# Patient Record
Sex: FEMALE | Race: WHITE | Employment: UNEMPLOYED | URBAN - NONMETROPOLITAN AREA
[De-identification: names, ages, dates, MRNs, and addresses within clinical notes are randomized per-mention and may not be internally consistent; named-entity substitution may affect disease eponyms.]

---

## 2017-01-06 ENCOUNTER — OFFICE VISIT (OUTPATIENT)
Dept: FAMILY MEDICINE CLINIC | Age: 52
End: 2017-01-06

## 2017-01-06 VITALS — HEART RATE: 73 BPM | DIASTOLIC BLOOD PRESSURE: 68 MMHG | OXYGEN SATURATION: 98 % | SYSTOLIC BLOOD PRESSURE: 120 MMHG

## 2017-01-06 DIAGNOSIS — M79.7 FIBROMYALGIA: ICD-10-CM

## 2017-01-06 DIAGNOSIS — G44.40 DRUG INDUCED HEADACHE: ICD-10-CM

## 2017-01-06 DIAGNOSIS — G43.519 INTRACTABLE PERSISTENT MIGRAINE AURA WITHOUT CEREBRAL INFARCTION AND WITHOUT STATUS MIGRAINOSUS: Primary | ICD-10-CM

## 2017-01-06 DIAGNOSIS — F51.01 PRIMARY INSOMNIA: ICD-10-CM

## 2017-01-06 DIAGNOSIS — F32.89 DEPRESSIVE DISORDER, NOT ELSEWHERE CLASSIFIED: ICD-10-CM

## 2017-01-06 DIAGNOSIS — I10 ESSENTIAL HYPERTENSION, BENIGN: ICD-10-CM

## 2017-01-06 DIAGNOSIS — R94.31 PROLONGED QT INTERVAL: ICD-10-CM

## 2017-01-06 DIAGNOSIS — T43.011S: ICD-10-CM

## 2017-01-06 DIAGNOSIS — R40.0 SOMNOLENCE: ICD-10-CM

## 2017-01-06 PROCEDURE — 99215 OFFICE O/P EST HI 40 MIN: CPT | Performed by: FAMILY MEDICINE

## 2017-01-06 RX ORDER — PROMETHAZINE HYDROCHLORIDE 25 MG/1
TABLET ORAL
Qty: 30 TABLET | Refills: 0 | Status: CANCELLED | OUTPATIENT
Start: 2017-01-06

## 2017-01-10 ENCOUNTER — TELEPHONE (OUTPATIENT)
Dept: FAMILY MEDICINE CLINIC | Age: 52
End: 2017-01-10

## 2017-01-10 DIAGNOSIS — I10 ESSENTIAL HYPERTENSION, BENIGN: ICD-10-CM

## 2017-01-10 RX ORDER — METOPROLOL SUCCINATE 50 MG/1
TABLET, EXTENDED RELEASE ORAL
Qty: 30 TABLET | Refills: 3 | Status: SHIPPED | OUTPATIENT
Start: 2017-01-10 | End: 2017-07-26 | Stop reason: ALTCHOICE

## 2017-01-10 RX ORDER — POTASSIUM CHLORIDE 750 MG/1
10 CAPSULE, EXTENDED RELEASE ORAL DAILY PRN
Qty: 30 CAPSULE | Refills: 2 | Status: SHIPPED | OUTPATIENT
Start: 2017-01-10 | End: 2017-09-16

## 2017-01-10 RX ORDER — TRIAMTERENE AND HYDROCHLOROTHIAZIDE 37.5; 25 MG/1; MG/1
CAPSULE ORAL
Qty: 60 CAPSULE | Refills: 11 | Status: SHIPPED | OUTPATIENT
Start: 2017-01-10 | End: 2018-03-05

## 2017-01-11 ENCOUNTER — TELEPHONE (OUTPATIENT)
Dept: FAMILY MEDICINE CLINIC | Age: 52
End: 2017-01-11

## 2017-01-11 RX ORDER — METHYLPREDNISOLONE 4 MG/1
TABLET ORAL
Qty: 1 KIT | Refills: 0 | Status: SHIPPED | OUTPATIENT
Start: 2017-01-11 | End: 2017-01-15

## 2017-01-12 ENCOUNTER — OFFICE VISIT (OUTPATIENT)
Dept: NEUROLOGY | Age: 52
End: 2017-01-12

## 2017-01-12 VITALS
OXYGEN SATURATION: 97 % | HEART RATE: 100 BPM | HEIGHT: 63 IN | WEIGHT: 141 LBS | DIASTOLIC BLOOD PRESSURE: 86 MMHG | SYSTOLIC BLOOD PRESSURE: 114 MMHG | BODY MASS INDEX: 24.98 KG/M2

## 2017-01-12 DIAGNOSIS — T50.901D DRUG OVERDOSE, ACCIDENTAL OR UNINTENTIONAL, SUBSEQUENT ENCOUNTER: ICD-10-CM

## 2017-01-12 DIAGNOSIS — F51.05 INSOMNIA DUE TO MENTAL CONDITION: ICD-10-CM

## 2017-01-12 DIAGNOSIS — R56.9 NEW ONSET SEIZURE (HCC): ICD-10-CM

## 2017-01-12 DIAGNOSIS — G93.40 ACUTE ENCEPHALOPATHY: ICD-10-CM

## 2017-01-12 DIAGNOSIS — G43.719 INTRACTABLE CHRONIC MIGRAINE WITHOUT AURA AND WITHOUT STATUS MIGRAINOSUS: Primary | ICD-10-CM

## 2017-01-12 PROCEDURE — 99214 OFFICE O/P EST MOD 30 MIN: CPT | Performed by: PSYCHIATRY & NEUROLOGY

## 2017-01-16 PROBLEM — G93.40 ENCEPHALOPATHY: Status: ACTIVE | Noted: 2017-01-16

## 2017-01-17 ENCOUNTER — TELEPHONE (OUTPATIENT)
Dept: FAMILY MEDICINE CLINIC | Age: 52
End: 2017-01-17

## 2017-01-17 ENCOUNTER — CARE COORDINATION (OUTPATIENT)
Dept: CARE COORDINATION | Age: 52
End: 2017-01-17

## 2017-01-17 DIAGNOSIS — R56.9 NEW ONSET SEIZURE (HCC): Primary | ICD-10-CM

## 2017-01-17 RX ORDER — METOPROLOL SUCCINATE 25 MG/1
TABLET, EXTENDED RELEASE ORAL
Qty: 30 TABLET | Refills: 3 | Status: SHIPPED | OUTPATIENT
Start: 2017-01-17 | End: 2017-12-01 | Stop reason: DRUGHIGH

## 2017-01-25 ENCOUNTER — TELEPHONE (OUTPATIENT)
Dept: FAMILY MEDICINE CLINIC | Age: 52
End: 2017-01-25

## 2017-01-30 RX ORDER — IBUPROFEN 800 MG/1
TABLET ORAL
Qty: 90 TABLET | Refills: 0 | Status: SHIPPED | OUTPATIENT
Start: 2017-01-30 | End: 2017-06-12

## 2017-01-31 ENCOUNTER — TELEPHONE (OUTPATIENT)
Dept: FAMILY MEDICINE CLINIC | Age: 52
End: 2017-01-31

## 2017-02-15 ENCOUNTER — TELEPHONE (OUTPATIENT)
Dept: NEUROLOGY | Age: 52
End: 2017-02-15

## 2017-02-20 ENCOUNTER — CARE COORDINATION (OUTPATIENT)
Dept: CARE COORDINATION | Age: 52
End: 2017-02-20

## 2017-02-20 ENCOUNTER — TELEPHONE (OUTPATIENT)
Dept: FAMILY MEDICINE CLINIC | Age: 52
End: 2017-02-20

## 2017-02-27 ENCOUNTER — TELEPHONE (OUTPATIENT)
Dept: FAMILY MEDICINE CLINIC | Age: 52
End: 2017-02-27

## 2017-02-27 RX ORDER — BUSPIRONE HYDROCHLORIDE 15 MG/1
15 TABLET ORAL 3 TIMES DAILY PRN
Qty: 30 TABLET | Refills: 0 | Status: ON HOLD | OUTPATIENT
Start: 2017-02-27 | End: 2017-04-13 | Stop reason: ALTCHOICE

## 2017-03-29 ENCOUNTER — OFFICE VISIT (OUTPATIENT)
Dept: FAMILY MEDICINE CLINIC | Age: 52
End: 2017-03-29

## 2017-03-29 VITALS
WEIGHT: 147.2 LBS | SYSTOLIC BLOOD PRESSURE: 124 MMHG | OXYGEN SATURATION: 97 % | BODY MASS INDEX: 26.08 KG/M2 | DIASTOLIC BLOOD PRESSURE: 74 MMHG | HEART RATE: 77 BPM | HEIGHT: 63 IN

## 2017-03-29 DIAGNOSIS — R41.82 ALTERED MENTAL STATUS, UNSPECIFIED ALTERED MENTAL STATUS TYPE: Primary | ICD-10-CM

## 2017-03-29 DIAGNOSIS — F32.9 REACTIVE DEPRESSION: ICD-10-CM

## 2017-03-29 DIAGNOSIS — I10 ESSENTIAL HYPERTENSION, BENIGN: ICD-10-CM

## 2017-03-29 DIAGNOSIS — G43.719 INTRACTABLE CHRONIC MIGRAINE WITHOUT AURA AND WITHOUT STATUS MIGRAINOSUS: ICD-10-CM

## 2017-03-29 DIAGNOSIS — G93.40 ENCEPHALOPATHY: ICD-10-CM

## 2017-03-29 DIAGNOSIS — F51.01 PRIMARY INSOMNIA: ICD-10-CM

## 2017-03-29 PROCEDURE — G8484 FLU IMMUNIZE NO ADMIN: HCPCS | Performed by: FAMILY MEDICINE

## 2017-03-29 PROCEDURE — 3014F SCREEN MAMMO DOC REV: CPT | Performed by: FAMILY MEDICINE

## 2017-03-29 PROCEDURE — 4004F PT TOBACCO SCREEN RCVD TLK: CPT | Performed by: FAMILY MEDICINE

## 2017-03-29 PROCEDURE — G8419 CALC BMI OUT NRM PARAM NOF/U: HCPCS | Performed by: FAMILY MEDICINE

## 2017-03-29 PROCEDURE — 99214 OFFICE O/P EST MOD 30 MIN: CPT | Performed by: FAMILY MEDICINE

## 2017-03-29 PROCEDURE — G8598 ASA/ANTIPLAT THER USED: HCPCS | Performed by: FAMILY MEDICINE

## 2017-03-29 PROCEDURE — G8427 DOCREV CUR MEDS BY ELIG CLIN: HCPCS | Performed by: FAMILY MEDICINE

## 2017-04-11 ENCOUNTER — TELEPHONE (OUTPATIENT)
Dept: FAMILY MEDICINE CLINIC | Age: 52
End: 2017-04-11

## 2017-04-12 ENCOUNTER — TELEPHONE (OUTPATIENT)
Dept: FAMILY MEDICINE CLINIC | Age: 52
End: 2017-04-12

## 2017-04-14 ENCOUNTER — CARE COORDINATION (OUTPATIENT)
Dept: CARE COORDINATION | Age: 52
End: 2017-04-14

## 2017-05-03 ENCOUNTER — TELEPHONE (OUTPATIENT)
Dept: FAMILY MEDICINE CLINIC | Age: 52
End: 2017-05-03

## 2017-05-15 ENCOUNTER — TELEPHONE (OUTPATIENT)
Dept: FAMILY MEDICINE CLINIC | Age: 52
End: 2017-05-15

## 2017-06-12 ENCOUNTER — TELEPHONE (OUTPATIENT)
Dept: FAMILY MEDICINE CLINIC | Age: 52
End: 2017-06-12

## 2017-06-13 ENCOUNTER — TELEPHONE (OUTPATIENT)
Dept: FAMILY MEDICINE CLINIC | Age: 52
End: 2017-06-13

## 2017-06-13 ENCOUNTER — HOSPITAL ENCOUNTER (OUTPATIENT)
Dept: OTHER | Age: 52
Discharge: HOME | End: 2017-06-13
Attending: FAMILY MEDICINE | Admitting: FAMILY MEDICINE

## 2017-06-13 DIAGNOSIS — K59.00 CONSTIPATION, UNSPECIFIED CONSTIPATION TYPE: Primary | ICD-10-CM

## 2017-06-13 DIAGNOSIS — K59.00 CONSTIPATION, UNSPECIFIED CONSTIPATION TYPE: ICD-10-CM

## 2017-06-28 RX ORDER — POTASSIUM CHLORIDE 750 MG/1
TABLET, EXTENDED RELEASE ORAL
Qty: 30 TABLET | Refills: 11 | Status: SHIPPED | OUTPATIENT
Start: 2017-06-28 | End: 2018-08-28 | Stop reason: SDUPTHER

## 2017-07-10 RX ORDER — IBUPROFEN 800 MG/1
TABLET ORAL
Qty: 90 TABLET | Refills: 0 | Status: ON HOLD | OUTPATIENT
Start: 2017-07-10 | End: 2017-07-27 | Stop reason: HOSPADM

## 2017-07-13 RX ORDER — METOPROLOL SUCCINATE 50 MG/1
TABLET, EXTENDED RELEASE ORAL
Qty: 90 TABLET | Refills: 0 | Status: SHIPPED | OUTPATIENT
Start: 2017-07-13 | End: 2017-07-26 | Stop reason: ALTCHOICE

## 2017-07-14 ENCOUNTER — TELEPHONE (OUTPATIENT)
Dept: FAMILY MEDICINE CLINIC | Age: 52
End: 2017-07-14

## 2017-07-14 RX ORDER — FLUCONAZOLE 150 MG/1
150 TABLET ORAL ONCE
Qty: 1 TABLET | Refills: 0 | Status: SHIPPED | OUTPATIENT
Start: 2017-07-14 | End: 2017-07-14

## 2017-07-28 ENCOUNTER — CARE COORDINATION (OUTPATIENT)
Dept: CARE COORDINATION | Age: 52
End: 2017-07-28

## 2017-08-14 ENCOUNTER — TELEPHONE (OUTPATIENT)
Dept: FAMILY MEDICINE CLINIC | Age: 52
End: 2017-08-14

## 2017-08-23 ENCOUNTER — OFFICE VISIT (OUTPATIENT)
Dept: FAMILY MEDICINE CLINIC | Age: 52
End: 2017-08-23

## 2017-08-23 VITALS
OXYGEN SATURATION: 98 % | WEIGHT: 155.4 LBS | BODY MASS INDEX: 27.54 KG/M2 | DIASTOLIC BLOOD PRESSURE: 70 MMHG | HEART RATE: 90 BPM | SYSTOLIC BLOOD PRESSURE: 124 MMHG | HEIGHT: 63 IN

## 2017-08-23 DIAGNOSIS — Z23 NEED FOR PROPHYLACTIC VACCINATION AGAINST STREPTOCOCCUS PNEUMONIAE (PNEUMOCOCCUS): ICD-10-CM

## 2017-08-23 DIAGNOSIS — Z12.31 ENCOUNTER FOR SCREENING MAMMOGRAM FOR BREAST CANCER: ICD-10-CM

## 2017-08-23 DIAGNOSIS — I10 ESSENTIAL HYPERTENSION, BENIGN: Primary | ICD-10-CM

## 2017-08-23 DIAGNOSIS — F32.9 REACTIVE DEPRESSION: ICD-10-CM

## 2017-08-23 DIAGNOSIS — Z12.11 COLON CANCER SCREENING: ICD-10-CM

## 2017-08-23 DIAGNOSIS — T50.901S: ICD-10-CM

## 2017-08-23 DIAGNOSIS — Z23 NEED FOR INFLUENZA VACCINATION: ICD-10-CM

## 2017-08-23 DIAGNOSIS — Z13.220 LIPID SCREENING: ICD-10-CM

## 2017-08-23 DIAGNOSIS — G89.29 OTHER CHRONIC PAIN: ICD-10-CM

## 2017-08-23 DIAGNOSIS — F33.9 EPISODE OF RECURRENT MAJOR DEPRESSIVE DISORDER, UNSPECIFIED DEPRESSION EPISODE SEVERITY (HCC): ICD-10-CM

## 2017-08-23 PROBLEM — T50.901A POLYSUBSTANCE OVERDOSE: Status: ACTIVE | Noted: 2017-08-23

## 2017-08-23 PROCEDURE — G0444 DEPRESSION SCREEN ANNUAL: HCPCS | Performed by: FAMILY MEDICINE

## 2017-08-23 PROCEDURE — 90732 PPSV23 VACC 2 YRS+ SUBQ/IM: CPT | Performed by: FAMILY MEDICINE

## 2017-08-23 PROCEDURE — 90471 IMMUNIZATION ADMIN: CPT | Performed by: FAMILY MEDICINE

## 2017-08-23 PROCEDURE — 3014F SCREEN MAMMO DOC REV: CPT | Performed by: FAMILY MEDICINE

## 2017-08-23 PROCEDURE — 3017F COLORECTAL CA SCREEN DOC REV: CPT | Performed by: FAMILY MEDICINE

## 2017-08-23 PROCEDURE — G8427 DOCREV CUR MEDS BY ELIG CLIN: HCPCS | Performed by: FAMILY MEDICINE

## 2017-08-23 PROCEDURE — 1111F DSCHRG MED/CURRENT MED MERGE: CPT | Performed by: FAMILY MEDICINE

## 2017-08-23 PROCEDURE — 4004F PT TOBACCO SCREEN RCVD TLK: CPT | Performed by: FAMILY MEDICINE

## 2017-08-23 PROCEDURE — G8598 ASA/ANTIPLAT THER USED: HCPCS | Performed by: FAMILY MEDICINE

## 2017-08-23 PROCEDURE — 99214 OFFICE O/P EST MOD 30 MIN: CPT | Performed by: FAMILY MEDICINE

## 2017-08-23 PROCEDURE — 90688 IIV4 VACCINE SPLT 0.5 ML IM: CPT | Performed by: FAMILY MEDICINE

## 2017-08-23 PROCEDURE — 90472 IMMUNIZATION ADMIN EACH ADD: CPT | Performed by: FAMILY MEDICINE

## 2017-08-23 PROCEDURE — G8419 CALC BMI OUT NRM PARAM NOF/U: HCPCS | Performed by: FAMILY MEDICINE

## 2017-08-23 RX ORDER — TIZANIDINE 2 MG/1
2 TABLET ORAL EVERY 6 HOURS PRN
Qty: 120 TABLET | Refills: 0 | Status: SHIPPED | OUTPATIENT
Start: 2017-08-23 | End: 2017-09-21 | Stop reason: SDUPTHER

## 2017-08-23 ASSESSMENT — PATIENT HEALTH QUESTIONNAIRE - PHQ9
SUM OF ALL RESPONSES TO PHQ9 QUESTIONS 1 & 2: 6
5. POOR APPETITE OR OVEREATING: 1
8. MOVING OR SPEAKING SO SLOWLY THAT OTHER PEOPLE COULD HAVE NOTICED. OR THE OPPOSITE, BEING SO FIGETY OR RESTLESS THAT YOU HAVE BEEN MOVING AROUND A LOT MORE THAN USUAL: 0
3. TROUBLE FALLING OR STAYING ASLEEP: 3
7. TROUBLE CONCENTRATING ON THINGS, SUCH AS READING THE NEWSPAPER OR WATCHING TELEVISION: 3
6. FEELING BAD ABOUT YOURSELF - OR THAT YOU ARE A FAILURE OR HAVE LET YOURSELF OR YOUR FAMILY DOWN: 1
10. IF YOU CHECKED OFF ANY PROBLEMS, HOW DIFFICULT HAVE THESE PROBLEMS MADE IT FOR YOU TO DO YOUR WORK, TAKE CARE OF THINGS AT HOME, OR GET ALONG WITH OTHER PEOPLE: 3
SUM OF ALL RESPONSES TO PHQ QUESTIONS 1-9: 17
1. LITTLE INTEREST OR PLEASURE IN DOING THINGS: 3
2. FEELING DOWN, DEPRESSED OR HOPELESS: 3
4. FEELING TIRED OR HAVING LITTLE ENERGY: 3
9. THOUGHTS THAT YOU WOULD BE BETTER OFF DEAD, OR OF HURTING YOURSELF: 0

## 2017-09-15 ENCOUNTER — TELEPHONE (OUTPATIENT)
Dept: FAMILY MEDICINE CLINIC | Age: 52
End: 2017-09-15

## 2017-09-21 DIAGNOSIS — G89.29 OTHER CHRONIC PAIN: ICD-10-CM

## 2017-09-21 RX ORDER — TIZANIDINE 2 MG/1
TABLET ORAL
Qty: 120 TABLET | Refills: 0 | Status: SHIPPED | OUTPATIENT
Start: 2017-09-21 | End: 2017-09-25 | Stop reason: SDUPTHER

## 2017-09-25 ENCOUNTER — TELEPHONE (OUTPATIENT)
Dept: FAMILY MEDICINE CLINIC | Age: 52
End: 2017-09-25

## 2017-09-25 DIAGNOSIS — G89.29 OTHER CHRONIC PAIN: ICD-10-CM

## 2017-09-25 RX ORDER — TIZANIDINE 4 MG/1
4 TABLET ORAL EVERY 6 HOURS PRN
Qty: 120 TABLET | Refills: 0 | Status: SHIPPED | OUTPATIENT
Start: 2017-09-25 | End: 2017-10-23 | Stop reason: SDUPTHER

## 2017-10-03 ENCOUNTER — TELEPHONE (OUTPATIENT)
Dept: OTHER | Facility: CLINIC | Age: 52
End: 2017-10-03

## 2017-10-11 ENCOUNTER — TELEPHONE (OUTPATIENT)
Dept: FAMILY MEDICINE CLINIC | Age: 52
End: 2017-10-11

## 2017-10-11 RX ORDER — KETOROLAC TROMETHAMINE 10 MG/1
10 TABLET, FILM COATED ORAL EVERY 6 HOURS PRN
Qty: 20 TABLET | Refills: 0 | Status: SHIPPED | OUTPATIENT
Start: 2017-10-11 | End: 2017-11-14 | Stop reason: SDUPTHER

## 2017-10-12 NOTE — TELEPHONE ENCOUNTER
Would patient like to come in for a toradol injection for her headache? Has she made an appointment with pain management?

## 2017-10-12 NOTE — TELEPHONE ENCOUNTER
I spoke with the pt and she said that the Toradol injections or Phenergan does not work for her. Pt said that her head is congested, she is seeing double and she just feels like her head is ready to split apart. Pt said she has thrown up 5 times today due to the pain. Pt does not have anyone to bring her into the office today, due to her  being at work until and her mom is undergoing chemo treatments. Pt is refusing to go to the ER due to the cost.  Pt says that Dr. Raciel Carlos can not see her till Nov, she has gone to see Dr. Lucía Anguiano before and she does not trust him due to getting a shot from him and it causing her to stroke the next day so she does not want to go back to see him. Pt said that Dr. Kavya Sawyer office has her on a waiting list for if someone cancels their appt. She has gone to see Dr. Cecy Marsh before and she was dismissed from the practice due to them finding a partial crushed up pill in her purse.

## 2017-10-12 NOTE — TELEPHONE ENCOUNTER
Patient called after office hours (not sure how she go to us, [facility] on-call service) reporting migraine episode, requesting something for pain. Instructed try calling again to your office on call service or call in am to schedule an appointment. If headache is different and the worst she ever had in life, go to ER. Pt voiced understanding.

## 2017-10-16 ENCOUNTER — OFFICE VISIT (OUTPATIENT)
Dept: FAMILY MEDICINE CLINIC | Age: 52
End: 2017-10-16

## 2017-10-16 ENCOUNTER — HOSPITAL ENCOUNTER (OUTPATIENT)
Dept: CT IMAGING | Facility: MEDICAL CENTER | Age: 52
Discharge: HOME | End: 2017-10-16
Attending: NURSE PRACTITIONER | Admitting: NURSE PRACTITIONER

## 2017-10-16 VITALS
SYSTOLIC BLOOD PRESSURE: 120 MMHG | HEART RATE: 84 BPM | BODY MASS INDEX: 26.93 KG/M2 | DIASTOLIC BLOOD PRESSURE: 70 MMHG | HEIGHT: 63 IN | WEIGHT: 152 LBS | OXYGEN SATURATION: 97 %

## 2017-10-16 DIAGNOSIS — G43.811 OTHER MIGRAINE WITH STATUS MIGRAINOSUS, INTRACTABLE: ICD-10-CM

## 2017-10-16 DIAGNOSIS — R52 SEVERE PAIN: ICD-10-CM

## 2017-10-16 DIAGNOSIS — Z72.0 TOBACCO ABUSE: ICD-10-CM

## 2017-10-16 DIAGNOSIS — R06.02 SHORTNESS OF BREATH: ICD-10-CM

## 2017-10-16 DIAGNOSIS — R10.11 RIGHT UPPER QUADRANT PAIN: ICD-10-CM

## 2017-10-16 DIAGNOSIS — R11.0 NAUSEA: ICD-10-CM

## 2017-10-16 DIAGNOSIS — R10.11 RIGHT UPPER QUADRANT PAIN: Primary | ICD-10-CM

## 2017-10-16 LAB
A/G RATIO: 1.7 (ref 1.1–2.2)
ALBUMIN SERPL-MCNC: 4.7 G/DL (ref 3.4–5)
ALP BLD-CCNC: 59 U/L (ref 40–129)
ALT SERPL-CCNC: 13 U/L (ref 10–40)
AMYLASE: 38 U/L (ref 25–115)
ANION GAP SERPL CALCULATED.3IONS-SCNC: 14 MMOL/L (ref 3–16)
AST SERPL-CCNC: 27 U/L (ref 15–37)
BASOPHILS ABSOLUTE: 0 K/UL (ref 0–0.2)
BASOPHILS RELATIVE PERCENT: 0.7 %
BILIRUB SERPL-MCNC: <0.2 MG/DL (ref 0–1)
BUN BLDV-MCNC: 8 MG/DL (ref 7–20)
CALCIUM SERPL-MCNC: 9 MG/DL (ref 8.3–10.6)
CHLORIDE BLD-SCNC: 106 MMOL/L (ref 99–110)
CO2: 22 MMOL/L (ref 21–32)
CREAT SERPL-MCNC: 0.8 MG/DL (ref 0.6–1.1)
EOSINOPHILS ABSOLUTE: 0.2 K/UL (ref 0–0.6)
EOSINOPHILS RELATIVE PERCENT: 3.6 %
GFR AFRICAN AMERICAN: >60
GFR NON-AFRICAN AMERICAN: >60
GLOBULIN: 2.7 G/DL
GLUCOSE BLD-MCNC: 79 MG/DL (ref 70–99)
HCT VFR BLD CALC: 37.9 % (ref 36–48)
HEMOGLOBIN: 12.6 G/DL (ref 12–16)
LIPASE: 17 U/L (ref 13–60)
LYMPHOCYTES ABSOLUTE: 2 K/UL (ref 1–5.1)
LYMPHOCYTES RELATIVE PERCENT: 33.1 %
MCH RBC QN AUTO: 29.4 PG (ref 26–34)
MCHC RBC AUTO-ENTMCNC: 33.3 G/DL (ref 31–36)
MCV RBC AUTO: 88.3 FL (ref 80–100)
MONOCYTES ABSOLUTE: 0.4 K/UL (ref 0–1.3)
MONOCYTES RELATIVE PERCENT: 6.8 %
NEUTROPHILS ABSOLUTE: 3.3 K/UL (ref 1.7–7.7)
NEUTROPHILS RELATIVE PERCENT: 55.8 %
PDW BLD-RTO: 14.6 % (ref 12.4–15.4)
PLATELET # BLD: 246 K/UL (ref 135–450)
PMV BLD AUTO: 7.8 FL (ref 5–10.5)
POTASSIUM SERPL-SCNC: 5.2 MMOL/L (ref 3.5–5.1)
RBC # BLD: 4.3 M/UL (ref 4–5.2)
SODIUM BLD-SCNC: 142 MMOL/L (ref 136–145)
TOTAL PROTEIN: 7.4 G/DL (ref 6.4–8.2)
WBC # BLD: 5.9 K/UL (ref 4–11)

## 2017-10-16 PROCEDURE — 3014F SCREEN MAMMO DOC REV: CPT | Performed by: NURSE PRACTITIONER

## 2017-10-16 PROCEDURE — 99214 OFFICE O/P EST MOD 30 MIN: CPT | Performed by: NURSE PRACTITIONER

## 2017-10-16 PROCEDURE — G8419 CALC BMI OUT NRM PARAM NOF/U: HCPCS | Performed by: NURSE PRACTITIONER

## 2017-10-16 PROCEDURE — 3017F COLORECTAL CA SCREEN DOC REV: CPT | Performed by: NURSE PRACTITIONER

## 2017-10-16 PROCEDURE — 4004F PT TOBACCO SCREEN RCVD TLK: CPT | Performed by: NURSE PRACTITIONER

## 2017-10-16 PROCEDURE — G8598 ASA/ANTIPLAT THER USED: HCPCS | Performed by: NURSE PRACTITIONER

## 2017-10-16 PROCEDURE — G8427 DOCREV CUR MEDS BY ELIG CLIN: HCPCS | Performed by: NURSE PRACTITIONER

## 2017-10-16 PROCEDURE — G8484 FLU IMMUNIZE NO ADMIN: HCPCS | Performed by: NURSE PRACTITIONER

## 2017-10-16 RX ORDER — PROCHLORPERAZINE MALEATE 5 MG/1
5 TABLET ORAL EVERY 6 HOURS PRN
Qty: 30 TABLET | Refills: 0 | Status: SHIPPED | OUTPATIENT
Start: 2017-10-16 | End: 2017-10-16 | Stop reason: SDUPTHER

## 2017-10-16 RX ORDER — PROCHLORPERAZINE MALEATE 10 MG
10 TABLET ORAL EVERY 6 HOURS PRN
Qty: 30 TABLET | Refills: 0 | Status: ON HOLD | OUTPATIENT
Start: 2017-10-16 | End: 2017-12-07 | Stop reason: ALTCHOICE

## 2017-10-16 RX ORDER — PREDNISONE 10 MG/1
TABLET ORAL
Qty: 30 TABLET | Refills: 0 | Status: SHIPPED | OUTPATIENT
Start: 2017-10-16 | End: 2017-10-16 | Stop reason: ALTCHOICE

## 2017-10-16 RX ORDER — CLONAZEPAM 2 MG/1
TABLET ORAL
Refills: 1 | Status: ON HOLD | COMMUNITY
Start: 2017-10-10 | End: 2017-12-08 | Stop reason: HOSPADM

## 2017-10-16 RX ORDER — METOPROLOL SUCCINATE 50 MG/1
TABLET, EXTENDED RELEASE ORAL
Qty: 90 TABLET | Refills: 1 | Status: SHIPPED | OUTPATIENT
Start: 2017-10-16 | End: 2018-04-14 | Stop reason: SDUPTHER

## 2017-10-16 ASSESSMENT — ENCOUNTER SYMPTOMS
BLOOD IN STOOL: 0
ABDOMINAL PAIN: 1
CONSTIPATION: 0
VOMITING: 1
DIARRHEA: 0
ABDOMINAL DISTENTION: 0
NAUSEA: 1
SHORTNESS OF BREATH: 1
RECTAL PAIN: 0
ANAL BLEEDING: 0
WHEEZING: 0
APNEA: 0
STRIDOR: 0
CHEST TIGHTNESS: 0
COUGH: 0
CHOKING: 0
BACK PAIN: 1

## 2017-10-16 NOTE — PATIENT INSTRUCTIONS
spots, or sensitivity to bright light or loud noise. Note if the headache occurred near your period. List anything that might have triggered the headache. Triggers may include certain foods (chocolate, cheese, wine) or odors, smoke, bright light, stress, or lack of sleep. · If your doctor has prescribed medicine for your migraines, take it as directed. You may have medicine that you take only when you get a migraine and medicine that you take all the time to help prevent migraines. ¨ If your doctor has prescribed medicine for when you get a headache, take it at the first sign of a migraine, unless your doctor has given you other instructions. ¨ If your doctor has prescribed medicine to prevent migraines, take it exactly as prescribed. Call your doctor if you think you are having a problem with your medicine. · Find healthy ways to deal with stress. Migraines are most common during or right after stressful times. Take time to relax before and after you do something that has caused a migraine in the past.  · Try to keep your muscles relaxed by keeping good posture. Check your jaw, face, neck, and shoulder muscles for tension. Try to relax them. When you sit at a desk, change positions often. And make sure to stretch for 30 seconds each hour. · Get plenty of sleep and exercise. · Eat meals on a regular schedule. Avoid foods and drinks that often trigger migraines. These include chocolate, alcohol (especially red wine and port), aspartame, monosodium glutamate (MSG), and some additives found in foods (such as hot dogs, reddy, cold cuts, aged cheeses, and pickled foods). · Limit caffeine. Don't drink too much coffee, tea, or soda. But don't quit caffeine suddenly. That can also give you migraines. · Do not smoke or allow others to smoke around you. If you need help quitting, talk to your doctor about stop-smoking programs and medicines. These can increase your chances of quitting for good.   · If you are taking

## 2017-10-18 ENCOUNTER — TELEPHONE (OUTPATIENT)
Dept: FAMILY MEDICINE CLINIC | Age: 52
End: 2017-10-18

## 2017-10-18 NOTE — TELEPHONE ENCOUNTER
Pt was wanting to see if we could call her [facility] in to [address] after asking if we could do that she said that her mother threw her last ones away and they were filled on 10/13/17. Explained to her that her insurance would probably not pay for it since she had just gotten it filled so she was wanting to see if we could call her something else for her headaches and the pain in her ribs.

## 2017-10-23 DIAGNOSIS — G89.29 OTHER CHRONIC PAIN: ICD-10-CM

## 2017-10-23 RX ORDER — TIZANIDINE 4 MG/1
TABLET ORAL
Qty: 120 TABLET | Refills: 0 | Status: SHIPPED | OUTPATIENT
Start: 2017-10-23 | End: 2017-11-21 | Stop reason: SDUPTHER

## 2017-11-14 ENCOUNTER — TELEPHONE (OUTPATIENT)
Dept: FAMILY MEDICINE CLINIC | Age: 52
End: 2017-11-14

## 2017-11-14 RX ORDER — KETOROLAC TROMETHAMINE 10 MG/1
10 TABLET, FILM COATED ORAL EVERY 6 HOURS PRN
Qty: 20 TABLET | Refills: 0 | Status: SHIPPED | OUTPATIENT
Start: 2017-11-14 | End: 2017-12-01 | Stop reason: ALTCHOICE

## 2017-11-14 NOTE — TELEPHONE ENCOUNTER
Pt said that she has a migraine said that she does not have a pain doctor yet was wanting to see if she could get something called in to [facility]

## 2017-11-15 ENCOUNTER — TELEPHONE (OUTPATIENT)
Dept: ADMINISTRATIVE | Age: 52
End: 2017-11-15

## 2017-11-15 RX ORDER — PROMETHAZINE HYDROCHLORIDE 25 MG/1
25 TABLET ORAL EVERY 8 HOURS PRN
Qty: 20 TABLET | Refills: 0 | Status: SHIPPED | OUTPATIENT
Start: 2017-11-15 | End: 2017-12-11 | Stop reason: SDUPTHER

## 2017-11-15 NOTE — TELEPHONE ENCOUNTER
Patient called in saying she would like to be prescribed a new medication for her headaches. Her pain medication is not working.

## 2017-11-20 ENCOUNTER — TELEPHONE (OUTPATIENT)
Dept: FAMILY MEDICINE CLINIC | Age: 52
End: 2017-11-20

## 2017-11-20 RX ORDER — BUTALBITAL, ACETAMINOPHEN AND CAFFEINE 300; 40; 50 MG/1; MG/1; MG/1
1 CAPSULE ORAL EVERY 4 HOURS PRN
Qty: 10 CAPSULE | Refills: 0 | Status: SHIPPED | OUTPATIENT
Start: 2017-11-20 | End: 2017-12-01 | Stop reason: ALTCHOICE

## 2017-11-20 NOTE — TELEPHONE ENCOUNTER
Pt said that she is still having the migraine headaches and she said that she can't get in to Dr. Ashlyn Johnson till after piedad and he will not give her anything till he sees her was wanting to see if she could get some stadol called in to [address]

## 2017-11-21 DIAGNOSIS — G89.29 OTHER CHRONIC PAIN: ICD-10-CM

## 2017-11-21 RX ORDER — TIZANIDINE 4 MG/1
TABLET ORAL
Qty: 120 TABLET | Refills: 11 | Status: ON HOLD | OUTPATIENT
Start: 2017-11-21 | End: 2017-12-08 | Stop reason: HOSPADM

## 2017-12-01 ENCOUNTER — OFFICE VISIT (OUTPATIENT)
Dept: FAMILY MEDICINE CLINIC | Age: 52
End: 2017-12-01

## 2017-12-01 VITALS
SYSTOLIC BLOOD PRESSURE: 128 MMHG | BODY MASS INDEX: 28.21 KG/M2 | HEIGHT: 63 IN | HEART RATE: 89 BPM | WEIGHT: 159.2 LBS | OXYGEN SATURATION: 99 % | DIASTOLIC BLOOD PRESSURE: 80 MMHG

## 2017-12-01 DIAGNOSIS — I10 ESSENTIAL HYPERTENSION, BENIGN: Primary | ICD-10-CM

## 2017-12-01 DIAGNOSIS — F32.9 REACTIVE DEPRESSION: ICD-10-CM

## 2017-12-01 DIAGNOSIS — I10 UNCONTROLLED HYPERTENSION: ICD-10-CM

## 2017-12-01 DIAGNOSIS — M79.7 FIBROMYALGIA: ICD-10-CM

## 2017-12-01 DIAGNOSIS — K59.04 CHRONIC IDIOPATHIC CONSTIPATION: ICD-10-CM

## 2017-12-01 DIAGNOSIS — F41.9 CHRONIC ANXIETY: ICD-10-CM

## 2017-12-01 DIAGNOSIS — G43.509 PERSISTENT MIGRAINE AURA WITHOUT CEREBRAL INFARCTION AND WITHOUT STATUS MIGRAINOSUS, NOT INTRACTABLE: ICD-10-CM

## 2017-12-01 DIAGNOSIS — G44.209 TENSION HEADACHE: ICD-10-CM

## 2017-12-01 DIAGNOSIS — F51.01 PRIMARY INSOMNIA: ICD-10-CM

## 2017-12-01 PROCEDURE — 99214 OFFICE O/P EST MOD 30 MIN: CPT | Performed by: FAMILY MEDICINE

## 2017-12-01 PROCEDURE — G8484 FLU IMMUNIZE NO ADMIN: HCPCS | Performed by: FAMILY MEDICINE

## 2017-12-01 PROCEDURE — G8598 ASA/ANTIPLAT THER USED: HCPCS | Performed by: FAMILY MEDICINE

## 2017-12-01 PROCEDURE — G8419 CALC BMI OUT NRM PARAM NOF/U: HCPCS | Performed by: FAMILY MEDICINE

## 2017-12-01 PROCEDURE — 3017F COLORECTAL CA SCREEN DOC REV: CPT | Performed by: FAMILY MEDICINE

## 2017-12-01 PROCEDURE — G8427 DOCREV CUR MEDS BY ELIG CLIN: HCPCS | Performed by: FAMILY MEDICINE

## 2017-12-01 PROCEDURE — 4004F PT TOBACCO SCREEN RCVD TLK: CPT | Performed by: FAMILY MEDICINE

## 2017-12-01 PROCEDURE — 3014F SCREEN MAMMO DOC REV: CPT | Performed by: FAMILY MEDICINE

## 2017-12-01 RX ORDER — BUTALBITAL, ACETAMINOPHEN AND CAFFEINE 300; 40; 50 MG/1; MG/1; MG/1
1 CAPSULE ORAL EVERY 4 HOURS PRN
Qty: 10 CAPSULE | Refills: 0 | Status: ON HOLD | OUTPATIENT
Start: 2017-12-01 | End: 2017-12-08 | Stop reason: HOSPADM

## 2017-12-01 NOTE — PROGRESS NOTES
Rib pain. Pain under right rib area x a few months. She states pain is still present.  Migraine     She states symptoms come and go. The severity is the same, but they do not last as long.  Hypertension     Her bp has been high last week  One reading was 182/117, and another was 200/112. She was very stressed the past few weeks,and sometimes forgets to take her medications. Still has some rib pain since she fell down the steps. She states she thinks she needs Miralax again. Her bowels haven't moved since last Wednesday (over 1 week ago). She will check her formulary book to see what may be cheaper than the Linzess that she tried and it worked well. Her headaches have been lasting fewer days. She is still trying to get into a pain management doctor at Dr Franc [facility] old office. She complains about hot flashes, she may try the OTC Estrovan. HPI  Joy Lynn had a bad migraine about 3 weeks ago and had to sit in her closet for complete darkness. She did find the Fioricet capsules somewhat useful. She is found a new pain specialist.  Janet Infante if she can have some more Fioricet until she gets in with the pain specialist.  Her ribs still hurt, they have for several months. Her CAT scan in October however showed no rib fracture. Blood pressure was elevated 182/117 last week. She's had a good deal stress between her own health condition and trying to take care of her mother and father. Is here for high blood pressure. Watching salt intake. Blood pressure checked at home - yes. Numbers good if checked? No.  Denies chest pain. Denies shortness of breath. Taking medications as prescribed. HX: (> 3 status chronic/inact. Prob) or  Wt Readings from Last 3 Encounters:   12/01/17 159 lb 3.2 oz (72.2 kg)   10/16/17 152 lb (68.9 kg)   10/12/17 145 lb (65.8 kg)       Occupation Retired              HPI:  (>4 )  LOCATION:  QUALITY:SEVERITY:  DURATION:  TIMING:  CONTEXT:  MOD. FACTORS:  ASSOC. S/S:    Pertinent items are noted in HPI.  (+/- 2-9 systems)  ROS  All other systems reviewed and are negative except as noted above  Additional review of systems may be scanned into the media section of this medical record. Any responses requiring further intervention were pursued.     Past Medical History:   Diagnosis Date    Ankle fracture     Anxiety     Arthritis     Benzodiazepine abuse     Depression     Fibromyalgia     Hyperlipidemia     Hypertension     Kidney cyst     x2 rt. ?    Migraine     Pneumonia 2008    bronchitis frequently since    TIA (transient ischemic attack)     Ulcer, Stomach Peptic     frequent N/V    Unspecified cerebral artery occlusion with cerebral infarction      mini stroke cused by severe migraine    Weight loss     frequent N/V, S/P cholecystectomy       Family History   Problem Relation Age of Onset    High Blood Pressure Mother     Heart Disease Mother     Diabetes Maternal Grandfather     Heart Disease Father     Heart Disease Brother        Social History   Substance Use Topics    Smoking status: Light Tobacco Smoker     Packs/day: 0.50     Years: 27.00     Types: Cigarettes    Smokeless tobacco: Never Used    Alcohol use 0.0 oz/week      Comment: less than once a month         Past Surgical History:   Procedure Laterality Date    ANKLE SURGERY       SECTION      CHOLECYSTECTOMY      HAND SURGERY      HERNIA REPAIR      HYSTERECTOMY      TUMOR REMOVAL      lt femur       Immunization History   Administered Date(s) Administered    Influenza Virus Vaccine 09/10/2014    InfluenzaMichael, 3 Years and older, IM 2016, 2017    Pneumococcal 13-valent Conjugate ([id number]) 2013    Pneumococcal Polysaccharide ([id number]) 2017    Tdap (Boostrix, Adacel) 10/06/2012       Vitals:    17 1612   BP: 128/80   Site: Left Arm   Position: Sitting   Cuff Size: Large Adult   Pulse: 89   SpO2: 99%   Weight: 159 lb 3.2 oz (72.2 kg)   Height: 5' 3\" (1.6 m)       Estimated body mass index is 28.2 kg/m² as calculated from the following:    Height as of this encounter: 5' 3\" (1.6 m). Weight as of this encounter: 159 lb 3.2 oz (72.2 kg).   OBJECTIVE:    /80 (Site: Left Arm, Position: Sitting, Cuff Size: Large Adult)   Pulse 89   Ht 5' 3\" (1.6 m)   Wt 159 lb 3.2 oz (72.2 kg)   SpO2 99%   BMI 28.20 kg/m²   BP Readings from Last 2 Encounters:   12/01/17 128/80   11/12/17 (!) 119/98     Lab Review   Admission on 11/12/2017, Discharged on 11/12/2017   Component Date Value    WBC 11/12/2017 6.0     RBC 11/12/2017 3.51*    Hemoglobin 11/12/2017 10.5*    Hematocrit 11/12/2017 31.2*    MCV 11/12/2017 88.9     MCH 11/12/2017 29.9     MCHC 11/12/2017 33.6     RDW 11/12/2017 14.1     Platelets 52/96/2494 229     MPV 11/12/2017 7.2     Neutrophils % 11/12/2017 57.1     Lymphocytes % 11/12/2017 32.4     Monocytes % 11/12/2017 5.6     Eosinophils % 11/12/2017 4.0     Basophils % 11/12/2017 0.9     Neutrophils # 11/12/2017 3.4     Lymphocytes # 11/12/2017 2.0     Monocytes # 11/12/2017 0.3     Eosinophils # 11/12/2017 0.2     Basophils # 11/12/2017 0.1     Sodium 11/12/2017 142     Potassium 11/12/2017 4.4     Chloride 11/12/2017 109     CO2 11/12/2017 25     Anion Gap 11/12/2017 8     Glucose 11/12/2017 81     BUN 11/12/2017 17     CREATININE 11/12/2017 0.8     GFR Non- 11/12/2017 >60     GFR  11/12/2017 >60     Calcium 11/12/2017 8.5     Total Protein 11/12/2017 6.1*    Alb 11/12/2017 3.7     Albumin/Globulin Ratio 11/12/2017 1.5     Total Bilirubin 11/12/2017 <0.2     Alkaline Phosphatase 11/12/2017 53     ALT 11/12/2017 14     AST 11/12/2017 15     Globulin 11/12/2017 2.4     Amphetamine Screen, Urine 11/12/2017 Neg     Barbiturate Screen, Ur 11/12/2017 Neg     Benzodiazepine Screen, U* 11/12/2017 POSITIVE*    Cannabinoid Scrn, Ur 11/12/2017 Neg     below     Oxycodone Urine 10/14/2017 Neg     Ventricular Rate 10/16/2017 63     Atrial Rate 10/16/2017 63     P-R Interval 10/16/2017 164     QRS Duration 10/16/2017 88     Q-T Interval 10/16/2017 450     QTc Calculation (Bazett) 10/16/2017 460     P Axis 10/16/2017 63     R Axis 10/16/2017 28     T Axis 10/16/2017 31     Diagnosis 10/16/2017                      Value:Normal sinus rhythm  Normal ECG  When compared with ECG of 17-SEP-2017 20:43,  No significant change was found  Confirmed by HOOD SAMSON MD ([id number]) on 10/14/2017 1:55:41 PM     Admission on 10/12/2017, Discharged on 10/13/2017   Component Date Value    Color, UA 10/12/2017 Yellow     Clarity, UA 10/12/2017 Clear     Glucose, Ur 10/12/2017 Negative     Bilirubin Urine 10/12/2017 Negative     Ketones, Urine 10/12/2017 Negative     Specific Gravity, UA 10/12/2017 <=1.005     Blood, Urine 10/12/2017 TRACE-INTACT*    pH, UA 10/12/2017 6.0     Protein, UA 10/12/2017 Negative     Urobilinogen, Urine 10/12/2017 0.2     Nitrite, Urine 10/12/2017 Negative     Leukocyte Esterase, Urine 10/12/2017 Negative     Microscopic Examination 10/12/2017 YES     Urine Reflex to Culture 10/12/2017 Not Indicated     Urine Type 10/12/2017 Not Specified     Sodium 10/12/2017 142     Potassium 10/12/2017 3.6     Chloride 10/12/2017 105     CO2 10/12/2017 24     Anion Gap 10/12/2017 13     Glucose 10/12/2017 80     BUN 10/12/2017 13     CREATININE 10/12/2017 0.7     GFR Non- 10/12/2017 >60     GFR  10/12/2017 >60     Calcium 10/12/2017 9.1     Total Protein 10/12/2017 7.0     Alb 10/12/2017 4.2     Albumin/Globulin Ratio 10/12/2017 1.5     Total Bilirubin 10/12/2017 0.4     Alkaline Phosphatase 10/12/2017 50     ALT 10/12/2017 6*    AST 10/12/2017 14*    Globulin 10/12/2017 2.8     Total CK 10/12/2017 92     WBC, UA 10/12/2017 0-2     RBC, UA 10/12/2017 0-2     Epi Cells 10/12/2017 3-5     Bacteria, UA 10/12/2017 1+*   Admission on 09/17/2017, Discharged on 09/17/2017   Component Date Value    Ventricular Rate 09/18/2017 68     Atrial Rate 09/18/2017 68     P-R Interval 09/18/2017 196     QRS Duration 09/18/2017 96     Q-T Interval 09/18/2017 434     QTc Calculation (Bazett) 09/18/2017 461     P Axis 09/18/2017 65     R Axis 09/18/2017 17     T Axis 09/18/2017 55     Diagnosis 09/18/2017                      Value:Normal sinus rhythm  Normal ECG  No previous ECGs available  Confirmed by PHYLLIS LAURENT MD ([id number]) on 9/18/2017 8:02:35 AM     Admission on 07/26/2017, Discharged on 07/27/2017   Component Date Value    Color, UA 07/26/2017 Yellow     Clarity, UA 07/26/2017 Clear     Glucose, Ur 07/26/2017 Negative     Bilirubin Urine 07/26/2017 Negative     Ketones, Urine 07/26/2017 Negative     Specific Gravity, UA 07/26/2017 1.010     Blood, Urine 07/26/2017 Negative     pH, UA 07/26/2017 6.5     Protein, UA 07/26/2017 Negative     Urobilinogen, Urine 07/26/2017 0.2     Nitrite, Urine 07/26/2017 Negative     Leukocyte Esterase, Urine 07/26/2017 Negative     Microscopic Examination 07/26/2017 Not Indicated     Urine Type 07/26/2017 Voided     Ventricular Rate 07/28/2017 71     Atrial Rate 07/28/2017 71     P-R Interval 07/28/2017 186     QRS Duration 07/28/2017 100     Q-T Interval 07/28/2017 440     QTc Calculation (Bazett) 07/28/2017 478     P Axis 07/28/2017 24     R Axis 07/28/2017 -16     T Axis 07/28/2017 18     Diagnosis 07/28/2017                      Value: Poor data quality, interpretation may be adversely affected  Normal sinus rhythm  Moderate voltage criteria for LVH, may be normal variant  Borderline ECG  When compared with ECG of 13-APR-2017 22:28,  No significant change was found  Confirmed by Angela Chavis MD, [address] ([id number]) on 7/27/2017 7:25:59 AM      WBC 07/26/2017 5.6     RBC 07/26/2017 4.31     Hemoglobin 07/26/2017 13.0     Hematocrit 07/26/2017 38.6

## 2017-12-07 PROBLEM — T50.901A DRUG OVERDOSE: Status: ACTIVE | Noted: 2017-12-07

## 2017-12-11 ENCOUNTER — TELEPHONE (OUTPATIENT)
Dept: FAMILY MEDICINE CLINIC | Age: 52
End: 2017-12-11

## 2017-12-11 RX ORDER — FLUCONAZOLE 100 MG/1
100 TABLET ORAL DAILY
Qty: 1 TABLET | Refills: 0 | Status: SHIPPED | OUTPATIENT
Start: 2017-12-11 | End: 2017-12-12

## 2017-12-11 RX ORDER — PROMETHAZINE HYDROCHLORIDE 25 MG/1
25 TABLET ORAL EVERY 8 HOURS PRN
Qty: 20 TABLET | Refills: 0 | Status: SHIPPED | OUTPATIENT
Start: 2017-12-11 | End: 2017-12-13

## 2017-12-13 ENCOUNTER — TELEPHONE (OUTPATIENT)
Dept: FAMILY MEDICINE CLINIC | Age: 52
End: 2017-12-13

## 2017-12-20 ENCOUNTER — TELEPHONE (OUTPATIENT)
Dept: FAMILY MEDICINE CLINIC | Age: 52
End: 2017-12-20

## 2017-12-20 NOTE — TELEPHONE ENCOUNTER
Maura Luna stopped in this morning for a BP check. It was 140/98 on her left arm. She said she felt dizzy and had a headache. I told her if she starts to get any chest pains to go to the Er.

## 2018-01-08 ENCOUNTER — OFFICE VISIT (OUTPATIENT)
Dept: FAMILY MEDICINE CLINIC | Age: 53
End: 2018-01-08

## 2018-01-08 VITALS
DIASTOLIC BLOOD PRESSURE: 70 MMHG | HEART RATE: 98 BPM | SYSTOLIC BLOOD PRESSURE: 112 MMHG | WEIGHT: 160.4 LBS | BODY MASS INDEX: 28.41 KG/M2 | OXYGEN SATURATION: 96 % | TEMPERATURE: 99.3 F

## 2018-01-08 DIAGNOSIS — R11.0 NAUSEA: ICD-10-CM

## 2018-01-08 DIAGNOSIS — R68.89 FLU-LIKE SYMPTOMS: Primary | ICD-10-CM

## 2018-01-08 DIAGNOSIS — G43.701 CHRONIC MIGRAINE WITHOUT AURA WITH STATUS MIGRAINOSUS, NOT INTRACTABLE: ICD-10-CM

## 2018-01-08 DIAGNOSIS — R06.2 WHEEZING: ICD-10-CM

## 2018-01-08 DIAGNOSIS — Z72.0 TOBACCO ABUSE: ICD-10-CM

## 2018-01-08 DIAGNOSIS — J11.1 INFLUENZA: ICD-10-CM

## 2018-01-08 DIAGNOSIS — R05.9 COUGH: ICD-10-CM

## 2018-01-08 LAB
INFLUENZA A ANTIBODY: NORMAL
INFLUENZA B ANTIBODY: NORMAL

## 2018-01-08 PROCEDURE — 87804 INFLUENZA ASSAY W/OPTIC: CPT | Performed by: NURSE PRACTITIONER

## 2018-01-08 PROCEDURE — 94640 AIRWAY INHALATION TREATMENT: CPT | Performed by: NURSE PRACTITIONER

## 2018-01-08 PROCEDURE — 99214 OFFICE O/P EST MOD 30 MIN: CPT | Performed by: NURSE PRACTITIONER

## 2018-01-08 RX ORDER — ALBUTEROL SULFATE 2.5 MG/3ML
2.5 SOLUTION RESPIRATORY (INHALATION) ONCE
Status: COMPLETED | OUTPATIENT
Start: 2018-01-08 | End: 2018-01-08

## 2018-01-08 RX ORDER — BENZONATATE 100 MG/1
200 CAPSULE ORAL 3 TIMES DAILY PRN
Qty: 30 CAPSULE | Refills: 0 | Status: SHIPPED | OUTPATIENT
Start: 2018-01-08 | End: 2018-03-05 | Stop reason: ALTCHOICE

## 2018-01-08 RX ORDER — PREDNISONE 20 MG/1
40 TABLET ORAL DAILY
Qty: 7 TABLET | Refills: 0 | Status: SHIPPED | OUTPATIENT
Start: 2018-01-08 | End: 2018-03-17 | Stop reason: ALTCHOICE

## 2018-01-08 RX ORDER — ONDANSETRON 4 MG/1
4 TABLET, ORALLY DISINTEGRATING ORAL EVERY 8 HOURS PRN
Qty: 30 TABLET | Refills: 0 | Status: SHIPPED | OUTPATIENT
Start: 2018-01-08 | End: 2018-03-05

## 2018-01-08 RX ORDER — OSELTAMIVIR PHOSPHATE 75 MG/1
75 CAPSULE ORAL 2 TIMES DAILY
Qty: 10 CAPSULE | Refills: 0 | Status: SHIPPED | OUTPATIENT
Start: 2018-01-08 | End: 2018-01-13

## 2018-01-08 RX ADMIN — ALBUTEROL SULFATE 2.5 MG: 2.5 SOLUTION RESPIRATORY (INHALATION) at 16:26

## 2018-01-08 RX ADMIN — Medication 0.5 MG: at 16:27

## 2018-01-08 ASSESSMENT — ENCOUNTER SYMPTOMS
NAUSEA: 1
EYES NEGATIVE: 1
WHEEZING: 1
FACIAL SWELLING: 0
ABDOMINAL DISTENTION: 0
VOICE CHANGE: 0
COUGH: 1
CHOKING: 0
CONSTIPATION: 0
VOMITING: 1
RHINORRHEA: 1
SORE THROAT: 1
ANAL BLEEDING: 0
SINUS PRESSURE: 1
BLOOD IN STOOL: 0
CHEST TIGHTNESS: 1
TROUBLE SWALLOWING: 0
STRIDOR: 0
DIARRHEA: 0
SINUS PAIN: 1
ABDOMINAL PAIN: 0
SHORTNESS OF BREATH: 1
APNEA: 0
RECTAL PAIN: 0

## 2018-01-08 NOTE — PROGRESS NOTES
Kidney cyst     x2 rt. ?    Migraine     Pneumonia 2008    bronchitis frequently since    TIA (transient ischemic attack)     Ulcer, Stomach Peptic     frequent N/V    Unspecified cerebral artery occlusion with cerebral infarction 2007     mini stroke cused by severe migraine    Weight loss     frequent N/V, S/P cholecystectomy      Past Surgical History:   Procedure Laterality Date    ANKLE SURGERY       SECTION      CHOLECYSTECTOMY      HAND SURGERY  2005    HERNIA REPAIR      HYSTERECTOMY      TUMOR REMOVAL      lt femur       Family History   Problem Relation Age of Onset    High Blood Pressure Mother     Heart Disease Mother     Diabetes Maternal Grandfather     Heart Disease Father     Heart Disease Brother        Social History   Substance Use Topics    Smoking status: Light Tobacco Smoker     Packs/day: 0.50     Years: 27.00     Types: Cigarettes    Smokeless tobacco: Never Used    Alcohol use 0.0 oz/week      Comment: less than once a month      Current Outpatient Prescriptions   Medication Sig Dispense Refill    oseltamivir (TAMIFLU) 75 MG capsule Take 1 capsule by mouth 2 times daily for 5 days 10 capsule 0    predniSONE (DELTASONE) 20 MG tablet Take 2 tablets by mouth daily 7 tablet 0    benzonatate (TESSALON PERLES) 100 MG capsule Take 2 capsules by mouth 3 times daily as needed for Cough 30 capsule 0    ondansetron (ZOFRAN ODT) 4 MG disintegrating tablet Take 1 tablet by mouth every 8 hours as needed for Nausea or Vomiting 30 tablet 0    tiZANidine (ZANAFLEX) 2 MG tablet Take 2 mg by mouth every 6 hours as needed      LORazepam (ATIVAN) 1 MG tablet Take 1 mg by mouth every 6 hours as needed for Anxiety .  clonazePAM (KLONOPIN) 1 MG tablet Take 2 mg by mouth 2 times daily as needed .       Butalbital-APAP-Caffeine (FIORICET PO) Take by mouth      metoprolol succinate (TOPROL XL) 50 MG extended release tablet 1 tab daily 90 tablet 1    potassium chloride (KLOR-CON M) 10 MEQ extended release tablet TAKE 1 TABLET ONCE A DAY (Patient taking differently: TAKE 1 TABLET ONCE A DAY prn) 30 tablet 11    ibuprofen (ADVIL;MOTRIN) 600 MG tablet Take 1 tablet by mouth every 6 hours as needed for Pain 20 tablet 3    zolpidem (AMBIEN) 10 MG tablet Take by mouth nightly as needed for Sleep      amitriptyline (ELAVIL) 100 MG tablet Take 100 mg by mouth nightly      triamterene-hydrochlorothiazide (DYAZIDE) 37.5-25 MG per capsule Take 2 capsules daily (Patient taking differently: 1 capsule daily as needed Indications: take for HTN as needed Take 2 capsules daily) 60 capsule 11    DULoxetine (CYMBALTA) 60 MG extended release capsule TAKE 1 CAPSULE TWICE DAILY 60 capsule 11     No current facility-administered medications for this visit. Allergies   Allergen Reactions    Benadryl [Diphenhydramine] Anxiety     \"Made me jump off the wall\"    Topamax [Topiramate] Hives    Tramadol Shortness Of Breath    Vitamin C [Ascorbic Acid] Hives    Lyrica [Pregabalin] Palpitations       Subjective:      Review of Systems   Constitutional: Positive for activity change (R/t faigue), appetite change, chills, fatigue and fever (Up to 102 ). Negative for diaphoresis and unexpected weight change. HENT: Positive for congestion (Chest ), ear discharge (RIght ear fullness), postnasal drip, rhinorrhea, sinus pain, sinus pressure, sneezing and sore throat. Negative for dental problem, drooling, ear pain, facial swelling, hearing loss, mouth sores, nosebleeds, tinnitus, trouble swallowing and voice change. Eyes: Negative. Respiratory: Positive for cough (Dry, strong), chest tightness, shortness of breath and wheezing. Negative for apnea, choking and stridor. Cardiovascular: Negative. Gastrointestinal: Positive for nausea and vomiting (X 1 times ). Negative for abdominal distention, abdominal pain, anal bleeding, blood in stool, constipation, diarrhea and rectal pain.    Endocrine: mmol/L Final    Potassium 12/14/2017 3.7  3.5 - 5.1 mmol/L Final    Chloride 12/14/2017 105  99 - 110 mmol/L Final    CO2 12/14/2017 29  21 - 32 mmol/L Final    Anion Gap 12/14/2017 11  3 - 16 Final    Glucose 12/14/2017 97  70 - 99 mg/dL Final    BUN 12/14/2017 21* 7 - 20 mg/dL Final    CREATININE 12/14/2017 1.0  0.6 - 1.1 mg/dL Final    GFR Non- 12/14/2017 58* >60 Final    Comment: >60 mL/min/1.73m2 EGFR, calc. for ages 25 and older using the  MDRD formula (not corrected for weight), is valid for stable  renal function.  GFR  12/14/2017 >60  >60 Final    Comment: Chronic Kidney Disease: less than 60 ml/min/1.73 sq.m. Kidney Failure: less than 15 ml/min/1.73 sq.m. Results valid for patients 18 years and older.       Calcium 12/14/2017 10.4  8.3 - 10.6 mg/dL Final    Total Protein 12/14/2017 7.1  6.4 - 8.2 g/dL Final    Alb 12/14/2017 4.3  3.4 - 5.0 g/dL Final    Albumin/Globulin Ratio 12/14/2017 1.5  1.1 - 2.2 Final    Total Bilirubin 12/14/2017 <0.2  0.0 - 1.0 mg/dL Final    Alkaline Phosphatase 12/14/2017 63  40 - 129 U/L Final    ALT 12/14/2017 7* 10 - 40 U/L Final    AST 12/14/2017 12* 15 - 37 U/L Final    Globulin 12/14/2017 2.8  g/dL Final    Ventricular Rate 12/15/2017 71  BPM Final    Atrial Rate 12/15/2017 71  BPM Final    P-R Interval 12/15/2017 200  ms Final    QRS Duration 12/15/2017 96  ms Final    Q-T Interval 12/15/2017 424  ms Final    QTc Calculation (Bazett) 12/15/2017 460  ms Final    P Axis 12/15/2017 53  degrees Final    R Axis 12/15/2017 2  degrees Final    T Axis 12/15/2017 58  degrees Final    Diagnosis 12/15/2017    Final                    Value:Normal sinus rhythm  Normal ECG  When compared with ECG of 13-DEC-2017 02:41,  No significant change was found  Confirmed by Domenic Gómez MD, HOOD ([id number]) on 12/15/2017 5:31:28 AM      Lactic Acid 12/14/2017 0.8  0.4 - 2.0 mmol/L Final    Color, UA 12/14/2017 Yellow  Straw/Yellow Final    Clarity, UA 12/14/2017 Clear  Clear Final    Glucose, Ur 12/14/2017 Negative  Negative mg/dL Final    Bilirubin Urine 12/14/2017 Negative  Negative Final    Ketones, Urine 12/14/2017 TRACE* Negative mg/dL Final    Specific Gravity, UA 12/14/2017 >=1.030  1.005 - 1.030 Final    Blood, Urine 12/14/2017 Negative  Negative Final    pH, UA 12/14/2017 6.0  5.0 - 8.0 Final    Protein, UA 12/14/2017 Negative  Negative mg/dL Final    Urobilinogen, Urine 12/14/2017 0.2  <2.0 E.U./dL Final    Nitrite, Urine 12/14/2017 Negative  Negative Final    Leukocyte Esterase, Urine 12/14/2017 Negative  Negative Final    Microscopic Examination 12/14/2017 Not Indicated   Final    Urine Reflex to Culture 12/14/2017 Not Indicated   Final    Urine Type 12/14/2017 Not Specified   Final    Protime 12/14/2017 12.1  9.6 - 13.0 sec Final    Comment: Effective 02-15-17 9:00am EST  Please note reference ranges have  changed for PT and INR Testing.  INR 12/14/2017 1.04  0.85 - 1.15 Final    Comment: Effective 08/01/2017 at 3pm EST    Normal: 0.86 - 1.14  Therapeutic: 2.0 - 3.0  Pros. Valve: 2.5 - 3.5  AMI: 2.0 - 3.0      Total CK 12/14/2017 53  26 - 192 U/L Final           Assessment & Plan: The following diagnoses and conditions are stable with no further orders unless indicated:  1. Flu-like symptoms    2. Tobacco abuse    3. Cough    4. Chronic migraine without aura with status migrainosus, not intractable    5. Wheezing    6. Influenza    7. Nausea        Shanae Baugh was seen today for cough, fever and migraine. Will treat for influenza. Tamiflu, tessalon perles, rest, increased fluids, and small frequent meals. She may take zofran as needed for nausea. Recommend smoking cessation. Prednisone for her wheezing. She most likely has undiagnosed COPD. She is to follow up with neurology and pain management for her migraines and chronic pain syndrome.     Diagnoses and all orders for this visit:    Flu-like symptoms  -     POCT Influenza A/B    Tobacco abuse    Cough  -     benzonatate (TESSALON PERLES) 100 MG capsule; Take 2 capsules by mouth 3 times daily as needed for Cough    Chronic migraine without aura with status migrainosus, not intractable    Wheezing  -     albuterol (PROVENTIL) nebulizer solution 2.5 mg; Take 3 mLs by nebulization once  -     ipratropium (ATROVENT) 0.02 % nebulizer solution 0.5 mg; Take 2.5 mLs by nebulization once  -     predniSONE (DELTASONE) 20 MG tablet; Take 2 tablets by mouth daily    Influenza  -     oseltamivir (TAMIFLU) 75 MG capsule; Take 1 capsule by mouth 2 times daily for 5 days    Nausea  -     ondansetron (ZOFRAN ODT) 4 MG disintegrating tablet; Take 1 tablet by mouth every 8 hours as needed for Nausea or Vomiting        Return if symptoms worsen or fail to improve, for Follow up with PCP for further managment . Patient should call the office immediately with new or ongoing signs or symptoms or worsening, or proceed to the emergency room. If you are on medications which could impair your senses, you are at risk of weakness, falls, dizziness, or drowsiness. You should be careful during activities which could place you at risk of harm, such as climbing, using stairs, operating machinery, or driving vehicles. If you feel you cannot safely do these activities, you should request others to help you, or avoid the activities altogether. If you are drowsy for any other reason, you should use the same precautions as listed above. Call if pattern of symptoms change or persists for an extended time. Tobacco abuse: Patient was counseled about stopping tobacco use. Discussed harmful effects of continued tobacco use including COPD, cardiovascular disease, and/or death. Discussed the increased risk of pneumonia as well as the potential for substantial decline in lung function.  The following recommendations were given to improve chances of quittin)

## 2018-01-08 NOTE — PATIENT INSTRUCTIONS
Patient Education        Cough: Care Instructions  Your Care Instructions    A cough is your body's response to something that bothers your throat or airways. Many things can cause a cough. You might cough because of a cold or the flu, bronchitis, or asthma. Smoking, postnasal drip, allergies, and stomach acid that backs up into your throat also can cause coughs. A cough is a symptom, not a disease. Most coughs stop when the cause, such as a cold, goes away. You can take a few steps at home to cough less and feel better. Follow-up care is a key part of your treatment and safety. Be sure to make and go to all appointments, and call your doctor if you are having problems. It's also a good idea to know your test results and keep a list of the medicines you take. How can you care for yourself at home? · Drink lots of water and other fluids. This helps thin the mucus and soothes a dry or sore throat. Honey or lemon juice in hot water or tea may ease a dry cough. · Take cough medicine as directed by your doctor. · Prop up your head on pillows to help you breathe and ease a dry cough. · Try cough drops to soothe a dry or sore throat. Cough drops don't stop a cough. Medicine-flavored cough drops are no better than candy-flavored drops or hard candy. · Do not smoke. Avoid secondhand smoke. If you need help quitting, talk to your doctor about stop-smoking programs and medicines. These can increase your chances of quitting for good. When should you call for help? Call 911 anytime you think you may need emergency care. For example, call if:  ? · You have severe trouble breathing. ?Call your doctor now or seek immediate medical care if:  ? · You cough up blood. ? · You have new or worse trouble breathing. ? · You have a new or higher fever. ? · You have a new rash. ? Watch closely for changes in your health, and be sure to contact your doctor if:  ? · You cough more deeply or more often, especially if you notice that you have more energy than when you smoked. After 2 weeks  · Your lungs start to work better. · Your risk of heart attack starts to drop. After 1 month  · When your lungs are clear, you cough less and breathe deeper, so it's easier to be active. · Your sense of taste and smell return. That means you can enjoy food more than you have since you started smoking. Over the years  · After 1 year, your risk of heart disease is half what it would be if you kept smoking. · After 5 years, your risk of stroke starts to shrink. Within a few years after that, it's about the same as if you'd never smoked. · After 10 years, your risk of dying from lung cancer is cut by about half. And your risk for many other types of cancer is lower too. How would quitting help others in your life? When you quit smoking, you improve the health of everyone who now breathes in your smoke. · Their heart, lung, and cancer risks drop, much like yours. · They are sick less. For babies and small children, living smoke-free means they're less likely to have ear infections, pneumonia, and bronchitis. · If you're a woman who is or will be pregnant someday, quitting smoking means a healthier . · Children who are close to you are less likely to become adult smokers. Where can you learn more? Go to https://OutsmartpenelopeTapInko.healthNvigen. org and sign in to your [employer] account. Enter [phone] in the [facility] box to learn more about \"Learning About Benefits From Quitting Smoking. \"     If you do not have an account, please click on the \"Sign Up Now\" link. Current as of: 2017  Content Version: 11.5  © 7530-4217 Healthwise, Incorporated. Care instructions adapted under license by [facility] [place] ([facility]). If you have questions about a medical condition or this instruction, always ask your healthcare professional. [place] [address] any warranty or liability for your use of this information.

## 2018-01-15 RX ORDER — IBUPROFEN 800 MG/1
TABLET ORAL
Qty: 90 TABLET | Refills: 0 | Status: SHIPPED | OUTPATIENT
Start: 2018-01-15 | End: 2018-03-05 | Stop reason: SDUPTHER

## 2018-01-16 ENCOUNTER — TELEPHONE (OUTPATIENT)
Dept: FAMILY MEDICINE CLINIC | Age: 53
End: 2018-01-16

## 2018-01-16 NOTE — TELEPHONE ENCOUNTER
Pt said that she can't get into her pain management said that she was dropped from Dr. Kody Hall and she hasn't found another one yet was wanting to see if you would be willing to fill her Fioricet for her and she said that she uses [address] [place] [address]

## 2018-02-06 ENCOUNTER — TELEPHONE (OUTPATIENT)
Dept: FAMILY MEDICINE CLINIC | Age: 53
End: 2018-02-06

## 2018-02-06 RX ORDER — KETOROLAC TROMETHAMINE 10 MG/1
20 TABLET, FILM COATED ORAL EVERY 6 HOURS PRN
Qty: 30 TABLET | Refills: 0 | Status: SHIPPED | OUTPATIENT
Start: 2018-02-06 | End: 2018-02-06

## 2018-02-06 RX ORDER — PROMETHAZINE HYDROCHLORIDE 25 MG/1
25 TABLET ORAL EVERY 6 HOURS PRN
Qty: 30 TABLET | Refills: 0 | OUTPATIENT
Start: 2018-02-06 | End: 2018-05-18 | Stop reason: SDUPTHER

## 2018-02-06 RX ORDER — KETOROLAC TROMETHAMINE 10 MG/1
10 TABLET, FILM COATED ORAL EVERY 6 HOURS PRN
Qty: 15 TABLET | Refills: 0 | OUTPATIENT
Start: 2018-02-06 | End: 2018-03-17 | Stop reason: ALTCHOICE

## 2018-02-06 RX ORDER — KETOROLAC TROMETHAMINE 10 MG/1
20 TABLET, FILM COATED ORAL EVERY 6 HOURS PRN
Qty: 30 TABLET | Refills: 0 | Status: CANCELLED | OUTPATIENT
Start: 2018-02-06

## 2018-02-06 NOTE — TELEPHONE ENCOUNTER
Called the pharmacy and fixed the toradol to 10mg 1 by mouth 4 times a day when necessary and also callled in the phenergen for pt

## 2018-02-16 ENCOUNTER — TELEPHONE (OUTPATIENT)
Dept: FAMILY MEDICINE CLINIC | Age: 53
End: 2018-02-16

## 2018-02-16 RX ORDER — SULFAMETHOXAZOLE AND TRIMETHOPRIM 800; 160 MG/1; MG/1
1 TABLET ORAL 2 TIMES DAILY
Qty: 14 TABLET | Refills: 0 | Status: SHIPPED | OUTPATIENT
Start: 2018-02-16 | End: 2018-02-26

## 2018-03-05 ENCOUNTER — OFFICE VISIT (OUTPATIENT)
Dept: FAMILY MEDICINE CLINIC | Age: 53
End: 2018-03-05

## 2018-03-05 VITALS
BODY MASS INDEX: 29.59 KG/M2 | TEMPERATURE: 98.8 F | OXYGEN SATURATION: 97 % | WEIGHT: 167 LBS | DIASTOLIC BLOOD PRESSURE: 90 MMHG | HEART RATE: 97 BPM | HEIGHT: 63 IN | SYSTOLIC BLOOD PRESSURE: 138 MMHG

## 2018-03-05 DIAGNOSIS — J01.90 ACUTE BACTERIAL SINUSITIS: ICD-10-CM

## 2018-03-05 DIAGNOSIS — B96.89 ACUTE BACTERIAL SINUSITIS: ICD-10-CM

## 2018-03-05 DIAGNOSIS — J18.9 COMMUNITY ACQUIRED PNEUMONIA, UNSPECIFIED LATERALITY: Primary | ICD-10-CM

## 2018-03-05 PROCEDURE — 3017F COLORECTAL CA SCREEN DOC REV: CPT | Performed by: FAMILY MEDICINE

## 2018-03-05 PROCEDURE — 99213 OFFICE O/P EST LOW 20 MIN: CPT | Performed by: FAMILY MEDICINE

## 2018-03-05 PROCEDURE — G8427 DOCREV CUR MEDS BY ELIG CLIN: HCPCS | Performed by: FAMILY MEDICINE

## 2018-03-05 PROCEDURE — G8419 CALC BMI OUT NRM PARAM NOF/U: HCPCS | Performed by: FAMILY MEDICINE

## 2018-03-05 PROCEDURE — 3014F SCREEN MAMMO DOC REV: CPT | Performed by: FAMILY MEDICINE

## 2018-03-05 PROCEDURE — G8484 FLU IMMUNIZE NO ADMIN: HCPCS | Performed by: FAMILY MEDICINE

## 2018-03-05 PROCEDURE — G8599 NO ASA/ANTIPLAT THER USE RNG: HCPCS | Performed by: FAMILY MEDICINE

## 2018-03-05 PROCEDURE — 4004F PT TOBACCO SCREEN RCVD TLK: CPT | Performed by: FAMILY MEDICINE

## 2018-03-05 RX ORDER — LEVOFLOXACIN 500 MG/1
TABLET, FILM COATED ORAL
Qty: 7 TABLET | Refills: 0 | Status: SHIPPED | OUTPATIENT
Start: 2018-03-05 | End: 2018-03-17 | Stop reason: ALTCHOICE

## 2018-03-05 RX ORDER — IBUPROFEN 800 MG/1
TABLET ORAL
Qty: 90 TABLET | Refills: 11 | Status: ON HOLD | OUTPATIENT
Start: 2018-03-05 | End: 2018-11-30 | Stop reason: HOSPADM

## 2018-03-05 NOTE — PROGRESS NOTES
tender to touch. HPI  Gaby Espinal has been under a lot of stress, staying with her mother, who is in hospice care. Mother however is considering dropping hospice. Patient has been resting very little. Is here for high blood pressure. Watching salt intake. Numbers are good. Denies chest pain. Denies shortness of breath. Taking medications as prescribed. HX: (> 3 status chronic/inact. Prob) or  Wt Readings from Last 3 Encounters:   03/05/18 167 lb (75.8 kg)   01/08/18 160 lb 6.4 oz (72.8 kg)   12/14/17 154 lb (69.9 kg)       Occupation Retired              HPI:  (>4 )  LOCATION:  QUALITY:SEVERITY:  DURATION:  TIMING:  CONTEXT:  MOD. FACTORS:  ASSOC. S/S:    Pertinent items are noted in HPI.  (+/- 2-9 systems)  ROS  All other systems reviewed and are negative except as noted above  Additional review of systems may be scanned into the media section of this medical record. Any responses requiring further intervention were pursued.     Past Medical History:   Diagnosis Date    Ankle fracture     Anxiety     Arthritis     Benzodiazepine abuse     Depression     Fibromyalgia     Hyperlipidemia     Hypertension     Kidney cyst     x2 rt. ?    Migraine     Pneumonia 2008    bronchitis frequently since    TIA (transient ischemic attack) 2007    Ulcer, Stomach Peptic     frequent N/V    Unspecified cerebral artery occlusion with cerebral infarction 2007     mini stroke cused by severe migraine    Weight loss     frequent N/V, S/P cholecystectomy       Family History   Problem Relation Age of Onset    High Blood Pressure Mother     Heart Disease Mother     Diabetes Maternal Grandfather     Heart Disease Father     Heart Disease Brother        Social History   Substance Use Topics    Smoking status: Light Tobacco Smoker     Packs/day: 0.50     Years: 27.00     Types: Cigarettes    Smokeless tobacco: Never Used    Alcohol use 0.0 oz/week      Comment: less than once a month

## 2018-03-23 ENCOUNTER — OFFICE VISIT (OUTPATIENT)
Dept: FAMILY MEDICINE CLINIC | Age: 53
End: 2018-03-23

## 2018-03-23 VITALS
SYSTOLIC BLOOD PRESSURE: 116 MMHG | OXYGEN SATURATION: 98 % | HEART RATE: 92 BPM | DIASTOLIC BLOOD PRESSURE: 70 MMHG | BODY MASS INDEX: 28.6 KG/M2 | HEIGHT: 63 IN | WEIGHT: 161.4 LBS

## 2018-03-23 DIAGNOSIS — I10 ESSENTIAL HYPERTENSION, BENIGN: ICD-10-CM

## 2018-03-23 DIAGNOSIS — S06.0X1D CONCUSSION WITH LOSS OF CONSCIOUSNESS OF 30 MINUTES OR LESS, SUBSEQUENT ENCOUNTER: Primary | ICD-10-CM

## 2018-03-23 DIAGNOSIS — K59.04 CHRONIC IDIOPATHIC CONSTIPATION: ICD-10-CM

## 2018-03-23 DIAGNOSIS — M79.7 FIBROMYALGIA: ICD-10-CM

## 2018-03-23 DIAGNOSIS — F41.9 CHRONIC ANXIETY: ICD-10-CM

## 2018-03-23 PROCEDURE — G8427 DOCREV CUR MEDS BY ELIG CLIN: HCPCS | Performed by: FAMILY MEDICINE

## 2018-03-23 PROCEDURE — 3014F SCREEN MAMMO DOC REV: CPT | Performed by: FAMILY MEDICINE

## 2018-03-23 PROCEDURE — G8482 FLU IMMUNIZE ORDER/ADMIN: HCPCS | Performed by: FAMILY MEDICINE

## 2018-03-23 PROCEDURE — 3017F COLORECTAL CA SCREEN DOC REV: CPT | Performed by: FAMILY MEDICINE

## 2018-03-23 PROCEDURE — 4004F PT TOBACCO SCREEN RCVD TLK: CPT | Performed by: FAMILY MEDICINE

## 2018-03-23 PROCEDURE — 99214 OFFICE O/P EST MOD 30 MIN: CPT | Performed by: FAMILY MEDICINE

## 2018-03-23 PROCEDURE — G8419 CALC BMI OUT NRM PARAM NOF/U: HCPCS | Performed by: FAMILY MEDICINE

## 2018-03-23 PROCEDURE — G8599 NO ASA/ANTIPLAT THER USE RNG: HCPCS | Performed by: FAMILY MEDICINE

## 2018-03-23 RX ORDER — ARIPIPRAZOLE 2 MG/1
TABLET ORAL
COMMUNITY
Start: 2018-03-14 | End: 2018-07-09

## 2018-03-23 NOTE — PATIENT INSTRUCTIONS
MIRALAX INSTRUCTIONS    Get the Miralax over the counter. Start with one capful in 8 ounces of fluid daily. Double or halve dose to achieve a soft stool every day to every other day. You need to take some on a regular basis. Call if this is not working after several days, call us for instructions. Can use Biofreeze on your sore muscles to help the discomfort. You can try the [address] patches for pain.

## 2018-03-23 NOTE — PROGRESS NOTES
Chief Complaint   Patient presents with   [address] from [facility]     ER  3/17 and 3/20    Fall   Marii Hernandezer 5 times at her mothers since 3/17. She states she always had some neck problems but since she fell on it 2 days ago she can hardly move her neck. Mother states she has bowels problems and bloating. Discussed CtTof neck that showed some minor changes. She states she is having memory problems and states she can hardly see, has severe pain in the back of her head and neck, shoulders and back. Discussed that she should discuss her medications with Jignesh Salinas her psychiatrist re her falls. She states her bowels broke loose day before yesterday after she used 2 bottles of mag citrate and a whole box of ExLax. She states her bowels had not moved for 3 weeks before she did this. HPI:  Phuong Silva is a 47 y.o. (: 1965) here today for ER follow up on multiple falls, concussion. .      Patient's medications, allergies, past medical, surgical, social and family histories were reviewed and updated as appropriate on 3/23/2018 at 10:25 AM.    ROS:  Review of Systems    All other systems reviewed and are negative except as noted above on 3/23/2018 at 10:25 AM. Additional review of systems may be scanned into the media section of this medical record. Any responses requiring further intervention were pursued.     Microscopic Examination (no units)   Date Value   2018 YES     LDL Calculated (mg/dL)   Date Value   2016 104 (H)     Past Medical History:   Diagnosis Date    Ankle fracture     Anxiety     Arthritis     Benzodiazepine abuse     Depression     Fibromyalgia     Hyperlipidemia     Hypertension     Kidney cyst     x2 rt. ?    Migraine     Pneumonia 2008    bronchitis frequently since    TIA (transient ischemic attack) 2007    Ulcer, Stomach Peptic     frequent N/V    Unspecified cerebral artery occlusion with cerebral infarction 2007     mini stroke cused by severe off the wall\"    Topamax [Topiramate] Hives    Tramadol Shortness Of Breath    Vitamin C [Ascorbic Acid] Hives    Lyrica [Pregabalin] Palpitations       OBJECTIVE:  Estimated body mass index is 28.82 kg/m² as calculated from the following:    Height as of this encounter: 5' 2.75\" (1.594 m). Weight as of this encounter: 161 lb 6.4 oz (73.2 kg). Vitals:    03/23/18 1012   BP: 116/70   Site: Left Arm   Position: Sitting   Cuff Size: Medium Adult   Pulse: 92   SpO2: 98%   Weight: 161 lb 6.4 oz (73.2 kg)   Height: 5' 2.75\" (1.594 m)     BP Readings from Last 2 Encounters:   03/23/18 116/70   03/21/18 (!) 150/72     Wt Readings from Last 3 Encounters:   03/23/18 161 lb 6.4 oz (73.2 kg)   03/20/18 159 lb (72.1 kg)   03/17/18 170 lb (77.1 kg)       Physical Exam   Constitutional: She appears well-developed and well-nourished. No distress. HENT:   Head: Normocephalic and atraumatic. Right Ear: External ear normal.   Left Ear: External ear normal.   Nose: Nose normal.   Lips symmetric   Eyes: Lids are normal. Pupils are equal, round, and reactive to light. Right eye exhibits no discharge. Left eye exhibits no discharge. No scleral icterus. Neck: No JVD present. No thyromegaly present. Cardiovascular: Normal rate, regular rhythm and normal heart sounds. Pulmonary/Chest: Effort normal and breath sounds normal. No respiratory distress. Abdominal: Soft. She exhibits distension. There is no hepatosplenomegaly. There is tenderness. There is no guarding. Musculoskeletal: She exhibits tenderness (low back, mid back and worse in the trapezius muscle with spasm). She exhibits no edema. Right shoulder: She exhibits tenderness. Left shoulder: She exhibits tenderness. Cervical back: She exhibits tenderness. Thoracic back: She exhibits tenderness. Lumbar back: She exhibits tenderness. Skin: Skin is warm and dry. No rash noted. She is not diaphoretic. No erythema. No pallor. Turgor normal   Psychiatric: Judgment and thought content normal. Her affect is blunt. Her speech is delayed. Her speech is not slurred. She is slowed and withdrawn. Nursing note and vitals reviewed. ASSESSMENT PLAN     1. Concussion with loss of consciousness of 30 minutes or less, subsequent encounter     2. Chronic anxiety     3. Chronic idiopathic constipation     4. Essential hypertension, benign     5. Fibromyalgia     The patient is having repeated falls. She has been evaluated by neurology in the past without specific finding to explain the falls. He is had multiple emergency room visits and hospitalizations for change in mental status which has been attributed to polypharmacy/overmedicated state. I will share this note with her mental health specialist.  Also, I did not say anything in front of the patient or her mother. Her mother was a patient of hospice. I was notified by the hospice nurse that several Norco were missing from the mother's home, and patient states there frequently. Patient's drug screen on March 21 was negative for opiates and positive for benzodiazepines. Her drug screen from March 17 at the emergency room was not ran. I am not saying that this patient has taken some of her mother's medication, but has been suspicion of this patient misusing controlled substances in the past.  I think is important that the provider of her mental health meds be aware of these situations. Patient has read a follow-up with me on April 4. Symptomatic treatment for the diffuse muscular discomfort related to the fall and flare of fibromyalgia. Regular use of MiraLAX recommended. Blood pressures acceptable. Patient should call the office immediately with new or ongoing signs or symptoms or worsening, or proceed to the emergency room.   No changes in past medical history, past surgical history, social history, or family history were noted during the patient encounter unless

## 2018-03-24 ENCOUNTER — TELEPHONE (OUTPATIENT)
Dept: FAMILY MEDICINE CLINIC | Age: 53
End: 2018-03-24

## 2018-04-15 RX ORDER — METOPROLOL SUCCINATE 50 MG/1
TABLET, EXTENDED RELEASE ORAL
Qty: 90 TABLET | Refills: 1 | Status: SHIPPED | OUTPATIENT
Start: 2018-04-15 | End: 2018-10-12 | Stop reason: SDUPTHER

## 2018-05-15 ENCOUNTER — TELEPHONE (OUTPATIENT)
Dept: FAMILY MEDICINE CLINIC | Age: 53
End: 2018-05-15

## 2018-05-15 DIAGNOSIS — R41.82 ALTERED MENTAL STATUS, UNSPECIFIED ALTERED MENTAL STATUS TYPE: Primary | ICD-10-CM

## 2018-05-15 DIAGNOSIS — F32.9 REACTIVE DEPRESSION: ICD-10-CM

## 2018-05-15 DIAGNOSIS — F33.9 EPISODE OF RECURRENT MAJOR DEPRESSIVE DISORDER, UNSPECIFIED DEPRESSION EPISODE SEVERITY (HCC): ICD-10-CM

## 2018-05-18 ENCOUNTER — TELEPHONE (OUTPATIENT)
Dept: FAMILY MEDICINE CLINIC | Age: 53
End: 2018-05-18

## 2018-05-18 RX ORDER — PROMETHAZINE HYDROCHLORIDE 25 MG/1
25 TABLET ORAL EVERY 6 HOURS PRN
Qty: 30 TABLET | Refills: 0 | Status: SHIPPED | OUTPATIENT
Start: 2018-05-18 | End: 2018-07-20 | Stop reason: SDUPTHER

## 2018-05-21 ENCOUNTER — TELEPHONE (OUTPATIENT)
Dept: FAMILY MEDICINE CLINIC | Age: 53
End: 2018-05-21

## 2018-05-21 RX ORDER — KETOROLAC TROMETHAMINE 10 MG/1
10 TABLET, FILM COATED ORAL 4 TIMES DAILY
Qty: 20 TABLET | Refills: 0 | Status: SHIPPED | OUTPATIENT
Start: 2018-05-21 | End: 2018-07-09

## 2018-05-22 ENCOUNTER — TELEPHONE (OUTPATIENT)
Dept: FAMILY MEDICINE CLINIC | Age: 53
End: 2018-05-22

## 2018-05-22 RX ORDER — SENNA PLUS 8.6 MG/1
2 TABLET ORAL NIGHTLY PRN
Qty: 30 TABLET | Refills: 0 | Status: SHIPPED | OUTPATIENT
Start: 2018-05-22 | End: 2018-09-04 | Stop reason: ALTCHOICE

## 2018-05-22 RX ORDER — LACTULOSE 10 G/15ML
20 SOLUTION ORAL NIGHTLY PRN
Qty: 120 ML | Refills: 0 | Status: SHIPPED | OUTPATIENT
Start: 2018-05-22 | End: 2018-07-09

## 2018-07-06 ENCOUNTER — TELEPHONE (OUTPATIENT)
Dept: FAMILY MEDICINE CLINIC | Age: 53
End: 2018-07-06

## 2018-07-06 RX ORDER — POLYETHYLENE GLYCOL 3350 17 G/17G
POWDER, FOR SOLUTION ORAL
Qty: 510 G | Refills: 1 | Status: SHIPPED | OUTPATIENT
Start: 2018-07-06 | End: 2018-10-16 | Stop reason: ALTCHOICE

## 2018-07-06 NOTE — TELEPHONE ENCOUNTER
Patient is having trouble with her bowels, constipation and would like something called in for her. [facility] pharmacy.

## 2018-07-09 ENCOUNTER — HOSPITAL ENCOUNTER (OUTPATIENT)
Dept: OTHER | Age: 53
Discharge: HOME | End: 2018-07-09
Attending: FAMILY MEDICINE | Admitting: FAMILY MEDICINE

## 2018-07-09 ENCOUNTER — OFFICE VISIT (OUTPATIENT)
Dept: FAMILY MEDICINE CLINIC | Age: 53
End: 2018-07-09

## 2018-07-09 VITALS
WEIGHT: 191 LBS | DIASTOLIC BLOOD PRESSURE: 79 MMHG | SYSTOLIC BLOOD PRESSURE: 124 MMHG | HEART RATE: 71 BPM | BODY MASS INDEX: 33.84 KG/M2 | HEIGHT: 63 IN | OXYGEN SATURATION: 97 %

## 2018-07-09 DIAGNOSIS — F32.9 REACTIVE DEPRESSION: ICD-10-CM

## 2018-07-09 DIAGNOSIS — R12 HEARTBURN: ICD-10-CM

## 2018-07-09 DIAGNOSIS — M54.50 CHRONIC BILATERAL LOW BACK PAIN WITHOUT SCIATICA: ICD-10-CM

## 2018-07-09 DIAGNOSIS — K59.04 CHRONIC IDIOPATHIC CONSTIPATION: Primary | ICD-10-CM

## 2018-07-09 DIAGNOSIS — Z13.220 LIPID SCREENING: ICD-10-CM

## 2018-07-09 DIAGNOSIS — R07.89 CHEST DISCOMFORT: ICD-10-CM

## 2018-07-09 DIAGNOSIS — G89.29 CHRONIC BILATERAL LOW BACK PAIN WITHOUT SCIATICA: ICD-10-CM

## 2018-07-09 DIAGNOSIS — K30 INDIGESTION: ICD-10-CM

## 2018-07-09 DIAGNOSIS — Z13.21 ENCOUNTER FOR VITAMIN DEFICIENCY SCREENING: ICD-10-CM

## 2018-07-09 PROCEDURE — 4004F PT TOBACCO SCREEN RCVD TLK: CPT | Performed by: FAMILY MEDICINE

## 2018-07-09 PROCEDURE — G8417 CALC BMI ABV UP PARAM F/U: HCPCS | Performed by: FAMILY MEDICINE

## 2018-07-09 PROCEDURE — 3017F COLORECTAL CA SCREEN DOC REV: CPT | Performed by: FAMILY MEDICINE

## 2018-07-09 PROCEDURE — G8599 NO ASA/ANTIPLAT THER USE RNG: HCPCS | Performed by: FAMILY MEDICINE

## 2018-07-09 PROCEDURE — 93000 ELECTROCARDIOGRAM COMPLETE: CPT | Performed by: FAMILY MEDICINE

## 2018-07-09 PROCEDURE — 99214 OFFICE O/P EST MOD 30 MIN: CPT | Performed by: FAMILY MEDICINE

## 2018-07-09 PROCEDURE — G8427 DOCREV CUR MEDS BY ELIG CLIN: HCPCS | Performed by: FAMILY MEDICINE

## 2018-07-09 RX ORDER — TIZANIDINE 4 MG/1
TABLET ORAL
COMMUNITY
Start: 2018-04-10 | End: 2018-09-04 | Stop reason: SDUPTHER

## 2018-07-09 RX ORDER — RANITIDINE 150 MG/1
150 TABLET ORAL 2 TIMES DAILY
Qty: 60 TABLET | Refills: 5 | Status: SHIPPED | OUTPATIENT
Start: 2018-07-09 | End: 2019-02-04 | Stop reason: ALTCHOICE

## 2018-07-09 RX ORDER — QUETIAPINE FUMARATE 50 MG/1
50 TABLET, EXTENDED RELEASE ORAL NIGHTLY
COMMUNITY
End: 2019-02-04

## 2018-07-09 NOTE — PATIENT INSTRUCTIONS
Get 1 full jar of Miralax and mix with 64 ounces Gatorade. Mix it well and refrigerate until really cold. Drink 8 ounces every 15 minutes until you are fully cleaned out or bowels run clear. New RX for Macey Rose has been sent to pharmacy. Take every day. Also every day take 1 capful Miralax in 8 ounces liquid every day. Call our office on Wednesday to left Dr. Ralph Rose know how you are doing. Patient should call the office immediately with new or ongoing signs or symptoms or worsening, or proceed to the emergency room. If you are on medications which could impair your senses, you are at risk of weakness, falls, dizziness, or drowsiness. You should be careful during activities which could place you at risk of harm, such as climbing, using stairs, operating machinery, or driving vehicles. If you feel you cannot safely do these activities, you should request others to help you, or avoid the activities altogether. If you are drowsy for any other reason, you should use the same precautions as listed above. Patient Education        Constipation: Care Instructions  Your Care Instructions    Constipation means that you have a hard time passing stools (bowel movements). People pass stools from 3 times a day to once every 3 days. What is normal for you may be different. Constipation may occur with pain in the rectum and cramping. The pain may get worse when you try to pass stools. Sometimes there are small amounts of bright red blood on toilet paper or the surface of stools. This is because of enlarged veins near the rectum (hemorrhoids). A few changes in your diet and lifestyle may help you avoid ongoing constipation. Your doctor may also prescribe medicine to help loosen your stool. Some medicines can cause constipation. These include pain medicines and antidepressants. Tell your doctor about all the medicines you take. Your doctor may want to make a medicine change to ease your symptoms.   Follow-up Information box to learn more about \"Constipation: Care Instructions. \"     If you do not have an account, please click on the \"Sign Up Now\" link. Current as of: November 20, 2017  Content Version: 11.6  © 7916-8999 [email or web address]. Care instructions adapted under license by [facility] ([facility]). If you have questions about a medical condition or this instruction, always ask your healthcare professional. [address] any warranty or liability for your use of this information.

## 2018-07-09 NOTE — PROGRESS NOTES
Chief Complaint   Patient presents with    Constipation   She has uses Fleets enema, Magnesium, Dulcolax and if he goes, she kaveh not go enough. She is miserable. Back hurt and bad heart burn. She uses 1 capful MiraLax , then takes another capful about 3 hours later. Last BM was 18. Gets abdominal cramping and rumbles, but can't go. Has back pain. Hurts when she gets up to move and when she lays down. Also when she lays down she gets acid reflux. HPI:   Norma Gutiérrez is a 47 y.o. (: 1965) here today for constipation and weight gain. Constipation  She has had ongoing issues with constipation, but symptoms are worse. She has gained 20 pounds in 1 month. She states her stomach bloats and gets heartburn. She has been using Dulcolax , Citra magnesium and Miralax. Patient's medications, allergies, past medical, surgical, social and family histories were reviewed and updated as appropriate on 2018 at 5:22 PM.    Microscopic Examination (no units)   Date Value   2018 YES     LDL Calculated (mg/dL)   Date Value   2016 104 (H)     Review of Systems  Additional review of systems may be scanned into the media section of this medical record. Any responses requiring further intervention were pursued. OBJECTIVE:  Estimated body mass index is 34.1 kg/m² as calculated from the following:    Height as of this encounter: 5' 2.75\" (1.594 m). Weight as of this encounter: 191 lb (86.6 kg). Vitals:    18 1632   BP: 124/79   Site: Left Arm   Position: Sitting   Cuff Size: Large Adult   Pulse: 71   SpO2: 97%   Weight: 191 lb (86.6 kg)   Height: 5' 2.75\" (1.594 m)     Physical Exam   Constitutional: She appears well-developed and well-nourished. No distress. HENT:   Head: Normocephalic and atraumatic. Right Ear: External ear normal.   Left Ear: External ear normal.   Nose: Nose normal.   Lips symmetric   Eyes: Lids are normal. Pupils are equal, round, and reactive to light.  Right eye exhibits no discharge. Left eye exhibits no discharge. No scleral icterus. Neck: No JVD present. No thyromegaly present. Cardiovascular: Normal rate, regular rhythm and normal heart sounds. Pulmonary/Chest: Effort normal and breath sounds normal. No respiratory distress. Abdominal: Soft. Normal appearance and bowel sounds are normal. She exhibits distension. She exhibits no mass. There is no hepatosplenomegaly. There is generalized tenderness. There is no rigidity and no guarding. Musculoskeletal: She exhibits no edema. Skin: Skin is warm and dry. No rash noted. She is not diaphoretic. No erythema. No pallor. Turgor normal   Psychiatric: She has a normal mood and affect. Her behavior is normal. Judgment and thought content normal.   Nursing note and vitals reviewed. EKG-normal sinus rhythm     ASSESSMENT PLAN        Diagnosis Orders   1. Chronic idiopathic constipation  TSH without Reflex    T4, FREE    linaclotide (LINZESS) 290 MCG CAPS capsule   2. Chest discomfort  EKG 12 Lead    XR CHEST STANDARD (2 VW)   3. Heartburn  ranitidine (ZANTAC) 150 MG tablet   4. Indigestion  EKG 12 Lead    ranitidine (ZANTAC) 150 MG tablet   5. Encounter for vitamin deficiency screening  Vitamin D 25 Hydroxy   6. Lipid screening  Lipid Panel   7. Chronic bilateral low back pain without sciatica  tiZANidine (ZANAFLEX) 4 MG tablet   8. Reactive depression  QUEtiapine (SEROQUEL XR) 50 MG extended release tablet     We will do a KUB, then a MiraLAX cleanout. Increase MiraLAX to one capful twice a day. Add LINZESS 290 µg daily. Call Wednesday with follow-up. Add Zantac for reflux. Counselor has asked that we do some additional labs which are noted above. I do not believe her chest pain sounds cardiac in nature. Refer to GI as needed. Low back pain sounds musculoskeletal.    Patient should call the office immediately with new or ongoing signs or symptoms or worsening, or proceed to the emergency room.   No changes

## 2018-07-10 ENCOUNTER — NURSE ONLY (OUTPATIENT)
Dept: FAMILY MEDICINE CLINIC | Age: 53
End: 2018-07-10

## 2018-07-10 DIAGNOSIS — Z13.21 ENCOUNTER FOR VITAMIN DEFICIENCY SCREENING: ICD-10-CM

## 2018-07-10 DIAGNOSIS — Z13.220 LIPID SCREENING: ICD-10-CM

## 2018-07-10 DIAGNOSIS — K59.04 CHRONIC IDIOPATHIC CONSTIPATION: ICD-10-CM

## 2018-07-10 LAB
CHOLESTEROL, TOTAL: 188 MG/DL (ref 0–199)
HDLC SERPL-MCNC: 70 MG/DL (ref 40–60)
LDL CHOLESTEROL CALCULATED: 101 MG/DL
T4 FREE: 0.9 NG/DL (ref 0.9–1.8)
TRIGL SERPL-MCNC: 86 MG/DL (ref 0–150)
TSH SERPL DL<=0.05 MIU/L-ACNC: 1.98 UIU/ML (ref 0.27–4.2)
VITAMIN D 25-HYDROXY: 31.8 NG/ML
VLDLC SERPL CALC-MCNC: 17 MG/DL

## 2018-07-10 PROCEDURE — 36415 COLL VENOUS BLD VENIPUNCTURE: CPT | Performed by: FAMILY MEDICINE

## 2018-07-10 RX ORDER — LUBIPROSTONE 8 UG/1
CAPSULE, GELATIN COATED ORAL
Qty: 60 CAPSULE | Refills: 3 | Status: SHIPPED | OUTPATIENT
Start: 2018-07-10 | End: 2018-07-30

## 2018-07-10 NOTE — TELEPHONE ENCOUNTER
She states Ludy was going to cost $300.00 at pharmacy. Is there something else she can try? She did not use the Miralax and Gatorade yet, but will be doing it today.

## 2018-07-11 ENCOUNTER — TELEPHONE (OUTPATIENT)
Dept: FAMILY MEDICINE CLINIC | Age: 53
End: 2018-07-11

## 2018-07-11 DIAGNOSIS — R10.84 GENERALIZED ABDOMINAL PAIN: Primary | ICD-10-CM

## 2018-07-11 DIAGNOSIS — E55.9 VITAMIN D DEFICIENCY: ICD-10-CM

## 2018-07-11 DIAGNOSIS — K59.04 CHRONIC IDIOPATHIC CONSTIPATION: ICD-10-CM

## 2018-07-11 DIAGNOSIS — Z86.73 H/O: CVA (CEREBROVASCULAR ACCIDENT): Primary | ICD-10-CM

## 2018-07-11 RX ORDER — ROSUVASTATIN CALCIUM 10 MG/1
10 TABLET, COATED ORAL DAILY
Qty: 30 TABLET | Refills: 11 | Status: SHIPPED | OUTPATIENT
Start: 2018-07-11 | End: 2019-10-27

## 2018-07-11 NOTE — TELEPHONE ENCOUNTER
Pt said that she did the cleansing and she said that she went off and on up till 2:00 am and she said that it had a green color said that she hasn't went today

## 2018-07-13 ENCOUNTER — HOSPITAL ENCOUNTER (OUTPATIENT)
Dept: OTHER | Age: 53
Discharge: HOME | End: 2018-07-14
Attending: FAMILY MEDICINE | Admitting: FAMILY MEDICINE

## 2018-07-13 DIAGNOSIS — K59.04 CHRONIC IDIOPATHIC CONSTIPATION: ICD-10-CM

## 2018-07-13 DIAGNOSIS — R10.84 GENERALIZED ABDOMINAL PAIN: ICD-10-CM

## 2018-07-16 ENCOUNTER — TELEPHONE (OUTPATIENT)
Dept: FAMILY MEDICINE CLINIC | Age: 53
End: 2018-07-16

## 2018-07-16 NOTE — TELEPHONE ENCOUNTER
Pt states that the Amitiza is going to cost her $260.00 and the other script that was called in was $350.00 was wanting to see if there was anything else that she could get said that she uses [address]

## 2018-07-16 NOTE — TELEPHONE ENCOUNTER
I do not know what her formulary is. She either needs to bring it in, or work with [facility] [address] [address] to find a cheaper alternative.   Otherwise she can also call her insurance and ask what more affordable alternatives are beyond lactulose MiraLAX and fiber senna docusate etc.

## 2018-07-20 ENCOUNTER — TELEPHONE (OUTPATIENT)
Dept: FAMILY MEDICINE CLINIC | Age: 53
End: 2018-07-20

## 2018-07-20 DIAGNOSIS — Z86.73 H/O: CVA (CEREBROVASCULAR ACCIDENT): ICD-10-CM

## 2018-07-20 RX ORDER — PROMETHAZINE HYDROCHLORIDE 25 MG/1
25 TABLET ORAL EVERY 6 HOURS PRN
Qty: 30 TABLET | Refills: 0 | Status: SHIPPED | OUTPATIENT
Start: 2018-07-20 | End: 2018-07-27

## 2018-07-20 RX ORDER — KETOROLAC TROMETHAMINE 10 MG/1
10 TABLET, FILM COATED ORAL 4 TIMES DAILY
Qty: 20 TABLET | Refills: 0 | Status: SHIPPED | OUTPATIENT
Start: 2018-07-20 | End: 2018-10-16 | Stop reason: ALTCHOICE

## 2018-07-30 ENCOUNTER — TELEPHONE (OUTPATIENT)
Dept: FAMILY MEDICINE CLINIC | Age: 53
End: 2018-07-30

## 2018-07-30 ENCOUNTER — HOSPITAL ENCOUNTER (OUTPATIENT)
Dept: MAMMOGRAPHY | Age: 53
Discharge: HOME | End: 2018-08-04
Payer: COMMERCIAL

## 2018-07-30 DIAGNOSIS — Z12.31 VISIT FOR SCREENING MAMMOGRAM: ICD-10-CM

## 2018-07-30 PROCEDURE — 77067 SCR MAMMO BI INCL CAD: CPT

## 2018-07-30 RX ORDER — LACTULOSE 10 G/15ML
30 SOLUTION ORAL 2 TIMES DAILY
Qty: 1800 ML | Refills: 0 | Status: SHIPPED | OUTPATIENT
Start: 2018-07-30 | End: 2019-06-11 | Stop reason: SDUPTHER

## 2018-07-30 NOTE — TELEPHONE ENCOUNTER
Pt called and stated that the original medication Linzess was changed due to cost. And the next medication was too expensive as well. Pt called [facility] and was told these two medications are the closest covered meds and wanted to know if we could change to one of these. Lactulose any mg are covered  [id number] any mg are covered  To use instead if Linzess  Pt stated she uses [address] Please Advise.  Thank You

## 2018-08-02 ENCOUNTER — TELEPHONE (OUTPATIENT)
Dept: FAMILY MEDICINE CLINIC | Age: 53
End: 2018-08-02

## 2018-08-08 ENCOUNTER — TELEPHONE (OUTPATIENT)
Dept: FAMILY MEDICINE CLINIC | Age: 53
End: 2018-08-08

## 2018-08-08 NOTE — LETTER
[facility]  [address] [facility]  Phone: [phone]  Fax: [phone]    Royal Charly MD        August 9, 2018    Lucie Abraham  [address] [facility]  [place] [id number]      To whom it may concern,     Maria Elena Jj Suffers from migraines and may benefit from darker window tint as long as it is in the legal range. If you have any questions or concerns, please don't hesitate to call.     Sincerely,          Royal Charly MD

## 2018-08-08 NOTE — TELEPHONE ENCOUNTER
Pt called. She said that the sun really aggravates her migraines even with her sunglasses. She went to get a strip of tint on the front of her car window that was still in the legal range. She really needs to go darker to keep the glare out. The  mentioned that his dgt got a statement from her physician stating that she suffers from migraines and the darker tint would help prevent them and the insurance covered the cost.  Would you be willing to do this for her?

## 2018-08-27 ENCOUNTER — TELEPHONE (OUTPATIENT)
Dept: FAMILY MEDICINE CLINIC | Age: 53
End: 2018-08-27

## 2018-08-27 NOTE — TELEPHONE ENCOUNTER
Lasix 20 mg quantity #20 one by mouth daily as needed for fluid retention. If she is taking one potassium every day routinely, then take 2 on the days she takes Lasix. BMP in 2 weeks. Magnesium 2 weeks.

## 2018-08-28 DIAGNOSIS — R60.9 EDEMA, UNSPECIFIED TYPE: Primary | ICD-10-CM

## 2018-08-28 RX ORDER — POTASSIUM CHLORIDE 750 MG/1
TABLET, EXTENDED RELEASE ORAL
Qty: 30 TABLET | Refills: 11 | Status: SHIPPED | OUTPATIENT
Start: 2018-08-28 | End: 2020-03-18 | Stop reason: ALTCHOICE

## 2018-08-28 RX ORDER — FUROSEMIDE 20 MG/1
20 TABLET ORAL DAILY
Qty: 60 TABLET | Refills: 3 | Status: SHIPPED | OUTPATIENT
Start: 2018-08-28 | End: 2019-02-04 | Stop reason: ALTCHOICE

## 2018-09-04 ENCOUNTER — OFFICE VISIT (OUTPATIENT)
Dept: FAMILY MEDICINE CLINIC | Age: 53
End: 2018-09-04

## 2018-09-04 VITALS
OXYGEN SATURATION: 98 % | DIASTOLIC BLOOD PRESSURE: 80 MMHG | SYSTOLIC BLOOD PRESSURE: 132 MMHG | WEIGHT: 198.4 LBS | HEART RATE: 82 BPM | HEIGHT: 63 IN | BODY MASS INDEX: 35.15 KG/M2

## 2018-09-04 DIAGNOSIS — S39.012A STRAIN OF LUMBAR REGION, INITIAL ENCOUNTER: ICD-10-CM

## 2018-09-04 DIAGNOSIS — M54.50 CHRONIC BILATERAL LOW BACK PAIN WITHOUT SCIATICA: ICD-10-CM

## 2018-09-04 DIAGNOSIS — K59.01 SLOW TRANSIT CONSTIPATION: ICD-10-CM

## 2018-09-04 DIAGNOSIS — I10 ESSENTIAL HYPERTENSION, BENIGN: Primary | ICD-10-CM

## 2018-09-04 DIAGNOSIS — G43.719 INTRACTABLE CHRONIC MIGRAINE WITHOUT AURA AND WITHOUT STATUS MIGRAINOSUS: ICD-10-CM

## 2018-09-04 DIAGNOSIS — F51.01 PRIMARY INSOMNIA: ICD-10-CM

## 2018-09-04 DIAGNOSIS — G89.29 CHRONIC BILATERAL LOW BACK PAIN WITHOUT SCIATICA: ICD-10-CM

## 2018-09-04 PROCEDURE — G8417 CALC BMI ABV UP PARAM F/U: HCPCS | Performed by: FAMILY MEDICINE

## 2018-09-04 PROCEDURE — G8599 NO ASA/ANTIPLAT THER USE RNG: HCPCS | Performed by: FAMILY MEDICINE

## 2018-09-04 PROCEDURE — 4004F PT TOBACCO SCREEN RCVD TLK: CPT | Performed by: FAMILY MEDICINE

## 2018-09-04 PROCEDURE — 3017F COLORECTAL CA SCREEN DOC REV: CPT | Performed by: FAMILY MEDICINE

## 2018-09-04 PROCEDURE — 99214 OFFICE O/P EST MOD 30 MIN: CPT | Performed by: FAMILY MEDICINE

## 2018-09-04 PROCEDURE — G8427 DOCREV CUR MEDS BY ELIG CLIN: HCPCS | Performed by: FAMILY MEDICINE

## 2018-09-04 RX ORDER — PREDNISONE 10 MG/1
TABLET ORAL
Qty: 30 TABLET | Refills: 0 | Status: SHIPPED | OUTPATIENT
Start: 2018-09-04 | End: 2018-10-16 | Stop reason: ALTCHOICE

## 2018-09-04 RX ORDER — TIZANIDINE 4 MG/1
8 TABLET ORAL EVERY 6 HOURS PRN
Qty: 60 TABLET | Refills: 2 | Status: ON HOLD | OUTPATIENT
Start: 2018-09-04 | End: 2018-11-29

## 2018-09-04 NOTE — PROGRESS NOTES
responses requiring further intervention were pursued. Microscopic Examination (no units)   Date Value   03/21/2018 YES     LDL Calculated (mg/dL)   Date Value   07/10/2018 101 (H)     Past Medical History:   Diagnosis Date    Ankle fracture     Anxiety     Arthritis     Benzodiazepine abuse     Depression     Fibromyalgia     Hyperlipidemia     Hypertension     Kidney cyst     x2 rt. ?    Migraine     Pneumonia 2008    bronchitis frequently since    TIA (transient ischemic attack) 2007    Ulcer, Stomach Peptic     frequent N/V    Unspecified cerebral artery occlusion with cerebral infarction 2007     mini stroke cused by severe migraine    Weight loss     frequent N/V, S/P cholecystectomy     Family History   Problem Relation Age of Onset    High Blood Pressure Mother     Heart Disease Mother     Diabetes Maternal Grandfather     Heart Disease Father     Heart Disease Brother      Social History     Social History    Marital status:      Spouse name: N/A    Number of children: N/A    Years of education: N/A     Occupational History    Not on file. Social History Main Topics    Smoking status: Light Tobacco Smoker     Packs/day: 0.50     Years: 27.00     Types: Cigarettes    Smokeless tobacco: Never Used    Alcohol use No    Drug use: No    Sexual activity: Yes     Partners: Male     Other Topics Concern    Not on file     Social History Narrative    No narrative on file       Prior to Visit Medications    Medication Sig Taking?  Authorizing Provider   furosemide (LASIX) 20 MG tablet Take 1 tablet by mouth daily As needed for fluid retention Yes Emily Cates MD   potassium chloride (KLOR-CON M) 10 MEQ extended release tablet TAKE 1 TABLET ONCE A DAY Yes DARÍO Haro - CNP   ketorolac (TORADOL) 10 MG tablet Take 1 tablet by mouth 4 times daily for up to 5 days maximum Yes Emily Cates MD   rosuvastatin (CRESTOR) 10 MG tablet Take 1 tablet by mouth daily Yes Lani Frederick MD   tiZANidine (ZANAFLEX) 4 MG tablet  Yes Historical Provider, MD   QUEtiapine (SEROQUEL XR) 50 MG extended release tablet Take 50 mg by mouth nightly Yes Historical Provider, MD   ranitidine (ZANTAC) 150 MG tablet Take 1 tablet by mouth 2 times daily Yes Lani Frederick MD   polyethylene glycol [facility]) powder Take 1 capful mixed with 8 ounces fluid twice a day. Yes Lani Frederick MD   metoprolol succinate (TOPROL XL) 50 MG extended release tablet TAKE 1 TABLET DAILY Yes Lani Frederick MD   ibuprofen (ADVIL;MOTRIN) 800 MG tablet TAKE 1 TABLET THREE TIMES DAILY Yes Lani Frederick MD   DULoxetine (CYMBALTA) 60 MG extended release capsule TAKE 1 CAPSULE TWICE DAILY Yes Lani Frederick MD     Allergies   Allergen Reactions    Benadryl [Diphenhydramine] Anxiety     \"Made me jump off the wall\"    Topamax [Topiramate] Hives    Tramadol Shortness Of Breath    Vitamin C [Ascorbic Acid] Hives    Lyrica [Pregabalin] Palpitations       OBJECTIVE:  Estimated body mass index is 35.43 kg/m² as calculated from the following:    Height as of this encounter: 5' 2.75\" (1.594 m). Weight as of this encounter: 198 lb 6.4 oz (90 kg). Vitals:    09/04/18 0814   BP: 132/80   Pulse: 82   SpO2: 98%   Weight: 198 lb 6.4 oz (90 kg)   Height: 5' 2.75\" (1.594 m)     BP Readings from Last 2 Encounters:   09/04/18 132/80   07/09/18 124/79     Wt Readings from Last 3 Encounters:   09/04/18 198 lb 6.4 oz (90 kg)   07/09/18 191 lb (86.6 kg)   03/23/18 161 lb 6.4 oz (73.2 kg)       Physical Exam   Constitutional: She appears well-developed and well-nourished. No distress. HENT:   Head: Normocephalic and atraumatic. Right Ear: External ear normal.   Left Ear: External ear normal.   Nose: Nose normal.   Lips symmetric   Eyes: Pupils are equal, round, and reactive to light. Lids are normal. Right eye exhibits no discharge.  Left eye exhibits no discharge. No scleral icterus. Neck: No JVD present. No thyromegaly present. Cardiovascular: Normal rate, regular rhythm and normal heart sounds. Pulmonary/Chest: Effort normal and breath sounds normal. No respiratory distress. Abdominal: Soft. Normal appearance and bowel sounds are normal. She exhibits distension. There is no hepatosplenomegaly. There is no tenderness. Musculoskeletal: She exhibits no edema. Lumbar back: She exhibits tenderness and spasm. Back:    Skin: Skin is warm and dry. No rash noted. She is not diaphoretic. No erythema. No pallor. Turgor normal   Psychiatric: She has a normal mood and affect. Her behavior is normal. Judgment and thought content normal.   Nursing note and vitals reviewed. ASSESSMENT PLAN      Diagnosis Orders   1. Essential hypertension, benign     2. Chronic bilateral low back pain without sciatica     3. Strain of lumbar region, initial encounter  predniSONE (DELTASONE) 10 MG tablet    tiZANidine (ZANAFLEX) 4 MG tablet   4. Intractable chronic migraine without aura and without status migrainosus     5. Primary insomnia     6. Slow transit constipation      blood pressure is acceptable. She has no red flag symptoms with her back, therefore, I did no imaging. Will increase her Zanaflex up to 8 mg 4 times a day, she is already been doing 4 mg about every 4 hours at times. Also give a course of prednisone. Her migraine is at baseline. Still has trouble sleeping. She states that lactulose and MiraLAX work well when she uses it regularly. She did get some LINZESS samples from Dr. Madeleine Leal. They helped immensely. When we tried to prescribe this earlier in the year her insurance did not cover well and it was about $300 a month. Follow-up again in 4 months. Call Friday for back is not at least somewhat better. I am not sure if she is going to pain management elsewhere, likely not since her OARRS report is negative.   She is a

## 2018-09-07 ENCOUNTER — TELEPHONE (OUTPATIENT)
Dept: FAMILY MEDICINE CLINIC | Age: 53
End: 2018-09-07

## 2018-09-07 RX ORDER — METHYLPREDNISOLONE 4 MG/1
TABLET ORAL
Qty: 1 KIT | Refills: 0 | Status: SHIPPED | OUTPATIENT
Start: 2018-09-07 | End: 2018-10-19 | Stop reason: SDUPTHER

## 2018-09-14 RX ORDER — DULOXETIN HYDROCHLORIDE 60 MG/1
CAPSULE, DELAYED RELEASE ORAL
Qty: 60 CAPSULE | Refills: 11 | Status: SHIPPED | OUTPATIENT
Start: 2018-09-14 | End: 2019-10-25 | Stop reason: SDUPTHER

## 2018-10-01 ENCOUNTER — TELEPHONE (OUTPATIENT)
Dept: FAMILY MEDICINE CLINIC | Age: 53
End: 2018-10-01

## 2018-10-01 DIAGNOSIS — Z86.69 HISTORY OF MIGRAINE: Primary | ICD-10-CM

## 2018-10-01 RX ORDER — PROMETHAZINE HYDROCHLORIDE 25 MG/1
TABLET ORAL
Qty: 30 TABLET | Refills: 0 | Status: SHIPPED | OUTPATIENT
Start: 2018-10-01 | End: 2018-10-16 | Stop reason: ALTCHOICE

## 2018-10-01 NOTE — TELEPHONE ENCOUNTER
Pt called, uses [facility].  Pt doesn't have a pain management dr anymore, and open to suggestions regarding neurologist.

## 2018-10-01 NOTE — TELEPHONE ENCOUNTER
Phenergan 25 mg #30 one or 2 up to 4 times a day when necessary migraine pain and nausea. Patient has had oral Toradol several times and I do not think we should be prescribing it that closely.   Suggest she call her pain management or neurology for further suggestions for the migraine pain

## 2018-10-12 RX ORDER — METOPROLOL SUCCINATE 50 MG/1
TABLET, EXTENDED RELEASE ORAL
Qty: 90 TABLET | Refills: 1 | Status: SHIPPED | OUTPATIENT
Start: 2018-10-12 | End: 2019-07-01 | Stop reason: SDUPTHER

## 2018-10-16 ENCOUNTER — OFFICE VISIT (OUTPATIENT)
Dept: FAMILY MEDICINE CLINIC | Age: 53
End: 2018-10-16
Payer: COMMERCIAL

## 2018-10-16 VITALS
TEMPERATURE: 98.8 F | HEART RATE: 91 BPM | BODY MASS INDEX: 35.97 KG/M2 | OXYGEN SATURATION: 97 % | HEIGHT: 63 IN | WEIGHT: 203 LBS | SYSTOLIC BLOOD PRESSURE: 138 MMHG | DIASTOLIC BLOOD PRESSURE: 88 MMHG

## 2018-10-16 DIAGNOSIS — J06.9 ACUTE URI: ICD-10-CM

## 2018-10-16 DIAGNOSIS — J01.00 ACUTE NON-RECURRENT MAXILLARY SINUSITIS: ICD-10-CM

## 2018-10-16 DIAGNOSIS — R05.9 COUGH: Primary | ICD-10-CM

## 2018-10-16 PROCEDURE — G8427 DOCREV CUR MEDS BY ELIG CLIN: HCPCS | Performed by: NURSE PRACTITIONER

## 2018-10-16 PROCEDURE — 4004F PT TOBACCO SCREEN RCVD TLK: CPT | Performed by: NURSE PRACTITIONER

## 2018-10-16 PROCEDURE — G8599 NO ASA/ANTIPLAT THER USE RNG: HCPCS | Performed by: NURSE PRACTITIONER

## 2018-10-16 PROCEDURE — G8484 FLU IMMUNIZE NO ADMIN: HCPCS | Performed by: NURSE PRACTITIONER

## 2018-10-16 PROCEDURE — 99214 OFFICE O/P EST MOD 30 MIN: CPT | Performed by: NURSE PRACTITIONER

## 2018-10-16 PROCEDURE — 3017F COLORECTAL CA SCREEN DOC REV: CPT | Performed by: NURSE PRACTITIONER

## 2018-10-16 PROCEDURE — G8417 CALC BMI ABV UP PARAM F/U: HCPCS | Performed by: NURSE PRACTITIONER

## 2018-10-16 RX ORDER — DEXTROMETHORPHAN HYDROBROMIDE AND PROMETHAZINE HYDROCHLORIDE 15; 6.25 MG/5ML; MG/5ML
5 SYRUP ORAL 4 TIMES DAILY PRN
Qty: 180 ML | Refills: 0 | Status: ON HOLD | OUTPATIENT
Start: 2018-10-16 | End: 2018-11-29

## 2018-10-16 RX ORDER — DOXYCYCLINE HYCLATE 100 MG
100 TABLET ORAL EVERY 12 HOURS SCHEDULED
Qty: 14 TABLET | Refills: 0 | Status: SHIPPED | OUTPATIENT
Start: 2018-10-16 | End: 2018-10-23

## 2018-10-16 RX ORDER — FLUTICASONE PROPIONATE 50 MCG
1 SPRAY, SUSPENSION (ML) NASAL DAILY
Qty: 1 BOTTLE | Refills: 3 | Status: ON HOLD | OUTPATIENT
Start: 2018-10-16 | End: 2018-11-29 | Stop reason: SDDI

## 2018-10-16 ASSESSMENT — ENCOUNTER SYMPTOMS
SHORTNESS OF BREATH: 1
FACIAL SWELLING: 0
SINUS PAIN: 1
STRIDOR: 0
BACK PAIN: 1
RECTAL PAIN: 0
ANAL BLEEDING: 0
COUGH: 1
TROUBLE SWALLOWING: 0
SORE THROAT: 1
DIARRHEA: 0
BLOOD IN STOOL: 0
SINUS PRESSURE: 1
ABDOMINAL PAIN: 0
CHOKING: 0
CONSTIPATION: 0
WHEEZING: 1
CHEST TIGHTNESS: 0
RHINORRHEA: 1
VOMITING: 0
ABDOMINAL DISTENTION: 0
EYES NEGATIVE: 1
NAUSEA: 1
VOICE CHANGE: 0
APNEA: 0

## 2018-10-16 NOTE — PROGRESS NOTES
facial asymmetry, speech difficulty, light-headedness and numbness. Psychiatric/Behavioral: Negative. Objective:     Vitals:    10/16/18 1602 10/16/18 1611   BP: (!) 140/88 138/88   Site: Left Upper Arm Right Upper Arm   Position: Sitting Sitting   Cuff Size: Large Adult Medium Adult   Pulse: 91    Temp: 98.8 °F (37.1 °C)    SpO2: 97%    Weight: 203 lb (92.1 kg)    Height: 5' 3\" (1.6 m)      Wt Readings from Last 3 Encounters:   10/16/18 203 lb (92.1 kg)   09/04/18 198 lb 6.4 oz (90 kg)   07/09/18 191 lb (86.6 kg)     Temp Readings from Last 3 Encounters:   10/16/18 98.8 °F (37.1 °C)   03/20/18 98.1 °F (36.7 °C)   03/17/18 98.1 °F (36.7 °C) (Oral)     BP Readings from Last 3 Encounters:   10/16/18 138/88   09/04/18 132/80   07/09/18 124/79     Pulse Readings from Last 3 Encounters:   10/16/18 91   09/04/18 82   07/09/18 71     Physical Exam   Constitutional: She is oriented to person, place, and time. She appears well-developed and well-nourished. No distress. HENT:   Head: Normocephalic and atraumatic. Right Ear: Tympanic membrane and external ear normal.   Left Ear: Tympanic membrane and external ear normal.   Nose: Mucosal edema and rhinorrhea present. Right sinus exhibits maxillary sinus tenderness and frontal sinus tenderness. Left sinus exhibits maxillary sinus tenderness and frontal sinus tenderness. Mouth/Throat: Posterior oropharyngeal erythema (Slight) present. No oropharyngeal exudate. Eyes: Pupils are equal, round, and reactive to light. Conjunctivae and EOM are normal. Right eye exhibits no discharge. Left eye exhibits no discharge. No scleral icterus. Neck: Normal range of motion. Neck supple. No JVD present. No tracheal deviation present. No thyromegaly present. Cardiovascular: Normal rate, regular rhythm, normal heart sounds and intact distal pulses. Exam reveals no gallop and no friction rub. No murmur heard.   Pulmonary/Chest: Effort normal and breath sounds normal. No CNP   rosuvastatin (CRESTOR) 10 MG tablet Take 1 tablet by mouth daily Yes Valente Baugh MD   QUEtiapine (SEROQUEL XR) 50 MG extended release tablet Take 50 mg by mouth nightly Yes Historical Provider, MD   ranitidine (ZANTAC) 150 MG tablet Take 1 tablet by mouth 2 times daily Yes Valente Baugh MD   ibuprofen (ADVIL;MOTRIN) 800 MG tablet TAKE 1 TABLET THREE TIMES DAILY Yes Valente Baugh MD     Orders Placed This Encounter   Medications    doxycycline hyclate (VIBRA-TABS) 100 MG tablet     Sig: Take 1 tablet by mouth every 12 hours for 7 days     Dispense:  14 tablet     Refill:  0    fluticasone (FLONASE) 50 MCG/ACT nasal spray     Si spray by Nasal route daily     Dispense:  1 Bottle     Refill:  3    promethazine-dextromethorphan (PROMETHAZINE-DM) 6.25-15 MG/5ML syrup     Sig: Take 5 mLs by mouth 4 times daily as needed for Cough     Dispense:  180 mL     Refill:  0         Return if symptoms worsen or fail to improve. Patient should call the office immediately with new or ongoing signs or symptoms or worsening, or proceedto the emergency room. No changes in past medical history, past surgical history, social history, or family history were noted during the patient encounter unless specifically listed above. All updates of past medicalhistory, past surgical history, social history, or family history were reviewed personally by me during the office visit. All problems listed in the assessment are stable unless noted otherwise. Medication profilereviewed personally by me during the office visit. Medication side effects and possible impairments from medications were discussed as applicable.     Call if pattern of symptoms change or persists for an extended time. This document was prepared by a combination of typing and transcription through a voice recognition software.

## 2018-10-19 ENCOUNTER — TELEPHONE (OUTPATIENT)
Dept: FAMILY MEDICINE CLINIC | Age: 53
End: 2018-10-19

## 2018-10-19 ENCOUNTER — HOSPITAL ENCOUNTER (OUTPATIENT)
Dept: GENERAL RADIOLOGY | Age: 53
Discharge: HOME | End: 2018-10-19
Payer: COMMERCIAL

## 2018-10-19 ENCOUNTER — HOSPITAL ENCOUNTER (OUTPATIENT)
Age: 53
Discharge: HOME | End: 2018-10-19
Payer: COMMERCIAL

## 2018-10-19 DIAGNOSIS — R05.9 COUGH: Primary | ICD-10-CM

## 2018-10-19 DIAGNOSIS — R05.9 COUGH: ICD-10-CM

## 2018-10-19 PROCEDURE — 71046 X-RAY EXAM CHEST 2 VIEWS: CPT

## 2018-10-19 RX ORDER — METHYLPREDNISOLONE 4 MG/1
TABLET ORAL
Qty: 1 KIT | Refills: 0 | Status: SHIPPED | OUTPATIENT
Start: 2018-10-19 | End: 2018-10-25

## 2018-11-29 ENCOUNTER — APPOINTMENT (OUTPATIENT)
Dept: CT IMAGING | Age: 53
End: 2018-11-29
Payer: COMMERCIAL

## 2018-11-29 ENCOUNTER — HOSPITAL ENCOUNTER (OUTPATIENT)
Age: 53
Setting detail: OBSERVATION
Discharge: HOME | End: 2018-11-30
Attending: EMERGENCY MEDICINE | Admitting: INTERNAL MEDICINE
Payer: COMMERCIAL

## 2018-11-29 ENCOUNTER — APPOINTMENT (OUTPATIENT)
Dept: GENERAL RADIOLOGY | Age: 53
End: 2018-11-29
Payer: COMMERCIAL

## 2018-11-29 DIAGNOSIS — R07.9 CHEST PAIN, UNSPECIFIED TYPE: Primary | ICD-10-CM

## 2018-11-29 DIAGNOSIS — R10.13 EPIGASTRIC PAIN: ICD-10-CM

## 2018-11-29 LAB
A/G RATIO: 1.3 (ref 1.1–2.2)
ALBUMIN SERPL-MCNC: 4 G/DL (ref 3.4–5)
ALP BLD-CCNC: 60 U/L (ref 40–129)
ALT SERPL-CCNC: 7 U/L (ref 10–40)
ANION GAP SERPL CALCULATED.3IONS-SCNC: 12 MMOL/L (ref 3–16)
AST SERPL-CCNC: 10 U/L (ref 15–37)
BASOPHILS ABSOLUTE: 0.1 K/UL (ref 0–0.2)
BASOPHILS RELATIVE PERCENT: 1.2 %
BILIRUB SERPL-MCNC: 0.3 MG/DL (ref 0–1)
BUN BLDV-MCNC: 11 MG/DL (ref 7–20)
CALCIUM SERPL-MCNC: 9.2 MG/DL (ref 8.3–10.6)
CHLORIDE BLD-SCNC: 105 MMOL/L (ref 99–110)
CO2: 24 MMOL/L (ref 21–32)
CREAT SERPL-MCNC: 0.8 MG/DL (ref 0.6–1.1)
D DIMER: 276 NG/ML DDU (ref 0–229)
EKG ATRIAL RATE: 63 BPM
EKG ATRIAL RATE: 72 BPM
EKG DIAGNOSIS: NORMAL
EKG DIAGNOSIS: NORMAL
EKG P AXIS: 60 DEGREES
EKG P AXIS: 79 DEGREES
EKG P-R INTERVAL: 164 MS
EKG P-R INTERVAL: 170 MS
EKG Q-T INTERVAL: 424 MS
EKG Q-T INTERVAL: 458 MS
EKG QRS DURATION: 76 MS
EKG QRS DURATION: 84 MS
EKG QTC CALCULATION (BAZETT): 464 MS
EKG QTC CALCULATION (BAZETT): 468 MS
EKG R AXIS: -13 DEGREES
EKG R AXIS: -5 DEGREES
EKG T AXIS: -6 DEGREES
EKG T AXIS: 3 DEGREES
EKG VENTRICULAR RATE: 63 BPM
EKG VENTRICULAR RATE: 72 BPM
EOSINOPHILS ABSOLUTE: 0.3 K/UL (ref 0–0.6)
EOSINOPHILS RELATIVE PERCENT: 2.3 %
GFR AFRICAN AMERICAN: >60
GFR NON-AFRICAN AMERICAN: >60
GLOBULIN: 3.1 G/DL
GLUCOSE BLD-MCNC: 125 MG/DL (ref 70–99)
HCT VFR BLD CALC: 36.4 % (ref 36–48)
HEMOGLOBIN: 12.1 G/DL (ref 12–16)
LIPASE: 19 U/L (ref 13–60)
LYMPHOCYTES ABSOLUTE: 2.2 K/UL (ref 1–5.1)
LYMPHOCYTES RELATIVE PERCENT: 19.8 %
MCH RBC QN AUTO: 28.5 PG (ref 26–34)
MCHC RBC AUTO-ENTMCNC: 33.1 G/DL (ref 31–36)
MCV RBC AUTO: 85.9 FL (ref 80–100)
MONOCYTES ABSOLUTE: 0.4 K/UL (ref 0–1.3)
MONOCYTES RELATIVE PERCENT: 3.6 %
NEUTROPHILS ABSOLUTE: 8.3 K/UL (ref 1.7–7.7)
NEUTROPHILS RELATIVE PERCENT: 73.1 %
PDW BLD-RTO: 13.8 % (ref 12.4–15.4)
PLATELET # BLD: 385 K/UL (ref 135–450)
PMV BLD AUTO: 7.1 FL (ref 5–10.5)
POTASSIUM SERPL-SCNC: 3.3 MMOL/L (ref 3.5–5.1)
PRO-BNP: 1279 PG/ML (ref 0–124)
RBC # BLD: 4.24 M/UL (ref 4–5.2)
SODIUM BLD-SCNC: 141 MMOL/L (ref 136–145)
TOTAL PROTEIN: 7.1 G/DL (ref 6.4–8.2)
TROPONIN: <0.01 NG/ML
WBC # BLD: 11.3 K/UL (ref 4–11)

## 2018-11-29 PROCEDURE — 93010 ELECTROCARDIOGRAM REPORT: CPT | Performed by: INTERNAL MEDICINE

## 2018-11-29 PROCEDURE — 96376 TX/PRO/DX INJ SAME DRUG ADON: CPT

## 2018-11-29 PROCEDURE — 94761 N-INVAS EAR/PLS OXIMETRY MLT: CPT

## 2018-11-29 PROCEDURE — 96374 THER/PROPH/DIAG INJ IV PUSH: CPT

## 2018-11-29 PROCEDURE — 84484 ASSAY OF TROPONIN QUANT: CPT

## 2018-11-29 PROCEDURE — 2580000003 HC RX 258: Performed by: EMERGENCY MEDICINE

## 2018-11-29 PROCEDURE — G0378 HOSPITAL OBSERVATION PER HR: HCPCS

## 2018-11-29 PROCEDURE — 6360000004 HC RX CONTRAST MEDICATION: Performed by: EMERGENCY MEDICINE

## 2018-11-29 PROCEDURE — 85379 FIBRIN DEGRADATION QUANT: CPT

## 2018-11-29 PROCEDURE — C9113 INJ PANTOPRAZOLE SODIUM, VIA: HCPCS | Performed by: EMERGENCY MEDICINE

## 2018-11-29 PROCEDURE — 80053 COMPREHEN METABOLIC PANEL: CPT

## 2018-11-29 PROCEDURE — 85025 COMPLETE CBC W/AUTO DIFF WBC: CPT

## 2018-11-29 PROCEDURE — 6370000000 HC RX 637 (ALT 250 FOR IP): Performed by: EMERGENCY MEDICINE

## 2018-11-29 PROCEDURE — 2580000003 HC RX 258: Performed by: PHYSICIAN ASSISTANT

## 2018-11-29 PROCEDURE — 6360000002 HC RX W HCPCS: Performed by: EMERGENCY MEDICINE

## 2018-11-29 PROCEDURE — 83880 ASSAY OF NATRIURETIC PEPTIDE: CPT

## 2018-11-29 PROCEDURE — 99285 EMERGENCY DEPT VISIT HI MDM: CPT

## 2018-11-29 PROCEDURE — 96372 THER/PROPH/DIAG INJ SC/IM: CPT

## 2018-11-29 PROCEDURE — 71260 CT THORAX DX C+: CPT

## 2018-11-29 PROCEDURE — 96375 TX/PRO/DX INJ NEW DRUG ADDON: CPT

## 2018-11-29 PROCEDURE — 6360000002 HC RX W HCPCS: Performed by: PHYSICIAN ASSISTANT

## 2018-11-29 PROCEDURE — 83690 ASSAY OF LIPASE: CPT

## 2018-11-29 PROCEDURE — 71045 X-RAY EXAM CHEST 1 VIEW: CPT

## 2018-11-29 PROCEDURE — 6370000000 HC RX 637 (ALT 250 FOR IP): Performed by: PHYSICIAN ASSISTANT

## 2018-11-29 PROCEDURE — 93005 ELECTROCARDIOGRAM TRACING: CPT | Performed by: EMERGENCY MEDICINE

## 2018-11-29 PROCEDURE — 36415 COLL VENOUS BLD VENIPUNCTURE: CPT

## 2018-11-29 RX ORDER — ROSUVASTATIN CALCIUM 10 MG/1
10 TABLET, COATED ORAL DAILY
Status: DISCONTINUED | OUTPATIENT
Start: 2018-11-29 | End: 2018-11-29

## 2018-11-29 RX ORDER — ONDANSETRON 2 MG/ML
4 INJECTION INTRAMUSCULAR; INTRAVENOUS EVERY 6 HOURS PRN
Status: DISCONTINUED | OUTPATIENT
Start: 2018-11-29 | End: 2018-11-30 | Stop reason: HOSPADM

## 2018-11-29 RX ORDER — ASPIRIN 81 MG/1
324 TABLET, CHEWABLE ORAL ONCE
Status: COMPLETED | OUTPATIENT
Start: 2018-11-29 | End: 2018-11-29

## 2018-11-29 RX ORDER — PANTOPRAZOLE SODIUM 40 MG/10ML
40 INJECTION, POWDER, LYOPHILIZED, FOR SOLUTION INTRAVENOUS ONCE
Status: COMPLETED | OUTPATIENT
Start: 2018-11-29 | End: 2018-11-29

## 2018-11-29 RX ORDER — NITROGLYCERIN 0.4 MG/1
0.4 TABLET SUBLINGUAL EVERY 5 MIN PRN
Status: DISCONTINUED | OUTPATIENT
Start: 2018-11-29 | End: 2018-11-30 | Stop reason: HOSPADM

## 2018-11-29 RX ORDER — SODIUM CHLORIDE 0.9 % (FLUSH) 0.9 %
10 SYRINGE (ML) INJECTION PRN
Status: DISCONTINUED | OUTPATIENT
Start: 2018-11-29 | End: 2018-11-30 | Stop reason: HOSPADM

## 2018-11-29 RX ORDER — ROSUVASTATIN CALCIUM 10 MG/1
10 TABLET, COATED ORAL NIGHTLY
Status: DISCONTINUED | OUTPATIENT
Start: 2018-11-29 | End: 2018-11-30 | Stop reason: HOSPADM

## 2018-11-29 RX ORDER — SODIUM CHLORIDE 9 MG/ML
INJECTION, SOLUTION INTRAVENOUS CONTINUOUS
Status: DISCONTINUED | OUTPATIENT
Start: 2018-11-29 | End: 2018-11-29

## 2018-11-29 RX ORDER — QUETIAPINE FUMARATE 50 MG/1
50 TABLET, EXTENDED RELEASE ORAL NIGHTLY
Status: DISCONTINUED | OUTPATIENT
Start: 2018-11-29 | End: 2018-11-30 | Stop reason: HOSPADM

## 2018-11-29 RX ORDER — METOPROLOL SUCCINATE 50 MG/1
50 TABLET, EXTENDED RELEASE ORAL DAILY
Status: DISCONTINUED | OUTPATIENT
Start: 2018-11-29 | End: 2018-11-30 | Stop reason: HOSPADM

## 2018-11-29 RX ORDER — FLUTICASONE PROPIONATE 50 MCG
1 SPRAY, SUSPENSION (ML) NASAL DAILY
Status: DISCONTINUED | OUTPATIENT
Start: 2018-11-29 | End: 2018-11-30 | Stop reason: HOSPADM

## 2018-11-29 RX ORDER — ONDANSETRON 2 MG/ML
4 INJECTION INTRAMUSCULAR; INTRAVENOUS ONCE
Status: COMPLETED | OUTPATIENT
Start: 2018-11-29 | End: 2018-11-29

## 2018-11-29 RX ORDER — SODIUM CHLORIDE 0.9 % (FLUSH) 0.9 %
10 SYRINGE (ML) INJECTION EVERY 12 HOURS SCHEDULED
Status: DISCONTINUED | OUTPATIENT
Start: 2018-11-29 | End: 2018-11-30 | Stop reason: HOSPADM

## 2018-11-29 RX ORDER — ASPIRIN 81 MG/1
81 TABLET, CHEWABLE ORAL DAILY
Status: DISCONTINUED | OUTPATIENT
Start: 2018-11-30 | End: 2018-11-30 | Stop reason: HOSPADM

## 2018-11-29 RX ORDER — DULOXETIN HYDROCHLORIDE 60 MG/1
60 CAPSULE, DELAYED RELEASE ORAL 2 TIMES DAILY
Status: DISCONTINUED | OUTPATIENT
Start: 2018-11-29 | End: 2018-11-30 | Stop reason: HOSPADM

## 2018-11-29 RX ORDER — MORPHINE SULFATE 2 MG/ML
2 INJECTION, SOLUTION INTRAMUSCULAR; INTRAVENOUS EVERY 4 HOURS PRN
Status: DISCONTINUED | OUTPATIENT
Start: 2018-11-29 | End: 2018-11-30 | Stop reason: HOSPADM

## 2018-11-29 RX ORDER — NITROGLYCERIN 0.4 MG/1
0.4 TABLET SUBLINGUAL EVERY 5 MIN PRN
Status: DISCONTINUED | OUTPATIENT
Start: 2018-11-29 | End: 2018-11-29

## 2018-11-29 RX ORDER — FAMOTIDINE 20 MG/1
20 TABLET, FILM COATED ORAL 2 TIMES DAILY
Status: DISCONTINUED | OUTPATIENT
Start: 2018-11-29 | End: 2018-11-30 | Stop reason: HOSPADM

## 2018-11-29 RX ORDER — TRAZODONE HYDROCHLORIDE 50 MG/1
50 TABLET ORAL NIGHTLY
COMMUNITY
End: 2019-02-04

## 2018-11-29 RX ORDER — ACETAMINOPHEN 325 MG/1
650 TABLET ORAL ONCE
Status: COMPLETED | OUTPATIENT
Start: 2018-11-29 | End: 2018-11-29

## 2018-11-29 RX ORDER — TIZANIDINE 4 MG/1
8 TABLET ORAL EVERY 6 HOURS PRN
Status: DISCONTINUED | OUTPATIENT
Start: 2018-11-29 | End: 2018-11-30 | Stop reason: HOSPADM

## 2018-11-29 RX ADMIN — NITROGLYCERIN 0.4 MG: 0.4 TABLET SUBLINGUAL at 10:21

## 2018-11-29 RX ADMIN — FAMOTIDINE 20 MG: 20 TABLET, FILM COATED ORAL at 20:26

## 2018-11-29 RX ADMIN — NITROGLYCERIN 0.4 MG: 0.4 TABLET SUBLINGUAL at 10:02

## 2018-11-29 RX ADMIN — NITROGLYCERIN 1 INCH: 20 OINTMENT TOPICAL at 12:06

## 2018-11-29 RX ADMIN — Medication 10 ML: at 20:27

## 2018-11-29 RX ADMIN — DULOXETINE HYDROCHLORIDE 60 MG: 60 CAPSULE, DELAYED RELEASE ORAL at 20:26

## 2018-11-29 RX ADMIN — IOPAMIDOL 75 ML: 755 INJECTION, SOLUTION INTRAVENOUS at 11:01

## 2018-11-29 RX ADMIN — QUETIAPINE FUMARATE 50 MG: 50 TABLET, EXTENDED RELEASE ORAL at 20:30

## 2018-11-29 RX ADMIN — FLUTICASONE PROPIONATE 1 SPRAY: 50 SPRAY, METERED NASAL at 20:25

## 2018-11-29 RX ADMIN — HYDROMORPHONE HYDROCHLORIDE 1 MG: 1 INJECTION, SOLUTION INTRAMUSCULAR; INTRAVENOUS; SUBCUTANEOUS at 10:37

## 2018-11-29 RX ADMIN — LIDOCAINE HYDROCHLORIDE: 20 SOLUTION ORAL; TOPICAL at 12:51

## 2018-11-29 RX ADMIN — ONDANSETRON 4 MG: 2 INJECTION INTRAMUSCULAR; INTRAVENOUS at 10:37

## 2018-11-29 RX ADMIN — ACETAMINOPHEN 650 MG: 325 TABLET ORAL at 16:19

## 2018-11-29 RX ADMIN — HYDROMORPHONE HYDROCHLORIDE 1 MG: 1 INJECTION, SOLUTION INTRAMUSCULAR; INTRAVENOUS; SUBCUTANEOUS at 12:20

## 2018-11-29 RX ADMIN — ENOXAPARIN SODIUM 40 MG: 40 INJECTION SUBCUTANEOUS at 20:26

## 2018-11-29 RX ADMIN — ROSUVASTATIN CALCIUM 10 MG: 10 TABLET, FILM COATED ORAL at 20:26

## 2018-11-29 RX ADMIN — SODIUM CHLORIDE: 9 INJECTION, SOLUTION INTRAVENOUS at 10:01

## 2018-11-29 RX ADMIN — PANTOPRAZOLE SODIUM 40 MG: 40 INJECTION, POWDER, FOR SOLUTION INTRAVENOUS at 12:06

## 2018-11-29 RX ADMIN — ASPIRIN 81 MG 324 MG: 81 TABLET ORAL at 10:01

## 2018-11-29 ASSESSMENT — ENCOUNTER SYMPTOMS
VOMITING: 0
COUGH: 0
SORE THROAT: 0
SHORTNESS OF BREATH: 0
ABDOMINAL PAIN: 0
NAUSEA: 1
DIARRHEA: 0

## 2018-11-29 ASSESSMENT — PAIN DESCRIPTION - LOCATION
LOCATION: CHEST
LOCATION: CHEST

## 2018-11-29 ASSESSMENT — PAIN SCALES - GENERAL
PAINLEVEL_OUTOF10: 8
PAINLEVEL_OUTOF10: 6
PAINLEVEL_OUTOF10: 4
PAINLEVEL_OUTOF10: 6
PAINLEVEL_OUTOF10: 7

## 2018-11-29 ASSESSMENT — PAIN DESCRIPTION - PAIN TYPE
TYPE: ACUTE PAIN
TYPE: ACUTE PAIN

## 2018-11-29 ASSESSMENT — PAIN DESCRIPTION - ORIENTATION: ORIENTATION: MID

## 2018-11-29 ASSESSMENT — PAIN DESCRIPTION - FREQUENCY: FREQUENCY: INTERMITTENT

## 2018-11-29 ASSESSMENT — PAIN DESCRIPTION - DESCRIPTORS: DESCRIPTORS: BURNING;SHARP

## 2018-11-29 NOTE — ED NOTES
Patient called out states she has increased pain similar to what it was when she came into the ED. VSS. Dr. Danyelle Luis updated. Patient sitting up in bed using cell phone.      Natalia Ferreira RN  11/29/18 [id number]

## 2018-11-29 NOTE — ED NOTES
Report given to [facility] for transport to [facility].      Virginie Zamarripa RN  11/29/18 1640

## 2018-11-29 NOTE — ED PROVIDER NOTES
Asuncion Quezada is a 47 y.o. female presents with chest pain onset 5 days with associated nausea. She states for the past two days it has been constant and worsening around 0500 this morning where all she could do is pace. She has had heart burn for the past few weeks, she has bene taking her Zantac with no relief. Denies fevers, chills, v/d, ear aches, sore throat, cough, abdominal pain. She does smoke. She states that she had a   Her mother had CHF in her [de-identified], her father has had 6 stents placed, her brother at age 62 had 3 stents placed. HPI    Review of Systems   Constitutional: Negative for chills and fever. HENT: Negative for congestion and sore throat. Eyes: Negative for visual disturbance. Respiratory: Negative for cough and shortness of breath. Cardiovascular: Positive for chest pain. Gastrointestinal: Positive for nausea. Negative for abdominal pain, diarrhea and vomiting. Genitourinary: Negative for difficulty urinating. Musculoskeletal: Negative for neck pain. Skin: Negative for wound. Neurological: Negative for headaches. PAST MEDICAL HISTORY   has a past medical history of Ankle fracture; Anxiety; Arthritis; Benzodiazepine abuse ([facility] [address] Depression; Fibromyalgia; Hyperlipidemia; Hypertension; Kidney cyst; Migraine; Pneumonia (); TIA (transient ischemic attack) (); Ulcer, Stomach Peptic; Unspecified cerebral artery occlusion with cerebral infarction ( ); and Weight loss. PAST SURGICAL HISTORY   has a past surgical history that includes Cholecystectomy; tumor removal ();  section; Hysterectomy; hernia repair; Hand surgery (); and Ankle surgery. FAMILY HISTORY  family history includes Diabetes in her maternal grandfather; Heart Disease in her brother, father, and mother; High Blood Pressure in her mother. SOCIAL HISTORY   reports that she has been smoking Cigarettes. She has a 13.50 pack-year smoking history.  She has never used to the hospital and she would like to be admitted to [facility]. 2:50 PM  Spoke with Dr. Olesya Pan, cardiology. Patient presentation examination and disposition were discussed and agreed upon. He has no problem with the patient being admitted to [facility]. 3:02 PM   Spoke with DOUG Howard for Dr. Katalina Taylor and discussed symptoms, exam, objective data and clinical course. Disposition and plan agreed upon. He will admit the patient and give/enter admitting orders. I spoke with patient and she states her period of time the pain had significantly improved but is now returning and she would rank is a 7 out of 10. We had discussed admission to [facility] and she is still agreeable to that. As she still has chest pain I will speak with cardiology first with a plan then is agreeable to admit her to the hospitalist at [facility]. This document serves as a record of the services and decisions personally performed by Annie Garcia MD. It was created on the provider's behalf by Yoon Pereyra, a trained medical scribe. The creation of this document is based on the provider's statements to the medical scribe. The document has been reviewed and approved by the provider. Lucio Sykes, 9:37 AM 11/29/18 scribing for and in the presence of Annie Garcia MD.        This dictation was generated by voice recognition computer software. Although all attempts are made to edit the dictation for accuracy, there may be errors in the transcription that are not intended.      The Clinical Impression is chest pain        Annie Garcia MD  11/29/18 3718

## 2018-11-29 NOTE — ED NOTES
Bed: 02  Expected date:   Expected time:   Means of arrival:   Comments:  Jose Barbour RN  11/29/18 7450

## 2018-11-30 ENCOUNTER — APPOINTMENT (OUTPATIENT)
Dept: NUCLEAR MEDICINE | Age: 53
End: 2018-11-30
Payer: COMMERCIAL

## 2018-11-30 VITALS
DIASTOLIC BLOOD PRESSURE: 89 MMHG | HEART RATE: 70 BPM | WEIGHT: 193.8 LBS | BODY MASS INDEX: 34.34 KG/M2 | OXYGEN SATURATION: 97 % | SYSTOLIC BLOOD PRESSURE: 172 MMHG | HEIGHT: 63 IN | TEMPERATURE: 97.8 F | RESPIRATION RATE: 18 BRPM

## 2018-11-30 PROBLEM — R07.9 CHEST PAIN: Status: RESOLVED | Noted: 2018-11-29 | Resolved: 2018-11-30

## 2018-11-30 LAB
ANION GAP SERPL CALCULATED.3IONS-SCNC: 10 MMOL/L (ref 3–16)
BUN BLDV-MCNC: 13 MG/DL (ref 7–20)
CALCIUM SERPL-MCNC: 8.6 MG/DL (ref 8.3–10.6)
CHLORIDE BLD-SCNC: 107 MMOL/L (ref 99–110)
CO2: 25 MMOL/L (ref 21–32)
CREAT SERPL-MCNC: 0.7 MG/DL (ref 0.6–1.1)
GFR AFRICAN AMERICAN: >60
GFR NON-AFRICAN AMERICAN: >60
GLUCOSE BLD-MCNC: 115 MG/DL (ref 70–99)
HCT VFR BLD CALC: 32.4 % (ref 36–48)
HEMOGLOBIN: 11.2 G/DL (ref 12–16)
LV EF: 60 %
LVEF MODALITY: NORMAL
MCH RBC QN AUTO: 29.5 PG (ref 26–34)
MCHC RBC AUTO-ENTMCNC: 34.4 G/DL (ref 31–36)
MCV RBC AUTO: 85.7 FL (ref 80–100)
PDW BLD-RTO: 14.2 % (ref 12.4–15.4)
PLATELET # BLD: 296 K/UL (ref 135–450)
PMV BLD AUTO: 7.2 FL (ref 5–10.5)
POTASSIUM REFLEX MAGNESIUM: 3.7 MMOL/L (ref 3.5–5.1)
RBC # BLD: 3.79 M/UL (ref 4–5.2)
SODIUM BLD-SCNC: 142 MMOL/L (ref 136–145)
WBC # BLD: 7.1 K/UL (ref 4–11)

## 2018-11-30 PROCEDURE — 36415 COLL VENOUS BLD VENIPUNCTURE: CPT

## 2018-11-30 PROCEDURE — 6360000002 HC RX W HCPCS: Performed by: PHYSICIAN ASSISTANT

## 2018-11-30 PROCEDURE — 96375 TX/PRO/DX INJ NEW DRUG ADDON: CPT

## 2018-11-30 PROCEDURE — G0378 HOSPITAL OBSERVATION PER HR: HCPCS

## 2018-11-30 PROCEDURE — 90686 IIV4 VACC NO PRSV 0.5 ML IM: CPT | Performed by: PHYSICIAN ASSISTANT

## 2018-11-30 PROCEDURE — G0008 ADMIN INFLUENZA VIRUS VAC: HCPCS | Performed by: PHYSICIAN ASSISTANT

## 2018-11-30 PROCEDURE — 6370000000 HC RX 637 (ALT 250 FOR IP): Performed by: INTERNAL MEDICINE

## 2018-11-30 PROCEDURE — 6370000000 HC RX 637 (ALT 250 FOR IP): Performed by: PHYSICIAN ASSISTANT

## 2018-11-30 PROCEDURE — 93017 CV STRESS TEST TRACING ONLY: CPT

## 2018-11-30 PROCEDURE — 3430000000 HC RX DIAGNOSTIC RADIOPHARMACEUTICAL: Performed by: INTERNAL MEDICINE

## 2018-11-30 PROCEDURE — 96376 TX/PRO/DX INJ SAME DRUG ADON: CPT

## 2018-11-30 PROCEDURE — A9502 TC99M TETROFOSMIN: HCPCS | Performed by: INTERNAL MEDICINE

## 2018-11-30 PROCEDURE — 78452 HT MUSCLE IMAGE SPECT MULT: CPT

## 2018-11-30 PROCEDURE — 85027 COMPLETE CBC AUTOMATED: CPT

## 2018-11-30 PROCEDURE — 2580000003 HC RX 258: Performed by: PHYSICIAN ASSISTANT

## 2018-11-30 PROCEDURE — 99220 PR INITIAL OBSERVATION CARE/DAY 70 MINUTES: CPT | Performed by: PHYSICIAN ASSISTANT

## 2018-11-30 PROCEDURE — 6360000002 HC RX W HCPCS: Performed by: INTERNAL MEDICINE

## 2018-11-30 PROCEDURE — 80048 BASIC METABOLIC PNL TOTAL CA: CPT

## 2018-11-30 RX ORDER — PANTOPRAZOLE SODIUM 40 MG/1
40 TABLET, DELAYED RELEASE ORAL
Status: DISCONTINUED | OUTPATIENT
Start: 2018-11-30 | End: 2018-11-30 | Stop reason: HOSPADM

## 2018-11-30 RX ORDER — PANTOPRAZOLE SODIUM 40 MG/1
40 TABLET, DELAYED RELEASE ORAL
Qty: 30 TABLET | Refills: 0 | Status: SHIPPED | OUTPATIENT
Start: 2018-11-30 | End: 2019-02-04 | Stop reason: SDUPTHER

## 2018-11-30 RX ORDER — ACETAMINOPHEN 325 MG/1
650 TABLET ORAL EVERY 6 HOURS PRN
Status: DISCONTINUED | OUTPATIENT
Start: 2018-11-30 | End: 2018-11-30 | Stop reason: HOSPADM

## 2018-11-30 RX ORDER — ACETAMINOPHEN 160 MG/5ML
650 SOLUTION ORAL EVERY 6 HOURS PRN
Status: DISCONTINUED | OUTPATIENT
Start: 2018-11-30 | End: 2018-11-30

## 2018-11-30 RX ADMIN — MORPHINE SULFATE 2 MG: 2 INJECTION, SOLUTION INTRAMUSCULAR; INTRAVENOUS at 08:29

## 2018-11-30 RX ADMIN — Medication 10 ML: at 08:30

## 2018-11-30 RX ADMIN — TETROFOSMIN 30.7 MILLICURIE: 0.23 INJECTION, POWDER, LYOPHILIZED, FOR SOLUTION INTRAVENOUS at 13:18

## 2018-11-30 RX ADMIN — REGADENOSON 0.4 MG: 0.08 INJECTION, SOLUTION INTRAVENOUS at 13:18

## 2018-11-30 RX ADMIN — ACETAMINOPHEN 650 MG: 325 TABLET ORAL at 13:44

## 2018-11-30 RX ADMIN — ASPIRIN 81 MG 81 MG: 81 TABLET ORAL at 08:29

## 2018-11-30 RX ADMIN — FAMOTIDINE 20 MG: 20 TABLET, FILM COATED ORAL at 08:29

## 2018-11-30 RX ADMIN — METOPROLOL SUCCINATE 50 MG: 50 TABLET, EXTENDED RELEASE ORAL at 15:34

## 2018-11-30 RX ADMIN — DULOXETINE HYDROCHLORIDE 60 MG: 60 CAPSULE, DELAYED RELEASE ORAL at 08:29

## 2018-11-30 RX ADMIN — INFLUENZA A VIRUS A/MICHIGAN/45/2015 X-275 (H1N1) ANTIGEN (FORMALDEHYDE INACTIVATED), INFLUENZA A VIRUS A/SINGAPORE/INFIMH-16-0019/2016 IVR-186 (H3N2) ANTIGEN (FORMALDEHYDE INACTIVATED), INFLUENZA B VIRUS B/PHUKET/3073/2013 ANTIGEN (FORMALDEHYDE INACTIVATED), AND INFLUENZA B VIRUS B/MARYLAND/15/2016 BX-69A ANTIGEN (FORMALDEHYDE INACTIVATED) 0.5 ML: 15; 15; 15; 15 INJECTION, SUSPENSION INTRAMUSCULAR at 15:13

## 2018-11-30 RX ADMIN — ONDANSETRON 4 MG: 2 INJECTION INTRAMUSCULAR; INTRAVENOUS at 08:37

## 2018-11-30 RX ADMIN — TETROFOSMIN 10.3 MILLICURIE: 0.23 INJECTION, POWDER, LYOPHILIZED, FOR SOLUTION INTRAVENOUS at 13:17

## 2018-11-30 RX ADMIN — PANTOPRAZOLE SODIUM 40 MG: 40 TABLET, DELAYED RELEASE ORAL at 09:53

## 2018-11-30 ASSESSMENT — PAIN SCALES - GENERAL
PAINLEVEL_OUTOF10: 5
PAINLEVEL_OUTOF10: 6
PAINLEVEL_OUTOF10: 6
PAINLEVEL_OUTOF10: 8

## 2018-11-30 NOTE — PROGRESS NOTES
Pt called out stating that she was having severe pain. She described her pain as upper GI, stabbing, non-radiating, 8 out of 10. She states that she would compare this pain to having \"gallstones\"    See MAR for pain management.     Vitals:    11/30/18 0815   BP: (!) 149/95   Pulse: 78   Resp: 18   Temp: 97.8 °F (36.6 °C)   SpO2: 97%

## 2018-11-30 NOTE — PROGRESS NOTES
Spoke with Maude Eason from stress and Elizabeth Soria from xray re stress test for tomorrow 1902 11-29-18 Stephanie Osborne

## 2018-11-30 NOTE — PLAN OF CARE
Problem: PAIN  Goal: Patient's pain/discomfort is manageable  Outcome: Ongoing  Denies chest pain at this time.

## 2018-11-30 NOTE — PROGRESS NOTES
Pt completed Lexiscan stress ( due to Pt's recent dose on pain medication & c/o weakness in legs  Changed protocol to non walking stress )waiting for post stress scan.  Pt tolerated stress well no c/o's chest discomfort update calaled to PCU staff CHASIDY Vega

## 2018-12-06 ENCOUNTER — INITIAL CONSULT (OUTPATIENT)
Dept: GASTROENTEROLOGY | Age: 53
End: 2018-12-06
Payer: COMMERCIAL

## 2018-12-06 VITALS
HEIGHT: 63 IN | SYSTOLIC BLOOD PRESSURE: 140 MMHG | BODY MASS INDEX: 34.2 KG/M2 | DIASTOLIC BLOOD PRESSURE: 86 MMHG | WEIGHT: 193 LBS

## 2018-12-06 DIAGNOSIS — Z12.11 SCREENING FOR COLON CANCER: ICD-10-CM

## 2018-12-06 DIAGNOSIS — R12 HEARTBURN: ICD-10-CM

## 2018-12-06 DIAGNOSIS — K59.04 CHRONIC IDIOPATHIC CONSTIPATION: Primary | ICD-10-CM

## 2018-12-06 DIAGNOSIS — R07.9 CHEST PAIN, UNSPECIFIED TYPE: ICD-10-CM

## 2018-12-06 PROCEDURE — 3017F COLORECTAL CA SCREEN DOC REV: CPT | Performed by: INTERNAL MEDICINE

## 2018-12-06 PROCEDURE — G8427 DOCREV CUR MEDS BY ELIG CLIN: HCPCS | Performed by: INTERNAL MEDICINE

## 2018-12-06 PROCEDURE — G8417 CALC BMI ABV UP PARAM F/U: HCPCS | Performed by: INTERNAL MEDICINE

## 2018-12-06 PROCEDURE — 4004F PT TOBACCO SCREEN RCVD TLK: CPT | Performed by: INTERNAL MEDICINE

## 2018-12-06 PROCEDURE — G8598 ASA/ANTIPLAT THER USED: HCPCS | Performed by: INTERNAL MEDICINE

## 2018-12-06 PROCEDURE — G8482 FLU IMMUNIZE ORDER/ADMIN: HCPCS | Performed by: INTERNAL MEDICINE

## 2018-12-06 PROCEDURE — 99204 OFFICE O/P NEW MOD 45 MIN: CPT | Performed by: INTERNAL MEDICINE

## 2018-12-06 RX ORDER — POLYETHYLENE GLYCOL 3350 17 G/17G
255 POWDER ORAL DAILY
Qty: 255 G | Refills: 0 | Status: ON HOLD | OUTPATIENT
Start: 2018-12-06 | End: 2018-12-20 | Stop reason: ALTCHOICE

## 2018-12-06 NOTE — PROGRESS NOTES
03/29/17 147 lb 3.2 oz (66.8 kg)   02/17/17 142 lb 10.2 oz (64.7 kg)   01/16/17 141 lb 12.8 oz (64.3 kg)   01/12/17 141 lb (64 kg)   12/15/16 137 lb 3.2 oz (62.2 kg)   12/09/16 143 lb 12.8 oz (65.2 kg)   12/07/16 140 lb (63.5 kg)   11/02/16 142 lb (64.4 kg)   10/15/16 141 lb (64 kg)   10/11/16 146 lb 9.6 oz (66.5 kg)   09/13/16 148 lb (67.1 kg)   08/31/16 145 lb (65.8 kg)   08/17/16 141 lb (64 kg)   07/11/16 145 lb (65.8 kg)   06/20/16 150 lb (68 kg)   04/05/16 149 lb (67.6 kg)   01/05/16 157 lb (71.2 kg)   12/02/15 147 lb 11.3 oz (67 kg)   11/17/15 147 lb 12.8 oz (67 kg)   10/05/15 154 lb (69.9 kg)   08/26/15 155 lb 9.6 oz (70.6 kg)   08/06/15 160 lb (72.6 kg)   07/11/15 161 lb (73 kg)   06/26/15 156 lb 12.8 oz (71.1 kg)       No components found for: HGBA1C  BP Readings from Last 3 Encounters:   12/06/18 (!) 140/86   11/30/18 (!) 172/89   10/16/18 138/88     Health Maintenance   Topic Date Due    HIV screen  07/31/1981    Diabetes screen  07/31/2006    Shingles Vaccine (1 of 2 - 2 Dose Series) 07/31/2016    Colon cancer screen colonoscopy  07/31/2016    Cervical cancer screen  04/05/2019    Potassium monitoring  11/30/2019    Creatinine monitoring  11/30/2019    Breast cancer screen  07/30/2020    DTaP/Tdap/Td vaccine (2 - Td) 10/06/2022    Lipid screen  07/10/2023    Flu vaccine  Completed    Pneumococcal med risk  Completed       No components found for: [facility]     PAST MEDICAL HISTORY     Past Medical History:   Diagnosis Date    Ankle fracture     Anxiety     Arthritis     Benzodiazepine abuse ([facility] [address]     Depression     Fibromyalgia     Hyperlipidemia     Hypertension     Kidney cyst     x2 rt. ?    Migraine     Pneumonia 2008    bronchitis frequently since    TIA (transient ischemic attack) 2007    Ulcer, Stomach Peptic     frequent N/V    Unspecified cerebral artery occlusion with cerebral infarction 2007     mini stroke cused by severe migraine    Weight loss     frequent COLONOSCOPY W/ OR W/O BIOPSY  -     bisacodyl (DULCOLAX) 5 MG EC tablet; Take 1 tablet by mouth daily as needed for Constipation  -     polyethylene glycol (MIRALAX) POWD powder; Take 255 g by mouth daily Take as directed for colonoscopy    History suspicious for slow transit constipation/gastroparesis. Need to rule out recurrent pud/gastric outlet obstruction given her nsaid use which I instructed her to stop. Should use daily miralax. ORDERED FUTURE/PENDING TESTS     Lab Frequency Next Occurrence   LIPID PANEL Once 11/30/2018   Hepatic Function Panel Once 11/30/2018   Vitamin D 25 Hydroxy Once 70/03/9792   BASIC METABOLIC PANEL Once 79/73/1324   MAGNESIUM Once 11/30/2018       FOLLOWUP   Return for EGD & Colonoscopy.           Ewa GARCÍA 12/6/18 2:19 PM    CC:  Kaylene Novak MD

## 2018-12-06 NOTE — LETTER
[address]  [place], [facility]  Jeremias Meehan [phone]   [phone]    12/06/18  Patient: Ruchi Euceda   MR Number: [id number]   YOB: 1965   Date of Visit: 12/6/18     Dear Dr. Aisha Coto MD    Thank you for the request for consultation for Ruchi Euceda to me for the evaluation of   Chief Complaint   Patient presents with    Abdominal Pain     Epigastric pain X 1 month. Belching (taste like egg)   . Below are the relevant portions of my assessment and plan of care. FINAL DIAGNOSIS/Assessment   Diagnosis Orders   1. Chronic idiopathic constipation  bisacodyl (DULCOLAX) 5 MG EC tablet    polyethylene glycol (MIRALAX) POWD powder   2. Chest pain, unspecified type  EGD   3. Heartburn  EGD   4. Screening for colon cancer  COLONOSCOPY W/ OR W/O BIOPSY    bisacodyl (DULCOLAX) 5 MG EC tablet    polyethylene glycol (MIRALAX) POWD powder       VISIT ORDERS/Plan  Orders Placed This Encounter   Procedures    COLONOSCOPY W/ OR W/O BIOPSY     Scheduling Instructions: With diprivan     Order Specific Question:   Screening or Diagnostic? Answer:   Diagnostic    EGD     Standing Status:   Future     Standing Expiration Date:   1/6/2019     Order Specific Question:   Screening or Diagnostic? Answer:   Diagnostic       If you have questions, please do not hesitate to call me. I look forward to following Ruchi Euceda along with you.     Sincerely,        Ezequiel [facility] 12/6/18 2:32 PM

## 2018-12-06 NOTE — PATIENT INSTRUCTIONS
to detect lumps (tumors), polyps, inflammation, and areas of bleeding. Your caregiver may also take a small piece of tissue (biopsy) that will be examined under a microscope. LET YOUR CAREGIVER KNOW ABOUT:   Allergies to food or medicine. Medicines taken, including vitamins, herbs, eyedrops, over-the-counter medicines, and creams. Use of steroids (by mouth or creams). Previous problems with anesthetics or numbing medicines. History of bleeding problems or blood clots. Previous surgery. Other health problems, including diabetes and kidney problems. Possibility of pregnancy, if this applies. BEFORE THE PROCEDURE   A clear liquid diet may be required for 2 days before the exam.   Ask your caregiver about changing or stopping your regular medications. Liquid injections (enemas) or laxatives may be required. A large amount of electrolyte solution may be given to you to drink over a short period of time. This solution is used to clean out your colon. You should be present 60 minutes prior to your procedure or as directed by your caregiver. AFTER THE PROCEDURE   If you received a sedative or pain relieving medication, you will need to arrange for someone to drive you home. Occasionally, there is a little blood passed with the first bowel movement. Do not be concerned. FINDING OUT THE RESULTS OF YOUR TEST   Not all test results are available during your visit. If your test results are not back during the visit, make an appointment with your caregiver to find out the results. Do not assume everything is normal if you have not heard from your caregiver or the medical facility. It is important for you to follow up on all of your test results. HOME CARE INSTRUCTIONS   It is not unusual to pass moderate amounts of gas and experience mild abdominal cramping following the procedure.  This is due to air being used to inflate your colon during the exam. Walking or a warm pack on your belly (abdomen) may

## 2018-12-19 ENCOUNTER — TELEPHONE (OUTPATIENT)
Dept: GASTROENTEROLOGY | Age: 53
End: 2018-12-19

## 2018-12-20 ENCOUNTER — HOSPITAL ENCOUNTER (OUTPATIENT)
Age: 53
Setting detail: OUTPATIENT SURGERY
Discharge: HOME | End: 2018-12-20
Attending: INTERNAL MEDICINE | Admitting: INTERNAL MEDICINE
Payer: COMMERCIAL

## 2018-12-20 ENCOUNTER — ANESTHESIA EVENT (OUTPATIENT)
Dept: ENDOSCOPY | Age: 53
End: 2018-12-20
Payer: COMMERCIAL

## 2018-12-20 ENCOUNTER — ANESTHESIA (OUTPATIENT)
Dept: ENDOSCOPY | Age: 53
End: 2018-12-20
Payer: COMMERCIAL

## 2018-12-20 VITALS
RESPIRATION RATE: 18 BRPM | BODY MASS INDEX: 33.66 KG/M2 | OXYGEN SATURATION: 97 % | HEART RATE: 82 BPM | SYSTOLIC BLOOD PRESSURE: 157 MMHG | TEMPERATURE: 98.8 F | HEIGHT: 63 IN | WEIGHT: 190 LBS | DIASTOLIC BLOOD PRESSURE: 96 MMHG

## 2018-12-20 VITALS
RESPIRATION RATE: 19 BRPM | DIASTOLIC BLOOD PRESSURE: 93 MMHG | OXYGEN SATURATION: 98 % | SYSTOLIC BLOOD PRESSURE: 179 MMHG

## 2018-12-20 PROBLEM — R07.89 NON-CARDIAC CHEST PAIN: Status: ACTIVE | Noted: 2018-11-29

## 2018-12-20 PROCEDURE — 43239 EGD BIOPSY SINGLE/MULTIPLE: CPT | Performed by: INTERNAL MEDICINE

## 2018-12-20 PROCEDURE — 45378 DIAGNOSTIC COLONOSCOPY: CPT | Performed by: INTERNAL MEDICINE

## 2018-12-20 PROCEDURE — 3700000000 HC ANESTHESIA ATTENDED CARE: Performed by: INTERNAL MEDICINE

## 2018-12-20 PROCEDURE — 3609012400 HC EGD TRANSORAL BIOPSY SINGLE/MULTIPLE: Performed by: INTERNAL MEDICINE

## 2018-12-20 PROCEDURE — 6360000002 HC RX W HCPCS: Performed by: NURSE ANESTHETIST, CERTIFIED REGISTERED

## 2018-12-20 PROCEDURE — 7100000010 HC PHASE II RECOVERY - FIRST 15 MIN: Performed by: INTERNAL MEDICINE

## 2018-12-20 PROCEDURE — 7100000011 HC PHASE II RECOVERY - ADDTL 15 MIN: Performed by: INTERNAL MEDICINE

## 2018-12-20 PROCEDURE — 3609027000 HC COLONOSCOPY: Performed by: INTERNAL MEDICINE

## 2018-12-20 PROCEDURE — 88305 TISSUE EXAM BY PATHOLOGIST: CPT

## 2018-12-20 PROCEDURE — 2580000003 HC RX 258: Performed by: INTERNAL MEDICINE

## 2018-12-20 PROCEDURE — 2709999900 HC NON-CHARGEABLE SUPPLY: Performed by: INTERNAL MEDICINE

## 2018-12-20 PROCEDURE — 3700000001 HC ADD 15 MINUTES (ANESTHESIA): Performed by: INTERNAL MEDICINE

## 2018-12-20 PROCEDURE — 2500000003 HC RX 250 WO HCPCS: Performed by: NURSE ANESTHETIST, CERTIFIED REGISTERED

## 2018-12-20 RX ORDER — LIDOCAINE HYDROCHLORIDE 20 MG/ML
INJECTION, SOLUTION INFILTRATION; PERINEURAL PRN
Status: DISCONTINUED | OUTPATIENT
Start: 2018-12-20 | End: 2018-12-20 | Stop reason: SDUPTHER

## 2018-12-20 RX ORDER — PROPOFOL 10 MG/ML
INJECTION, EMULSION INTRAVENOUS PRN
Status: DISCONTINUED | OUTPATIENT
Start: 2018-12-20 | End: 2018-12-20 | Stop reason: SDUPTHER

## 2018-12-20 RX ORDER — SODIUM CHLORIDE, SODIUM LACTATE, POTASSIUM CHLORIDE, CALCIUM CHLORIDE 600; 310; 30; 20 MG/100ML; MG/100ML; MG/100ML; MG/100ML
INJECTION, SOLUTION INTRAVENOUS CONTINUOUS
Status: DISCONTINUED | OUTPATIENT
Start: 2018-12-20 | End: 2018-12-20 | Stop reason: HOSPADM

## 2018-12-20 RX ADMIN — LIDOCAINE HYDROCHLORIDE 60 MG: 20 INJECTION, SOLUTION INFILTRATION; PERINEURAL at 11:44

## 2018-12-20 RX ADMIN — PROPOFOL 30 MG: 10 INJECTION, EMULSION INTRAVENOUS at 11:45

## 2018-12-20 RX ADMIN — PROPOFOL 30 MG: 10 INJECTION, EMULSION INTRAVENOUS at 12:01

## 2018-12-20 RX ADMIN — PROPOFOL 40 MG: 10 INJECTION, EMULSION INTRAVENOUS at 11:51

## 2018-12-20 RX ADMIN — PROPOFOL 40 MG: 10 INJECTION, EMULSION INTRAVENOUS at 11:54

## 2018-12-20 RX ADMIN — PROPOFOL 20 MG: 10 INJECTION, EMULSION INTRAVENOUS at 12:02

## 2018-12-20 RX ADMIN — PROPOFOL 30 MG: 10 INJECTION, EMULSION INTRAVENOUS at 11:47

## 2018-12-20 RX ADMIN — PROPOFOL 30 MG: 10 INJECTION, EMULSION INTRAVENOUS at 11:56

## 2018-12-20 RX ADMIN — PROPOFOL 20 MG: 10 INJECTION, EMULSION INTRAVENOUS at 12:00

## 2018-12-20 RX ADMIN — SODIUM CHLORIDE, POTASSIUM CHLORIDE, SODIUM LACTATE AND CALCIUM CHLORIDE: 600; 310; 30; 20 INJECTION, SOLUTION INTRAVENOUS at 11:43

## 2018-12-20 RX ADMIN — PROPOFOL 30 MG: 10 INJECTION, EMULSION INTRAVENOUS at 11:58

## 2018-12-20 RX ADMIN — PROPOFOL 30 MG: 10 INJECTION, EMULSION INTRAVENOUS at 11:52

## 2018-12-20 RX ADMIN — PROPOFOL 30 MG: 10 INJECTION, EMULSION INTRAVENOUS at 11:59

## 2018-12-20 RX ADMIN — PROPOFOL 70 MG: 10 INJECTION, EMULSION INTRAVENOUS at 11:44

## 2018-12-20 ASSESSMENT — PAIN DESCRIPTION - DESCRIPTORS
DESCRIPTORS: ACHING

## 2018-12-20 ASSESSMENT — PAIN DESCRIPTION - PAIN TYPE
TYPE: ACUTE PAIN

## 2018-12-20 ASSESSMENT — PAIN DESCRIPTION - LOCATION
LOCATION: ABDOMEN

## 2018-12-20 ASSESSMENT — PAIN DESCRIPTION - ORIENTATION
ORIENTATION: MID;UPPER

## 2018-12-20 ASSESSMENT — PAIN SCALES - GENERAL
PAINLEVEL_OUTOF10: 8
PAINLEVEL_OUTOF10: 1
PAINLEVEL_OUTOF10: 8

## 2018-12-20 ASSESSMENT — PAIN - FUNCTIONAL ASSESSMENT: PAIN_FUNCTIONAL_ASSESSMENT: 0-10

## 2018-12-20 NOTE — PROGRESS NOTES
Patient resting in bed with no acute signs of distress. Vital signs stable. Side rails up x2. Family at beside. Will continue to monitor.

## 2018-12-20 NOTE — H&P
mini stroke cused by severe migraine    Weight loss       frequent N/V, S/P cholecystectomy         FAMILY HISTORY      Family History         Family History   Problem Relation Age of Onset    High Blood Pressure Mother      Heart Disease Mother      Diabetes Maternal Grandfather      Heart Disease Father      Heart Disease Brother           SOCIAL HISTORY      Social History   Social History            Social History    Marital status:        Spouse name: N/A    Number of children: N/A    Years of education: N/A          Occupational History    Not on file.             Social History Main Topics    Smoking status: Light Tobacco Smoker       Packs/day: 0.50       Years: 27.00       Types: Cigarettes    Smokeless tobacco: Never Used    Alcohol use No         Comment: rare    Drug use: No    Sexual activity: Yes       Partners: Male           Other Topics Concern    Not on file          Social History Narrative    No narrative on file         SURGICAL HISTORY      Past Surgical History         Past Surgical History:   Procedure Laterality Date    ANKLE SURGERY         SECTION        CHOLECYSTECTOMY        HAND SURGERY   2005    HERNIA REPAIR        HYSTERECTOMY        TUMOR REMOVAL        lt femur         CURRENT MEDICATIONS   (This list may include medications prescribed during this encounter as epic can not insert only the list prior to this encounter.)  Current Outpatient Rx          Current Outpatient Rx   Medication Sig Dispense Refill    bisacodyl (DULCOLAX) 5 MG EC tablet Take 1 tablet by mouth daily as needed for Constipation 4 tablet 0    polyethylene glycol (MIRALAX) POWD powder Take 255 g by mouth daily Take as directed for colonoscopy 255 g 0    pantoprazole (PROTONIX) 40 MG tablet Take 1 tablet by mouth every morning (before breakfast) 30 tablet 0    metoprolol succinate (TOPROL XL) 50 MG extended release tablet TAKE 1 TABLET DAILY 90 tablet 1    adenopathy. Does not bruise/bleed easily. Psychiatric/Behavioral: Negative for suicidal ideas. PHYSICAL EXAM   VITAL SIGNS: BP (!) 140/86   Ht 5' 2.99\" (1.6 m)   Wt 193 lb (87.5 kg)   BMI 34.20 kg/m²       Wt Readings from Last 3 Encounters:   12/06/18 193 lb (87.5 kg)   11/30/18 193 lb 12.8 oz (87.9 kg)   10/16/18 203 lb (92.1 kg)      Constitutional: Well developed, Well nourished, No acute distress, Non-toxic appearance. HENT: Normocephalic, Atraumatic, Bilateral external ears normal, Oropharynx moist, No oral exudates, Nose normal.   Eyes: Conjunctiva normal, No discharge. Neck: Normal range of motion, No tenderness, Supple, No stridor. Lymphatic: No cervical, subclavian, or axillary lymphadenopathy. Cardiovascular: Normal heart rate, Normal rhythm, No murmurs, No rubs, No gallops. Thorax & Lungs: Normal breath sounds, No respiratory distress, No wheezing, No chest tenderness. No gynecomastia. Abdomen: scars consistent with stated surgeries, no hernias, no HSM, soft NTND   Rectal:  Deferred. Skin: Warm, Dry, No erythema, No rash. No bruising. No spider hemangiomas. Back: No tenderness, No CVA tenderness. Lower Extremities: Intact distal pulses, No edema, No tenderness, No cyanosis, No clubbing. Neurologic: Alert & oriented x 3, Normal motor function, Normal sensory function, No focal deficits noted. No asterixis.   RADIOLOGY/PROCEDURES         FINAL IMPRESSION             Orders Placed This Encounter   Procedures    COLONOSCOPY W/ OR W/O BIOPSY       Scheduling Instructions:         With diprivan       Order Specific Question:   Screening or Diagnostic?       Answer:   Diagnostic    EGD       Standing Status:   Future       Standing Expiration Date:   1/6/2019       Order Specific Question:   Screening or Diagnostic?       Answer:   Diagnostic      Jamar Means was seen today for abdominal pain.     Diagnoses and all orders for this visit:     Chronic idiopathic constipation  -     bisacodyl

## 2018-12-21 ENCOUNTER — TELEPHONE (OUTPATIENT)
Dept: GASTROENTEROLOGY | Age: 53
End: 2018-12-21

## 2019-02-04 ENCOUNTER — OFFICE VISIT (OUTPATIENT)
Dept: FAMILY MEDICINE CLINIC | Age: 54
End: 2019-02-04
Payer: COMMERCIAL

## 2019-02-04 VITALS
HEART RATE: 93 BPM | BODY MASS INDEX: 34.73 KG/M2 | OXYGEN SATURATION: 98 % | HEIGHT: 63 IN | WEIGHT: 196 LBS | SYSTOLIC BLOOD PRESSURE: 136 MMHG | DIASTOLIC BLOOD PRESSURE: 78 MMHG

## 2019-02-04 DIAGNOSIS — K59.04 CHRONIC IDIOPATHIC CONSTIPATION: ICD-10-CM

## 2019-02-04 DIAGNOSIS — Z13.220 LIPID SCREENING: ICD-10-CM

## 2019-02-04 DIAGNOSIS — F51.01 PRIMARY INSOMNIA: ICD-10-CM

## 2019-02-04 DIAGNOSIS — K20.80 ESOPHAGITIS, LOS ANGELES GRADE A: ICD-10-CM

## 2019-02-04 DIAGNOSIS — B96.89 ACUTE BACTERIAL SINUSITIS: ICD-10-CM

## 2019-02-04 DIAGNOSIS — G43.719 INTRACTABLE CHRONIC MIGRAINE WITHOUT AURA AND WITHOUT STATUS MIGRAINOSUS: ICD-10-CM

## 2019-02-04 DIAGNOSIS — L82.1 SEBORRHEIC KERATOSIS: ICD-10-CM

## 2019-02-04 DIAGNOSIS — I10 ESSENTIAL HYPERTENSION, BENIGN: Primary | ICD-10-CM

## 2019-02-04 DIAGNOSIS — K26.9 DUODENAL ULCER: ICD-10-CM

## 2019-02-04 DIAGNOSIS — R73.9 HYPERGLYCEMIA: ICD-10-CM

## 2019-02-04 DIAGNOSIS — M79.7 FIBROMYALGIA: ICD-10-CM

## 2019-02-04 DIAGNOSIS — J01.90 ACUTE BACTERIAL SINUSITIS: ICD-10-CM

## 2019-02-04 DIAGNOSIS — J18.9 COMMUNITY ACQUIRED PNEUMONIA, UNSPECIFIED LATERALITY: ICD-10-CM

## 2019-02-04 DIAGNOSIS — S99.921A FOOT TRAUMA, RIGHT, INITIAL ENCOUNTER: ICD-10-CM

## 2019-02-04 PROCEDURE — G8599 NO ASA/ANTIPLAT THER USE RNG: HCPCS | Performed by: FAMILY MEDICINE

## 2019-02-04 PROCEDURE — G8417 CALC BMI ABV UP PARAM F/U: HCPCS | Performed by: FAMILY MEDICINE

## 2019-02-04 PROCEDURE — 99214 OFFICE O/P EST MOD 30 MIN: CPT | Performed by: FAMILY MEDICINE

## 2019-02-04 PROCEDURE — G8427 DOCREV CUR MEDS BY ELIG CLIN: HCPCS | Performed by: FAMILY MEDICINE

## 2019-02-04 PROCEDURE — 4004F PT TOBACCO SCREEN RCVD TLK: CPT | Performed by: FAMILY MEDICINE

## 2019-02-04 PROCEDURE — G8482 FLU IMMUNIZE ORDER/ADMIN: HCPCS | Performed by: FAMILY MEDICINE

## 2019-02-04 PROCEDURE — 3017F COLORECTAL CA SCREEN DOC REV: CPT | Performed by: FAMILY MEDICINE

## 2019-02-04 RX ORDER — PANTOPRAZOLE SODIUM 40 MG/1
40 TABLET, DELAYED RELEASE ORAL
Qty: 30 TABLET | Refills: 11 | Status: SHIPPED | OUTPATIENT
Start: 2019-02-04 | End: 2020-03-11

## 2019-02-04 RX ORDER — TRAZODONE HYDROCHLORIDE 50 MG/1
50 TABLET ORAL NIGHTLY
Qty: 100 TABLET | Refills: 0 | Status: SHIPPED | OUTPATIENT
Start: 2019-02-04 | End: 2019-03-05

## 2019-02-04 RX ORDER — IBUPROFEN 800 MG/1
TABLET ORAL
Qty: 90 TABLET | Refills: 11 | Status: SHIPPED | OUTPATIENT
Start: 2019-02-04 | End: 2020-02-13

## 2019-02-12 ENCOUNTER — HOSPITAL ENCOUNTER (OUTPATIENT)
Dept: GENERAL RADIOLOGY | Age: 54
Discharge: HOME | End: 2019-02-12
Payer: COMMERCIAL

## 2019-02-12 ENCOUNTER — HOSPITAL ENCOUNTER (OUTPATIENT)
Age: 54
Discharge: HOME | End: 2019-02-12
Payer: COMMERCIAL

## 2019-02-12 DIAGNOSIS — S99.921A FOOT TRAUMA, RIGHT, INITIAL ENCOUNTER: ICD-10-CM

## 2019-02-12 PROCEDURE — 73630 X-RAY EXAM OF FOOT: CPT

## 2019-02-12 PROCEDURE — 73610 X-RAY EXAM OF ANKLE: CPT

## 2019-02-21 ENCOUNTER — TELEPHONE (OUTPATIENT)
Dept: FAMILY MEDICINE CLINIC | Age: 54
End: 2019-02-21

## 2019-03-04 RX ORDER — TRAZODONE HYDROCHLORIDE 50 MG/1
TABLET ORAL
Qty: 30 TABLET | Refills: 0 | Status: SHIPPED | OUTPATIENT
Start: 2019-03-04 | End: 2019-05-10 | Stop reason: SDUPTHER

## 2019-03-05 ENCOUNTER — PROCEDURE VISIT (OUTPATIENT)
Dept: FAMILY MEDICINE CLINIC | Age: 54
End: 2019-03-05
Payer: COMMERCIAL

## 2019-03-05 VITALS
BODY MASS INDEX: 35.08 KG/M2 | DIASTOLIC BLOOD PRESSURE: 82 MMHG | HEIGHT: 63 IN | HEART RATE: 90 BPM | OXYGEN SATURATION: 98 % | WEIGHT: 198 LBS | SYSTOLIC BLOOD PRESSURE: 136 MMHG

## 2019-03-05 DIAGNOSIS — R20.9 SKIN SENSATION DISTURBANCE: ICD-10-CM

## 2019-03-05 DIAGNOSIS — L82.0 INFLAMED SEBORRHEIC KERATOSIS: Primary | ICD-10-CM

## 2019-03-05 DIAGNOSIS — L91.8 SKIN TAG: ICD-10-CM

## 2019-03-05 DIAGNOSIS — L73.2 HIDRADENITIS AXILLARIS: ICD-10-CM

## 2019-03-05 PROCEDURE — 4004F PT TOBACCO SCREEN RCVD TLK: CPT | Performed by: FAMILY MEDICINE

## 2019-03-05 PROCEDURE — 3017F COLORECTAL CA SCREEN DOC REV: CPT | Performed by: FAMILY MEDICINE

## 2019-03-05 PROCEDURE — G8482 FLU IMMUNIZE ORDER/ADMIN: HCPCS | Performed by: FAMILY MEDICINE

## 2019-03-05 PROCEDURE — G8599 NO ASA/ANTIPLAT THER USE RNG: HCPCS | Performed by: FAMILY MEDICINE

## 2019-03-05 PROCEDURE — G8417 CALC BMI ABV UP PARAM F/U: HCPCS | Performed by: FAMILY MEDICINE

## 2019-03-05 PROCEDURE — G8427 DOCREV CUR MEDS BY ELIG CLIN: HCPCS | Performed by: FAMILY MEDICINE

## 2019-03-05 PROCEDURE — 11301 SHAVE SKIN LESION 0.6-1.0 CM: CPT | Performed by: FAMILY MEDICINE

## 2019-03-05 PROCEDURE — 99212 OFFICE O/P EST SF 10 MIN: CPT | Performed by: FAMILY MEDICINE

## 2019-03-05 RX ORDER — DOXYCYCLINE HYCLATE 100 MG
100 TABLET ORAL 2 TIMES DAILY
Qty: 20 TABLET | Refills: 0 | Status: SHIPPED | OUTPATIENT
Start: 2019-03-05 | End: 2019-03-15

## 2019-04-02 RX ORDER — TRAZODONE HYDROCHLORIDE 50 MG/1
TABLET ORAL
Qty: 30 TABLET | Refills: 11 | Status: SHIPPED | OUTPATIENT
Start: 2019-04-02 | End: 2019-05-10 | Stop reason: SDUPTHER

## 2019-04-08 ENCOUNTER — NURSE ONLY (OUTPATIENT)
Dept: FAMILY MEDICINE CLINIC | Age: 54
End: 2019-04-08
Payer: COMMERCIAL

## 2019-04-08 DIAGNOSIS — R73.9 HYPERGLYCEMIA: ICD-10-CM

## 2019-04-08 DIAGNOSIS — Z13.220 LIPID SCREENING: ICD-10-CM

## 2019-04-08 LAB
ALBUMIN SERPL-MCNC: 3.9 G/DL (ref 3.4–5)
ALP BLD-CCNC: 60 U/L (ref 40–129)
ALT SERPL-CCNC: 9 U/L (ref 10–40)
AST SERPL-CCNC: 13 U/L (ref 15–37)
BILIRUB SERPL-MCNC: <0.2 MG/DL (ref 0–1)
BILIRUBIN DIRECT: <0.2 MG/DL (ref 0–0.3)
BILIRUBIN, INDIRECT: ABNORMAL MG/DL (ref 0–1)
CHOLESTEROL, TOTAL: 171 MG/DL (ref 0–199)
HDLC SERPL-MCNC: 62 MG/DL (ref 40–60)
LDL CHOLESTEROL CALCULATED: 94 MG/DL
TOTAL PROTEIN: 6.7 G/DL (ref 6.4–8.2)
TRIGL SERPL-MCNC: 75 MG/DL (ref 0–150)
VLDLC SERPL CALC-MCNC: 15 MG/DL

## 2019-04-08 PROCEDURE — 36415 COLL VENOUS BLD VENIPUNCTURE: CPT | Performed by: FAMILY MEDICINE

## 2019-04-09 LAB
ESTIMATED AVERAGE GLUCOSE: 122.6 MG/DL
HBA1C MFR BLD: 5.9 %

## 2019-04-22 ENCOUNTER — TELEPHONE (OUTPATIENT)
Dept: FAMILY MEDICINE CLINIC | Age: 54
End: 2019-04-22

## 2019-04-22 DIAGNOSIS — T36.95XA ANTIBIOTIC-INDUCED YEAST INFECTION: Primary | ICD-10-CM

## 2019-04-22 DIAGNOSIS — B37.9 ANTIBIOTIC-INDUCED YEAST INFECTION: Primary | ICD-10-CM

## 2019-04-22 RX ORDER — FLUCONAZOLE 150 MG/1
150 TABLET ORAL ONCE
Qty: 1 TABLET | Refills: 0 | Status: SHIPPED | OUTPATIENT
Start: 2019-04-22 | End: 2019-04-22

## 2019-04-22 NOTE — TELEPHONE ENCOUNTER
Pt states that she has been on an antibiotic and has a yeast infection from it was wanting to see if she could get the 1 pill called in to [address]  States that the dentist put her on the antibiotic

## 2019-05-09 ENCOUNTER — TELEPHONE (OUTPATIENT)
Dept: FAMILY MEDICINE CLINIC | Age: 54
End: 2019-05-09

## 2019-05-09 NOTE — TELEPHONE ENCOUNTER
Pt called stating that Sunday she accidentally took 150MG of her Trazodone instead of the 100MG. Pt stated that she slept really well after doing this(3.5 hrs straight) and was wanting to see what PCP thought about increasing her Rx.  Call back [phone]

## 2019-05-10 RX ORDER — TRAZODONE HYDROCHLORIDE 150 MG/1
TABLET ORAL
Qty: 30 TABLET | Refills: 11 | Status: SHIPPED | OUTPATIENT
Start: 2019-05-10 | End: 2020-04-07

## 2019-05-30 ENCOUNTER — TELEPHONE (OUTPATIENT)
Dept: FAMILY MEDICINE CLINIC | Age: 54
End: 2019-05-30

## 2019-05-30 NOTE — TELEPHONE ENCOUNTER
Attempted to contact patient on 5/30/2019. Result: left message on the patient's voicemail asking patient to return my call. Pre-Visit planning not completed.

## 2019-06-06 NOTE — TELEPHONE ENCOUNTER
Attempted to contact patient on 6/6/2019. Result: left message on the patient's voicemail asking patient to return my call. Pre-Visit planning not completed.

## 2019-06-11 ENCOUNTER — OFFICE VISIT (OUTPATIENT)
Dept: FAMILY MEDICINE CLINIC | Age: 54
End: 2019-06-11
Payer: COMMERCIAL

## 2019-06-11 VITALS
BODY MASS INDEX: 34.55 KG/M2 | DIASTOLIC BLOOD PRESSURE: 100 MMHG | HEART RATE: 79 BPM | SYSTOLIC BLOOD PRESSURE: 162 MMHG | OXYGEN SATURATION: 97 % | HEIGHT: 63 IN | WEIGHT: 195 LBS

## 2019-06-11 DIAGNOSIS — R60.9 PERIPHERAL EDEMA: ICD-10-CM

## 2019-06-11 DIAGNOSIS — F51.01 PRIMARY INSOMNIA: ICD-10-CM

## 2019-06-11 DIAGNOSIS — K21.00 GASTROESOPHAGEAL REFLUX DISEASE WITH ESOPHAGITIS: ICD-10-CM

## 2019-06-11 DIAGNOSIS — R10.13 DYSPEPSIA: ICD-10-CM

## 2019-06-11 DIAGNOSIS — M25.571 RIGHT ANKLE PAIN, UNSPECIFIED CHRONICITY: ICD-10-CM

## 2019-06-11 DIAGNOSIS — G43.509 PERSISTENT MIGRAINE AURA WITHOUT CEREBRAL INFARCTION AND WITHOUT STATUS MIGRAINOSUS, NOT INTRACTABLE: ICD-10-CM

## 2019-06-11 DIAGNOSIS — K59.04 CHRONIC IDIOPATHIC CONSTIPATION: ICD-10-CM

## 2019-06-11 DIAGNOSIS — K20.80 ESOPHAGITIS, LOS ANGELES GRADE A: ICD-10-CM

## 2019-06-11 DIAGNOSIS — I10 ESSENTIAL HYPERTENSION, BENIGN: Primary | ICD-10-CM

## 2019-06-11 PROCEDURE — 99214 OFFICE O/P EST MOD 30 MIN: CPT | Performed by: FAMILY MEDICINE

## 2019-06-11 PROCEDURE — G8427 DOCREV CUR MEDS BY ELIG CLIN: HCPCS | Performed by: FAMILY MEDICINE

## 2019-06-11 PROCEDURE — G8417 CALC BMI ABV UP PARAM F/U: HCPCS | Performed by: FAMILY MEDICINE

## 2019-06-11 PROCEDURE — 3017F COLORECTAL CA SCREEN DOC REV: CPT | Performed by: FAMILY MEDICINE

## 2019-06-11 PROCEDURE — G8599 NO ASA/ANTIPLAT THER USE RNG: HCPCS | Performed by: FAMILY MEDICINE

## 2019-06-11 PROCEDURE — 4004F PT TOBACCO SCREEN RCVD TLK: CPT | Performed by: FAMILY MEDICINE

## 2019-06-11 RX ORDER — POLYETHYLENE GLYCOL 3350 17 G/17G
POWDER ORAL
Qty: 255 G | Refills: 11 | Status: SHIPPED | OUTPATIENT
Start: 2019-06-11 | End: 2022-03-23 | Stop reason: SDUPTHER

## 2019-06-11 RX ORDER — LACTULOSE 10 G/15ML
30 SOLUTION ORAL 2 TIMES DAILY
Qty: 1800 ML | Refills: 0 | Status: SHIPPED | OUTPATIENT
Start: 2019-06-11 | End: 2019-07-11

## 2019-06-11 RX ORDER — TRIAMTERENE AND HYDROCHLOROTHIAZIDE 37.5; 25 MG/1; MG/1
1 CAPSULE ORAL DAILY
Qty: 30 CAPSULE | Refills: 5 | Status: SHIPPED | OUTPATIENT
Start: 2019-06-11 | End: 2021-02-16

## 2019-06-11 RX ORDER — FUROSEMIDE 20 MG/1
20 TABLET ORAL DAILY PRN
COMMUNITY
End: 2021-03-27

## 2019-06-11 NOTE — PATIENT INSTRUCTIONS
Start taking dyazide every morning and if by 12 or 2:00 you're still feeling swollen, then take one 20 mg tab of Lasix with one potassium supplement. Continue taking metoprolol. Call the office in 1 week to let us know how your BP is doing. Patient should call the office immediately with new or ongoing signs or symptoms or worsening, or proceed to the emergency room. If you are on medications which could impair your senses, you are at risk of weakness, falls, dizziness, or drowsiness. You should be careful during activities which could place you at risk of harm, such as climbing, using stairs, operating machinery, or driving vehicles. If you feel you cannot safely do these activities, you should request others to help you, or avoid the activities altogether. If you are drowsy for any other reason, you should use the same precautions as listed above.

## 2019-06-11 NOTE — PROGRESS NOTES
Chief Complaint   Patient presents with    Hypertension    Gastroesophageal Reflux    Constipation    Migraine    Other     pt has multiple medical conditions     HPI:  Nancy Olmedo is a 47 y.o. (: 1965) here today for 4 month follow up. GI Disorder:  Patient presents for follow-up of GERD- chronic and constipation- chronic. Pt has not been taking her Miralax or using Lactulose over the last 2 weeks because she ran out both prescriptions. So she has not had a full BM in 2 weeks. She's bloated and her abdomen is distended and with her current migraine, she can't strain to move her bowels because it makes her head hurt. Current symptoms include heartburn. Symptoms are better since last visit. Patient denies vomiting, diarrhea, hematochezia and rectal bleeding. Current treatment includes: proton pump inhibitor- pantoprazole 40 mg daily. Medication side effects:  none. Recent diagnostic testing: none. Chronic Migraine Headaches: Last week she got a migraine that began on Wednesday and has not yet totally gone away. She takes ibuprofen for this but says it doesn't help. Hypertension:  Home blood pressure monitoring: yes, has been running high lately. She notes that one day last week she checked her BP and it was 200 something over 100 something and 190/80 on another day. A long time ago she was on a dyazide prescription. She is adherent to a low sodium diet. Patient denies chest pain, shortness of breath, blurred vision, palpitations, dry cough and fatigue. She does have peripheral edema and has been taking 20 mg of Lasix daily without much relief. Antihypertensive medication side effects: no medication side effects noted. Use of agents associated with hypertension: none. Cervical Neck Pain: Radiates down to her shoulders and mid back, pain is constant, started approx 1 month ago.   Pt is pretty sure she saw a physician at [facility] a year or so ago and recalls having an x-ray of her neck completed and thinks it showed some kind of abnormality. She has limited ROM, hard to look to her left side for very long. Hip Pain:  Pt has left hip pain and swelling. Hx of surgery in 1990 for tumor removal and they put a joseluis, screws and a plate in her left hip. She's been more physically active lately watching her grand daughter and wonders if this is what's aggravating her joints. Sodium (mmol/L)   Date Value   11/30/2018 142    BUN (mg/dL)   Date Value   11/30/2018 13    Glucose (mg/dL)   Date Value   11/30/2018 115 (H)      Potassium reflex Magnesium (mmol/L)   Date Value   11/30/2018 3.7    CREATININE (mg/dL)   Date Value   11/30/2018 0.7         Patient's medications, allergies, past medical, surgical, social and family histories were reviewed and updated asappropriate on 6/11/2019 at 3:28 PM.    ROS:  Review of Systems    All other systems reviewed and are negative except as noted above on 6/11/2019 at 3:28 PM. Additional review of systems may be scanned into the media section ofthis medical record. Any responses requiring further intervention were pursued.     Hemoglobin A1C (%)   Date Value   04/08/2019 5.9     Microscopic Examination (no units)   Date Value   03/21/2018 YES     LDL Calculated (mg/dL)   Date Value   04/08/2019 94     Past Medical History:   Diagnosis Date    Ankle fracture     Anxiety     Arthritis     Benzodiazepine abuse ([facility] [address]     Depression     Fibromyalgia     Hyperlipidemia     Hypertension     Kidney cyst     x2 rt. ?    Migraine     Pneumonia 2008    bronchitis frequently since    TIA (transient ischemic attack) 2007    Ulcer, Stomach Peptic     frequent N/V    Unspecified cerebral artery occlusion with cerebral infarction 2007     mini stroke cused by severe migraine    Weight loss     frequent N/V, S/P cholecystectomy        Family History   Problem Relation Age of Onset    High Blood Pressure Mother  Heart Disease Mother     Diabetes Maternal Grandfather     Heart Disease Father     Heart Disease Brother      Social History     Socioeconomic History    Marital status:      Spouse name: Not on file    Number of children: Not on file    Years of education: Not on file    Highest education level: Not on file   Occupational History    Not on file   Social Needs    Financial resource strain: Not on file    Food insecurity:     Worry: Not on file     Inability: Not on file    Transportation needs:     Medical: Not on file     Non-medical: Not on file   Tobacco Use    Smoking status: Current Every Day Smoker     Packs/day: 0.50     Years: 27.00     Pack years: 13.50     Types: Cigarettes    Smokeless tobacco: Never Used   Substance and Sexual Activity    Alcohol use: No     Alcohol/week: 0.0 oz     Comment: rare    Drug use: No    Sexual activity: Yes     Partners: Male   Lifestyle    Physical activity:     Days per week: Not on file     Minutes per session: Not on file    Stress: Not on file   Relationships    Social connections:     Talks on phone: Not on file     Gets together: Not on file     Attends Confucianism service: Not on file     Active member of club or organization: Not on file     Attends meetings of clubs or organizations: Not on file     Relationship status: Not on file    Intimate partner violence:     Fear of current or ex partner: Not on file     Emotionally abused: Not on file     Physically abused: Not on file     Forced sexual activity: Not on file   Other Topics Concern    Not on file   Social History Narrative    Not on file       Prior to Visit Medications    Medication Sig Taking?  Authorizing Provider   traZODone (DESYREL) 150 MG tablet TAKE 1 TABLET AT BEDTIME Yes Kinjal Fernandes MD   pantoprazole (PROTONIX) 40 MG tablet Take 1 tablet by mouth every morning (before breakfast) Yes Kinjal Fernandes MD   ibuprofen (ADVIL;MOTRIN) 800 MG tablet TAKE 1 TABLET THREE TIMES DAILY Yes Rosanne Tompkins MD   metoprolol succinate (TOPROL XL) 50 MG extended release tablet TAKE 1 TABLET DAILY Yes Rosanne Tompkins MD   DULoxetine (CYMBALTA) 60 MG extended release capsule TAKE 1 CAPSULE TWICE DAILY Yes Rosanne Tompkins MD   potassium chloride (KLOR-CON M) 10 MEQ extended release tablet TAKE 1 TABLET ONCE A DAY Yes Enma Tabares, APRN - CNP   rosuvastatin (CRESTOR) 10 MG tablet Take 1 tablet by mouth daily Yes Rosanne Tompkins MD     Allergies   Allergen Reactions    Benadryl [Diphenhydramine] Anxiety     \"Made me jump off the wall\"    Topamax [Topiramate] Hives    Tramadol Shortness Of Breath    Vitamin C [Ascorbic Acid] Hives    Lyrica [Pregabalin] Palpitations       OBJECTIVE:  Estimated body mass index is 35.08 kg/m² as calculated from the following:    Height as of this encounter: 5' 2.52\" (1.588 m). Weight as of this encounter: 195 lb (88.5 kg). Vitals:    06/11/19 1457   BP: (!) 170/100   Site: Right Upper Arm   Position: Sitting   Cuff Size: Large Adult   Pulse: 79   SpO2: 97%   Weight: 195 lb (88.5 kg)   Height: 5' 2.52\" (1.588 m)     BP Readings from Last 2 Encounters:   06/11/19 (!) 170/100   03/05/19 136/82     Wt Readings from Last 3 Encounters:   06/11/19 195 lb (88.5 kg)   03/05/19 198 lb (89.8 kg)   02/04/19 196 lb (88.9 kg)       Physical Exam   Constitutional: She appears well-developed and well-nourished. No distress. HENT:   Head: Normocephalic and atraumatic. Right Ear: External ear normal.   Left Ear: External ear normal.   Nose: Nose normal.   Lips symmetric   Eyes: Pupils are equal, round, and reactive to light. Lids are normal. Right eye exhibits no discharge. Left eye exhibits no discharge. No scleral icterus. Neck: No JVD present. No thyromegaly present. Cardiovascular: Normal rate, regular rhythm and normal heart sounds.    Pulmonary/Chest: Effort normal and breath sounds normal. No respiratory distress. Abdominal: Soft. There is no hepatosplenomegaly. There is no tenderness. Musculoskeletal: She exhibits edema (3 + BLE). Cervical back: She exhibits decreased range of motion. Skin: Skin is warm and dry. No rash noted. She is not diaphoretic. No erythema. No pallor. Turgor normal   Psychiatric: She has a normal mood and affect. Her behavior is normal. Judgment and thought content normal.   Nursing note and vitals reviewed. ASSESSMENT PLAN      Diagnosis Orders   1. Essential hypertension, benign  triamterene-hydrochlorothiazide (DYAZIDE) 37.5-25 MG per capsule   2. 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 Esophagitis, [place] grade A     4. Dyspepsia     5. Right ankle pain, unspecified chronicity     6. Gastroesophageal reflux disease with esophagitis     7. Chronic idiopathic constipation  polyethylene glycol (MIRALAX) POWD powder    lactulose (CHRONULAC) 10 GM/15ML solution   8. Peripheral edema     9. Primary insomnia     Blood pressure is uncontrolled. Add Dyazide. Patient will change her use of Lasix and potassium to PRN for peripheral edema. Call blood pressure readings in 1 week for titration. Considered prednisone for status migraine, but she states it makes her swelling much worse so will observe for now. Under significant stress with her mother who is terminally ill. Still has some difficulty with swallowing and acid, treated elsewhere. Since significant neck discomfort related to degenerative disc changes previously evaluated. Bowels were some rash she went off of MiraLAX and lactulose actually running out of these products. She will  MiraLAX over-the-counter and we sent lactulose. Still with difficulty sleeping. Follow-up 4 months. Patient should call the office immediately with new or ongoing signs or symptoms or worsening, or proceed to the emergency room.   No changes

## 2019-07-01 ENCOUNTER — TELEPHONE (OUTPATIENT)
Dept: FAMILY MEDICINE CLINIC | Age: 54
End: 2019-07-01

## 2019-07-01 RX ORDER — METOPROLOL SUCCINATE 50 MG/1
TABLET, EXTENDED RELEASE ORAL
Qty: 30 TABLET | Refills: 11 | Status: SHIPPED | OUTPATIENT
Start: 2019-07-01 | End: 2019-10-11

## 2019-08-28 ENCOUNTER — TELEPHONE (OUTPATIENT)
Dept: FAMILY MEDICINE CLINIC | Age: 54
End: 2019-08-28

## 2019-09-10 ENCOUNTER — TELEPHONE (OUTPATIENT)
Dept: FAMILY MEDICINE CLINIC | Age: 54
End: 2019-09-10

## 2019-09-18 ENCOUNTER — OFFICE VISIT (OUTPATIENT)
Dept: ORTHOPEDIC SURGERY | Age: 54
End: 2019-09-18
Payer: COMMERCIAL

## 2019-09-18 VITALS
HEART RATE: 76 BPM | HEIGHT: 63 IN | DIASTOLIC BLOOD PRESSURE: 82 MMHG | WEIGHT: 195.11 LBS | SYSTOLIC BLOOD PRESSURE: 128 MMHG | BODY MASS INDEX: 34.57 KG/M2

## 2019-09-18 DIAGNOSIS — M50.30 DDD (DEGENERATIVE DISC DISEASE), CERVICAL: Primary | ICD-10-CM

## 2019-09-18 PROCEDURE — G8427 DOCREV CUR MEDS BY ELIG CLIN: HCPCS | Performed by: PHYSICIAN ASSISTANT

## 2019-09-18 PROCEDURE — 99243 OFF/OP CNSLTJ NEW/EST LOW 30: CPT | Performed by: PHYSICIAN ASSISTANT

## 2019-09-18 PROCEDURE — G8417 CALC BMI ABV UP PARAM F/U: HCPCS | Performed by: PHYSICIAN ASSISTANT

## 2019-09-27 ENCOUNTER — TELEPHONE (OUTPATIENT)
Dept: FAMILY MEDICINE CLINIC | Age: 54
End: 2019-09-27

## 2019-10-11 ENCOUNTER — OFFICE VISIT (OUTPATIENT)
Dept: FAMILY MEDICINE CLINIC | Age: 54
End: 2019-10-11
Payer: COMMERCIAL

## 2019-10-11 VITALS
OXYGEN SATURATION: 96 % | BODY MASS INDEX: 35.22 KG/M2 | SYSTOLIC BLOOD PRESSURE: 156 MMHG | DIASTOLIC BLOOD PRESSURE: 108 MMHG | HEART RATE: 90 BPM | WEIGHT: 195.8 LBS

## 2019-10-11 DIAGNOSIS — Z23 FLU VACCINE NEED: ICD-10-CM

## 2019-10-11 DIAGNOSIS — I10 UNCONTROLLED HYPERTENSION: ICD-10-CM

## 2019-10-11 DIAGNOSIS — K21.00 GASTROESOPHAGEAL REFLUX DISEASE WITH ESOPHAGITIS: ICD-10-CM

## 2019-10-11 DIAGNOSIS — K59.04 CHRONIC IDIOPATHIC CONSTIPATION: ICD-10-CM

## 2019-10-11 DIAGNOSIS — R07.9 CHEST PAIN AT REST: Primary | ICD-10-CM

## 2019-10-11 DIAGNOSIS — G43.509 PERSISTENT MIGRAINE AURA WITHOUT CEREBRAL INFARCTION AND WITHOUT STATUS MIGRAINOSUS, NOT INTRACTABLE: ICD-10-CM

## 2019-10-11 DIAGNOSIS — L82.1 SEBORRHEIC KERATOSIS: ICD-10-CM

## 2019-10-11 DIAGNOSIS — Z23 NEED FOR SHINGLES VACCINE: ICD-10-CM

## 2019-10-11 DIAGNOSIS — L81.9 ABNORMAL PIGMENTATION OF SKIN: ICD-10-CM

## 2019-10-11 DIAGNOSIS — I10 ESSENTIAL HYPERTENSION, BENIGN: ICD-10-CM

## 2019-10-11 PROCEDURE — 4004F PT TOBACCO SCREEN RCVD TLK: CPT | Performed by: FAMILY MEDICINE

## 2019-10-11 PROCEDURE — G8599 NO ASA/ANTIPLAT THER USE RNG: HCPCS | Performed by: FAMILY MEDICINE

## 2019-10-11 PROCEDURE — 90686 IIV4 VACC NO PRSV 0.5 ML IM: CPT | Performed by: FAMILY MEDICINE

## 2019-10-11 PROCEDURE — 93000 ELECTROCARDIOGRAM COMPLETE: CPT | Performed by: FAMILY MEDICINE

## 2019-10-11 PROCEDURE — 3017F COLORECTAL CA SCREEN DOC REV: CPT | Performed by: FAMILY MEDICINE

## 2019-10-11 PROCEDURE — 90471 IMMUNIZATION ADMIN: CPT | Performed by: FAMILY MEDICINE

## 2019-10-11 PROCEDURE — G8427 DOCREV CUR MEDS BY ELIG CLIN: HCPCS | Performed by: FAMILY MEDICINE

## 2019-10-11 PROCEDURE — 99214 OFFICE O/P EST MOD 30 MIN: CPT | Performed by: FAMILY MEDICINE

## 2019-10-11 PROCEDURE — G8482 FLU IMMUNIZE ORDER/ADMIN: HCPCS | Performed by: FAMILY MEDICINE

## 2019-10-11 PROCEDURE — G8417 CALC BMI ABV UP PARAM F/U: HCPCS | Performed by: FAMILY MEDICINE

## 2019-10-11 RX ORDER — METOPROLOL SUCCINATE 100 MG/1
TABLET, EXTENDED RELEASE ORAL
Qty: 30 TABLET | Refills: 11
Start: 2019-10-11 | End: 2021-01-05

## 2019-10-15 ENCOUNTER — APPOINTMENT (OUTPATIENT)
Dept: GENERAL RADIOLOGY | Age: 54
End: 2019-10-15
Payer: COMMERCIAL

## 2019-10-15 ENCOUNTER — HOSPITAL ENCOUNTER (EMERGENCY)
Age: 54
Discharge: HOME | End: 2019-10-15
Attending: EMERGENCY MEDICINE
Payer: COMMERCIAL

## 2019-10-15 VITALS
TEMPERATURE: 98.3 F | WEIGHT: 190 LBS | HEART RATE: 68 BPM | SYSTOLIC BLOOD PRESSURE: 150 MMHG | RESPIRATION RATE: 19 BRPM | OXYGEN SATURATION: 98 % | DIASTOLIC BLOOD PRESSURE: 69 MMHG | BODY MASS INDEX: 33.66 KG/M2 | HEIGHT: 63 IN

## 2019-10-15 DIAGNOSIS — G43.909 MIGRAINE WITHOUT STATUS MIGRAINOSUS, NOT INTRACTABLE, UNSPECIFIED MIGRAINE TYPE: ICD-10-CM

## 2019-10-15 DIAGNOSIS — R07.9 CHEST PAIN, UNSPECIFIED TYPE: Primary | ICD-10-CM

## 2019-10-15 LAB
A/G RATIO: 1.4 (ref 1.1–2.2)
ALBUMIN SERPL-MCNC: 4.1 G/DL (ref 3.4–5)
ALP BLD-CCNC: 58 U/L (ref 40–129)
ALT SERPL-CCNC: 7 U/L (ref 10–40)
ANION GAP SERPL CALCULATED.3IONS-SCNC: 12 MMOL/L (ref 3–16)
AST SERPL-CCNC: 11 U/L (ref 15–37)
BASOPHILS ABSOLUTE: 0.1 K/UL (ref 0–0.2)
BASOPHILS RELATIVE PERCENT: 1.1 %
BILIRUB SERPL-MCNC: 0.3 MG/DL (ref 0–1)
BUN BLDV-MCNC: 13 MG/DL (ref 7–20)
CALCIUM SERPL-MCNC: 9.3 MG/DL (ref 8.3–10.6)
CHLORIDE BLD-SCNC: 106 MMOL/L (ref 99–110)
CO2: 21 MMOL/L (ref 21–32)
CREAT SERPL-MCNC: 0.7 MG/DL (ref 0.6–1.1)
EKG ATRIAL RATE: 67 BPM
EKG DIAGNOSIS: NORMAL
EKG P AXIS: 26 DEGREES
EKG P-R INTERVAL: 176 MS
EKG Q-T INTERVAL: 452 MS
EKG QRS DURATION: 88 MS
EKG QTC CALCULATION (BAZETT): 477 MS
EKG R AXIS: -6 DEGREES
EKG T AXIS: 8 DEGREES
EKG VENTRICULAR RATE: 67 BPM
EOSINOPHILS ABSOLUTE: 0.3 K/UL (ref 0–0.6)
EOSINOPHILS RELATIVE PERCENT: 3.3 %
GFR AFRICAN AMERICAN: >60
GFR NON-AFRICAN AMERICAN: >60
GLOBULIN: 2.9 G/DL
GLUCOSE BLD-MCNC: 99 MG/DL (ref 70–99)
HCT VFR BLD CALC: 36.4 % (ref 36–48)
HEMOGLOBIN: 12 G/DL (ref 12–16)
LYMPHOCYTES ABSOLUTE: 2 K/UL (ref 1–5.1)
LYMPHOCYTES RELATIVE PERCENT: 25.7 %
MAGNESIUM: 2 MG/DL (ref 1.8–2.4)
MCH RBC QN AUTO: 28.8 PG (ref 26–34)
MCHC RBC AUTO-ENTMCNC: 33.1 G/DL (ref 31–36)
MCV RBC AUTO: 87.2 FL (ref 80–100)
MONOCYTES ABSOLUTE: 0.3 K/UL (ref 0–1.3)
MONOCYTES RELATIVE PERCENT: 4.3 %
NEUTROPHILS ABSOLUTE: 5 K/UL (ref 1.7–7.7)
NEUTROPHILS RELATIVE PERCENT: 65.6 %
PDW BLD-RTO: 14.3 % (ref 12.4–15.4)
PLATELET # BLD: 274 K/UL (ref 135–450)
PMV BLD AUTO: 7.2 FL (ref 5–10.5)
POTASSIUM REFLEX MAGNESIUM: 3.5 MMOL/L (ref 3.5–5.1)
RBC # BLD: 4.17 M/UL (ref 4–5.2)
SODIUM BLD-SCNC: 139 MMOL/L (ref 136–145)
TOTAL PROTEIN: 7 G/DL (ref 6.4–8.2)
TROPONIN: <0.01 NG/ML
TROPONIN: <0.01 NG/ML
WBC # BLD: 7.6 K/UL (ref 4–11)

## 2019-10-15 PROCEDURE — 93010 ELECTROCARDIOGRAM REPORT: CPT | Performed by: INTERNAL MEDICINE

## 2019-10-15 PROCEDURE — 84484 ASSAY OF TROPONIN QUANT: CPT

## 2019-10-15 PROCEDURE — 2580000003 HC RX 258: Performed by: PHYSICIAN ASSISTANT

## 2019-10-15 PROCEDURE — 99285 EMERGENCY DEPT VISIT HI MDM: CPT

## 2019-10-15 PROCEDURE — 96375 TX/PRO/DX INJ NEW DRUG ADDON: CPT

## 2019-10-15 PROCEDURE — 6360000002 HC RX W HCPCS: Performed by: PHYSICIAN ASSISTANT

## 2019-10-15 PROCEDURE — 93005 ELECTROCARDIOGRAM TRACING: CPT | Performed by: PHYSICIAN ASSISTANT

## 2019-10-15 PROCEDURE — 80053 COMPREHEN METABOLIC PANEL: CPT

## 2019-10-15 PROCEDURE — 96361 HYDRATE IV INFUSION ADD-ON: CPT

## 2019-10-15 PROCEDURE — 71046 X-RAY EXAM CHEST 2 VIEWS: CPT

## 2019-10-15 PROCEDURE — 96374 THER/PROPH/DIAG INJ IV PUSH: CPT

## 2019-10-15 PROCEDURE — 83735 ASSAY OF MAGNESIUM: CPT

## 2019-10-15 PROCEDURE — 85025 COMPLETE CBC W/AUTO DIFF WBC: CPT

## 2019-10-15 RX ORDER — ONDANSETRON 2 MG/ML
4 INJECTION INTRAMUSCULAR; INTRAVENOUS EVERY 6 HOURS PRN
Status: DISCONTINUED | OUTPATIENT
Start: 2019-10-15 | End: 2019-10-15 | Stop reason: HOSPADM

## 2019-10-15 RX ORDER — DEXAMETHASONE SODIUM PHOSPHATE 10 MG/ML
10 INJECTION INTRAMUSCULAR; INTRAVENOUS ONCE
Status: COMPLETED | OUTPATIENT
Start: 2019-10-15 | End: 2019-10-15

## 2019-10-15 RX ORDER — KETOROLAC TROMETHAMINE 30 MG/ML
30 INJECTION, SOLUTION INTRAMUSCULAR; INTRAVENOUS ONCE
Status: COMPLETED | OUTPATIENT
Start: 2019-10-15 | End: 2019-10-15

## 2019-10-15 RX ORDER — 0.9 % SODIUM CHLORIDE 0.9 %
1000 INTRAVENOUS SOLUTION INTRAVENOUS ONCE
Status: COMPLETED | OUTPATIENT
Start: 2019-10-15 | End: 2019-10-15

## 2019-10-15 RX ADMIN — KETOROLAC TROMETHAMINE 30 MG: 30 INJECTION, SOLUTION INTRAMUSCULAR at 13:47

## 2019-10-15 RX ADMIN — ONDANSETRON HYDROCHLORIDE 4 MG: 2 INJECTION, SOLUTION INTRAMUSCULAR; INTRAVENOUS at 13:47

## 2019-10-15 RX ADMIN — DEXAMETHASONE SODIUM PHOSPHATE 10 MG: 10 INJECTION, SOLUTION INTRAMUSCULAR; INTRAVENOUS at 13:47

## 2019-10-15 RX ADMIN — SODIUM CHLORIDE 1000 ML: 9 INJECTION, SOLUTION INTRAVENOUS at 13:46

## 2019-10-15 ASSESSMENT — PAIN SCALES - GENERAL
PAINLEVEL_OUTOF10: 9
PAINLEVEL_OUTOF10: 9
PAINLEVEL_OUTOF10: 7

## 2019-10-15 ASSESSMENT — HEART SCORE
ECG: 0
ECG: 0

## 2019-10-15 ASSESSMENT — PAIN DESCRIPTION - LOCATION
LOCATION: CHEST;HEAD;SHOULDER
LOCATION: CHEST;HEAD

## 2019-10-15 ASSESSMENT — ENCOUNTER SYMPTOMS
RESPIRATORY NEGATIVE: 1
NAUSEA: 1
PHOTOPHOBIA: 1

## 2019-10-15 ASSESSMENT — PAIN DESCRIPTION - PAIN TYPE
TYPE: ACUTE PAIN
TYPE: ACUTE PAIN

## 2019-10-15 ASSESSMENT — PAIN DESCRIPTION - ORIENTATION: ORIENTATION: RIGHT;LEFT

## 2019-10-25 ENCOUNTER — OFFICE VISIT (OUTPATIENT)
Dept: FAMILY MEDICINE CLINIC | Age: 54
End: 2019-10-25
Payer: COMMERCIAL

## 2019-10-25 VITALS
WEIGHT: 206.2 LBS | HEART RATE: 75 BPM | OXYGEN SATURATION: 96 % | DIASTOLIC BLOOD PRESSURE: 64 MMHG | SYSTOLIC BLOOD PRESSURE: 128 MMHG | BODY MASS INDEX: 36.53 KG/M2

## 2019-10-25 DIAGNOSIS — L98.9 SKIN LESION OF RIGHT ARM: Primary | ICD-10-CM

## 2019-10-25 DIAGNOSIS — L81.9 ABNORMAL PIGMENTATION OF SKIN: ICD-10-CM

## 2019-10-25 DIAGNOSIS — R07.9 CHEST PAIN, UNSPECIFIED TYPE: ICD-10-CM

## 2019-10-25 DIAGNOSIS — R20.9 SKIN SENSATION DISTURBANCE: ICD-10-CM

## 2019-10-25 PROCEDURE — G8427 DOCREV CUR MEDS BY ELIG CLIN: HCPCS | Performed by: FAMILY MEDICINE

## 2019-10-25 PROCEDURE — G8482 FLU IMMUNIZE ORDER/ADMIN: HCPCS | Performed by: FAMILY MEDICINE

## 2019-10-25 PROCEDURE — G8417 CALC BMI ABV UP PARAM F/U: HCPCS | Performed by: FAMILY MEDICINE

## 2019-10-25 PROCEDURE — G8599 NO ASA/ANTIPLAT THER USE RNG: HCPCS | Performed by: FAMILY MEDICINE

## 2019-10-25 PROCEDURE — 3017F COLORECTAL CA SCREEN DOC REV: CPT | Performed by: FAMILY MEDICINE

## 2019-10-25 PROCEDURE — 99212 OFFICE O/P EST SF 10 MIN: CPT | Performed by: FAMILY MEDICINE

## 2019-10-25 PROCEDURE — 4004F PT TOBACCO SCREEN RCVD TLK: CPT | Performed by: FAMILY MEDICINE

## 2019-10-25 PROCEDURE — 11300 SHAVE SKIN LESION 0.5 CM/<: CPT | Performed by: FAMILY MEDICINE

## 2019-10-25 RX ORDER — DULOXETIN HYDROCHLORIDE 60 MG/1
CAPSULE, DELAYED RELEASE ORAL
Qty: 60 CAPSULE | Refills: 0 | Status: SHIPPED | OUTPATIENT
Start: 2019-10-25 | End: 2020-02-13

## 2019-10-27 ENCOUNTER — APPOINTMENT (OUTPATIENT)
Dept: CT IMAGING | Age: 54
End: 2019-10-27
Payer: COMMERCIAL

## 2019-10-27 ENCOUNTER — HOSPITAL ENCOUNTER (EMERGENCY)
Age: 54
Discharge: HOME | End: 2019-10-28
Payer: COMMERCIAL

## 2019-10-27 DIAGNOSIS — R19.7 DIARRHEA, UNSPECIFIED TYPE: ICD-10-CM

## 2019-10-27 DIAGNOSIS — R11.0 NAUSEA: ICD-10-CM

## 2019-10-27 DIAGNOSIS — R10.13 EPIGASTRIC PAIN: Primary | ICD-10-CM

## 2019-10-27 LAB
A/G RATIO: 1.3 (ref 1.1–2.2)
ALBUMIN SERPL-MCNC: 3.9 G/DL (ref 3.4–5)
ALP BLD-CCNC: 54 U/L (ref 40–129)
ALT SERPL-CCNC: 10 U/L (ref 10–40)
ANION GAP SERPL CALCULATED.3IONS-SCNC: 11 MMOL/L (ref 3–16)
AST SERPL-CCNC: 14 U/L (ref 15–37)
BASOPHILS ABSOLUTE: 0.1 K/UL (ref 0–0.2)
BASOPHILS RELATIVE PERCENT: 1 %
BILIRUB SERPL-MCNC: <0.2 MG/DL (ref 0–1)
BILIRUBIN URINE: NEGATIVE
BLOOD, URINE: NEGATIVE
BUN BLDV-MCNC: 14 MG/DL (ref 7–20)
CALCIUM SERPL-MCNC: 9.3 MG/DL (ref 8.3–10.6)
CHLORIDE BLD-SCNC: 104 MMOL/L (ref 99–110)
CLARITY: CLEAR
CO2: 24 MMOL/L (ref 21–32)
COLOR: YELLOW
CREAT SERPL-MCNC: 1 MG/DL (ref 0.6–1.1)
EOSINOPHILS ABSOLUTE: 0.3 K/UL (ref 0–0.6)
EOSINOPHILS RELATIVE PERCENT: 3 %
GFR AFRICAN AMERICAN: >60
GFR NON-AFRICAN AMERICAN: 58
GLOBULIN: 2.9 G/DL
GLUCOSE BLD-MCNC: 106 MG/DL (ref 70–99)
GLUCOSE URINE: NEGATIVE MG/DL
HCT VFR BLD CALC: 33.4 % (ref 36–48)
HEMOGLOBIN: 11.2 G/DL (ref 12–16)
KETONES, URINE: NEGATIVE MG/DL
LEUKOCYTE ESTERASE, URINE: NEGATIVE
LIPASE: 21 U/L (ref 13–60)
LYMPHOCYTES ABSOLUTE: 2.5 K/UL (ref 1–5.1)
LYMPHOCYTES RELATIVE PERCENT: 26.5 %
MCH RBC QN AUTO: 28.8 PG (ref 26–34)
MCHC RBC AUTO-ENTMCNC: 33.4 G/DL (ref 31–36)
MCV RBC AUTO: 86.2 FL (ref 80–100)
MICROSCOPIC EXAMINATION: NORMAL
MONOCYTES ABSOLUTE: 0.5 K/UL (ref 0–1.3)
MONOCYTES RELATIVE PERCENT: 5.5 %
NEUTROPHILS ABSOLUTE: 6.1 K/UL (ref 1.7–7.7)
NEUTROPHILS RELATIVE PERCENT: 64 %
NITRITE, URINE: NEGATIVE
PDW BLD-RTO: 14.4 % (ref 12.4–15.4)
PH UA: 7 (ref 5–8)
PLATELET # BLD: 274 K/UL (ref 135–450)
PMV BLD AUTO: 7.5 FL (ref 5–10.5)
POTASSIUM SERPL-SCNC: 4.2 MMOL/L (ref 3.5–5.1)
PROTEIN UA: NEGATIVE MG/DL
RBC # BLD: 3.87 M/UL (ref 4–5.2)
SODIUM BLD-SCNC: 139 MMOL/L (ref 136–145)
SPECIFIC GRAVITY UA: 1.01 (ref 1–1.03)
TOTAL PROTEIN: 6.8 G/DL (ref 6.4–8.2)
URINE REFLEX TO CULTURE: NORMAL
URINE TYPE: NORMAL
UROBILINOGEN, URINE: 0.2 E.U./DL
WBC # BLD: 9.5 K/UL (ref 4–11)

## 2019-10-27 PROCEDURE — 85025 COMPLETE CBC W/AUTO DIFF WBC: CPT

## 2019-10-27 PROCEDURE — 74177 CT ABD & PELVIS W/CONTRAST: CPT

## 2019-10-27 PROCEDURE — 99284 EMERGENCY DEPT VISIT MOD MDM: CPT

## 2019-10-27 PROCEDURE — 96361 HYDRATE IV INFUSION ADD-ON: CPT

## 2019-10-27 PROCEDURE — 83690 ASSAY OF LIPASE: CPT

## 2019-10-27 PROCEDURE — 6360000002 HC RX W HCPCS: Performed by: NURSE PRACTITIONER

## 2019-10-27 PROCEDURE — 6370000000 HC RX 637 (ALT 250 FOR IP): Performed by: NURSE PRACTITIONER

## 2019-10-27 PROCEDURE — 2580000003 HC RX 258: Performed by: NURSE PRACTITIONER

## 2019-10-27 PROCEDURE — 80053 COMPREHEN METABOLIC PANEL: CPT

## 2019-10-27 PROCEDURE — 6360000004 HC RX CONTRAST MEDICATION: Performed by: NURSE PRACTITIONER

## 2019-10-27 PROCEDURE — 96374 THER/PROPH/DIAG INJ IV PUSH: CPT

## 2019-10-27 PROCEDURE — 2500000003 HC RX 250 WO HCPCS: Performed by: NURSE PRACTITIONER

## 2019-10-27 PROCEDURE — 81003 URINALYSIS AUTO W/O SCOPE: CPT

## 2019-10-27 PROCEDURE — 96375 TX/PRO/DX INJ NEW DRUG ADDON: CPT

## 2019-10-27 RX ORDER — 0.9 % SODIUM CHLORIDE 0.9 %
1000 INTRAVENOUS SOLUTION INTRAVENOUS ONCE
Status: COMPLETED | OUTPATIENT
Start: 2019-10-27 | End: 2019-10-28

## 2019-10-27 RX ORDER — ONDANSETRON 2 MG/ML
4 INJECTION INTRAMUSCULAR; INTRAVENOUS ONCE
Status: COMPLETED | OUTPATIENT
Start: 2019-10-27 | End: 2019-10-27

## 2019-10-27 RX ADMIN — LIDOCAINE HYDROCHLORIDE: 20 SOLUTION ORAL; TOPICAL at 22:32

## 2019-10-27 RX ADMIN — FAMOTIDINE 20 MG: 10 INJECTION, SOLUTION INTRAVENOUS at 22:45

## 2019-10-27 RX ADMIN — SODIUM CHLORIDE 1000 ML: 9 INJECTION, SOLUTION INTRAVENOUS at 22:45

## 2019-10-27 RX ADMIN — ONDANSETRON 4 MG: 2 INJECTION INTRAMUSCULAR; INTRAVENOUS at 22:45

## 2019-10-27 RX ADMIN — IOPAMIDOL 75 ML: 755 INJECTION, SOLUTION INTRAVENOUS at 23:36

## 2019-10-27 ASSESSMENT — PAIN DESCRIPTION - ORIENTATION: ORIENTATION: RIGHT;UPPER

## 2019-10-27 ASSESSMENT — PAIN DESCRIPTION - DESCRIPTORS: DESCRIPTORS: DULL;SHARP

## 2019-10-27 ASSESSMENT — PAIN SCALES - GENERAL: PAINLEVEL_OUTOF10: 10

## 2019-10-27 ASSESSMENT — PAIN DESCRIPTION - PAIN TYPE: TYPE: ACUTE PAIN

## 2019-10-27 ASSESSMENT — PAIN DESCRIPTION - LOCATION: LOCATION: ABDOMEN

## 2019-10-27 ASSESSMENT — PAIN DESCRIPTION - FREQUENCY: FREQUENCY: CONTINUOUS

## 2019-10-28 ENCOUNTER — TELEPHONE (OUTPATIENT)
Dept: FAMILY MEDICINE CLINIC | Age: 54
End: 2019-10-28

## 2019-10-28 VITALS
HEIGHT: 63 IN | TEMPERATURE: 98.3 F | SYSTOLIC BLOOD PRESSURE: 150 MMHG | BODY MASS INDEX: 33.66 KG/M2 | HEART RATE: 90 BPM | WEIGHT: 190 LBS | OXYGEN SATURATION: 99 % | RESPIRATION RATE: 20 BRPM | DIASTOLIC BLOOD PRESSURE: 78 MMHG

## 2019-10-28 PROCEDURE — 96361 HYDRATE IV INFUSION ADD-ON: CPT

## 2019-10-28 PROCEDURE — 96372 THER/PROPH/DIAG INJ SC/IM: CPT

## 2019-10-28 PROCEDURE — 6360000002 HC RX W HCPCS: Performed by: NURSE PRACTITIONER

## 2019-10-28 RX ORDER — DICYCLOMINE HYDROCHLORIDE 10 MG/1
10 CAPSULE ORAL
Qty: 60 CAPSULE | Refills: 0 | Status: SHIPPED | OUTPATIENT
Start: 2019-10-28 | End: 2020-01-30 | Stop reason: ALTCHOICE

## 2019-10-28 RX ORDER — ONDANSETRON 4 MG/1
4 TABLET, ORALLY DISINTEGRATING ORAL EVERY 8 HOURS PRN
Qty: 12 TABLET | Refills: 0 | Status: SHIPPED | OUTPATIENT
Start: 2019-10-28 | End: 2020-01-30 | Stop reason: SDUPTHER

## 2019-10-28 RX ORDER — DICYCLOMINE HYDROCHLORIDE 10 MG/ML
20 INJECTION INTRAMUSCULAR ONCE
Status: COMPLETED | OUTPATIENT
Start: 2019-10-28 | End: 2019-10-28

## 2019-10-28 RX ADMIN — DICYCLOMINE HYDROCHLORIDE 20 MG: 20 INJECTION INTRAMUSCULAR at 00:17

## 2019-10-28 ASSESSMENT — PAIN SCALES - GENERAL: PAINLEVEL_OUTOF10: 6

## 2019-10-29 ENCOUNTER — OFFICE VISIT (OUTPATIENT)
Dept: FAMILY MEDICINE CLINIC | Age: 54
End: 2019-10-29
Payer: COMMERCIAL

## 2019-10-29 VITALS
BODY MASS INDEX: 35.43 KG/M2 | HEART RATE: 68 BPM | OXYGEN SATURATION: 96 % | DIASTOLIC BLOOD PRESSURE: 89 MMHG | WEIGHT: 200 LBS | SYSTOLIC BLOOD PRESSURE: 149 MMHG

## 2019-10-29 DIAGNOSIS — K59.01 SLOW TRANSIT CONSTIPATION: ICD-10-CM

## 2019-10-29 DIAGNOSIS — R10.13 EPIGASTRIC PAIN: ICD-10-CM

## 2019-10-29 DIAGNOSIS — R14.0 ABDOMINAL DISTENSION: Primary | ICD-10-CM

## 2019-10-29 PROBLEM — R10.9 ABDOMINAL PAIN: Status: ACTIVE | Noted: 2019-10-29

## 2019-10-29 PROCEDURE — 99213 OFFICE O/P EST LOW 20 MIN: CPT | Performed by: FAMILY MEDICINE

## 2019-10-29 PROCEDURE — G8417 CALC BMI ABV UP PARAM F/U: HCPCS | Performed by: FAMILY MEDICINE

## 2019-10-29 PROCEDURE — G8427 DOCREV CUR MEDS BY ELIG CLIN: HCPCS | Performed by: FAMILY MEDICINE

## 2019-10-29 PROCEDURE — G8599 NO ASA/ANTIPLAT THER USE RNG: HCPCS | Performed by: FAMILY MEDICINE

## 2019-10-29 PROCEDURE — G8482 FLU IMMUNIZE ORDER/ADMIN: HCPCS | Performed by: FAMILY MEDICINE

## 2019-10-29 PROCEDURE — 4004F PT TOBACCO SCREEN RCVD TLK: CPT | Performed by: FAMILY MEDICINE

## 2019-10-29 PROCEDURE — 3017F COLORECTAL CA SCREEN DOC REV: CPT | Performed by: FAMILY MEDICINE

## 2019-11-19 ENCOUNTER — TELEPHONE (OUTPATIENT)
Dept: FAMILY MEDICINE CLINIC | Age: 54
End: 2019-11-19

## 2019-11-19 RX ORDER — BUSPIRONE HYDROCHLORIDE 15 MG/1
15 TABLET ORAL 3 TIMES DAILY
Qty: 30 TABLET | Refills: 0 | Status: SHIPPED | OUTPATIENT
Start: 2019-11-19 | End: 2019-12-19

## 2019-12-04 ENCOUNTER — TELEPHONE (OUTPATIENT)
Dept: FAMILY MEDICINE CLINIC | Age: 54
End: 2019-12-04

## 2019-12-19 RX ORDER — BUSPIRONE HYDROCHLORIDE 15 MG/1
TABLET ORAL
Qty: 30 TABLET | Refills: 0 | Status: SHIPPED | OUTPATIENT
Start: 2019-12-19 | End: 2020-03-18 | Stop reason: ALTCHOICE

## 2020-01-20 ENCOUNTER — TELEPHONE (OUTPATIENT)
Dept: FAMILY MEDICINE CLINIC | Age: 55
End: 2020-01-20

## 2020-01-29 ENCOUNTER — TELEPHONE (OUTPATIENT)
Dept: FAMILY MEDICINE CLINIC | Age: 55
End: 2020-01-29

## 2020-01-29 NOTE — TELEPHONE ENCOUNTER
Pt said that she is having a lot of pain in her right hip just like what she had in her left hip years ago and they found a tumor. Was wanting to see if she could get an order for an x-ray.

## 2020-01-30 ENCOUNTER — OFFICE VISIT (OUTPATIENT)
Dept: FAMILY MEDICINE CLINIC | Age: 55
End: 2020-01-30
Payer: COMMERCIAL

## 2020-01-30 ENCOUNTER — HOSPITAL ENCOUNTER (OUTPATIENT)
Dept: GENERAL RADIOLOGY | Age: 55
Discharge: HOME | End: 2020-01-30
Payer: COMMERCIAL

## 2020-01-30 ENCOUNTER — HOSPITAL ENCOUNTER (OUTPATIENT)
Age: 55
Discharge: HOME | End: 2020-01-30
Payer: COMMERCIAL

## 2020-01-30 VITALS
WEIGHT: 197 LBS | HEIGHT: 63 IN | TEMPERATURE: 99.1 F | SYSTOLIC BLOOD PRESSURE: 138 MMHG | OXYGEN SATURATION: 98 % | BODY MASS INDEX: 34.91 KG/M2 | DIASTOLIC BLOOD PRESSURE: 88 MMHG | HEART RATE: 75 BPM

## 2020-01-30 LAB
ANION GAP SERPL CALCULATED.3IONS-SCNC: 13 MMOL/L (ref 3–16)
BASOPHILS ABSOLUTE: 0.1 K/UL (ref 0–0.2)
BASOPHILS RELATIVE PERCENT: 0.7 %
BUN BLDV-MCNC: 8 MG/DL (ref 7–20)
CALCIUM SERPL-MCNC: 9.3 MG/DL (ref 8.3–10.6)
CHLORIDE BLD-SCNC: 105 MMOL/L (ref 99–110)
CO2: 22 MMOL/L (ref 21–32)
CREAT SERPL-MCNC: 0.8 MG/DL (ref 0.6–1.1)
EOSINOPHILS ABSOLUTE: 0.2 K/UL (ref 0–0.6)
EOSINOPHILS RELATIVE PERCENT: 2.5 %
GFR AFRICAN AMERICAN: >60
GFR NON-AFRICAN AMERICAN: >60
GLUCOSE BLD-MCNC: 130 MG/DL (ref 70–99)
HCT VFR BLD CALC: 39 % (ref 36–48)
HEMOGLOBIN: 12.9 G/DL (ref 12–16)
INFLUENZA A ANTIBODY: NEGATIVE
INFLUENZA B ANTIBODY: NEGATIVE
LYMPHOCYTES ABSOLUTE: 2 K/UL (ref 1–5.1)
LYMPHOCYTES RELATIVE PERCENT: 24.2 %
MCH RBC QN AUTO: 29 PG (ref 26–34)
MCHC RBC AUTO-ENTMCNC: 33.1 G/DL (ref 31–36)
MCV RBC AUTO: 87.6 FL (ref 80–100)
MONOCYTES ABSOLUTE: 0.3 K/UL (ref 0–1.3)
MONOCYTES RELATIVE PERCENT: 3.6 %
NEUTROPHILS ABSOLUTE: 5.8 K/UL (ref 1.7–7.7)
NEUTROPHILS RELATIVE PERCENT: 69 %
PDW BLD-RTO: 14.6 % (ref 12.4–15.4)
PLATELET # BLD: 325 K/UL (ref 135–450)
PMV BLD AUTO: 7.4 FL (ref 5–10.5)
POTASSIUM SERPL-SCNC: 3.2 MMOL/L (ref 3.5–5.1)
RBC # BLD: 4.45 M/UL (ref 4–5.2)
S PYO AG THROAT QL: NORMAL
SODIUM BLD-SCNC: 140 MMOL/L (ref 136–145)
WBC # BLD: 8.4 K/UL (ref 4–11)

## 2020-01-30 PROCEDURE — 99215 OFFICE O/P EST HI 40 MIN: CPT | Performed by: NURSE PRACTITIONER

## 2020-01-30 PROCEDURE — G8417 CALC BMI ABV UP PARAM F/U: HCPCS | Performed by: NURSE PRACTITIONER

## 2020-01-30 PROCEDURE — 85025 COMPLETE CBC W/AUTO DIFF WBC: CPT

## 2020-01-30 PROCEDURE — 71046 X-RAY EXAM CHEST 2 VIEWS: CPT

## 2020-01-30 PROCEDURE — 87880 STREP A ASSAY W/OPTIC: CPT | Performed by: NURSE PRACTITIONER

## 2020-01-30 PROCEDURE — 73502 X-RAY EXAM HIP UNI 2-3 VIEWS: CPT

## 2020-01-30 PROCEDURE — 3017F COLORECTAL CA SCREEN DOC REV: CPT | Performed by: NURSE PRACTITIONER

## 2020-01-30 PROCEDURE — 94640 AIRWAY INHALATION TREATMENT: CPT | Performed by: NURSE PRACTITIONER

## 2020-01-30 PROCEDURE — G8427 DOCREV CUR MEDS BY ELIG CLIN: HCPCS | Performed by: NURSE PRACTITIONER

## 2020-01-30 PROCEDURE — 4004F PT TOBACCO SCREEN RCVD TLK: CPT | Performed by: NURSE PRACTITIONER

## 2020-01-30 PROCEDURE — 70210 X-RAY EXAM OF SINUSES: CPT

## 2020-01-30 PROCEDURE — G8482 FLU IMMUNIZE ORDER/ADMIN: HCPCS | Performed by: NURSE PRACTITIONER

## 2020-01-30 PROCEDURE — 87804 INFLUENZA ASSAY W/OPTIC: CPT | Performed by: NURSE PRACTITIONER

## 2020-01-30 PROCEDURE — 36415 COLL VENOUS BLD VENIPUNCTURE: CPT

## 2020-01-30 PROCEDURE — 80048 BASIC METABOLIC PNL TOTAL CA: CPT

## 2020-01-30 RX ORDER — BENZONATATE 100 MG/1
100-200 CAPSULE ORAL 3 TIMES DAILY PRN
Qty: 30 CAPSULE | Refills: 0 | Status: SHIPPED | OUTPATIENT
Start: 2020-01-30 | End: 2020-02-06

## 2020-01-30 RX ORDER — METHYLPREDNISOLONE 4 MG/1
TABLET ORAL
Qty: 1 KIT | Refills: 0 | Status: SHIPPED | OUTPATIENT
Start: 2020-01-30 | End: 2020-02-05

## 2020-01-30 RX ORDER — ONDANSETRON 4 MG/1
4 TABLET, ORALLY DISINTEGRATING ORAL EVERY 8 HOURS PRN
Qty: 12 TABLET | Refills: 0 | Status: SHIPPED | OUTPATIENT
Start: 2020-01-30 | End: 2020-03-18 | Stop reason: ALTCHOICE

## 2020-01-30 RX ORDER — FLUTICASONE PROPIONATE 50 MCG
1 SPRAY, SUSPENSION (ML) NASAL DAILY
Qty: 1 BOTTLE | Refills: 3 | Status: SHIPPED | OUTPATIENT
Start: 2020-01-30 | End: 2020-03-18 | Stop reason: ALTCHOICE

## 2020-01-30 RX ORDER — ALBUTEROL SULFATE 2.5 MG/3ML
2.5 SOLUTION RESPIRATORY (INHALATION) ONCE
Status: COMPLETED | OUTPATIENT
Start: 2020-01-30 | End: 2020-01-30

## 2020-01-30 RX ORDER — ESTRADIOL 0.5 MG/1
TABLET ORAL
COMMUNITY
Start: 2019-12-19 | End: 2020-05-29

## 2020-01-30 RX ADMIN — ALBUTEROL SULFATE 2.5 MG: 2.5 SOLUTION RESPIRATORY (INHALATION) at 16:04

## 2020-01-30 ASSESSMENT — ENCOUNTER SYMPTOMS
ABDOMINAL DISTENTION: 0
APNEA: 0
CHEST TIGHTNESS: 0
WHEEZING: 0
BLOOD IN STOOL: 0
RHINORRHEA: 1
RECTAL PAIN: 0
HOARSE VOICE: 0
CONSTIPATION: 0
ANAL BLEEDING: 0
VOICE CHANGE: 0
SINUS PRESSURE: 1
CHOKING: 0
FACIAL SWELLING: 0
SHORTNESS OF BREATH: 1
TROUBLE SWALLOWING: 0
SINUS PAIN: 1
STRIDOR: 0
DIARRHEA: 1

## 2020-01-30 NOTE — PROGRESS NOTES
since    TIA (transient ischemic attack)     Ulcer, Stomach Peptic     frequent N/V    Unspecified cerebral artery occlusion with cerebral infarction      mini stroke cused by severe migraine    Weight loss     frequent N/V, S/P cholecystectomy      Past Surgical History:   Procedure Laterality Date    ANKLE SURGERY       SECTION      CHOLECYSTECTOMY      COLONOSCOPY N/A 2018    COLON W/ANES. performed by Patricia Bustos MD at [address]      HERNIA REPAIR      HYSTERECTOMY      TUMOR REMOVAL      lt femur    UPPER GASTROINTESTINAL ENDOSCOPY N/A 2018    EGD BIOPSY performed by Patricia Bustos MD at [facility]       Family History   Problem Relation Age of Onset    High Blood Pressure Mother     Heart Disease Mother     Diabetes Maternal Grandfather     Heart Disease Father     Heart Disease Brother        Social History     Tobacco Use    Smoking status: Current Some Day Smoker     Packs/day: 0.50     Years: 27.00     Pack years: 13.50     Types: Cigarettes    Smokeless tobacco: Never Used    Tobacco comment: she smokes sometimes   Substance Use Topics    Alcohol use: No     Alcohol/week: 0.0 standard drinks     Comment: rare         Current Outpatient Medications   Medication Sig Dispense Refill    estradiol (ESTRACE) 0.5 MG tablet       methylPREDNISolone (MEDROL DOSEPACK) 4 MG tablet Take by mouth.  1 kit 0    ondansetron (ZOFRAN ODT) 4 MG disintegrating tablet Take 1 tablet by mouth every 8 hours as needed for Nausea or Vomiting 12 tablet 0    fluticasone (FLONASE) 50 MCG/ACT nasal spray 1 spray by Nasal route daily 1 Bottle 3    benzonatate (TESSALON) 100 MG capsule Take 1-2 capsules by mouth 3 times daily as needed for Cough 30 capsule 0    busPIRone (BUSPAR) 15 MG tablet TAKE 1 TABLET 3 TIMES DAILY 30 tablet 0    DULoxetine (CYMBALTA) 60 MG extended release capsule TAKE (1) CAPSULE TWICE DAILY 60 capsule 0    zoster recombinant adjuvanted vaccine (SHINGRIX) 50 MCG/0.5ML SUSR injection Inject 0.5 mLs into the muscle See Admin Instructions 1 dose now and repeat in 2-6 months 0.5 mL 0    metoprolol succinate (TOPROL XL) 100 MG extended release tablet TAKE (1) TABLET DAILY 30 tablet 11    furosemide (LASIX) 20 MG tablet Take 20 mg by mouth daily as needed      triamterene-hydrochlorothiazide (DYAZIDE) 37.5-25 MG per capsule Take 1 capsule by mouth daily 30 capsule 5    traZODone (DESYREL) 150 MG tablet TAKE 1 TABLET AT BEDTIME 30 tablet 11    pantoprazole (PROTONIX) 40 MG tablet Take 1 tablet by mouth every morning (before breakfast) 30 tablet 11    ibuprofen (ADVIL;MOTRIN) 800 MG tablet TAKE 1 TABLET THREE TIMES DAILY 90 tablet 11    potassium chloride (KLOR-CON M) 10 MEQ extended release tablet TAKE 1 TABLET ONCE A DAY 30 tablet 11    polyethylene glycol (MIRALAX) POWD powder Take 1-2 capfuls of Miralax daily as needed (Patient not taking: Reported on 1/30/2020) 255 g 11     No current facility-administered medications for this visit. Allergies   Allergen Reactions    Benadryl [Diphenhydramine] Anxiety     \"Made me jump off the wall\"    Topamax [Topiramate] Hives    Tramadol Shortness Of Breath    Vitamin C [Ascorbic Acid] Hives    Lyrica [Pregabalin] Palpitations    Penicillins Hives and Itching       Subjective:      Review of Systems   Constitutional: Positive for activity change and appetite change. Negative for unexpected weight change. HENT: Positive for ear pain, postnasal drip, rhinorrhea, sinus pressure and sinus pain. Negative for dental problem, drooling, ear discharge, facial swelling, hearing loss, hoarse voice, mouth sores, nosebleeds, sneezing, tinnitus, trouble swallowing and voice change. Respiratory: Positive for shortness of breath (With extensive exertion). Negative for apnea, choking, chest tightness, wheezing and stridor. Cardiovascular: Negative. Gastrointestinal: Positive for diarrhea (Started 4 days ago). Negative for abdominal distention, anal bleeding, blood in stool, constipation and rectal pain. Allergic/Immunologic: Positive for environmental allergies. Negative for food allergies and immunocompromised state. Neurological: Negative for dizziness, facial asymmetry and light-headedness. Hematological: Positive for adenopathy. Objective:     Vitals:    01/30/20 1155   BP: 138/88   Site: Right Upper Arm   Position: Sitting   Cuff Size: Large Adult   Pulse: 75   Temp: 99.1 °F (37.3 °C)   SpO2: 98%   Weight: 197 lb (89.4 kg)   Height: 5' 3\" (1.6 m)     Wt Readings from Last 3 Encounters:   01/30/20 197 lb (89.4 kg)   10/29/19 200 lb (90.7 kg)   10/27/19 190 lb (86.2 kg)     Temp Readings from Last 3 Encounters:   01/30/20 99.1 °F (37.3 °C)   10/27/19 98.3 °F (36.8 °C) (Oral)   10/15/19 98.3 °F (36.8 °C) (Oral)     BP Readings from Last 3 Encounters:   01/30/20 138/88   10/29/19 (!) 149/89   10/28/19 (!) 150/78     Pulse Readings from Last 3 Encounters:   01/30/20 75   10/29/19 68   10/28/19 90     Physical Exam  Vitals signs and nursing note reviewed. Constitutional:       General: She is not in acute distress. Appearance: Normal appearance. She is well-developed. She is not ill-appearing, toxic-appearing or diaphoretic. HENT:      Head: Normocephalic and atraumatic. Right Ear: Tympanic membrane, ear canal and external ear normal. There is no impacted cerumen. Left Ear: Tympanic membrane, ear canal and external ear normal. There is no impacted cerumen. Nose: Mucosal edema present. No congestion or rhinorrhea. Right Turbinates: Swollen. Left Turbinates: Swollen. Right Sinus: Maxillary sinus tenderness and frontal sinus tenderness present. Left Sinus: Maxillary sinus tenderness and frontal sinus tenderness present.       Mouth/Throat:      Mouth: Mucous membranes are moist.      Pharynx: Oropharynx is clear. No oropharyngeal exudate or posterior oropharyngeal erythema. Eyes:      General: No scleral icterus. Right eye: No discharge. Left eye: No discharge. Extraocular Movements: Extraocular movements intact. Conjunctiva/sclera: Conjunctivae normal.      Pupils: Pupils are equal, round, and reactive to light. Neck:      Musculoskeletal: Normal range of motion and neck supple. No neck rigidity or muscular tenderness. Vascular: No carotid bruit. Trachea: No tracheal deviation. Cardiovascular:      Rate and Rhythm: Normal rate and regular rhythm. Pulses: Normal pulses. Heart sounds: Normal heart sounds. No murmur. No friction rub. No gallop. Pulmonary:      Effort: Pulmonary effort is normal. No respiratory distress. Breath sounds: No stridor. Examination of the right-lower field reveals wheezing. Examination of the left-lower field reveals wheezing. Wheezing (Lungs clear after breathing treatment ) present. No rhonchi or rales. Chest:      Chest wall: No tenderness. Abdominal:      General: Bowel sounds are normal. There is no distension. Palpations: Abdomen is soft. There is no mass. Tenderness: There is no abdominal tenderness. There is no guarding or rebound. Hernia: No hernia is present. Musculoskeletal:         General: Tenderness present. No swelling, deformity or signs of injury. Thoracic back: She exhibits tenderness and pain. Lumbar back: She exhibits tenderness and pain. She exhibits normal range of motion. Right lower leg: No edema. Left lower leg: No edema. Lymphadenopathy:      Cervical: No cervical adenopathy. Skin:     General: Skin is warm and dry. Capillary Refill: Capillary refill takes less than 2 seconds. Coloration: Skin is not jaundiced or pale. Findings: No bruising, erythema, lesion or rash. Neurological:      General: No focal deficit present.       Mental Status: She is alert and oriented to person, place, and time. Mental status is at baseline. Cranial Nerves: No cranial nerve deficit. Sensory: No sensory deficit. Motor: No weakness or abnormal muscle tone. Coordination: Coordination normal.      Gait: Gait normal.      Deep Tendon Reflexes: Reflexes are normal and symmetric. Reflexes normal.   Psychiatric:         Mood and Affect: Mood normal.         Behavior: Behavior normal.         Thought Content:  Thought content normal.         Judgment: Judgment normal.         Admission on 10/27/2019, Discharged on 10/28/2019   Component Date Value Ref Range Status    WBC 10/27/2019 9.5  4.0 - 11.0 K/uL Final    RBC 10/27/2019 3.87* 4.00 - 5.20 M/uL Final    Hemoglobin 10/27/2019 11.2* 12.0 - 16.0 g/dL Final    Hematocrit 10/27/2019 33.4* 36.0 - 48.0 % Final    MCV 10/27/2019 86.2  80.0 - 100.0 fL Final    MCH 10/27/2019 28.8  26.0 - 34.0 pg Final    MCHC 10/27/2019 33.4  31.0 - 36.0 g/dL Final    RDW 10/27/2019 14.4  12.4 - 15.4 % Final    Platelets 12/63/1183 274  135 - 450 K/uL Final    MPV 10/27/2019 7.5  5.0 - 10.5 fL Final    Neutrophils % 10/27/2019 64.0  % Final    Lymphocytes % 10/27/2019 26.5  % Final    Monocytes % 10/27/2019 5.5  % Final    Eosinophils % 10/27/2019 3.0  % Final    Basophils % 10/27/2019 1.0  % Final    Neutrophils Absolute 10/27/2019 6.1  1.7 - 7.7 K/uL Final    Lymphocytes Absolute 10/27/2019 2.5  1.0 - 5.1 K/uL Final    Monocytes Absolute 10/27/2019 0.5  0.0 - 1.3 K/uL Final    Eosinophils Absolute 10/27/2019 0.3  0.0 - 0.6 K/uL Final    Basophils Absolute 10/27/2019 0.1  0.0 - 0.2 K/uL Final    Sodium 10/27/2019 139  136 - 145 mmol/L Final    Potassium 10/27/2019 4.2  3.5 - 5.1 mmol/L Final    Chloride 10/27/2019 104  99 - 110 mmol/L Final    CO2 10/27/2019 24  21 - 32 mmol/L Final    Anion Gap 10/27/2019 11  3 - 16 Final    Glucose 10/27/2019 106* 70 - 99 mg/dL Final    BUN 10/27/2019 14  7 - 20 mg/dL Final  CREATININE 10/27/2019 1.0  0.6 - 1.1 mg/dL Final    GFR Non- 10/27/2019 58* >60 Final    Comment: >60 mL/min/1.73m2 EGFR, calc. for ages 25 and older using the  MDRD formula (not corrected for weight), is valid for stable  renal function.  GFR  10/27/2019 >60  >60 Final    Comment: Chronic Kidney Disease: less than 60 ml/min/1.73 sq.m. Kidney Failure: less than 15 ml/min/1.73 sq.m. Results valid for patients 18 years and older.  Calcium 10/27/2019 9.3  8.3 - 10.6 mg/dL Final    Total Protein 10/27/2019 6.8  6.4 - 8.2 g/dL Final    Alb 10/27/2019 3.9  3.4 - 5.0 g/dL Final    Albumin/Globulin Ratio 10/27/2019 1.3  1.1 - 2.2 Final    Total Bilirubin 10/27/2019 <0.2  0.0 - 1.0 mg/dL Final    Alkaline Phosphatase 10/27/2019 54  40 - 129 U/L Final    ALT 10/27/2019 10  10 - 40 U/L Final    AST 10/27/2019 14* 15 - 37 U/L Final    Comment: Specimen hemolysis has exceeded the interference as defined by Roche. Value may be falsely increased. Suggest recollection if clinically  indicated.       Globulin 10/27/2019 2.9  g/dL Final    Lipase 10/27/2019 21.0  13.0 - 60.0 U/L Final    Color, UA 10/27/2019 Yellow  Straw/Yellow Final    Clarity, UA 10/27/2019 Clear  Clear Final    Glucose, Ur 10/27/2019 Negative  Negative mg/dL Final    Bilirubin Urine 10/27/2019 Negative  Negative Final    Ketones, Urine 10/27/2019 Negative  Negative mg/dL Final    Specific [place], UA 10/27/2019 1.015  1.005 - 1.030 Final    Blood, Urine 10/27/2019 Negative  Negative Final    pH, UA 10/27/2019 7.0  5.0 - 8.0 Final    Protein, UA 10/27/2019 Negative  Negative mg/dL Final    Urobilinogen, Urine 10/27/2019 0.2  <2.0 E.U./dL Final    Nitrite, Urine 10/27/2019 Negative  Negative Final    Leukocyte Esterase, Urine 10/27/2019 Negative  Negative Final    Microscopic Examination 10/27/2019 Not Indicated   Final    Urine Type 10/27/2019 NotGiven   Final    Urine received in a benzonatate (TESSALON) 100 MG capsule; Take 1-2 capsules by mouth 3 times daily as needed for Cough    Nausea  -     ondansetron (ZOFRAN ODT) 4 MG disintegrating tablet; Take 1 tablet by mouth every 8 hours as needed for Nausea or Vomiting    Sinus pain  -     XR SINUSES (<3 VIEWS); Future  -     fluticasone (FLONASE) 50 MCG/ACT nasal spray; 1 spray by Nasal route daily    Fever, unspecified fever cause  -     Basic Metabolic Panel; Future  -     CBC Auto Differential; Future  -     POCT rapid strep A  -     Throat culture  -     POCT Influenza A/B    Diarrhea of presumed infectious origin  -     Basic Metabolic Panel; Future  -     CBC Auto Differential; Future    Wheezing  -     albuterol (PROVENTIL) nebulizer solution 2.5 mg  -     methylPREDNISolone (MEDROL DOSEPACK) 4 MG tablet; Take by mouth. Prior to Visit Medications    Medication Sig Taking? Authorizing Provider   estradiol (ESTRACE) 0.5 MG tablet  Yes Historical Provider, MD   methylPREDNISolone (MEDROL DOSEPACK) 4 MG tablet Take by mouth.  Yes DARÍO Jacobsen CNP   ondansetron (ZOFRAN ODT) 4 MG disintegrating tablet Take 1 tablet by mouth every 8 hours as needed for Nausea or Vomiting Yes DARÍO Jacobsen CNP   fluticasone (FLONASE) 50 MCG/ACT nasal spray 1 spray by Nasal route daily Yes DARÍO Jacobsen CNP   benzonatate (TESSALON) 100 MG capsule Take 1-2 capsules by mouth 3 times daily as needed for Cough Yes DARÍO Jacobsen CNP   busPIRone (BUSPAR) 15 MG tablet TAKE 1 TABLET 3 TIMES DAILY Yes DARÍO Jacobsen CNP   DULoxetine (CYMBALTA) 60 MG extended release capsule TAKE (1) CAPSULE TWICE DAILY Yes Vanessa Gaona MD   zoster recombinant adjuvanted vaccine [facility]) 50 MCG/0.5ML SUSR injection Inject 0.5 mLs into the muscle See Admin Instructions 1 dose now and repeat in 2-6 months Yes Vanessa Gaona MD   metoprolol succinate (TOPROL XL) 100 MG extended release medicalhistory, past surgical history, social history, or family history were reviewed personally by me during the office visit. All problems listed in the assessment are stable unless noted otherwise. Medication profilereviewed personally by me during the office visit. Medication side effects and possible impairments from medications were discussed as applicable.     Call if pattern of symptoms change or persists for an extended time. This document was prepared by a combination of typing and transcription through a voice recognition software. This provider spent 40 minutes in the room with the patient with 50% or greater being utilized on patient education. Patient educated on role of a humidifier, causes of diarrhea, influenza.

## 2020-02-01 LAB — THROAT CULTURE: NORMAL

## 2020-02-04 ENCOUNTER — HOSPITAL ENCOUNTER (OUTPATIENT)
Age: 55
Discharge: HOME | End: 2020-02-04
Payer: COMMERCIAL

## 2020-02-04 LAB — POTASSIUM SERPL-SCNC: 3.8 MMOL/L (ref 3.5–5.1)

## 2020-02-04 PROCEDURE — 84132 ASSAY OF SERUM POTASSIUM: CPT

## 2020-02-04 PROCEDURE — 36415 COLL VENOUS BLD VENIPUNCTURE: CPT

## 2020-02-07 ENCOUNTER — HOSPITAL ENCOUNTER (OUTPATIENT)
Dept: NON INVASIVE DIAGNOSTICS | Age: 55
Discharge: HOME | End: 2020-02-07
Payer: COMMERCIAL

## 2020-02-07 ENCOUNTER — HOSPITAL ENCOUNTER (OUTPATIENT)
Dept: NUCLEAR MEDICINE | Age: 55
Discharge: HOME | End: 2020-02-07
Payer: COMMERCIAL

## 2020-02-07 LAB
LV EF: 60 %
LVEF MODALITY: NORMAL

## 2020-02-07 PROCEDURE — 93017 CV STRESS TEST TRACING ONLY: CPT

## 2020-02-07 PROCEDURE — 78452 HT MUSCLE IMAGE SPECT MULT: CPT

## 2020-02-07 PROCEDURE — 3430000000 HC RX DIAGNOSTIC RADIOPHARMACEUTICAL: Performed by: FAMILY MEDICINE

## 2020-02-07 PROCEDURE — A9502 TC99M TETROFOSMIN: HCPCS | Performed by: FAMILY MEDICINE

## 2020-02-07 RX ADMIN — TETROFOSMIN 10 MILLICURIE: 1.38 INJECTION, POWDER, LYOPHILIZED, FOR SOLUTION INTRAVENOUS at 08:25

## 2020-02-07 RX ADMIN — TETROFOSMIN 31.7 MILLICURIE: 1.38 INJECTION, POWDER, LYOPHILIZED, FOR SOLUTION INTRAVENOUS at 09:40

## 2020-02-07 NOTE — PROGRESS NOTES
Pt educated on cardiac stress testing. Pt verbalizes understanding to cardiac stress testing. Questions and concerns addressed. Pt is agreeable to proceed with stress testing. Pt denies CP at present. Pt states gets intermittent CP.

## 2020-02-07 NOTE — PROGRESS NOTES
A GXT stress test was completed on this patient as ordered. The patient tolerated the procedure well. Awaiting stress imaging at this time. Nuc med to remove IV prior to discharge.

## 2020-02-13 RX ORDER — DULOXETIN HYDROCHLORIDE 60 MG/1
CAPSULE, DELAYED RELEASE ORAL
Qty: 60 CAPSULE | Refills: 0 | Status: SHIPPED | OUTPATIENT
Start: 2020-02-13 | End: 2020-12-15 | Stop reason: SDUPTHER

## 2020-02-13 RX ORDER — IBUPROFEN 800 MG/1
TABLET ORAL
Qty: 90 TABLET | Refills: 0 | Status: SHIPPED | OUTPATIENT
Start: 2020-02-13 | End: 2020-04-07

## 2020-03-11 RX ORDER — PANTOPRAZOLE SODIUM 40 MG/1
TABLET, DELAYED RELEASE ORAL
Qty: 30 TABLET | Refills: 5 | Status: SHIPPED | OUTPATIENT
Start: 2020-03-11 | End: 2021-03-22 | Stop reason: SDUPTHER

## 2020-03-18 ENCOUNTER — OFFICE VISIT (OUTPATIENT)
Dept: FAMILY MEDICINE CLINIC | Age: 55
End: 2020-03-18
Payer: COMMERCIAL

## 2020-03-18 VITALS
TEMPERATURE: 98.1 F | OXYGEN SATURATION: 98 % | SYSTOLIC BLOOD PRESSURE: 130 MMHG | BODY MASS INDEX: 34.37 KG/M2 | DIASTOLIC BLOOD PRESSURE: 80 MMHG | WEIGHT: 194 LBS | HEART RATE: 105 BPM

## 2020-03-18 PROBLEM — G44.229 CHRONIC TENSION HEADACHE: Status: ACTIVE | Noted: 2020-03-18

## 2020-03-18 PROBLEM — L82.0 INFLAMED SEBORRHEIC KERATOSIS: Status: ACTIVE | Noted: 2020-03-18

## 2020-03-18 PROBLEM — M25.551 RIGHT HIP PAIN: Status: ACTIVE | Noted: 2020-03-18

## 2020-03-18 LAB
A/G RATIO: 1.7 (ref 1.1–2.2)
ALBUMIN SERPL-MCNC: 4.3 G/DL (ref 3.4–5)
ALP BLD-CCNC: 63 U/L (ref 40–129)
ALT SERPL-CCNC: 7 U/L (ref 10–40)
ANION GAP SERPL CALCULATED.3IONS-SCNC: 14 MMOL/L (ref 3–16)
AST SERPL-CCNC: 9 U/L (ref 15–37)
BASOPHILS ABSOLUTE: 0.1 K/UL (ref 0–0.2)
BASOPHILS RELATIVE PERCENT: 0.8 %
BILIRUB SERPL-MCNC: 0.4 MG/DL (ref 0–1)
BUN BLDV-MCNC: 11 MG/DL (ref 7–20)
CALCIUM SERPL-MCNC: 9.6 MG/DL (ref 8.3–10.6)
CHLORIDE BLD-SCNC: 109 MMOL/L (ref 99–110)
CHOLESTEROL, TOTAL: 174 MG/DL (ref 0–199)
CO2: 19 MMOL/L (ref 21–32)
CREAT SERPL-MCNC: 0.8 MG/DL (ref 0.6–1.1)
EOSINOPHILS ABSOLUTE: 0.3 K/UL (ref 0–0.6)
EOSINOPHILS RELATIVE PERCENT: 3.4 %
GFR AFRICAN AMERICAN: >60
GFR NON-AFRICAN AMERICAN: >60
GLOBULIN: 2.5 G/DL
GLUCOSE BLD-MCNC: 98 MG/DL (ref 70–99)
HCT VFR BLD CALC: 41.8 % (ref 36–48)
HDLC SERPL-MCNC: 61 MG/DL (ref 40–60)
HEMOGLOBIN: 13.7 G/DL (ref 12–16)
LDL CHOLESTEROL CALCULATED: 94 MG/DL
LYMPHOCYTES ABSOLUTE: 2.1 K/UL (ref 1–5.1)
LYMPHOCYTES RELATIVE PERCENT: 22.4 %
MCH RBC QN AUTO: 28.8 PG (ref 26–34)
MCHC RBC AUTO-ENTMCNC: 32.8 G/DL (ref 31–36)
MCV RBC AUTO: 87.8 FL (ref 80–100)
MONOCYTES ABSOLUTE: 0.5 K/UL (ref 0–1.3)
MONOCYTES RELATIVE PERCENT: 5.5 %
NEUTROPHILS ABSOLUTE: 6.3 K/UL (ref 1.7–7.7)
NEUTROPHILS RELATIVE PERCENT: 67.9 %
PDW BLD-RTO: 14.2 % (ref 12.4–15.4)
PLATELET # BLD: 325 K/UL (ref 135–450)
PMV BLD AUTO: 8 FL (ref 5–10.5)
POTASSIUM SERPL-SCNC: 3.8 MMOL/L (ref 3.5–5.1)
RBC # BLD: 4.76 M/UL (ref 4–5.2)
SODIUM BLD-SCNC: 142 MMOL/L (ref 136–145)
TOTAL PROTEIN: 6.8 G/DL (ref 6.4–8.2)
TRIGL SERPL-MCNC: 97 MG/DL (ref 0–150)
VLDLC SERPL CALC-MCNC: 19 MG/DL
WBC # BLD: 9.3 K/UL (ref 4–11)

## 2020-03-18 PROCEDURE — G8417 CALC BMI ABV UP PARAM F/U: HCPCS | Performed by: FAMILY MEDICINE

## 2020-03-18 PROCEDURE — 3017F COLORECTAL CA SCREEN DOC REV: CPT | Performed by: FAMILY MEDICINE

## 2020-03-18 PROCEDURE — G8482 FLU IMMUNIZE ORDER/ADMIN: HCPCS | Performed by: FAMILY MEDICINE

## 2020-03-18 PROCEDURE — G8427 DOCREV CUR MEDS BY ELIG CLIN: HCPCS | Performed by: FAMILY MEDICINE

## 2020-03-18 PROCEDURE — 99214 OFFICE O/P EST MOD 30 MIN: CPT | Performed by: FAMILY MEDICINE

## 2020-03-18 PROCEDURE — 4004F PT TOBACCO SCREEN RCVD TLK: CPT | Performed by: FAMILY MEDICINE

## 2020-03-18 PROCEDURE — 36415 COLL VENOUS BLD VENIPUNCTURE: CPT | Performed by: FAMILY MEDICINE

## 2020-03-18 NOTE — PROGRESS NOTES
Chief Complaint   Patient presents with    Hypertension    Depression    Anxiety    Gastroesophageal Reflux    Other     hip issues   She always has the left hip problem but now her right hip is really hurting her. She has seen Dr Meredith Tyler in the past for bursitis in her right hip, she will call his office to see about getting in. She takes Motrin regularly and is worried about her liver, we will check her labs today. She is worried about going around her Dad with the corona virus scare, he has a lot of health problems. She can't see her Mom at the NH due to no visitors being allowed currently. She asked about a spot near her nose that is a SK, she also has a SK on right upper back that gets irritated by her bra. Offered removal in the future if desired. She states she still has migraine headaches but they do not last as long. HPI:  Jennifer Lipoma is a 49 y.o. (: 1965) here today for    GI Disorder:  Patient presents for follow-up of GERD- chronic. Current symptoms include none. Symptoms are better since last visit. Patient denies any other symptoms. Current treatment includes: proton pump inhibitor- protonix. Medication side effects:  none. Recent diagnostic testing: none. Hypertension:  Home blood pressure monitoring: Yes - runs high at times. She is adherent to a low sodium diet. Patient denies shortness of breath. Antihypertensive medication side effects: no medication side effects noted. Use of agents associated with hypertension: none. Depression:  Takes the Cymbalta as directed and does help.                                   Sodium (mmol/L)   Date Value   2020 140    BUN (mg/dL)   Date Value   2020 8    Glucose (mg/dL)   Date Value   2020 130 (H)      Potassium (mmol/L)   Date Value   2020 3.8     Potassium reflex Magnesium (mmol/L)   Date Value   10/15/2019 3.5    CREATININE (mg/dL)   Date Value   2020 0.8           Patient's medications, allergies, past medical, surgical, social and family histories were reviewed and updated asappropriate on 3/18/2020 at 9:04 AM.    ROS:  Review of Systems    All other systems reviewed and are negative except as noted above on 3/18/2020 at 9:04 AM. Additional review of systems may be scanned into the media section ofthis medical record. Any responses requiring further intervention were pursued.     Hemoglobin A1C (%)   Date Value   04/08/2019 5.9     Microscopic Examination (no units)   Date Value   10/27/2019 Not Indicated     LDL Calculated (mg/dL)   Date Value   04/08/2019 94     Past Medical History:   Diagnosis Date    Ankle fracture     Anxiety     Arthritis     Benzodiazepine abuse ([facility] [address]     Depression     Fibromyalgia     Hyperlipidemia     Hypertension     Kidney cyst     x2 rt. ?    Migraine     Pneumonia 2008    bronchitis frequently since    TIA (transient ischemic attack) 2007    Ulcer, Stomach Peptic     frequent N/V    Unspecified cerebral artery occlusion with cerebral infarction 2007     mini stroke cused by severe migraine    Weight loss     frequent N/V, S/P cholecystectomy        Family History   Problem Relation Age of Onset    High Blood Pressure Mother     Heart Disease Mother     Diabetes Maternal Grandfather     Heart Disease Father     Heart Disease Brother      Social History     Socioeconomic History    Marital status:      Spouse name: Not on file    Number of children: Not on file    Years of education: Not on file    Highest education level: Not on file   Occupational History    Not on file   Social Needs    Financial resource strain: Not on file    Food insecurity     Worry: Not on file     Inability: Not on file    Transportation needs     Medical: Not on file     Non-medical: Not on file   Tobacco Use    Smoking status: Current Some Day Smoker     Packs/day: 0.50     Years: 27.00     Pack years: 13.50     Types: Cigarettes    1/30/2020  Haskel Hatchet, MD   triamterene-hydrochlorothiazide (DYAZIDE) 37.5-25 MG per capsule Take 1 capsule by mouth daily  Haskel Hatchet, MD   traZODone (DESYREL) 150 MG tablet TAKE 1 TABLET AT BEDTIME  Haskel Hatchet, MD     Allergies   Allergen Reactions    Benadryl [Diphenhydramine] Anxiety     \"Made me jump off the wall\"    Topamax [Topiramate] Hives    Tramadol Shortness Of Breath    Vitamin C [Ascorbic Acid] Hives    Lyrica [Pregabalin] Palpitations    Penicillins Hives and Itching       OBJECTIVE:  Estimated body mass index is 34.37 kg/m² as calculated from the following:    Height as of 1/30/20: 5' 3\" (1.6 m). Weight as of this encounter: 194 lb (88 kg). Vitals:    03/18/20 0901   BP: 130/80   Site: Left Upper Arm   Position: Sitting   Cuff Size: Medium Adult   Pulse: 105   Temp: 98.1 °F (36.7 °C)   SpO2: 98%   Weight: 194 lb (88 kg)     BP Readings from Last 2 Encounters:   03/18/20 130/80   01/30/20 138/88     Wt Readings from Last 3 Encounters:   03/18/20 194 lb (88 kg)   01/30/20 197 lb (89.4 kg)   10/29/19 200 lb (90.7 kg)       Physical Exam  Vitals signs and nursing note reviewed. Constitutional:       General: She is not in acute distress. Appearance: She is well-developed. She is not diaphoretic. HENT:      Head: Normocephalic and atraumatic. Right Ear: External ear normal.      Left Ear: External ear normal.      Nose: Nose normal.   Eyes:      General: Lids are normal. No scleral icterus. Right eye: No discharge. Left eye: No discharge. Pupils: Pupils are equal, round, and reactive to light. Neck:      Thyroid: No thyromegaly. Vascular: No JVD. Cardiovascular:      Rate and Rhythm: Normal rate and regular rhythm. Heart sounds: Normal heart sounds. Pulmonary:      Effort: Pulmonary effort is normal. No respiratory distress. Breath sounds: Normal breath sounds.    Abdominal:      Palpations: Abdomen is soft. There is no hepatomegaly or splenomegaly. Tenderness: There is no abdominal tenderness. Skin:     General: Skin is warm and dry. Coloration: Skin is not pale. Findings: No erythema or rash. Comments: SK on right side of nose and right upper back   Psychiatric:         Behavior: Behavior normal.         Thought Content: Thought content normal.         Judgment: Judgment normal.              ASSESSMENT PLAN      Diagnosis Orders   1. Essential hypertension, benign  COMPREHENSIVE METABOLIC PANEL   2. Hypokalemia  COMPREHENSIVE METABOLIC PANEL   3. Lipid screening  Lipid Panel   4. Hyperglycemia  Hemoglobin A1C   5. Abnormal CBC  CBC Auto Differential   6. Persistent migraine aura without cerebral infarction and without status migrainosus, not intractable     7. Chronic anxiety     8. Slow transit constipation     9. Gastroesophageal reflux disease with esophagitis     10. Right hip pain     11. Inflamed seborrheic keratosis     12. Chronic tension-type headache, not intractable     Blood pressure acceptable. Following up the low potassium. Update sugar control but no symptoms of elevated sugar. Follow-up her CBC. She has been having a new form of headache almost daily not necessarily migraine. Concerned about her mother who is in the nursing home that she cannot see right now. Asked about seeing her father who is quite ill. Reflux controlled on current regimen. She may be seeing Dr. Marylene Esters in the future regarding her right hip pain. She had a 6 mm seborrheic keratosis on her right shoulder where the bra strap is at. In this area it is being irritated and causing pain. I told her that we can remove that but will consider doing that in the future. She will follow back up in the office in 6 months. Patient should call the office immediately with new or ongoing signs or symptoms or worsening, or proceed to the emergency room.   No changes in past medical history, past surgical history, social history, or family history were noted during the patient encounter unless specifically listed above. All updates of past medical history, past surgical history, social history, or family history were reviewed personally by me during the office visit. All problems listed in the assessment are stable unless noted otherwise. Medication profile reviewed personally by me during the visit. Medication side effects and possible impairments from medications were discussed as applicable. This document was prepared by a combination of typing and transcription through a voice recognition software. Scribe attestation: Pati Anne RN, am scribing for and in the presence of Tamara Fuentes MD. Electronically signed by Miles Doran RN on 3/18/2020 at 9:04 AM      Provider attestation:     I, Dr. Klever Woods, personally performed the services described in this documentation, as scribed by the above signed scribe in my presence, and it is both accurate and complete. I agree with the ROS and Past Histories independently gathered by the clinical support staff and the remaining scribed note accurately describes my personal service to the patient.       3/18/2020    9:58 AM

## 2020-03-19 LAB
ESTIMATED AVERAGE GLUCOSE: 108.3 MG/DL
HBA1C MFR BLD: 5.4 %

## 2020-03-20 ENCOUNTER — TELEPHONE (OUTPATIENT)
Dept: FAMILY MEDICINE CLINIC | Age: 55
End: 2020-03-20

## 2020-03-20 NOTE — TELEPHONE ENCOUNTER
Labs are resulted but have not yet been reviewed by MD.  Will route this message to Gena Vanessa to have her review the results so we can inform pt.

## 2020-03-24 RX ORDER — ATORVASTATIN CALCIUM 10 MG/1
10 TABLET, FILM COATED ORAL DAILY
Qty: 30 TABLET | Refills: 3 | Status: SHIPPED | OUTPATIENT
Start: 2020-03-24 | End: 2020-05-29

## 2020-04-07 RX ORDER — IBUPROFEN 800 MG/1
TABLET ORAL
Qty: 90 TABLET | Refills: 1 | Status: SHIPPED | OUTPATIENT
Start: 2020-04-07 | End: 2020-06-30 | Stop reason: SDUPTHER

## 2020-04-07 RX ORDER — TRAZODONE HYDROCHLORIDE 150 MG/1
TABLET ORAL
Qty: 30 TABLET | Refills: 1 | Status: SHIPPED | OUTPATIENT
Start: 2020-04-07 | End: 2020-06-30 | Stop reason: SDUPTHER

## 2020-05-28 ENCOUNTER — TELEPHONE (OUTPATIENT)
Dept: FAMILY MEDICINE CLINIC | Age: 55
End: 2020-05-28

## 2020-05-28 NOTE — TELEPHONE ENCOUNTER
The pt called today with [facility] VISIT + [phone] knot on chest inbetween breast the two breast mainly over top right breast , has been present for 3wks , painful in begining , but now just annoying . just concerned . Was offered an appointment for [facility] but   cant a soon enough appt . Seen some provider filll , was hoping you could find the pt  A sooner appt . Patient would like Matt Clayton MD to  Give the pt a call back in ref to the concern . N/a  pharmacy confirmed in [id number].     Patient was made aware of e-visits via [place] -Yes

## 2020-05-29 ENCOUNTER — VIRTUAL VISIT (OUTPATIENT)
Dept: FAMILY MEDICINE CLINIC | Age: 55
End: 2020-05-29
Payer: COMMERCIAL

## 2020-05-29 PROCEDURE — 99213 OFFICE O/P EST LOW 20 MIN: CPT | Performed by: NURSE PRACTITIONER

## 2020-05-29 ASSESSMENT — ENCOUNTER SYMPTOMS
EYES NEGATIVE: 1
COLOR CHANGE: 0
SHORTNESS OF BREATH: 0
GASTROINTESTINAL NEGATIVE: 1
ANAL BLEEDING: 0
RESPIRATORY NEGATIVE: 1
ABDOMINAL PAIN: 0
NAUSEA: 0
ALLERGIC/IMMUNOLOGIC NEGATIVE: 1
BLOOD IN STOOL: 0

## 2020-05-29 NOTE — PROGRESS NOTES
vaccine  Completed    Pneumococcal 0-64 years Vaccine  Completed    Hepatitis A vaccine  Aged Out    Hepatitis B vaccine  Aged Out    Hib vaccine  Aged Out    Meningococcal (ACWY) vaccine  Aged Out       PHYSICAL EXAMINATION:  [ INSTRUCTIONS:  \"[x]\" Indicates a positive item  \"[]\" Indicates a negative item  -- DELETE ALL ITEMS NOT EXAMINED]  Vital Signs: (As obtained by patient/caregiver or practitioner observation)    Blood pressure-  Heart rate-    Respiratory rate-    Temperature-  Pulse oximetry-     Constitutional: [x] Appears well-developed and well-nourished [x] No apparent distress      [] Abnormal-   Mental status  [x] Alert and awake  [x] Oriented to person/place/time [x]Able to follow commands      Eyes:  EOM    [x]  Normal  [] Abnormal-  Sclera  [x]  Normal  [] Abnormal -         Discharge [x]  None visible  [] Abnormal -    HENT:   [x] Normocephalic, atraumatic.   [] Abnormal   [x] Mouth/Throat: Mucous membranes are moist.     External Ears [x] Normal  [] Abnormal-     Neck: [x] No visualized mass     Pulmonary/Chest: [x] Respiratory effort normal.  [x] No visualized signs of difficulty breathing or respiratory distress        [] Abnormal-      Musculoskeletal:   [x] Normal gait with no signs of ataxia         [x] Normal range of motion of neck        [] Abnormal-       Neurological:        [x] No Facial Asymmetry (Cranial nerve 7 motor function) (limited exam to video visit)          [x] No gaze palsy        [] Abnormal-         Skin:        [x] No significant exanthematous lesions or discoloration noted on facial skin         [x] Abnormal- raised area that is irregular in shape to left of sternal region and slightly on left inner breast.  no surrounding erythema, no open area, no drainage, slight tenderness when patient pressed on area,  Appears soft when patient pushes on area           Psychiatric:       [x] Normal Affect [x] No Hallucinations        [] Abnormal-     Other pertinent observable physical exam findings-     ASSESSMENT/PLAN:  1. Skin lesion of chest wall  Skin lesion is to left of sternum and on part of left breast but appears to be superfical skin lesion and not a breast issue. Area is similar to cyst like area but is irregular in shape. Patient given option to try atb therapy since she has history of MRSA or refer to general surgery for evaluation and possible removal of area. Patient has opted for referral  - Orly Norwood MD, General Surgery, [facility] if symptoms worsen or fail to improve. Jenny Coronel is a 49 y.o. female being evaluated by a Virtual Visit (video visit) encounter to address concerns as mentioned above. A caregiver was present when appropriate. Due to this being a TeleHealth encounter (During [place] [address] public health emergency), evaluation of the following organ systems was limited: Vitals/Constitutional/EENT/Resp/CV/GI//MS/Neuro/Skin/Heme-Lymph-Imm. Pursuant to the emergency declaration under the [address] [place] [address], [address] [place] [address] authority and the [employer] and [employer] Act, this Virtual Visit was conducted with patient's (and/or legal guardian's) consent, to reduce the patient's risk of exposure to COVID-19 and provide necessary medical care. The patient (and/or legal guardian) has also been advised to contact this office for worsening conditions or problems, and seek emergency medical treatment and/or call 911 if deemed necessary. Patient identification was verified at the start of the visit: Yes    Total time spent on this encounter: 20 minutes    Services were provided through a video synchronous discussion virtually to substitute for in-person clinic visit. Patient and provider were located at their individual homes. --Rosanna Cast, DARÍO - CNP on 5/29/2020 at 2:52 PM    An electronic signature was used to authenticate this note.

## 2020-06-01 ENCOUNTER — INITIAL CONSULT (OUTPATIENT)
Dept: SURGERY | Age: 55
End: 2020-06-01
Payer: COMMERCIAL

## 2020-06-01 VITALS
SYSTOLIC BLOOD PRESSURE: 138 MMHG | DIASTOLIC BLOOD PRESSURE: 86 MMHG | HEIGHT: 63 IN | BODY MASS INDEX: 34.41 KG/M2 | TEMPERATURE: 98.6 F | WEIGHT: 194.2 LBS

## 2020-06-01 PROCEDURE — G8427 DOCREV CUR MEDS BY ELIG CLIN: HCPCS | Performed by: SURGERY

## 2020-06-01 PROCEDURE — G8417 CALC BMI ABV UP PARAM F/U: HCPCS | Performed by: SURGERY

## 2020-06-01 PROCEDURE — 99241 PR OFFICE CONSULTATION NEW/ESTAB PATIENT 15 MIN: CPT | Performed by: SURGERY

## 2020-06-30 ENCOUNTER — TELEPHONE (OUTPATIENT)
Dept: FAMILY MEDICINE CLINIC | Age: 55
End: 2020-06-30

## 2020-06-30 RX ORDER — TRAZODONE HYDROCHLORIDE 150 MG/1
TABLET ORAL
Qty: 30 TABLET | Refills: 11 | Status: SHIPPED | OUTPATIENT
Start: 2020-06-30 | End: 2021-07-12

## 2020-06-30 RX ORDER — IBUPROFEN 800 MG/1
TABLET ORAL
Qty: 90 TABLET | Refills: 11 | Status: ON HOLD | OUTPATIENT
Start: 2020-06-30 | End: 2020-12-02 | Stop reason: HOSPADM

## 2020-06-30 RX ORDER — BLOOD PRESSURE TEST KIT
KIT MISCELLANEOUS
Qty: 1 KIT | Refills: 0 | Status: SHIPPED | OUTPATIENT
Start: 2020-06-30 | End: 2021-01-19

## 2020-06-30 NOTE — PROGRESS NOTES
[facility] SURGERY    HPI:  Patient is 49y.o. year old female seen at request of Shon Marks MD.  She presents because of lump located on chest and lesion located on the R shoulder. There has been pain at or near the lesion and a recent increase in size. There is not  previous history of similar. There has not been any biopsy. Past Medical History:   Diagnosis Date    Ankle fracture     Anxiety     Arthritis     Benzodiazepine abuse ([facility] [address]     Depression     Fibromyalgia     Hyperlipidemia     Hypertension     Kidney cyst     x2 rt. ?    Migraine     Pneumonia 2008    bronchitis frequently since    TIA (transient ischemic attack)     Ulcer, Stomach Peptic     frequent N/V    Unspecified cerebral artery occlusion with cerebral infarction      mini stroke cused by severe migraine    Weight loss     frequent N/V, S/P cholecystectomy       Past Surgical History:   Procedure Laterality Date    ANKLE SURGERY       SECTION      CHOLECYSTECTOMY      COLONOSCOPY N/A 2018    COLON W/ANES.  performed by Andrea Jones MD at [address] [facility] SURGERY  2005   [address]      TUMOR REMOVAL      lt femur    UPPER GASTROINTESTINAL ENDOSCOPY N/A 2018    EGD BIOPSY performed by Andrea Jones MD at [address] [facility]       Current Outpatient Medications on File Prior to Visit   Medication Sig Dispense Refill    traZODone (DESYREL) 150 MG tablet TAKE 1 TABLET AT BEDTIME 30 tablet 1    ibuprofen (ADVIL;MOTRIN) 800 MG tablet TAKE 1 TABLET THREE TIMES DAILY 90 tablet 1    pantoprazole (PROTONIX) 40 MG tablet TAKE 1 TABLET EACH MORNING PRIOR TO BREAKFAST 30 tablet 5    DULoxetine (CYMBALTA) 60 MG extended release capsule TAKE (1) CAPSULE TWICE DAILY 60 capsule 0    metoprolol succinate (TOPROL XL) 100 MG extended release tablet TAKE (1) TABLET DAILY 30 tablet 11    furosemide (LASIX) 20 MG tablet Take 20 mg by mouth daily as needed      polyethylene glycol (MIRALAX) POWD powder Take 1-2 capfuls of Miralax daily as needed 255 g 11    triamterene-hydrochlorothiazide (DYAZIDE) 37.5-25 MG per capsule Take 1 capsule by mouth daily 30 capsule 5     No current facility-administered medications on file prior to visit.         Allergies   Allergen Reactions    Benadryl [Diphenhydramine] Anxiety     \"Made me jump off the wall\"    Topamax [Topiramate] Hives    Tramadol Shortness Of Breath    Vitamin C [Ascorbic Acid] Hives    Lyrica [Pregabalin] Palpitations    Penicillins Hives and Itching       Social History     Socioeconomic History    Marital status:      Spouse name: Not on file    Number of children: Not on file    Years of education: Not on file    Highest education level: Not on file   Occupational History    Not on file   Social Needs    Financial resource strain: Not on file    Food insecurity     Worry: Not on file     Inability: Not on file    Transportation needs     Medical: Not on file     Non-medical: Not on file   Tobacco Use    Smoking status: Current Some Day Smoker     Packs/day: 0.50     Years: 27.00     Pack years: 13.50     Types: Cigarettes    Smokeless tobacco: Never Used    Tobacco comment: she smokes sometimes   Substance and Sexual Activity    Alcohol use: No     Alcohol/week: 0.0 standard drinks     Comment: rare    Drug use: No    Sexual activity: Yes     Partners: Male   Lifestyle    Physical activity     Days per week: Not on file     Minutes per session: Not on file    Stress: Not on file   Relationships    Social connections     Talks on phone: Not on file     Gets together: Not on file     Attends Orthodox service: Not on file     Active member of club or organization: Not on file     Attends meetings of clubs or organizations: Not on file     Relationship status: Not on file    Intimate partner violence     Fear of current or ex partner: Not on file material risks, benefits, and reasonable alternatives including postponing the procedure were discussed. The patient does wish to proceed with the procedure at this time. Greater than 50% visit spent counseling about diagnosis, treatment plan and expected post operative course.

## 2020-07-06 ENCOUNTER — OFFICE VISIT (OUTPATIENT)
Dept: ORTHOPEDIC SURGERY | Age: 55
End: 2020-07-06
Payer: COMMERCIAL

## 2020-07-06 VITALS — WEIGHT: 190 LBS | BODY MASS INDEX: 33.66 KG/M2 | HEIGHT: 63 IN

## 2020-07-06 PROCEDURE — G8427 DOCREV CUR MEDS BY ELIG CLIN: HCPCS | Performed by: ORTHOPAEDIC SURGERY

## 2020-07-06 PROCEDURE — 99213 OFFICE O/P EST LOW 20 MIN: CPT | Performed by: ORTHOPAEDIC SURGERY

## 2020-07-06 PROCEDURE — 3017F COLORECTAL CA SCREEN DOC REV: CPT | Performed by: ORTHOPAEDIC SURGERY

## 2020-07-06 PROCEDURE — G8417 CALC BMI ABV UP PARAM F/U: HCPCS | Performed by: ORTHOPAEDIC SURGERY

## 2020-07-06 PROCEDURE — 4004F PT TOBACCO SCREEN RCVD TLK: CPT | Performed by: ORTHOPAEDIC SURGERY

## 2020-07-06 RX ORDER — MELOXICAM 15 MG/1
15 TABLET ORAL DAILY PRN
Qty: 90 TABLET | Refills: 0 | Status: SHIPPED | OUTPATIENT
Start: 2020-07-06 | End: 2020-09-23

## 2020-07-06 NOTE — PROGRESS NOTES
Chief Complaint    Hip Pain (rt hip lateral pain down into leg, aches all the down of the front of her leg, feels like needles)      History of Present Illness:  Sayda Chery is a 49 y.o. female presents with a chief complaint of right lateral hip pain, thigh pain and pain, numbness and tingling all the way down to her knee and calf and foot. She has been noticing pain for the last several months, progressing getting worse. 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 There is no history of trauma. Does have history of low back problems in the past and she seems to think it may be coming from her back. There is some groin pain. She has been trying some ibuprofen with some mild relief in her pain. He saw her primary care physician, Dr. Jono Pryor who sends her in today for orthopedic consultation. Pain Assessment  Location of Pain: Pelvis  Location Modifiers: Right  Severity of Pain: 10  Quality of Pain: Aching  Frequency of Pain: Intermittent  Aggravating Factors: Stairs, Walking  Limiting Behavior: Yes  Result of Injury: No  Work-Related Injury: No  Are there other pain locations you wish to document?: No    Medical History:  Patient's medications, allergies, past medical, surgical, social and family histories were reviewed and updated as appropriate. Review of Systems:  Pertinent items are noted in HPI  Review of systems reviewed from Patient History Form dated on 7/6/2020 and available in the patient's chart under the Media tab. Vital Signs:  Ht 5' 3.25\" (1.607 m)   Wt 190 lb (86.2 kg)   BMI 33.39 kg/m²     General Exam:   Constitutional: Patient is adequately groomed with no evidence of malnutrition  Mental Status: The patient is oriented to time, place and person. The patient's mood and affect are appropriate.   Lymphatic: The lymphatic examination bilaterally reveals all areas to be without enlargement or induration. Vascular: Examination reveals no swelling or calf tenderness. Peripheral pulses are palpable and 2+. Neurological: The patient has good coordination. There is no weakness or sensory deficit. Right hip Examination:    Inspection:  No skin abnormalities noted, no evidence of lymphangitis or lymphedema, ecchymosis or bruising. Palpation: Mild pain to palpation of the greater trochanter    Range of Motion: Full pain-free range of motion. Strength: 4+/5 hip strength in abduction, hip flexion and extension. Special Tests: Mildly positive Nicolas test, negative log maneuver, mildly positive straight leg raise    Skin: There are no rashes, ulcerations or lesions. Gait: Antalgic gait on the right    Additional Comments:       Additional Examinations:         Left Lower Extremity: Examination of the left lower extremity does not show any tenderness, deformity or injury. Range of motion is unremarkable. There is no gross instability. There are no rashes, ulcerations or lesions. Strength and tone are normal.    Radiology:     X-rays obtained and reviewed in office:  Views 2  Location right hip including AP pelvis, AP lateral right hip reveals well-maintained articular cartilage space with no evidence of acute fracture, dislocation, high-grade AVN. A dynamic compression hip screw is seen on the left. There is no evidence of AVN or osteoarthritis on the left as well. Assessment : Right lower extremity pain, probable lumbar radiculopathy,    Impression:  Encounter Diagnoses   Name Primary?     Pain of right hip joint Yes    Lumbar radiculopathy        Office Procedures:  Orders Placed This Encounter   Procedures    XR HIP RIGHT (2-3 VIEWS)     Standing Status:   Future     Number of Occurrences:   1     Standing Expiration Date:   7/6/2021   [facility] Fenton [facility] Physical Therapy     Referral Priority:   Routine     Referral Type:   Eval and Treat Referral Reason:   Specialty Services Required     Requested Specialty:   Physical Therapy     Number of Visits Requested:   1       Treatment Plan: Options for treatment were clear discussed. I have discussed with the patient, possible MRI but she wants to try some conservative care including physical therapy both for her knee back and hip. We will try her on meloxicam to see if this will help her with pain. See her back in 3 to 4 weeks for repeat clinical exam to try distinguish  between hip versus back.

## 2020-08-07 ENCOUNTER — TELEPHONE (OUTPATIENT)
Dept: ORTHOPEDIC SURGERY | Age: 55
End: 2020-08-07

## 2020-08-07 NOTE — TELEPHONE ENCOUNTER
Patient cancelled appointment. She hasnt started PT.  She will call to reschedule once she starts PT.

## 2020-08-26 ENCOUNTER — TELEPHONE (OUTPATIENT)
Dept: FAMILY MEDICINE CLINIC | Age: 55
End: 2020-08-26

## 2020-08-26 NOTE — TELEPHONE ENCOUNTER
Pt was wanting to see if she could get the spot on her neck taken off when she was here for her appointment  On 8/23.

## 2020-08-26 NOTE — TELEPHONE ENCOUNTER
Please clarify the date, but will also depend upon what the lesion looks like, and that is really the only thing we could do in a 20-minute slot would be to remove the lesion and not deal with any other problems

## 2020-08-26 NOTE — TELEPHONE ENCOUNTER
ADRIANE for pt with Dr. Harrison Rene response. Asked her to call the office to clarify which she'd like to do - procedure to remove spot on her neck of follow up for management of chronic conditions. There isn't enough time to do both that day.

## 2020-09-23 ENCOUNTER — OFFICE VISIT (OUTPATIENT)
Dept: FAMILY MEDICINE CLINIC | Age: 55
End: 2020-09-23
Payer: COMMERCIAL

## 2020-09-23 VITALS
WEIGHT: 195 LBS | SYSTOLIC BLOOD PRESSURE: 136 MMHG | HEART RATE: 78 BPM | BODY MASS INDEX: 34.55 KG/M2 | OXYGEN SATURATION: 97 % | HEIGHT: 63 IN | DIASTOLIC BLOOD PRESSURE: 74 MMHG | TEMPERATURE: 98.1 F

## 2020-09-23 LAB
ALBUMIN SERPL-MCNC: 4.2 G/DL (ref 3.4–5)
ALP BLD-CCNC: 62 U/L (ref 40–129)
ALT SERPL-CCNC: 10 U/L (ref 10–40)
AST SERPL-CCNC: 10 U/L (ref 15–37)
BILIRUB SERPL-MCNC: 0.3 MG/DL (ref 0–1)
BILIRUBIN DIRECT: <0.2 MG/DL (ref 0–0.3)
BILIRUBIN, INDIRECT: ABNORMAL MG/DL (ref 0–1)
CHOLESTEROL, TOTAL: 178 MG/DL (ref 0–199)
HDLC SERPL-MCNC: 70 MG/DL (ref 40–60)
LDL CHOLESTEROL CALCULATED: 97 MG/DL
TOTAL CK: 62 U/L (ref 26–192)
TOTAL PROTEIN: 6.6 G/DL (ref 6.4–8.2)
TRIGL SERPL-MCNC: 57 MG/DL (ref 0–150)
VLDLC SERPL CALC-MCNC: 11 MG/DL

## 2020-09-23 PROCEDURE — 4004F PT TOBACCO SCREEN RCVD TLK: CPT | Performed by: FAMILY MEDICINE

## 2020-09-23 PROCEDURE — 99212 OFFICE O/P EST SF 10 MIN: CPT | Performed by: FAMILY MEDICINE

## 2020-09-23 PROCEDURE — G8427 DOCREV CUR MEDS BY ELIG CLIN: HCPCS | Performed by: FAMILY MEDICINE

## 2020-09-23 PROCEDURE — G8417 CALC BMI ABV UP PARAM F/U: HCPCS | Performed by: FAMILY MEDICINE

## 2020-09-23 PROCEDURE — 11300 SHAVE SKIN LESION 0.5 CM/<: CPT | Performed by: FAMILY MEDICINE

## 2020-09-23 PROCEDURE — 90686 IIV4 VACC NO PRSV 0.5 ML IM: CPT | Performed by: FAMILY MEDICINE

## 2020-09-23 PROCEDURE — 36415 COLL VENOUS BLD VENIPUNCTURE: CPT | Performed by: FAMILY MEDICINE

## 2020-09-23 PROCEDURE — 3017F COLORECTAL CA SCREEN DOC REV: CPT | Performed by: FAMILY MEDICINE

## 2020-09-23 PROCEDURE — 90471 IMMUNIZATION ADMIN: CPT | Performed by: FAMILY MEDICINE

## 2020-09-23 RX ORDER — PREDNISONE 10 MG/1
TABLET ORAL
Qty: 30 TABLET | Refills: 0 | Status: SHIPPED | OUTPATIENT
Start: 2020-09-23 | End: 2020-11-03 | Stop reason: ALTCHOICE

## 2020-09-25 RX ORDER — ROSUVASTATIN CALCIUM 10 MG/1
10 TABLET, COATED ORAL NIGHTLY
Qty: 30 TABLET | Refills: 5 | Status: SHIPPED | OUTPATIENT
Start: 2020-09-25 | End: 2021-12-17 | Stop reason: ALTCHOICE

## 2020-09-25 NOTE — PROGRESS NOTES
Chief Complaint   Patient presents with    Procedure     Right shoulder lesion        HPI:   Maude Buckner is a 48 y.o. (: 1965) here today for evaluation of a bump on her right shoulder. This area is in the way of her bra strap. She catches the lesion frequently causing it to bleed. Patient's medications, allergies, past medical, surgical, social and family histories were reviewed and updated as appropriateon 2020 at 2:45 PM.    Hemoglobin A1C (%)   Date Value   2020 5.4     Microscopic Examination (no units)   Date Value   10/27/2019 Not Indicated     LDL Calculated (mg/dL)   Date Value   2020 97     Review of Systems  Additional review of systems may be scanned into the mediasection of this medical record. Any responses requiring further intervention were pursued. OBJECTIVE:  Estimated body mass index is 34.27 kg/m² as calculated from the following:    Height as of this encounter: 5' 3.25\" (1.607 m). Weight as of this encounter: 195 lb (88.5 kg). Vitals:    20 0827   BP: 136/74   Site: Left Upper Arm   Position: Sitting   Cuff Size: Medium Adult   Pulse: 78   Temp: 98.1 °F (36.7 °C)   TempSrc: Temporal   SpO2: 97%   Weight: 195 lb (88.5 kg)   Height: 5' 3.25\" (1.607 m)     Physical Exam  Vitals signs and nursing note reviewed. Constitutional:       General: She is not in acute distress. Appearance: She is well-developed. She is not diaphoretic. HENT:      Head: Normocephalic and atraumatic. Cardiovascular:      Rate and Rhythm: Normal rate. Pulmonary:      Effort: Pulmonary effort is normal. No respiratory distress. Musculoskeletal:        Hands:    Skin:     Comments: A 5 mm hyperpigmented papule on the right shoulder. Neurological:      Mental Status: She is alert and oriented to person, place, and time. Psychiatric:         Behavior: Behavior normal.         Thought Content:  Thought content normal.         Judgment: Judgment normal. PROCEDURE NOTE    Written consent obtained. Patient and site confirmed. A 5 mm lesion indicated for removal by shave excision. Indication for removal irritation and bleeding of the lesion, being caught by her clothing. . 1 milliliters of Xylocaine without epinephrine administered. Shave excision performed. Hemostasis by electrocautery. Wound closure necessary no. Pathology sent yes. Wound care instructions. Signs and symptoms of infection discussed. Return to office as needed. ASSESSMENT PLAN        Diagnosis Orders   1. Inflamed seborrheic keratosis  SURGICAL PATHOLOGY   2. Cardiovascular risk factor  Hepatic Function Panel    Lipid Panel    CK   3. Skin sensation disturbance  SURGICAL PATHOLOGY   4. Synovitis of finger  predniSONE (DELTASONE) 10 MG tablet   Due to this lesion bleeding and in the way of her clothing it was removed. Patient also showed us an area on her finger that was red and inflamed. She does not recall trauma to the finger, we gave her a prednisone course. Follow-up as regularly scheduled    Patient should call the office immediately with new or ongoing signs or symptoms or worsening, or proceed to the emergency room. No changes in past medical history, past surgical history, social history, or family history were noted during the patient encounter unless specifically listed above. All updates of past medical history, past surgical history, social history, or family history were reviewed personally by me during the office visit. All problems listed in the assessment are stable unless noted otherwise. Medication profile reviewed personally by me during the visit. Medication side effects and possible impairments from medications were discussed as applicable. This document was prepared by a combination of typing and transcription through a voice recognition software.       Scribe attestation: Travon Leone MD, am scribing for and in the presence of Martin Eason MD. Electronically signed by Martin Eason MD on 9/25/2020 at 2:45 PM    Physician attestation:     I, Dr. Lobo Tena, personally performed the services described in this documentation, as scribed by the above signed scribe in my presence, and it is both accurate and complete. I agree with the ROS and Past Histories independently gathered by the clinical support staff and the remaining scribed note accurately describes my personal service to the patient.       9/25/2020    3:11 PM

## 2020-10-15 ENCOUNTER — TELEPHONE (OUTPATIENT)
Dept: FAMILY MEDICINE CLINIC | Age: 55
End: 2020-10-15

## 2020-10-15 NOTE — TELEPHONE ENCOUNTER
Pt said that she was taking the prednisone for her finger and it was doing a little better. But since she started to taper down the finger is right back to where it was. Was wondering what she needs to do now, she uses [address]

## 2020-11-02 ENCOUNTER — TELEPHONE (OUTPATIENT)
Dept: FAMILY MEDICINE CLINIC | Age: 55
End: 2020-11-02

## 2020-11-02 NOTE — TELEPHONE ENCOUNTER
Pt states that she has a cough, left earache and breaking out under her breast and in between them. Said that the breaking out spots comes to a head and when she pops them they have a really bad odor to it. Said that she thimks it is the MRSA back that she has had before. Been using an antibiotic cream on it but not helping. And she uses [employer].

## 2020-11-03 ENCOUNTER — HOSPITAL ENCOUNTER (EMERGENCY)
Age: 55
Discharge: HOME | End: 2020-11-03
Attending: EMERGENCY MEDICINE
Payer: COMMERCIAL

## 2020-11-03 ENCOUNTER — APPOINTMENT (OUTPATIENT)
Dept: GENERAL RADIOLOGY | Age: 55
End: 2020-11-03
Payer: COMMERCIAL

## 2020-11-03 VITALS
BODY MASS INDEX: 33.84 KG/M2 | OXYGEN SATURATION: 95 % | HEART RATE: 78 BPM | RESPIRATION RATE: 18 BRPM | WEIGHT: 191 LBS | SYSTOLIC BLOOD PRESSURE: 138 MMHG | HEIGHT: 63 IN | DIASTOLIC BLOOD PRESSURE: 78 MMHG | TEMPERATURE: 98.3 F

## 2020-11-03 PROBLEM — I10 ESSENTIAL HYPERTENSION, BENIGN: Status: RESOLVED | Noted: 2020-11-03 | Resolved: 2020-11-03

## 2020-11-03 PROBLEM — R91.1 PULMONARY NODULE: Status: ACTIVE | Noted: 2020-11-03

## 2020-11-03 PROCEDURE — 99283 EMERGENCY DEPT VISIT LOW MDM: CPT

## 2020-11-03 PROCEDURE — 71046 X-RAY EXAM CHEST 2 VIEWS: CPT

## 2020-11-03 RX ORDER — NYSTATIN 100000 [USP'U]/G
POWDER TOPICAL
Qty: 30 G | Refills: 0 | Status: SHIPPED | OUTPATIENT
Start: 2020-11-03 | End: 2022-03-23 | Stop reason: SDUPTHER

## 2020-11-03 RX ORDER — SULFAMETHOXAZOLE AND TRIMETHOPRIM 800; 160 MG/1; MG/1
1 TABLET ORAL 2 TIMES DAILY
Qty: 20 TABLET | Refills: 0 | Status: SHIPPED | OUTPATIENT
Start: 2020-11-03 | End: 2020-11-13

## 2020-11-03 RX ORDER — ALBUTEROL SULFATE 90 UG/1
2 AEROSOL, METERED RESPIRATORY (INHALATION) EVERY 6 HOURS PRN
Qty: 1 INHALER | Refills: 0 | Status: SHIPPED | OUTPATIENT
Start: 2020-11-03 | End: 2021-01-19

## 2020-11-03 ASSESSMENT — ENCOUNTER SYMPTOMS
BACK PAIN: 0
SHORTNESS OF BREATH: 0
DIARRHEA: 0
NAUSEA: 0
SORE THROAT: 0
COUGH: 1
VOMITING: 0
EYE DISCHARGE: 0
ABDOMINAL PAIN: 0
RHINORRHEA: 0

## 2020-11-03 NOTE — ED PROVIDER NOTES
Skin: Positive for rash. Neurological: Negative for weakness and headaches. Hematological: Does not bruise/bleed easily. Psychiatric/Behavioral: Negative for confusion. Positives and Pertinent negatives as per HPI. Except as noted above in the ROS, all other systems were reviewed and negative. PAST MEDICAL HISTORY     Past Medical History:   Diagnosis Date    Ankle fracture     Anxiety     Arthritis     Benzodiazepine abuse ([facility] [address]     Depression     Fibromyalgia     Hyperlipidemia     Hypertension     Kidney cyst     x2 rt. ?    Migraine     Pneumonia 2008    bronchitis frequently since    TIA (transient ischemic attack)     Ulcer, Stomach Peptic     frequent N/V    Unspecified cerebral artery occlusion with cerebral infarction      mini stroke cused by severe migraine    Weight loss     frequent N/V, S/P cholecystectomy         SURGICAL HISTORY     Past Surgical History:   Procedure Laterality Date    ANKLE SURGERY       SECTION      CHOLECYSTECTOMY      COLONOSCOPY N/A 2018    COLON W/ANES.  performed by Robb Vyas MD at [address] [facility] SURGERY  2005   [address]      TUMOR REMOVAL      lt femur    UPPER GASTROINTESTINAL ENDOSCOPY N/A 2018    EGD BIOPSY performed by Robb Vyas MD at [address]       Discharge Medication List as of 11/3/2020 11:16 AM      CONTINUE these medications which have NOT CHANGED    Details   rosuvastatin (CRESTOR) 10 MG tablet Take 1 tablet by mouth nightly, Disp-30 tablet,R-5Normal      traZODone (DESYREL) 150 MG tablet TAKE 1 TABLET AT BEDTIME, Disp-30 tablet, R-11Normal      ibuprofen (ADVIL;MOTRIN) 800 MG tablet TAKE 1 TABLET THREE TIMES DAILY, Disp-90 tablet, R-11Normal      Blood Pressure Monitor KIT Disp-1 kit, R-0, NormalUse daily      pantoprazole (PROTONIX) 40 MG tablet TAKE 1 TABLET EACH MORNING PRIOR TO BREAKFAST, Disp-30 tablet, R-5Normal      DULoxetine (CYMBALTA) 60 MG extended release capsule TAKE (1) CAPSULE TWICE DAILY, Disp-60 capsule, R-0Normal      metoprolol succinate (TOPROL XL) 100 MG extended release tablet TAKE (1) TABLET DAILY, Disp-30 tablet, R-11Adjust Sig      furosemide (LASIX) 20 MG tablet Take 20 mg by mouth daily as neededHistorical Med      polyethylene glycol (MIRALAX) POWD powder Take 1-2 capfuls of Miralax daily as needed, Disp-255 g, R-11Normal      triamterene-hydrochlorothiazide (DYAZIDE) 37.5-25 MG per capsule Take 1 capsule by mouth daily, Disp-30 capsule, R-5Normal               ALLERGIES     Benadryl [diphenhydramine]; Topamax [topiramate]; Tramadol; Vitamin c [ascorbic acid];  Lyrica [pregabalin]; and Penicillins    FAMILYHISTORY       Family History   Problem Relation Age of Onset    High Blood Pressure Mother     Heart Disease Mother     Diabetes Maternal Grandfather     Heart Disease Father     Heart Disease Brother           SOCIAL HISTORY       Social History     Socioeconomic History    Marital status:      Spouse name: None    Number of children: None    Years of education: None    Highest education level: None   Occupational History    None   Social Needs    Financial resource strain: None    Food insecurity     Worry: None     Inability: None    Transportation needs     Medical: None     Non-medical: None   Tobacco Use    Smoking status: Current Some Day Smoker     Packs/day: 0.50     Years: 27.00     Pack years: 13.50     Types: Cigarettes    Smokeless tobacco: Never Used    Tobacco comment: she smokes sometimes   Substance and Sexual Activity    Alcohol use: No     Alcohol/week: 0.0 standard drinks     Comment: rare    Drug use: No    Sexual activity: Yes     Partners: Male   Lifestyle    Physical activity     Days per week: None     Minutes per session: None    Stress: None   Relationships    Social connections     Talks on phone: None Gets together: None     Attends [employer] service: None     Active member of club or organization: None     Attends meetings of clubs or organizations: None     Relationship status: None    Intimate partner violence     Fear of current or ex partner: None     Emotionally abused: None     Physically abused: None     Forced sexual activity: None   Other Topics Concern    None   Social History Narrative    None       SCREENINGS    Abigail Coma Scale  Eye Opening: Spontaneous  Best Verbal Response: Oriented  Best Motor Response: Obeys commands  Abigail Coma Scale Score: 15        PHYSICAL EXAM    (up to 7 for level 4, 8 or more for level 5)     ED Triage Vitals [11/03/20 1030]   BP Temp Temp Source Pulse Resp SpO2 Height Weight   (!) 180/84 98.4 °F (36.9 °C) Oral 96 18 95 % 5' 3\" (1.6 m) 191 lb (86.6 kg)       Physical Exam  Vitals signs and nursing note reviewed. Exam conducted with a chaperone present. Constitutional:       General: She is not in acute distress. Appearance: Normal appearance. She is well-developed. HENT:      Head: Normocephalic and atraumatic. Right Ear: Tympanic membrane, ear canal and external ear normal.      Left Ear: Tympanic membrane, ear canal and external ear normal.      Nose: Nose normal.      Mouth/Throat:      Mouth: Mucous membranes are moist.      Pharynx: No oropharyngeal exudate or posterior oropharyngeal erythema. Eyes:      Conjunctiva/sclera: Conjunctivae normal.   Neck:      Musculoskeletal: Normal range of motion and neck supple. Cardiovascular:      Rate and Rhythm: Normal rate and regular rhythm. Heart sounds: Normal heart sounds. No murmur. No friction rub. No gallop. Pulmonary:      Effort: Pulmonary effort is normal. No accessory muscle usage, respiratory distress or retractions. Breath sounds: Decreased breath sounds present. No wheezing, rhonchi or rales. Chest:          Comments: Daniele Arroyo RN, present in the exam room.   Red outline the area has several areas of open wounds with serosanguineous weeping surrounded by erythema. No induration or warmth. No fluctuance. The lesions are somewhat confluent on the right. Abdominal:      General: Bowel sounds are normal. There is no distension. Palpations: Abdomen is soft. Tenderness: There is no abdominal tenderness. There is no guarding or rebound. Musculoskeletal: Normal range of motion. General: No tenderness. Lymphadenopathy:      Cervical: No cervical adenopathy. Skin:     General: Skin is warm and dry. Capillary Refill: Capillary refill takes less than 2 seconds. Findings: Rash present. Neurological:      General: No focal deficit present. Mental Status: She is alert and oriented to person, place, and time. Cranial Nerves: No cranial nerve deficit. Gait: Gait normal.         DIAGNOSTIC RESULTS   LABS:    No results found for this visit on 11/03/20. All other labs were within normal range ornot returned as of this dictation. EKG: All EKG's are interpreted by the Emergency Department Physician who either signs or Co-signs this chart in the absence of a cardiologist.  Please see their note for interpretation of EKG. RADIOLOGY:   Non-plain film images such as CT, Ultrasound and MRI are read by the radiologist.  Plain radiographic images are visualized and preliminarily interpreted by the ED Provider with the belowfindings:    Interpretation per the Radiologist below, if available at the time of this note:    XR CHEST (2 VW)   Final Result   Evidence of prior granulomatous disease as above. No consolidation.                PROCEDURES   Unless otherwise noted below, none     Procedures    CRITICAL CARE TIME   N/A    CONSULTS:  None      EMERGENCY DEPARTMENT COURSE and DIFFERENTIAL DIAGNOSIS/MDM:   Vitals:    Vitals:    11/03/20 1030 11/03/20 1119   BP: (!) 180/84 138/78   Pulse: 96 78   Resp: 18 18   Temp: 98.4 °F (36.9 °C) 98.3 °F (36.8 °C) TempSrc: Oral Oral   SpO2: 95%    Weight: 191 lb (86.6 kg)    Height: 5' 3\" (1.6 m)        Patient was given the following medications:  Medications - No data to display    Clinically I think the patient is having bronchitis plus or minus possible undiagnosed COPD flare. She also appears to have intertrigo that may be bacterial versus candidal.  I am going to place her on antibiotics for this rash along with nystatin powder. Chest x-ray was obtained that shows a pulmonary nodule but otherwise no acute disease. I am placing the patient on an inhaler to help with her respiratory complaints. I have asked that she follow-up with her primary care physician regarding the pulmonary nodule. Patient is agreeable with the plan for continued outpatient management. I estimate there is LOW risk for ACUTE CORONARY SYNDROME, CHRONIC OBSTRUCTIVE PULMONARY DISEASE, CONGESTIVE HEART FAILURE, PERICARDIAL TAMPONADE, PNEUMONIA, PNEUMOTHORAX, PULMONARY EMBOLISM, SEPSIS, and THORACIC DISSECTION,  thus I consider the discharge disposition reasonable. Luc Gaffney and I have discussed the diagnosis and risks, and we agree with discharging home to follow-up with their primary doctor. We also discussed returning to the Emergency Department immediately if new or worsening symptoms occur. We have discussed the symptoms which are most concerning (e.g., bloody sputum, fever, worsening pain or shortness of breath, vomiting) that necessitate immediate return. /78   Pulse 78   Temp 98.3 °F (36.8 °C) (Oral)   Resp 18 Comment: 97% RA  Ht 5' 3\" (1.6 m)   Wt 191 lb (86.6 kg)   SpO2 95%   BMI 33.83 kg/m²     The patient understands the importance of follow up and reasons to return. FINAL IMPRESSION      1. Acute bronchitis, unspecified organism    2. Intertrigo    3.  Pulmonary nodule          DISPOSITION/PLAN   DISPOSITION Decision To Discharge 11/03/2020 11:13:40 AM      PATIENT REFERRED TO:  Regino Dillard MD  [address] [facility]. Miky Mortensen  [phone]    Schedule an appointment as soon as possible for a visit in 1 week      [facility]  Madan Cochran [address] [address]  [phone]  Go to   If symptoms worsen      DISCHARGE MEDICATIONS:  Discharge Medication List as of 11/3/2020 11:16 AM      START taking these medications    Details   sulfamethoxazole-trimethoprim (BACTRIM DS) 800-160 MG per tablet Take 1 tablet by mouth 2 times daily for 10 days, Disp-20 tablet,R-0Normal      nystatin (MYCOSTATIN) 613383 UNIT/GM powder Apply 3 times daily. , Disp-30 g,R-0, Normal      albuterol sulfate HFA (VENTOLIN HFA) 108 (90 Base) MCG/ACT inhaler Inhale 2 puffs into the lungs every 6 hours as needed for Wheezing, Disp-1 Inhaler,R-0Normal             DISCONTINUED MEDICATIONS:  Discharge Medication List as of 11/3/2020 11:16 AM                 (Please note that portions of this note were completed with a voice recognition program.  Efforts were made to edit the dictations but occasionally words are mis-transcribed.)    Jeanna Tarango MD(electronically signed)              Jeanna Tarango MD  11/03/20 5711

## 2020-11-03 NOTE — ED NOTES
Pt denies fever, N/V, SOB or loss of taste or smell. Resps even and unlab. Noted with scattered erythemic rash under bilat breast area. No open areas or drng noted.  Pt alert and without s/s distress     Victoriano Russell RN  11/03/20 5318

## 2020-11-05 ENCOUNTER — TELEPHONE (OUTPATIENT)
Dept: FAMILY MEDICINE CLINIC | Age: 55
End: 2020-11-05

## 2020-11-05 RX ORDER — FLUCONAZOLE 150 MG/1
150 TABLET ORAL ONCE
Qty: 2 TABLET | Refills: 0 | Status: SHIPPED | OUTPATIENT
Start: 2020-11-05 | End: 2020-11-05

## 2020-11-05 NOTE — TELEPHONE ENCOUNTER
Pt called and states that she was given bactrim yesterday and has to take for about 10-12 days. Pt states she now has a yeast infection since starting the antibiotic. Pt requesting Rx be sent for the yeast infection to [facility].  Call back [phone]  Routing to Yoni De Oliveira due to PCP out of office

## 2020-11-28 ENCOUNTER — APPOINTMENT (OUTPATIENT)
Dept: GENERAL RADIOLOGY | Age: 55
End: 2020-11-28
Payer: COMMERCIAL

## 2020-11-28 ENCOUNTER — HOSPITAL ENCOUNTER (EMERGENCY)
Age: 55
Discharge: LEFT BEFORE DISCHARGE | End: 2020-11-29
Attending: EMERGENCY MEDICINE
Payer: COMMERCIAL

## 2020-11-28 ENCOUNTER — APPOINTMENT (OUTPATIENT)
Dept: CT IMAGING | Age: 55
End: 2020-11-28
Payer: COMMERCIAL

## 2020-11-28 VITALS
HEART RATE: 71 BPM | DIASTOLIC BLOOD PRESSURE: 87 MMHG | SYSTOLIC BLOOD PRESSURE: 134 MMHG | RESPIRATION RATE: 16 BRPM | BODY MASS INDEX: 31 KG/M2 | TEMPERATURE: 98.8 F | OXYGEN SATURATION: 96 % | WEIGHT: 175 LBS

## 2020-11-28 LAB
A/G RATIO: 1.3 (ref 1.1–2.2)
ALBUMIN SERPL-MCNC: 4 G/DL (ref 3.4–5)
ALP BLD-CCNC: 64 U/L (ref 40–129)
ALT SERPL-CCNC: 8 U/L (ref 10–40)
AMPHETAMINE SCREEN, URINE: ABNORMAL
ANION GAP SERPL CALCULATED.3IONS-SCNC: 12 MMOL/L (ref 3–16)
APTT: 34.6 SEC (ref 24.2–36.2)
AST SERPL-CCNC: 14 U/L (ref 15–37)
BARBITURATE SCREEN URINE: POSITIVE
BASOPHILS ABSOLUTE: 0.1 K/UL (ref 0–0.2)
BASOPHILS RELATIVE PERCENT: 0.6 %
BENZODIAZEPINE SCREEN, URINE: POSITIVE
BILIRUB SERPL-MCNC: 0.3 MG/DL (ref 0–1)
BILIRUBIN URINE: NEGATIVE
BLOOD, URINE: ABNORMAL
BUN BLDV-MCNC: 13 MG/DL (ref 7–20)
CALCIUM SERPL-MCNC: 9.7 MG/DL (ref 8.3–10.6)
CANNABINOID SCREEN URINE: ABNORMAL
CHLORIDE BLD-SCNC: 109 MMOL/L (ref 99–110)
CLARITY: CLEAR
CO2: 21 MMOL/L (ref 21–32)
COCAINE METABOLITE SCREEN URINE: ABNORMAL
COLOR: YELLOW
CREAT SERPL-MCNC: 0.8 MG/DL (ref 0.6–1.1)
EOSINOPHILS ABSOLUTE: 0.3 K/UL (ref 0–0.6)
EOSINOPHILS RELATIVE PERCENT: 3.8 %
EPITHELIAL CELLS, UA: NORMAL /HPF (ref 0–5)
ETHANOL: NORMAL MG/DL (ref 0–0.08)
GFR AFRICAN AMERICAN: >60
GFR NON-AFRICAN AMERICAN: >60
GLOBULIN: 3 G/DL
GLUCOSE BLD-MCNC: 86 MG/DL (ref 70–99)
GLUCOSE BLD-MCNC: 88 MG/DL (ref 70–99)
GLUCOSE URINE: NEGATIVE MG/DL
HCT VFR BLD CALC: 37.8 % (ref 36–48)
HEMOGLOBIN: 12.4 G/DL (ref 12–16)
INR BLD: 1.06 (ref 0.86–1.14)
KETONES, URINE: NEGATIVE MG/DL
LEUKOCYTE ESTERASE, URINE: NEGATIVE
LYMPHOCYTES ABSOLUTE: 2.5 K/UL (ref 1–5.1)
LYMPHOCYTES RELATIVE PERCENT: 28.1 %
Lab: ABNORMAL
MAGNESIUM: 1.8 MG/DL (ref 1.8–2.4)
MCH RBC QN AUTO: 29.3 PG (ref 26–34)
MCHC RBC AUTO-ENTMCNC: 32.7 G/DL (ref 31–36)
MCV RBC AUTO: 89.7 FL (ref 80–100)
METHADONE SCREEN, URINE: ABNORMAL
MICROSCOPIC EXAMINATION: YES
MONOCYTES ABSOLUTE: 0.4 K/UL (ref 0–1.3)
MONOCYTES RELATIVE PERCENT: 4.4 %
NEUTROPHILS ABSOLUTE: 5.6 K/UL (ref 1.7–7.7)
NEUTROPHILS RELATIVE PERCENT: 63.1 %
NITRITE, URINE: NEGATIVE
OPIATE SCREEN URINE: ABNORMAL
OXYCODONE URINE: ABNORMAL
PDW BLD-RTO: 14.6 % (ref 12.4–15.4)
PERFORMED ON: NORMAL
PH UA: 6.5
PH UA: 6.5 (ref 5–8)
PHENCYCLIDINE SCREEN URINE: ABNORMAL
PLATELET # BLD: 265 K/UL (ref 135–450)
PMV BLD AUTO: 7.6 FL (ref 5–10.5)
POTASSIUM REFLEX MAGNESIUM: 3.5 MMOL/L (ref 3.5–5.1)
PROPOXYPHENE SCREEN: ABNORMAL
PROTEIN UA: NEGATIVE MG/DL
PROTHROMBIN TIME: 12.3 SEC (ref 10–13.2)
RBC # BLD: 4.22 M/UL (ref 4–5.2)
RBC UA: NORMAL /HPF (ref 0–4)
SODIUM BLD-SCNC: 142 MMOL/L (ref 136–145)
SPECIFIC GRAVITY UA: 1.02 (ref 1–1.03)
TOTAL PROTEIN: 7 G/DL (ref 6.4–8.2)
TROPONIN: <0.01 NG/ML
URINE REFLEX TO CULTURE: ABNORMAL
URINE TYPE: ABNORMAL
UROBILINOGEN, URINE: 0.2 E.U./DL
WBC # BLD: 8.9 K/UL (ref 4–11)
WBC UA: NORMAL /HPF (ref 0–5)

## 2020-11-28 PROCEDURE — 6360000004 HC RX CONTRAST MEDICATION: Performed by: EMERGENCY MEDICINE

## 2020-11-28 PROCEDURE — 85025 COMPLETE CBC W/AUTO DIFF WBC: CPT

## 2020-11-28 PROCEDURE — 70450 CT HEAD/BRAIN W/O DYE: CPT

## 2020-11-28 PROCEDURE — 84484 ASSAY OF TROPONIN QUANT: CPT

## 2020-11-28 PROCEDURE — 71045 X-RAY EXAM CHEST 1 VIEW: CPT

## 2020-11-28 PROCEDURE — 85730 THROMBOPLASTIN TIME PARTIAL: CPT

## 2020-11-28 PROCEDURE — 93005 ELECTROCARDIOGRAM TRACING: CPT | Performed by: EMERGENCY MEDICINE

## 2020-11-28 PROCEDURE — 83735 ASSAY OF MAGNESIUM: CPT

## 2020-11-28 PROCEDURE — 80307 DRUG TEST PRSMV CHEM ANLYZR: CPT

## 2020-11-28 PROCEDURE — 85610 PROTHROMBIN TIME: CPT

## 2020-11-28 PROCEDURE — 81001 URINALYSIS AUTO W/SCOPE: CPT

## 2020-11-28 PROCEDURE — 80053 COMPREHEN METABOLIC PANEL: CPT

## 2020-11-28 PROCEDURE — 99284 EMERGENCY DEPT VISIT MOD MDM: CPT

## 2020-11-28 PROCEDURE — G0480 DRUG TEST DEF 1-7 CLASSES: HCPCS

## 2020-11-28 PROCEDURE — 72125 CT NECK SPINE W/O DYE: CPT

## 2020-11-28 PROCEDURE — 36415 COLL VENOUS BLD VENIPUNCTURE: CPT

## 2020-11-28 RX ORDER — ACETAMINOPHEN 325 MG/1
650 TABLET ORAL ONCE
Status: DISCONTINUED | OUTPATIENT
Start: 2020-11-28 | End: 2020-11-29 | Stop reason: HOSPADM

## 2020-11-28 RX ADMIN — IOPAMIDOL 75 ML: 755 INJECTION, SOLUTION INTRAVENOUS at 20:57

## 2020-11-28 ASSESSMENT — PAIN DESCRIPTION - FREQUENCY: FREQUENCY: INTERMITTENT

## 2020-11-28 ASSESSMENT — PAIN DESCRIPTION - LOCATION: LOCATION: HEAD

## 2020-11-28 ASSESSMENT — PAIN SCALES - GENERAL: PAINLEVEL_OUTOF10: 9

## 2020-11-29 ENCOUNTER — CLINICAL DOCUMENTATION (OUTPATIENT)
Dept: FAMILY MEDICINE CLINIC | Age: 55
End: 2020-11-29

## 2020-11-29 LAB
EKG ATRIAL RATE: 81 BPM
EKG DIAGNOSIS: NORMAL
EKG P AXIS: 39 DEGREES
EKG P-R INTERVAL: 162 MS
EKG Q-T INTERVAL: 402 MS
EKG QRS DURATION: 90 MS
EKG QTC CALCULATION (BAZETT): 466 MS
EKG R AXIS: -13 DEGREES
EKG T AXIS: 0 DEGREES
EKG VENTRICULAR RATE: 81 BPM

## 2020-11-29 PROCEDURE — 93010 ELECTROCARDIOGRAM REPORT: CPT | Performed by: INTERNAL MEDICINE

## 2020-11-29 NOTE — ED NOTES
Pt remains drowsy but answered questions appropriately. C/o headache. CT attempted cta without success. States iv infiltration. Dr. Ap Amado made aware.       Sebastián Suggs RN  11/28/20 2121

## 2020-11-29 NOTE — ED TRIAGE NOTES
States she fell at home at 1630. Pt seems altered with assessment. Slower to respond. Drowsy. Pt has swelling to right scalp.

## 2020-11-29 NOTE — ED PROVIDER NOTES
Past Medical History:   Diagnosis Date    Ankle fracture     Anxiety     Arthritis     Benzodiazepine abuse ([facility] [address]     Depression     Fibromyalgia     Hyperlipidemia     Hypertension     Kidney cyst     x2 rt. ?    Migraine     Pneumonia 2008    bronchitis frequently since    TIA (transient ischemic attack)     Ulcer, Stomach Peptic     frequent N/V    Unspecified cerebral artery occlusion with cerebral infarction      mini stroke cused by severe migraine    Weight loss     frequent N/V, S/P cholecystectomy         SURGICAL HISTORY       Past Surgical History:   Procedure Laterality Date    ANKLE SURGERY       SECTION      CHOLECYSTECTOMY      COLONOSCOPY N/A 2018    COLON W/ANES. performed by Amy Garcia MD at [address] [facility] SURGERY  2005   [address]      TUMOR REMOVAL  1990    lt femur    UPPER GASTROINTESTINAL ENDOSCOPY N/A 2018    EGD BIOPSY performed by Amy Garcia MD at [facility]       Discharge Medication List as of 2020 12:01 AM      CONTINUE these medications which have NOT CHANGED    Details   nystatin (MYCOSTATIN) 975145 UNIT/GM powder Apply 3 times daily. , Disp-30 g,R-0, Normal      albuterol sulfate HFA (VENTOLIN HFA) 108 (90 Base) MCG/ACT inhaler Inhale 2 puffs into the lungs every 6 hours as needed for Wheezing, Disp-1 Inhaler,R-0Normal      rosuvastatin (CRESTOR) 10 MG tablet Take 1 tablet by mouth nightly, Disp-30 tablet,R-5Normal      traZODone (DESYREL) 150 MG tablet TAKE 1 TABLET AT BEDTIME, Disp-30 tablet, R-11Normal      ibuprofen (ADVIL;MOTRIN) 800 MG tablet TAKE 1 TABLET THREE TIMES DAILY, Disp-90 tablet, R-11Normal      Blood Pressure Monitor KIT Disp-1 kit, R-0, NormalUse daily      pantoprazole (PROTONIX) 40 MG tablet TAKE 1 TABLET EACH MORNING PRIOR TO BREAKFAST, Disp-30 tablet, R-5Normal      DULoxetine (CYMBALTA) 60 MG extended release capsule TAKE (1) CAPSULE TWICE DAILY, Disp-60 capsule, R-0Normal      metoprolol succinate (TOPROL XL) 100 MG extended release tablet TAKE (1) TABLET DAILY, Disp-30 tablet, R-11Adjust Sig      furosemide (LASIX) 20 MG tablet Take 20 mg by mouth daily as neededHistorical Med      polyethylene glycol (MIRALAX) POWD powder Take 1-2 capfuls of Miralax daily as needed, Disp-255 g, R-11Normal      triamterene-hydrochlorothiazide (DYAZIDE) 37.5-25 MG per capsule Take 1 capsule by mouth daily, Disp-30 capsule, R-5Normal             ALLERGIES     Benadryl [diphenhydramine]; Topamax [topiramate]; Tramadol; Vitamin c [ascorbic acid];  Lyrica [pregabalin]; and Penicillins    FAMILY HISTORY       Family History   Problem Relation Age of Onset    High Blood Pressure Mother     Heart Disease Mother     Diabetes Maternal Grandfather     Heart Disease Father     Heart Disease Brother           SOCIAL HISTORY       Social History     Socioeconomic History    Marital status:      Spouse name: None    Number of children: None    Years of education: None    Highest education level: None   Occupational History    None   Social Needs    Financial resource strain: None    Food insecurity     Worry: None     Inability: None    Transportation needs     Medical: None     Non-medical: None   Tobacco Use    Smoking status: Current Some Day Smoker     Packs/day: 0.50     Years: 27.00     Pack years: 13.50     Types: Cigarettes    Smokeless tobacco: Never Used    Tobacco comment: she smokes sometimes   Substance and Sexual Activity    Alcohol use: No     Alcohol/week: 0.0 standard drinks     Comment: rare    Drug use: No    Sexual activity: Yes     Partners: Male   Lifestyle    Physical activity     Days per week: None     Minutes per session: None    Stress: None   Relationships    Social connections     Talks on phone: None     Gets together: None     Attends Ca[employer] service: None     Active member of club or organization: None     Attends meetings of clubs or organizations: None     Relationship status: None    Intimate partner violence     Fear of current or ex partner: None     Emotionally abused: None     Physically abused: None     Forced sexual activity: None   Other Topics Concern    None   Social History Narrative    None       SCREENINGS   NIH Stroke Scale  Interval: Baseline  Level of Consciousness (1a. ): Not alert, but arousable by minor stimulation to obey  LOC Questions (1b. ): Answers both correctly  LOC Commands (1c. ): Performs both tasks correctly  Best Gaze (2. ): Normal  Visual (3. ): No visual loss  Facial Palsy (4. ): Normal symmetrical movement  Motor Arm, Left (5a. ): Drift, but does not hit bed  Motor Arm, Right (5b. ): Drift, but does not hit bed  Motor Leg, Left (6a. ): Drift, but does not hit bed  Motor Leg, Right (6b. ): No drift  Limb Ataxia (7. ): Absent  Sensory (8. ): Normal  Best Language (9. ): No aphasia  Dysarthria (10. ): Normal           PHYSICAL EXAM    (up to 7 for level 4, 8 or more for level 5)     ED Triage Vitals [11/28/20 1943]   BP Temp Temp Source Pulse Resp SpO2 Height Weight   (!) 140/77 98.8 °F (37.1 °C) Oral 78 18 98 % -- 175 lb (79.4 kg)       Physical Exam    General appearance:  Cooperative. Sleepy but easily arousable. Skin:  Warm. Dry. Eye:  Extraocular movements intact. Pupils are equally round and reactive to light and accommodation. Extraocular motions are intact. CN II-XII intact. Ears, nose, mouth and throat:  Oral mucosa moist, contusion over the right parieto-occipital region  Neck:  Trachea midline. Heart:  Regular rate and rhythm  Perfusion:  intact  Respiratory:  Lungs clear to auscultation bilaterally. Respirations nonlabored. Abdominal:   Non distended. Nontender  Neurological: Sleepy but easily arousable and oriented to person, place and time. .  Moves all extremities spontaneously.   Subjective paresthesias in the left arm compared to the right. .  Intact finger-to-nose bilaterally. No drift in the upper or lower extremities. Gait normal.  2+ bilateral patellar reflexes  Musculoskeletal:   Normal ROM, no deformities          Psychiatric:  Normal mood    NIH Stroke Scale     Interval: Baseline  Time: 5:55 AM  Person Administering Scale: Matt Kelley     1a  Level of consciousness: 0=alert; keenly responsive   1b. LOC questions:  0=Performs both tasks correctly   1c. LOC commands: 0=Performs both tasks correctly   2. Best Gaze: 0=normal   3. Visual: 0=No visual loss   4. Facial Palsy: 0=Normal symmetric movement   5a. Motor left arm: 0=No drift, limb holds 90 (or 45) degrees for full 10 seconds   5b. Motor right arm: 0=No drift, limb holds 90 (or 45) degrees for full 10 seconds   6a. motor left le=No drift, limb holds 90 (or 45) degrees for full 10 seconds   6b  Motor right le=No drift, limb holds 90 (or 45) degrees for full 10 seconds   7. Limb Ataxia: 0=Absent   8. Sensory: 1=Mild to moderate sensory loss; patient feels pinprick is less sharp or is dull on the affected side; there is a loss of superficial pain with pinprick but patient is aware She is being touched   9. Best Language:  0=No aphasia, normal   10. Dysarthria: 0=Normal   11. Extinction and Inattention: 0=No abnormality         Total:  1         DIAGNOSTIC RESULTS       Labs Reviewed   COMPREHENSIVE METABOLIC PANEL W/ REFLEX TO MG FOR LOW K - Abnormal; Notable for the following components:       Result Value    ALT 8 (*)     AST 14 (*)     All other components within normal limits    Narrative:     Performed at:  [facility]. [facility]  [address] [place] [address]. [address] [employer]   Phone [phone]   URINE RT REFLEX TO CULTURE - Abnormal; Notable for the following components:    Blood, Urine TRACE-INTACT (*)     All other components within normal limits    Narrative:     Performed at:  [facility].  [facility]  [address] [place] [address]. [facility], [address]   Phone [phone]   URINE DRUG SCREEN - Abnormal; Notable for the following components:    Barbiturate Screen, Ur POSITIVE (*)     Benzodiazepine Screen, Urine POSITIVE (*)     All other components within normal limits    Narrative:     Performed at:  [facility]. [facility]  [address] [place] [address]. [facility], [address]   Phone [phone]   CBC WITH AUTO DIFFERENTIAL    Narrative:     Performed at:  [facility]. [facility]  [address] [place] [address]. [facility], [address]   Phone [phone]   TROPONIN    Narrative:     Performed at:  [facility]. [facility]  [address] [place] [address]. [facility], [address]   Phone [phone]    Narrative:     Performed at:  [facility]. [facility]  [address] [place] [address]. [facility], [address]   Phone [phone]   APTT    Narrative:     Performed at:  [facility]. [facility]  [address] [place] [address]. [facility], [address]   Phone [phone]   ETHANOL    Narrative:     Performed at:  [facility]. [facility]  [address] [place] [address]. [facility], [address]   Phone [phone]   MAGNESIUM    Narrative:     Performed at:  [facility]. [facility]  [address] [place] [address]. [facility], [address]   Phone [phone]   MICROSCOPIC URINALYSIS    Narrative:     Performed at:  [facility]. [facility]  [address] [place] [address]. [facility], [address]   Phone [phone]   POCT GLUCOSE    Narrative:     Performed at:  [facility]. [facility]  [address] [place] [address].  [facility], [address]   Phone [phone]       Interpretation per the Radiologist below, if obtained/available at the time of this note:    CT Cervical Spine WO Contrast   Final Result any benzodiazepines she again denies this. Given her complaint of a syncopal event with left arm paresthesias I did wish to admit the patient to the hospital for further work-up of syncope and possible ischemic stroke. She ultimately refused and wanted to sign out [address] I explained I was concerned for possible underlying pathology that was potentially life-threatening she understands this. SHe has capacity to make this decision and was not held against her will. MDM    CONSULTS     IP CONSULT TO HOSPITALIST    Critical Care:     CRITICAL CARE TIME   Total Critical Care time was 30 minutes, excluding separately reportable procedures. There was a high probability of clinically significant/life threatening deterioration in the patient's condition which required my urgent intervention. This time was spent reviewing the patient chart, interpreting diagnostic/laboratory data, emergent evaluation for possible acute ischemic stroke and speaking with consulting neurologic physicians      REASSESSMENT          PROCEDURE     Unless otherwise noted below, none     Procedures      FINAL IMPRESSION      1. Altered mental status, unspecified altered mental status type    2. Syncope and collapse    3. Paresthesia of arm            DISPOSITION/PLAN   DISPOSITION [place] 11/28/2020 11:58:55 PM        PATIENT REFERRED TO:  No follow-up provider specified. DISCHARGE MEDICATIONS:  Discharge Medication List as of 11/29/2020 12:01 AM        Controlled Substances Monitoring:     RX Monitoring 3/21/2018   Attestation The Prescription Monitoring Report for this patient was reviewed today. Periodic Controlled Substance Monitoring Possible medication side effects, risk of tolerance/dependence & alternative treatments discussed.        (Please note that portions of this note were completed with a voice recognition program.  Efforts were made to edit the dictations but occasionally words are mis-transcribed.)    Tatyana Bruce MD (electronically signed)  Attending Emergency Physician            Kari Tay MD  11/29/20 2998

## 2020-11-29 NOTE — ED NOTES
at bedside. States that pt actually fell yesterday and has had high bp's. Pt arguing with .  Sleeping at home more than normal.      Consuelo Hill RN  11/28/20 9523

## 2020-11-29 NOTE — ED NOTES
Pt left AMA. Staff explained [facility] paperwork including the discontinuation of care, and possible worsening of condition at home.   Pt verbalized an understanding, and AMA paper signed     Bri Dumont RN  11/29/20 0000

## 2020-11-29 NOTE — PROGRESS NOTES
Patient called on November 28, 2020 stating that she had fallen and hit her head a few hours prior. States her blood pressures been running high for several days. States she lost consciousness at the time of the fall. She was advised to go to the emergency room for possible head injury. The emergency room provider note indicates patient was quite drowsy. Her drug screen was positive for benzodiazepine and barbiturate. She has Fioricet from other providers which are not captured on PD MP. However in reviewing her PD MP there are no prescriptions for benzodiazepines.  arrived at the emergency room to give additional history that the fall had actually occurred on Friday, November 28 and not on Saturday, November 29 as patient had stated. I had asked the patient to contact the office Tuesday so we could deal with her sustained elevated blood pressure. We will also attempt to engage the patient about the presence of substances in the urine which were not prescribed for her. She denied taking benzodiazepines twice to the emergency room provider per documentation.

## 2020-11-29 NOTE — ED NOTES
IV attempt x4. Pt states this happens with each iv. PIv accomplished in left hand. 20g. For CT needs to be in ac. Attempting at this time. Dr. Jourdan Maxwell made aware.       Rosa Zapata RN  11/28/20 2035

## 2020-11-30 ENCOUNTER — HOSPITAL ENCOUNTER (EMERGENCY)
Age: 55
Discharge: ANOTHER HOSPITAL | End: 2020-11-30
Attending: STUDENT IN AN ORGANIZED HEALTH CARE EDUCATION/TRAINING PROGRAM
Payer: COMMERCIAL

## 2020-11-30 ENCOUNTER — APPOINTMENT (OUTPATIENT)
Dept: CT IMAGING | Age: 55
End: 2020-11-30
Payer: COMMERCIAL

## 2020-11-30 ENCOUNTER — APPOINTMENT (OUTPATIENT)
Dept: GENERAL RADIOLOGY | Age: 55
End: 2020-11-30
Payer: COMMERCIAL

## 2020-11-30 ENCOUNTER — HOSPITAL ENCOUNTER (INPATIENT)
Age: 55
LOS: 2 days | Discharge: HOME | DRG: 103 | End: 2020-12-02
Attending: INTERNAL MEDICINE | Admitting: INTERNAL MEDICINE
Payer: COMMERCIAL

## 2020-11-30 VITALS
HEART RATE: 72 BPM | DIASTOLIC BLOOD PRESSURE: 70 MMHG | TEMPERATURE: 98.4 F | RESPIRATION RATE: 18 BRPM | OXYGEN SATURATION: 98 % | SYSTOLIC BLOOD PRESSURE: 158 MMHG | WEIGHT: 175 LBS | BODY MASS INDEX: 31.01 KG/M2 | HEIGHT: 63 IN

## 2020-11-30 PROBLEM — R55 SYNCOPE AND COLLAPSE: Status: ACTIVE | Noted: 2020-11-30

## 2020-11-30 LAB
A/G RATIO: 1.4 (ref 1.1–2.2)
ALBUMIN SERPL-MCNC: 4.1 G/DL (ref 3.4–5)
ALP BLD-CCNC: 70 U/L (ref 40–129)
ALT SERPL-CCNC: 21 U/L (ref 10–40)
AMPHETAMINE SCREEN, URINE: ABNORMAL
ANION GAP SERPL CALCULATED.3IONS-SCNC: 9 MMOL/L (ref 3–16)
AST SERPL-CCNC: 26 U/L (ref 15–37)
BARBITURATE SCREEN URINE: POSITIVE
BASOPHILS ABSOLUTE: 0.1 K/UL (ref 0–0.2)
BASOPHILS RELATIVE PERCENT: 1.1 %
BENZODIAZEPINE SCREEN, URINE: POSITIVE
BILIRUB SERPL-MCNC: 0.4 MG/DL (ref 0–1)
BUN BLDV-MCNC: 12 MG/DL (ref 7–20)
CALCIUM SERPL-MCNC: 9.7 MG/DL (ref 8.3–10.6)
CANNABINOID SCREEN URINE: ABNORMAL
CHLORIDE BLD-SCNC: 111 MMOL/L (ref 99–110)
CHP ED QC CHECK: NORMAL
CO2: 22 MMOL/L (ref 21–32)
COCAINE METABOLITE SCREEN URINE: ABNORMAL
CREAT SERPL-MCNC: 0.8 MG/DL (ref 0.6–1.1)
EKG ATRIAL RATE: 83 BPM
EKG DIAGNOSIS: NORMAL
EKG P AXIS: 40 DEGREES
EKG P-R INTERVAL: 168 MS
EKG Q-T INTERVAL: 392 MS
EKG QRS DURATION: 88 MS
EKG QTC CALCULATION (BAZETT): 460 MS
EKG R AXIS: -17 DEGREES
EKG T AXIS: 28 DEGREES
EKG VENTRICULAR RATE: 83 BPM
EOSINOPHILS ABSOLUTE: 0.2 K/UL (ref 0–0.6)
EOSINOPHILS RELATIVE PERCENT: 3.1 %
GFR AFRICAN AMERICAN: >60
GFR NON-AFRICAN AMERICAN: >60
GLOBULIN: 3 G/DL
GLUCOSE BLD-MCNC: 101 MG/DL (ref 70–99)
GLUCOSE BLD-MCNC: 102 MG/DL
GLUCOSE BLD-MCNC: 102 MG/DL (ref 70–99)
HCT VFR BLD CALC: 37.4 % (ref 36–48)
HEMOGLOBIN: 12.5 G/DL (ref 12–16)
INR BLD: 1.07 (ref 0.86–1.14)
LYMPHOCYTES ABSOLUTE: 1.8 K/UL (ref 1–5.1)
LYMPHOCYTES RELATIVE PERCENT: 23.7 %
Lab: ABNORMAL
MAGNESIUM: 1.9 MG/DL (ref 1.8–2.4)
MCH RBC QN AUTO: 29.4 PG (ref 26–34)
MCHC RBC AUTO-ENTMCNC: 33.5 G/DL (ref 31–36)
MCV RBC AUTO: 87.9 FL (ref 80–100)
METHADONE SCREEN, URINE: ABNORMAL
MONOCYTES ABSOLUTE: 0.4 K/UL (ref 0–1.3)
MONOCYTES RELATIVE PERCENT: 5.4 %
NEUTROPHILS ABSOLUTE: 5 K/UL (ref 1.7–7.7)
NEUTROPHILS RELATIVE PERCENT: 66.7 %
OPIATE SCREEN URINE: ABNORMAL
OXYCODONE URINE: ABNORMAL
PDW BLD-RTO: 14 % (ref 12.4–15.4)
PERFORMED ON: ABNORMAL
PH UA: 6
PHENCYCLIDINE SCREEN URINE: ABNORMAL
PLATELET # BLD: 279 K/UL (ref 135–450)
PMV BLD AUTO: 7.5 FL (ref 5–10.5)
POTASSIUM REFLEX MAGNESIUM: 3.4 MMOL/L (ref 3.5–5.1)
POTASSIUM SERPL-SCNC: 3.4 MMOL/L (ref 3.5–5.1)
PROPOXYPHENE SCREEN: ABNORMAL
PROTHROMBIN TIME: 12.4 SEC (ref 10–13.2)
RBC # BLD: 4.26 M/UL (ref 4–5.2)
SODIUM BLD-SCNC: 142 MMOL/L (ref 136–145)
TOTAL PROTEIN: 7.1 G/DL (ref 6.4–8.2)
TROPONIN: <0.01 NG/ML
TROPONIN: <0.01 NG/ML
WBC # BLD: 7.5 K/UL (ref 4–11)

## 2020-11-30 PROCEDURE — 93010 ELECTROCARDIOGRAM REPORT: CPT | Performed by: INTERNAL MEDICINE

## 2020-11-30 PROCEDURE — 80307 DRUG TEST PRSMV CHEM ANLYZR: CPT

## 2020-11-30 PROCEDURE — 80053 COMPREHEN METABOLIC PANEL: CPT

## 2020-11-30 PROCEDURE — 2580000003 HC RX 258: Performed by: INTERNAL MEDICINE

## 2020-11-30 PROCEDURE — 2580000003 HC RX 258: Performed by: STUDENT IN AN ORGANIZED HEALTH CARE EDUCATION/TRAINING PROGRAM

## 2020-11-30 PROCEDURE — 70498 CT ANGIOGRAPHY NECK: CPT

## 2020-11-30 PROCEDURE — 6370000000 HC RX 637 (ALT 250 FOR IP): Performed by: INTERNAL MEDICINE

## 2020-11-30 PROCEDURE — 99285 EMERGENCY DEPT VISIT HI MDM: CPT

## 2020-11-30 PROCEDURE — 93005 ELECTROCARDIOGRAM TRACING: CPT | Performed by: STUDENT IN AN ORGANIZED HEALTH CARE EDUCATION/TRAINING PROGRAM

## 2020-11-30 PROCEDURE — 85025 COMPLETE CBC W/AUTO DIFF WBC: CPT

## 2020-11-30 PROCEDURE — 84484 ASSAY OF TROPONIN QUANT: CPT

## 2020-11-30 PROCEDURE — 96361 HYDRATE IV INFUSION ADD-ON: CPT

## 2020-11-30 PROCEDURE — 83735 ASSAY OF MAGNESIUM: CPT

## 2020-11-30 PROCEDURE — 71045 X-RAY EXAM CHEST 1 VIEW: CPT

## 2020-11-30 PROCEDURE — 96374 THER/PROPH/DIAG INJ IV PUSH: CPT

## 2020-11-30 PROCEDURE — 85610 PROTHROMBIN TIME: CPT

## 2020-11-30 PROCEDURE — 1200000000 HC SEMI PRIVATE

## 2020-11-30 PROCEDURE — 6360000002 HC RX W HCPCS: Performed by: STUDENT IN AN ORGANIZED HEALTH CARE EDUCATION/TRAINING PROGRAM

## 2020-11-30 PROCEDURE — 6360000002 HC RX W HCPCS: Performed by: INTERNAL MEDICINE

## 2020-11-30 PROCEDURE — 36415 COLL VENOUS BLD VENIPUNCTURE: CPT

## 2020-11-30 PROCEDURE — 6360000004 HC RX CONTRAST MEDICATION: Performed by: STUDENT IN AN ORGANIZED HEALTH CARE EDUCATION/TRAINING PROGRAM

## 2020-11-30 PROCEDURE — 6370000000 HC RX 637 (ALT 250 FOR IP): Performed by: STUDENT IN AN ORGANIZED HEALTH CARE EDUCATION/TRAINING PROGRAM

## 2020-11-30 PROCEDURE — 70450 CT HEAD/BRAIN W/O DYE: CPT

## 2020-11-30 RX ORDER — PROMETHAZINE HYDROCHLORIDE 25 MG/1
12.5 TABLET ORAL EVERY 6 HOURS PRN
Status: DISCONTINUED | OUTPATIENT
Start: 2020-11-30 | End: 2020-12-02 | Stop reason: HOSPADM

## 2020-11-30 RX ORDER — PANTOPRAZOLE SODIUM 40 MG/1
40 TABLET, DELAYED RELEASE ORAL
Status: DISCONTINUED | OUTPATIENT
Start: 2020-12-01 | End: 2020-12-02 | Stop reason: HOSPADM

## 2020-11-30 RX ORDER — SODIUM CHLORIDE 0.9 % (FLUSH) 0.9 %
10 SYRINGE (ML) INJECTION EVERY 12 HOURS SCHEDULED
Status: DISCONTINUED | OUTPATIENT
Start: 2020-11-30 | End: 2020-12-02 | Stop reason: HOSPADM

## 2020-11-30 RX ORDER — TRAZODONE HYDROCHLORIDE 50 MG/1
150 TABLET ORAL NIGHTLY
Status: DISCONTINUED | OUTPATIENT
Start: 2020-11-30 | End: 2020-12-02 | Stop reason: HOSPADM

## 2020-11-30 RX ORDER — KETOROLAC TROMETHAMINE 30 MG/ML
30 INJECTION, SOLUTION INTRAMUSCULAR; INTRAVENOUS ONCE
Status: COMPLETED | OUTPATIENT
Start: 2020-11-30 | End: 2020-11-30

## 2020-11-30 RX ORDER — ACETAMINOPHEN 650 MG/1
650 SUPPOSITORY RECTAL EVERY 6 HOURS PRN
Status: DISCONTINUED | OUTPATIENT
Start: 2020-11-30 | End: 2020-12-02 | Stop reason: HOSPADM

## 2020-11-30 RX ORDER — ROSUVASTATIN CALCIUM 10 MG/1
10 TABLET, COATED ORAL NIGHTLY
Status: DISCONTINUED | OUTPATIENT
Start: 2020-11-30 | End: 2020-12-02 | Stop reason: HOSPADM

## 2020-11-30 RX ORDER — IBUPROFEN 800 MG/1
800 TABLET ORAL EVERY 8 HOURS PRN
Status: DISCONTINUED | OUTPATIENT
Start: 2020-11-30 | End: 2020-12-01

## 2020-11-30 RX ORDER — ACETAMINOPHEN 325 MG/1
650 TABLET ORAL EVERY 6 HOURS PRN
Status: DISCONTINUED | OUTPATIENT
Start: 2020-11-30 | End: 2020-12-02 | Stop reason: HOSPADM

## 2020-11-30 RX ORDER — KETOROLAC TROMETHAMINE 30 MG/ML
15 INJECTION, SOLUTION INTRAMUSCULAR; INTRAVENOUS ONCE
Status: COMPLETED | OUTPATIENT
Start: 2020-11-30 | End: 2020-11-30

## 2020-11-30 RX ORDER — METOPROLOL SUCCINATE 50 MG/1
100 TABLET, EXTENDED RELEASE ORAL DAILY
Status: DISCONTINUED | OUTPATIENT
Start: 2020-12-01 | End: 2020-12-02 | Stop reason: HOSPADM

## 2020-11-30 RX ORDER — SODIUM CHLORIDE 0.9 % (FLUSH) 0.9 %
10 SYRINGE (ML) INJECTION PRN
Status: DISCONTINUED | OUTPATIENT
Start: 2020-11-30 | End: 2020-12-02 | Stop reason: HOSPADM

## 2020-11-30 RX ORDER — ONDANSETRON 2 MG/ML
4 INJECTION INTRAMUSCULAR; INTRAVENOUS EVERY 6 HOURS PRN
Status: DISCONTINUED | OUTPATIENT
Start: 2020-11-30 | End: 2020-12-02 | Stop reason: HOSPADM

## 2020-11-30 RX ORDER — PROCHLORPERAZINE EDISYLATE 5 MG/ML
10 INJECTION INTRAMUSCULAR; INTRAVENOUS ONCE
Status: COMPLETED | OUTPATIENT
Start: 2020-11-30 | End: 2020-11-30

## 2020-11-30 RX ORDER — SODIUM CHLORIDE 9 MG/ML
INJECTION, SOLUTION INTRAVENOUS CONTINUOUS
Status: DISCONTINUED | OUTPATIENT
Start: 2020-11-30 | End: 2020-12-02 | Stop reason: HOSPADM

## 2020-11-30 RX ORDER — ALBUTEROL SULFATE 2.5 MG/3ML
2.5 SOLUTION RESPIRATORY (INHALATION) EVERY 4 HOURS PRN
Status: DISCONTINUED | OUTPATIENT
Start: 2020-11-30 | End: 2020-12-02 | Stop reason: HOSPADM

## 2020-11-30 RX ORDER — 0.9 % SODIUM CHLORIDE 0.9 %
1000 INTRAVENOUS SOLUTION INTRAVENOUS ONCE
Status: COMPLETED | OUTPATIENT
Start: 2020-11-30 | End: 2020-11-30

## 2020-11-30 RX ORDER — ONDANSETRON 2 MG/ML
4 INJECTION INTRAMUSCULAR; INTRAVENOUS EVERY 6 HOURS PRN
Status: DISCONTINUED | OUTPATIENT
Start: 2020-11-30 | End: 2020-11-30 | Stop reason: HOSPADM

## 2020-11-30 RX ORDER — BUTALBITAL, ACETAMINOPHEN AND CAFFEINE 50; 325; 40 MG/1; MG/1; MG/1
1 TABLET ORAL EVERY 4 HOURS PRN
Status: DISCONTINUED | OUTPATIENT
Start: 2020-11-30 | End: 2020-12-02 | Stop reason: HOSPADM

## 2020-11-30 RX ORDER — BUTALBITAL, ACETAMINOPHEN AND CAFFEINE 50; 325; 40 MG/1; MG/1; MG/1
1 TABLET ORAL ONCE
Status: COMPLETED | OUTPATIENT
Start: 2020-11-30 | End: 2020-11-30

## 2020-11-30 RX ORDER — POTASSIUM CHLORIDE 750 MG/1
20 TABLET, EXTENDED RELEASE ORAL ONCE
Status: COMPLETED | OUTPATIENT
Start: 2020-11-30 | End: 2020-11-30

## 2020-11-30 RX ORDER — DULOXETIN HYDROCHLORIDE 60 MG/1
60 CAPSULE, DELAYED RELEASE ORAL 2 TIMES DAILY
Status: DISCONTINUED | OUTPATIENT
Start: 2020-11-30 | End: 2020-12-02 | Stop reason: HOSPADM

## 2020-11-30 RX ORDER — POLYETHYLENE GLYCOL 3350 17 G/17G
17 POWDER, FOR SOLUTION ORAL DAILY PRN
Status: DISCONTINUED | OUTPATIENT
Start: 2020-11-30 | End: 2020-12-02 | Stop reason: HOSPADM

## 2020-11-30 RX ADMIN — KETOROLAC TROMETHAMINE 30 MG: 30 INJECTION, SOLUTION INTRAMUSCULAR at 23:54

## 2020-11-30 RX ADMIN — SODIUM CHLORIDE: 9 INJECTION, SOLUTION INTRAVENOUS at 22:04

## 2020-11-30 RX ADMIN — SODIUM CHLORIDE 1000 ML: 9 INJECTION, SOLUTION INTRAVENOUS at 15:24

## 2020-11-30 RX ADMIN — IOPAMIDOL 75 ML: 755 INJECTION, SOLUTION INTRAVENOUS at 13:53

## 2020-11-30 RX ADMIN — TRAZODONE HYDROCHLORIDE 150 MG: 50 TABLET ORAL at 23:54

## 2020-11-30 RX ADMIN — POTASSIUM CHLORIDE 20 MEQ: 750 TABLET, EXTENDED RELEASE ORAL at 15:24

## 2020-11-30 RX ADMIN — KETOROLAC TROMETHAMINE 15 MG: 30 INJECTION, SOLUTION INTRAMUSCULAR at 15:24

## 2020-11-30 RX ADMIN — BUTALBITAL, ACETAMINOPHEN, AND CAFFEINE 1 TABLET: 50; 325; 40 TABLET ORAL at 17:02

## 2020-11-30 RX ADMIN — ONDANSETRON HYDROCHLORIDE 4 MG: 2 INJECTION, SOLUTION INTRAMUSCULAR; INTRAVENOUS at 17:03

## 2020-11-30 RX ADMIN — ROSUVASTATIN CALCIUM 10 MG: 10 TABLET, FILM COATED ORAL at 22:13

## 2020-11-30 RX ADMIN — PROCHLORPERAZINE EDISYLATE 10 MG: 5 INJECTION INTRAMUSCULAR; INTRAVENOUS at 23:55

## 2020-11-30 RX ADMIN — DULOXETINE HYDROCHLORIDE 60 MG: 60 CAPSULE, DELAYED RELEASE ORAL at 23:54

## 2020-11-30 ASSESSMENT — PAIN SCALES - GENERAL
PAINLEVEL_OUTOF10: 10
PAINLEVEL_OUTOF10: 10
PAINLEVEL_OUTOF10: 9
PAINLEVEL_OUTOF10: 10

## 2020-11-30 ASSESSMENT — PAIN DESCRIPTION - DESCRIPTORS: DESCRIPTORS: CONSTANT

## 2020-11-30 ASSESSMENT — PAIN DESCRIPTION - PAIN TYPE
TYPE: ACUTE PAIN
TYPE: ACUTE PAIN

## 2020-11-30 ASSESSMENT — PAIN DESCRIPTION - LOCATION
LOCATION: HEAD
LOCATION: HEAD

## 2020-11-30 NOTE — ED NOTES
Dr. [place] made aware of being unable to obtain IV access.      Daquan Quinones RN  11/30/20 1337

## 2020-11-30 NOTE — ED NOTES
Patient climbed out of the bed of the bed with bed rails x2. Patient into the hallway stating that she's going to leave. States that no one has given her anything for her pain, no one is taking care of her, and so she is going to leave. The patient was advised that new orders were just received and nursing staff plus the physician have been involved in a caring for critical patients. Patient states \"I'm leaving\" and returned to the room where she is seen sitting in bedside chair. Dr. Casper Singh to the room.       Josy Mcneill RN  11/30/20 5506

## 2020-11-30 NOTE — ED NOTES
Patient ambulatory to the restroom and returned to room. Urine specimen sent to lab. Patient again questioning how much longer until transfer. Patient advised again that there may be an extended wait due to limited bed space at [facility] with ED's holding. Advised patient again that staff would update her when more information is available.      Megha Duckworth RN  11/30/20 0642

## 2020-11-30 NOTE — ED NOTES
Patient made aware of probable admit to [facility] by Dr. Wes Latif. Patient visualized on video monitoring equipment. Alert, moving extremities without difficulty, holding cell phone up with left hand and using the right hand to operate it without difficulty.       Mustapha Herndon RN  11/30/20 5764

## 2020-11-30 NOTE — ED PROVIDER NOTES
[facility] EMERGENCY DEPARTMENT      CHIEF COMPLAINT  Headache (c/o headache and dizziness that has not changed since seen here two days ago. Reports that she took one Klonopin from a 2017 prescription last night.)       HISTORY OF PRESENT ILLNESS  Ирина Alvarez is a 48 y.o. female with a past medical history of Inzo diazepam abuse, hypertension, hyperlipidemia, TIA and CVA, who presents to the ED complaining of headache. Patient was seen on 11/28 for syncopal event with confusion. She was reporting mild tingling in her left arm and slurred speech. That time, NIH stroke scale of 1. Patient was a code stroke. She had negative CT head and CT spine imaging at that time. Patient did not receive TPA. Plan was to admit the patient due to syncopal event with left arm paresthesia for further work-up of syncope and possible ischemic stroke. Patient signed out [address] She reports that her symptoms have continued since she was previously seen. She reports no acute changes to her symptoms. She does report that she has had multiple other syncopal episodes, including head trauma. She also reports that her \"dizziness\" is room spinning sensation and is present at all times. She does report intermittent chest discomfort. Able to further characterize pain, but states that is mild when it is there. She not able to identify a pattern that causes her syncope or her chest discomfort. She denies shortness of breath. She denies other symptoms. No other complaints, modifying factors or associated symptoms. I have reviewed the following from the nursing documentation.     Past Medical History:   Diagnosis Date    Ankle fracture     Anxiety     Arthritis     Benzodiazepine abuse ([facility] [address]     Depression     Fibromyalgia     Hyperlipidemia     Hypertension     Kidney cyst     x2 rt. ?    Migraine     Pneumonia 2008    bronchitis frequently since    TIA (transient ischemic attack) 2007    Ulcer, Stomach Peptic     frequent N/V    Unspecified cerebral artery occlusion with cerebral infarction      mini stroke cused by severe migraine    Weight loss     frequent N/V, S/P cholecystectomy     Past Surgical History:   Procedure Laterality Date    ANKLE SURGERY       SECTION      CHOLECYSTECTOMY      COLONOSCOPY N/A 2018    COLON W/ANES.  performed by Sharmin Cates MD at [address]      HERNIA REPAIR      HYSTERECTOMY      TUMOR REMOVAL      lt femur    UPPER GASTROINTESTINAL ENDOSCOPY N/A 2018    EGD BIOPSY performed by Sharmin Cates MD at [address] [facility]     Family History   Problem Relation Age of Onset    High Blood Pressure Mother     Heart Disease Mother     Diabetes Maternal Grandfather     Heart Disease Father     Heart Disease Brother      Social History     Socioeconomic History    Marital status:      Spouse name: Not on file    Number of children: Not on file    Years of education: Not on file    Highest education level: Not on file   Occupational History    Not on file   Social Needs    Financial resource strain: Not on file    Food insecurity     Worry: Not on file     Inability: Not on file    Transportation needs     Medical: Not on file     Non-medical: Not on file   Tobacco Use    Smoking status: Current Some Day Smoker     Packs/day: 0.50     Years: 27.00     Pack years: 13.50     Types: Cigarettes    Smokeless tobacco: Never Used    Tobacco comment: she smokes sometimes   Substance and Sexual Activity    Alcohol use: No     Alcohol/week: 0.0 standard drinks     Comment: rare    Drug use: No    Sexual activity: Yes     Partners: Male   Lifestyle    Physical activity     Days per week: Not on file     Minutes per session: Not on file    Stress: Not on file   Relationships    Social connections     Talks on phone: Not on file     Gets together: Not on file     Attends [employer] service: Not on file     Active member of club or organization: Not on file     Attends meetings of clubs or organizations: Not on file     Relationship status: Not on file    Intimate partner violence     Fear of current or ex partner: Not on file     Emotionally abused: Not on file     Physically abused: Not on file     Forced sexual activity: Not on file   Other Topics Concern    Not on file   Social History Narrative    Not on file     No current facility-administered medications for this encounter. Current Outpatient Medications   Medication Sig Dispense Refill    nystatin (MYCOSTATIN) 267321 UNIT/GM powder Apply 3 times daily.  30 g 0    albuterol sulfate HFA (VENTOLIN HFA) 108 (90 Base) MCG/ACT inhaler Inhale 2 puffs into the lungs every 6 hours as needed for Wheezing 1 Inhaler 0    rosuvastatin (CRESTOR) 10 MG tablet Take 1 tablet by mouth nightly 30 tablet 5    traZODone (DESYREL) 150 MG tablet TAKE 1 TABLET AT BEDTIME 30 tablet 11    ibuprofen (ADVIL;MOTRIN) 800 MG tablet TAKE 1 TABLET THREE TIMES DAILY (Patient taking differently: TAKE 1 TABLET THREE TIMES DAILY PRN) 90 tablet 11    Blood Pressure Monitor KIT Use daily 1 kit 0    pantoprazole (PROTONIX) 40 MG tablet TAKE 1 TABLET EACH MORNING PRIOR TO BREAKFAST 30 tablet 5    DULoxetine (CYMBALTA) 60 MG extended release capsule TAKE (1) CAPSULE TWICE DAILY 60 capsule 0    metoprolol succinate (TOPROL XL) 100 MG extended release tablet TAKE (1) TABLET DAILY 30 tablet 11    furosemide (LASIX) 20 MG tablet Take 20 mg by mouth daily as needed      polyethylene glycol (MIRALAX) POWD powder Take 1-2 capfuls of Miralax daily as needed 255 g 11    triamterene-hydrochlorothiazide (DYAZIDE) 37.5-25 MG per capsule Take 1 capsule by mouth daily (Patient taking differently: Take 1 capsule by mouth daily Only takes if BP is britany high and she cant get it to drop) 30 capsule 5     Allergies   Allergen Reactions    Benadryl [Diphenhydramine] right le=No drift, limb holds 90 (or 45) degrees for full 10 seconds   7. Limb Ataxia: 0=Absent   8. Sensory: 1=Mild to moderate sensory loss; patient feels pinprick is less sharp or is dull on the affected side; there is a loss of superficial pain with pinprick but patient is aware She is being touched   9. Best Language:  0=No aphasia, normal   10. Dysarthria: 0=Normal   11. Extinction and Inattention: 0=No abnormality         Total:  2       LABS  I have reviewed all labs for this visit.    Results for orders placed or performed during the hospital encounter of 20   CBC Auto Differential   Result Value Ref Range    WBC 7.5 4.0 - 11.0 K/uL    RBC 4.26 4.00 - 5.20 M/uL    Hemoglobin 12.5 12.0 - 16.0 g/dL    Hematocrit 37.4 36.0 - 48.0 %    MCV 87.9 80.0 - 100.0 fL    MCH 29.4 26.0 - 34.0 pg    MCHC 33.5 31.0 - 36.0 g/dL    RDW 14.0 12.4 - 15.4 %    Platelets 361 696 - 895 K/uL    MPV 7.5 5.0 - 10.5 fL    Neutrophils % 66.7 %    Lymphocytes % 23.7 %    Monocytes % 5.4 %    Eosinophils % 3.1 %    Basophils % 1.1 %    Neutrophils Absolute 5.0 1.7 - 7.7 K/uL    Lymphocytes Absolute 1.8 1.0 - 5.1 K/uL    Monocytes Absolute 0.4 0.0 - 1.3 K/uL    Eosinophils Absolute 0.2 0.0 - 0.6 K/uL    Basophils Absolute 0.1 0.0 - 0.2 K/uL   Basic Metabolic Panel   Result Value Ref Range    Sodium 142 136 - 145 mmol/L    Potassium 3.4 (L) 3.5 - 5.1 mmol/L    Chloride 111 (H) 99 - 110 mmol/L    CO2 22 21 - 32 mmol/L    Anion Gap 9 3 - 16    Glucose 101 (H) 70 - 99 mg/dL    BUN 12 7 - 20 mg/dL    CREATININE 0.8 0.6 - 1.1 mg/dL    GFR Non-African American >60 >60    GFR African American >60 >60    Calcium 9.7 8.3 - 10.6 mg/dL   Troponin   Result Value Ref Range    Troponin <0.01 <0.01 ng/mL   Protime-INR   Result Value Ref Range    Protime 12.4 10.0 - 13.2 sec    INR 1.07 0.86 - 1.14   Comprehensive Metabolic Panel w/ Reflex to MG   Result Value Ref Range    Potassium reflex Magnesium 3.4 (L) 3.5 - 5.1 mmol/L    Total Protein 7.1 6.4 - 8.2 g/dL    Alb 4.1 3.4 - 5.0 g/dL    Albumin/Globulin Ratio 1.4 1.1 - 2.2    Total Bilirubin 0.4 0.0 - 1.0 mg/dL    Alkaline Phosphatase 70 40 - 129 U/L    ALT 21 10 - 40 U/L    AST 26 15 - 37 U/L    Globulin 3.0 g/dL   Magnesium   Result Value Ref Range    Magnesium 1.90 1.80 - 2.40 mg/dL   Drug screen multi urine   Result Value Ref Range    Amphetamine Screen, Urine Neg Negative <1000ng/mL    Barbiturate Screen, Ur POSITIVE (A) Negative <200 ng/mL    Benzodiazepine Screen, Urine POSITIVE (A) Negative <200 ng/mL    Cannabinoid Scrn, Ur Neg Negative <50 ng/mL    Cocaine Metabolite Screen, Urine Neg Negative <300 ng/mL    Opiate Scrn, Ur Neg Negative <300 ng/mL    PCP Screen, Urine Neg Negative <25 ng/mL    Methadone Screen, Urine Neg Negative <300 ng/mL    Propoxyphene Scrn, Ur Neg Negative <300 ng/mL    Oxycodone Urine Neg Negative <100 ng/ml    pH, UA 6.0     Drug Screen Comment: see below    Troponin   Result Value Ref Range    Troponin <0.01 <0.01 ng/mL   POCT Glucose   Result Value Ref Range    Glucose 102 mg/dL    QC OK? y    POCT Glucose   Result Value Ref Range    POC Glucose 102 (H) 70 - 99 mg/dl    Performed on ACCU-CHEK    EKG 12 Lead   Result Value Ref Range    Ventricular Rate 83 BPM    Atrial Rate 83 BPM    P-R Interval 168 ms    QRS Duration 88 ms    Q-T Interval 392 ms    QTc Calculation (Bazett) 460 ms    P Axis 40 degrees    R Axis -17 degrees    T Axis 28 degrees    Diagnosis       Normal sinus rhythmMinimal voltage criteria for LVH, may be normal variantBorderline ECGConfirmed by CHAPIN MUSA, HOOD ([id number]) on 11/30/2020 11:44:23 AM       ECG  The Ekg interpreted by me shows  normal sinus rhythm with a rate of 83  Axis is   Left axis deviation  QTc is  prolonged  Intervals and Durations are unremarkable. ST Segments: nonspecific changes  No significant change from prior EKG dated 11/28/20    RADIOLOGY    CTA HEAD NECK W CONTRAST   Final Result   Unremarkable CTA of the head and neck. CT HEAD WO CONTRAST   Final Result   No acute intracranial abnormality. XR CHEST PORTABLE   Final Result   Perihilar streak like opacities, subsegmental atelectasis versus pneumonia. Pattern is more typical of atelectasis. ED COURSE / MDM  Patient seen and evaluated. Old records reviewed. Labs and imaging reviewed and results discussed with patient. Overall patient in no acute distress, presenting for continued symptoms of vertigo and episodes of syncope. Patient was evaluated 2 days ago and left AGAINST MEDICAL ADVICE. Physical exam remarkable for NIH stroke scale of 2. Differential diagnosis includes but is not limited to: Posterior CVA, tension headache, migraine, arrhythmia, hypoglycemia, ACS, cranial hemorrhage, low suspicion for meningitis, intracranial mass, idiopathic intracranial hypertension    Stroke alert was not called given that patient had been having symptoms for greater than 72 hours. Patient reports continued stroke symptoms. They were unable to obtain a CTA of the head and neck on previous evaluation due to lack of vascular access. Given that patient reports multiple falls with head injuries, will repeat CT head to assess for intracranial hemorrhage. At this time we will also obtain CTA to look for large vessel occlusion. Will discuss with stroke team.    Patient also reporting multiple episodes of syncope. She does report some intermittent chest pain though it does not appear to coordinate with syncopal episodes. Patient will require further syncope work-up. Work-up showed:    ED Course as of Dec 01 0839   [facility] Nov 30, 2020   1248 Patient is not hypoglycemic.    [ER]   1248 Opponent within normal limits. EKG shows no evidence of acute ischemia. [ER]   1248 Mild hypokalemia, hyperchloremia. No evidence of kidney dysfunction.    [ER]   1248 Liver function testing unremarkable. [ER]   1249 No anemia, thrombocytopenia, or leukocytosis. [ER]   1249 Coagulation studies within normal limits. [ER]   2732 CXR: IMPRESSION:  Perihilar streak like opacities, subsegmental atelectasis versus pneumonia. Pattern is more typical of atelectasis. [ER]   1411 CT Head: IMPRESSION:  No acute intracranial abnormality. [ER]   8784 CTA Head: IMPRESSION:  Unremarkable CTA of the head and neck. [ER]   1749 Delta troponin within normal limits. [ER]   1749 Urine drug screen positive for benzodiazepines and barbiturates. [ER]      ED Course User Index  [ER] Michelle Retana MD        During the patient's ED course, the patient was given:  Medications   iopamidol (ISOVUE-370) 76 % injection 75 mL (75 mLs Intravenous Given 11/30/20 1353)   0.9 % sodium chloride bolus (0 mLs Intravenous Stopped 11/30/20 1835)   ketorolac (TORADOL) injection 15 mg (15 mg Intravenous Given 11/30/20 1524)   potassium chloride (KLOR-CON M) extended release tablet 20 mEq (20 mEq Oral Given 11/30/20 1524)   butalbital-acetaminophen-caffeine (FIORICET, ESGIC) per tablet 1 tablet (1 tablet Oral Given 11/30/20 1702)      Patient is reporting vertiginous symptoms. She also reports left upper extremity tingling. NIH stroke scale of 2. I do continue to have concern for posterior stroke. CT head/CTA head and neck were unremarkable. Spoke to [employer] stroke team who agreed that further work-up is indicated. Patient is agreeable for admission at this time, will transfer to [facility] for neurology evaluation and MRI. Patient is also reporting syncope. Her EKG shows no evidence of acute ischemia. Troponins negative x2. He is not having any chest pain in the emergency department. Do feel that patient requires further syncope work-up while she is admitted to the hospital.    At this time, do feel the patient requires admission for further work-up and management.   Discussed the patient with hospital team.  Patient transferred to [facility] in stable condition. CLINICAL IMPRESSION  1. Intractable episodic headache, unspecified headache type    2. Vertigo    3. Syncope and collapse        Blood pressure (!) 158/70, pulse 72, temperature 98.4 °F (36.9 °C), temperature source Oral, resp. rate 18, height 5' 3\" (1.6 m), weight 175 lb (79.4 kg), SpO2 98 %, not currently breastfeeding. DISPOSITION  Ana Rosa Garcia was transferred to [facility] in stable condition. DISCLAIMER: This chart was created using Dragon dictation software. Efforts were made by me to ensure accuracy, however some errors may be present due to limitations of this technology and occasionally words are not transcribed correctly.             Martha Wolfe MD  12/01/20 5422

## 2020-11-30 NOTE — ED NOTES
[place] [address], [facility] [address] [facility], EMT-P  11/30/20 4245

## 2020-11-30 NOTE — ED NOTES
Report to [facility] for transport to [facility].      Zev Hubbard RN  11/30/20 9549

## 2020-11-30 NOTE — ED NOTES
Patient heard yelling on the cell phone while in her room stating that she is being treated terrible because she isn't getting something stronger for pain and \"they stuck my 14 fucking times because they don't know what they're doing\". This nurse entered the room to medicate the patient due to her now stating she will stay and be admit. Patient hung up the phone, placed it behind the head of the bed, and states to this nurse \"Kids. I can't stand them\". Returned to bed from bedside chair. Monitors reapplied that the patient had removed herself. Patient then states to this nurse, \"Now that the test is done, can that be moved down (referencing the IV)\". Advised that the IV would stay in place for her stay due to difficulty obtaining a IV access which the patient reports is normal for her, and that [address] [facility] had to be used to place the IV. The patient is cooperative while nursing staff is in the room. After returning to the nurses station the patient returned to her cell phone where she is heard again yelling on the phone complaining about ED staff.      Hannah Castellanos RN  11/30/20 6361

## 2020-11-30 NOTE — ED NOTES
Medications per MAR. Patient alert and without change. Additional PO fluids provided. Patient declines additional blank. Verbalized understanding and previous knowledge of bed assignment and ETA for transport.      Haydee Marie RN  11/30/20 2547

## 2020-12-01 ENCOUNTER — APPOINTMENT (OUTPATIENT)
Dept: MRI IMAGING | Age: 55
DRG: 103 | End: 2020-12-01
Attending: INTERNAL MEDICINE
Payer: COMMERCIAL

## 2020-12-01 PROBLEM — R51.9 HEADACHE: Status: ACTIVE | Noted: 2020-12-01

## 2020-12-01 LAB
ANION GAP SERPL CALCULATED.3IONS-SCNC: 9 MMOL/L (ref 3–16)
BUN BLDV-MCNC: 11 MG/DL (ref 7–20)
CALCIUM SERPL-MCNC: 8.9 MG/DL (ref 8.3–10.6)
CHLORIDE BLD-SCNC: 109 MMOL/L (ref 99–110)
CO2: 20 MMOL/L (ref 21–32)
CREAT SERPL-MCNC: 0.7 MG/DL (ref 0.6–1.1)
GFR AFRICAN AMERICAN: >60
GFR NON-AFRICAN AMERICAN: >60
GLUCOSE BLD-MCNC: 83 MG/DL (ref 70–99)
LV EF: 58 %
LVEF MODALITY: NORMAL
POTASSIUM REFLEX MAGNESIUM: 3.8 MMOL/L (ref 3.5–5.1)
SODIUM BLD-SCNC: 138 MMOL/L (ref 136–145)

## 2020-12-01 PROCEDURE — 93306 TTE W/DOPPLER COMPLETE: CPT

## 2020-12-01 PROCEDURE — 99253 IP/OBS CNSLTJ NEW/EST LOW 45: CPT | Performed by: PSYCHIATRY & NEUROLOGY

## 2020-12-01 PROCEDURE — 6370000000 HC RX 637 (ALT 250 FOR IP): Performed by: INTERNAL MEDICINE

## 2020-12-01 PROCEDURE — 6360000002 HC RX W HCPCS: Performed by: NURSE PRACTITIONER

## 2020-12-01 PROCEDURE — 36415 COLL VENOUS BLD VENIPUNCTURE: CPT

## 2020-12-01 PROCEDURE — 70551 MRI BRAIN STEM W/O DYE: CPT

## 2020-12-01 PROCEDURE — 1200000000 HC SEMI PRIVATE

## 2020-12-01 PROCEDURE — 6360000002 HC RX W HCPCS: Performed by: INTERNAL MEDICINE

## 2020-12-01 PROCEDURE — 80048 BASIC METABOLIC PNL TOTAL CA: CPT

## 2020-12-01 PROCEDURE — 6370000000 HC RX 637 (ALT 250 FOR IP): Performed by: NURSE PRACTITIONER

## 2020-12-01 RX ORDER — PROCHLORPERAZINE EDISYLATE 5 MG/ML
10 INJECTION INTRAMUSCULAR; INTRAVENOUS ONCE
Status: COMPLETED | OUTPATIENT
Start: 2020-12-01 | End: 2020-12-01

## 2020-12-01 RX ORDER — KETOROLAC TROMETHAMINE 30 MG/ML
15 INJECTION, SOLUTION INTRAMUSCULAR; INTRAVENOUS ONCE
Status: COMPLETED | OUTPATIENT
Start: 2020-12-01 | End: 2020-12-01

## 2020-12-01 RX ORDER — HYDROXYZINE HYDROCHLORIDE 10 MG/1
10 TABLET, FILM COATED ORAL 3 TIMES DAILY PRN
Status: DISCONTINUED | OUTPATIENT
Start: 2020-12-01 | End: 2020-12-02 | Stop reason: HOSPADM

## 2020-12-01 RX ADMIN — BUTALBITAL, ACETAMINOPHEN, AND CAFFEINE 1 TABLET: 50; 325; 40 TABLET ORAL at 21:06

## 2020-12-01 RX ADMIN — METOPROLOL SUCCINATE 100 MG: 50 TABLET, EXTENDED RELEASE ORAL at 08:45

## 2020-12-01 RX ADMIN — ENOXAPARIN SODIUM 40 MG: 40 INJECTION SUBCUTANEOUS at 08:45

## 2020-12-01 RX ADMIN — PANTOPRAZOLE SODIUM 40 MG: 40 TABLET, DELAYED RELEASE ORAL at 07:05

## 2020-12-01 RX ADMIN — DULOXETINE HYDROCHLORIDE 60 MG: 60 CAPSULE, DELAYED RELEASE ORAL at 21:05

## 2020-12-01 RX ADMIN — ROSUVASTATIN CALCIUM 10 MG: 10 TABLET, FILM COATED ORAL at 21:05

## 2020-12-01 RX ADMIN — HYDROXYZINE HYDROCHLORIDE 10 MG: 10 TABLET, FILM COATED ORAL at 15:02

## 2020-12-01 RX ADMIN — PROMETHAZINE HYDROCHLORIDE 12.5 MG: 25 TABLET ORAL at 08:45

## 2020-12-01 RX ADMIN — TRAZODONE HYDROCHLORIDE 150 MG: 50 TABLET ORAL at 21:06

## 2020-12-01 RX ADMIN — BUTALBITAL, ACETAMINOPHEN, AND CAFFEINE 1 TABLET: 50; 325; 40 TABLET ORAL at 08:18

## 2020-12-01 RX ADMIN — PROCHLORPERAZINE EDISYLATE 10 MG: 5 INJECTION INTRAMUSCULAR; INTRAVENOUS at 12:09

## 2020-12-01 RX ADMIN — DULOXETINE HYDROCHLORIDE 60 MG: 60 CAPSULE, DELAYED RELEASE ORAL at 08:45

## 2020-12-01 RX ADMIN — KETOROLAC TROMETHAMINE 15 MG: 30 INJECTION, SOLUTION INTRAMUSCULAR at 10:51

## 2020-12-01 ASSESSMENT — PAIN SCALES - GENERAL
PAINLEVEL_OUTOF10: 0
PAINLEVEL_OUTOF10: 9
PAINLEVEL_OUTOF10: 9
PAINLEVEL_OUTOF10: 10
PAINLEVEL_OUTOF10: 10
PAINLEVEL_OUTOF10: 8
PAINLEVEL_OUTOF10: 10
PAINLEVEL_OUTOF10: 9
PAINLEVEL_OUTOF10: 9
PAINLEVEL_OUTOF10: 8

## 2020-12-01 ASSESSMENT — PAIN DESCRIPTION - PROGRESSION
CLINICAL_PROGRESSION: NOT CHANGED
CLINICAL_PROGRESSION: NOT CHANGED
CLINICAL_PROGRESSION: GRADUALLY WORSENING

## 2020-12-01 ASSESSMENT — PAIN DESCRIPTION - DESCRIPTORS
DESCRIPTORS: THROBBING
DESCRIPTORS: THROBBING
DESCRIPTORS: STABBING;CONSTANT

## 2020-12-01 ASSESSMENT — PAIN DESCRIPTION - FREQUENCY
FREQUENCY: CONTINUOUS
FREQUENCY: CONTINUOUS

## 2020-12-01 ASSESSMENT — PAIN DESCRIPTION - PAIN TYPE
TYPE: ACUTE PAIN

## 2020-12-01 ASSESSMENT — PAIN DESCRIPTION - LOCATION
LOCATION: HEAD

## 2020-12-01 ASSESSMENT — PAIN - FUNCTIONAL ASSESSMENT
PAIN_FUNCTIONAL_ASSESSMENT: PREVENTS OR INTERFERES SOME ACTIVE ACTIVITIES AND ADLS
PAIN_FUNCTIONAL_ASSESSMENT: PREVENTS OR INTERFERES SOME ACTIVE ACTIVITIES AND ADLS

## 2020-12-01 ASSESSMENT — PAIN DESCRIPTION - ONSET: ONSET: ON-GOING

## 2020-12-01 ASSESSMENT — PAIN DESCRIPTION - ORIENTATION: ORIENTATION: RIGHT;ANTERIOR;OUTER

## 2020-12-01 NOTE — PLAN OF CARE
Educate pt on use of pain control scale and available medications for pain. Will medicate as needed for head pain. Fall risk band applied and non skid socks provided. Instructed pt to call for assistance with ambulation. Bed in lowest position.  Will continue to monitor patient

## 2020-12-01 NOTE — PROGRESS NOTES
Hospitalist Progress Note      PCP: Jordana Kulkarni MD    Date of Admission: 11/30/2020    Chief Complaint: Constant headache    Hospital Course: 48 y.o. female who presented to [facility] with above complaints  Patient presented to [facility] today morning with complaints of headache. Patient reports she has had a continuous unrelenting headache for the last 3 days, 8/10 in intensity, bifrontal, associated with nausea, no aura, no vision problems, no neck stiffness or photophobia/phonophobia. Denies any subjective fevers chills or rigors. She does report previous history of migraine attacks. Has been taking ibuprofen at home without much relief. Reports last migraine headache was about 4 months ago. Patient did report a syncopal event on 11/29 when she was at home, passed out while she was holding some dishes, woke up to find the tissues were broken on the floor. she presented to the ED with similar complaints on 11/28, was evaluated in the ED but left [address] for further work-up of syncope and possible stroke. She returned to the ED today morning due to continuous headache. Patient reports she has been very anxious and depressed about not being able to see her father and mother, not being able to see her father because he has associated medical conditions that preclude from him being seen by her due to fear of giving Covid, and unable to see her mother who is in the nursing home who have restricted any visitors due to Covid concerns. She reports due to her anxiety levels she found an old bottle of Klonopin at home and took 1 tablet yesterday, reports did not help her symptoms much. Patient apparently gets Fioricet for headache from other providers that is not reflected on PDMP. Currently patient denies any left arm paresthesias. She reports compliance with all her medications.          Subjective: States her headache is no better. No nausea, vomiting.   She tells me she is concerned about a brain aneurysm, but I reassured her that her CTA of the head was negative. Medications:  Reviewed    Infusion Medications    sodium chloride 75 mL/hr at 11/30/20 2204     Scheduled Medications    DULoxetine  60 mg Oral BID    metoprolol succinate  100 mg Oral Daily    pantoprazole  40 mg Oral QAM AC    rosuvastatin  10 mg Oral Nightly    traZODone  150 mg Oral Nightly    sodium chloride flush  10 mL Intravenous 2 times per day    enoxaparin  40 mg Subcutaneous Daily     PRN Meds: hydrOXYzine, albuterol, sodium chloride flush, acetaminophen **OR** acetaminophen, polyethylene glycol, promethazine **OR** ondansetron, butalbital-acetaminophen-caffeine      Intake/Output Summary (Last 24 hours) at 12/1/2020 1848  Last data filed at 12/1/2020 1003  Gross per 24 hour   Intake 240 ml   Output --   Net 240 ml       Physical Exam Performed:    BP (!) 164/76   Pulse 58   Temp 97.9 °F (36.6 °C) (Oral)   Resp 18   Ht 5' 3\" (1.6 m)   Wt 175 lb (79.4 kg)   SpO2 95%   BMI 31.00 kg/m²     General appearance: No apparent distress, appears stated age and cooperative. HEENT: Pupils equal, round, and reactive to light. Conjunctivae/corneas clear. Neck: Supple, with full range of motion. No jugular venous distention. Trachea midline. Respiratory:  Normal respiratory effort. Clear to auscultation, bilaterally without Rales/Wheezes/Rhonchi. Cardiovascular: Regular rate and rhythm with normal S1/S2 without murmurs, rubs or gallops. Abdomen: Soft, non-tender, non-distended with normal bowel sounds. Musculoskeletal: No clubbing, cyanosis or edema bilaterally. Full range of motion without deformity. Skin: Skin color, texture, turgor normal.  No rashes or lesions. Neurologic:  Neurovascularly intact without any focal sensory/motor deficits.  Cranial nerves: II-XII intact, grossly non-focal.  Psychiatric: Alert and oriented, thought content appropriate, normal insight  Capillary Refill: Brisk,< 3 seconds   Peripheral Pulses: +2 palpable, equal bilaterally       Labs:   Recent Labs     11/28/20  2030 11/30/20  1055   WBC 8.9 7.5   HGB 12.4 12.5   HCT 37.8 37.4    279     Recent Labs     11/28/20  2030 11/30/20  1055 12/01/20  0749    142 138   K 3.5 3.4*  3.4* 3.8    111* 109   CO2 21 22 20*   BUN 13 12 11   CREATININE 0.8 0.8 0.7   CALCIUM 9.7 9.7 8.9     Recent Labs     11/28/20 2030 11/30/20  1055   AST 14* 26   ALT 8* 21   BILITOT 0.3 0.4   ALKPHOS 64 70     Recent Labs     11/28/20 2030 11/30/20  1055   INR 1.06 1.07     Recent Labs     11/28/20 2030 11/30/20  1055 11/30/20  1546   TROPONINI <0.01 <0.01 <0.01       Urinalysis:      Lab Results   Component Value Date    NITRU Negative 11/28/2020    WBCUA 0-2 11/28/2020    BACTERIA Rare 03/21/2018    RBCUA 0-2 11/28/2020    BLOODU TRACE-INTACT 11/28/2020    SPECGRAV 1.020 11/28/2020    GLUCOSEU Negative 11/28/2020       Radiology:  MRI BRAIN WO CONTRAST   Final Result   1. No acute intracranial abnormality. No acute infarct. 2. Minimal chronic microvascular ischemic changes. Assessment/Plan:    Active Hospital Problems    Diagnosis    Headache [R51.9]    Syncope and collapse [R55]    H/O: CVA (cerebrovascular accident) [Z86.73]    Reactive depression [F32.9]    HTN (hypertension) [I10]    Migraine with aura, with intractable migraine, so stated, with status migrainosus [G43.111]    Fibromyalgia [M79.7]    Chronic anxiety [F41.9]    Nonintractable migraine [G43.009]     Intractable headache   Likely recurrent migraine episode. Will treat with IV Toradol, IV Compazine. CT head, CTA head and neck unremarkable. If patient does not improve will consider neurology consult. Please do not give narcotics. History of chronic migraine/chronic tension headaches  - neurology consult pending.     Syncope -unclear etiology, could be related to BZD overuse.   Patient has history of drug OD and misuse in

## 2020-12-01 NOTE — PROGRESS NOTES
Spoke with pts  regarding pt status. Pt is confused and has been miscommunicating information to him. States pt use to get episodes like this. States she gets migrans that lead  to dizziness and confusion. She has over taken BP meds when in these states and made unsafe choices. Pt had 4 falls prior to this admission and  is concerned that she will fall again. Pt has been forgetting conversations and attempting to get out of bed without assistance. Pt is unstable with her gait and leans left. She reaches for doorways and objects to help with balance with ambulation.

## 2020-12-01 NOTE — PROGRESS NOTES
RESPIRATORY THERAPY ASSESSMENT    Name:  Dani Yee Record Number:  [id number]  Age: 48 y.o. Gender: female  : 1965  Today's Date:  2020  Room:  3470/7465-62    Assessment     Is the patient being admitted for a COPD or Asthma exacerbation? No   (If yes the patient will be seen every 4 hours for the first 24 hours and then reassessed)    Patient Admission Diagnosis      Allergies  Allergies   Allergen Reactions    Benadryl [Diphenhydramine] Anxiety     \"Made me jump off the wall\"    Topamax [Topiramate] Hives    Tramadol Shortness Of Breath    Vitamin C [Ascorbic Acid] Hives    Lyrica [Pregabalin] Palpitations    Penicillins Hives and Itching       Minimum Predicted Vital Capacity:     786          Actual Vital Capacity:      Pt N/A, IS placed at bedside              Pulmonary History:current smoker  Home Oxygen Therapy:  Unknown, pt currently on RA  Home Respiratory Therapy:Albuterol PRN, per chart  Current Respiratory Therapy:  Albuterol prn  Treatment Type: IS       Respiratory Severity Index(RSI)   Patients with orders for inhalation medications, oxygen, or any therapeutic treatment modality will be placed on Respiratory Protocol. They will be assessed with the first treatment and at least every 72 hours thereafter. The following severity scale will be used to determine frequency of treatment intervention.     Smoking History: Smoking History Less than 1ppd or less than 15 pack year = 1    Social History  Social History     Tobacco Use    Smoking status: Current Some Day Smoker     Packs/day: 0.50     Years: 27.00     Pack years: 13.50     Types: Cigarettes    Smokeless tobacco: Never Used    Tobacco comment: she smokes sometimes   Substance Use Topics    Alcohol use: No     Alcohol/week: 0.0 standard drinks     Comment: rare    Drug use: No       Recent Surgical History: None = 0  Past Surgical History  Past Surgical History:   Procedure Laterality Date    ANKLE than 35 breaths per minute. Therapy will be held for heart rate (HR) greater than 140 beats per minute, pending direction from physician. 3. Bronchodilators will be administered via Metered Dose Inhaler (MDI) with spacer when the following criteria are met:  a. Alert and cooperative     b. HR < 140 bpm  c. RR < 30 bpm                d. Can demonstrate a 2-3 second inspiratory hold  4. Bronchodilators will be administered via Hand Held Nebulizer JADIEL [facility] [place] [facility]) to patients when ANY of the following criteria are met  a. Incognizant or uncooperative          b. Patients treated with HHN at Home        c. Unable to demonstrate proper use of MDI with spacer     d. RR > 30 bpm   5. Bronchodilators will be delivered via Metered Dose Inhaler (MDI), HHN, Aerogen to intubated patients on mechanical ventilation. 6. Inhalation medication orders will be delivered and/or substituted as outlined below. Aerosolized Medications Ordering and Administration Guidelines:    1. All Medications will be ordered by a physician, and their frequency and/or modality will be adjusted as defined by the patients Respiratory Severity Index (RSI) score. 2. If the patient does not have documented COPD, consider discontinuing anticholinergics when RSI is less than 9.  3. If the bronchospasm worsens (increased RSI), then the bronchodilator frequency can be increased to a maximum of every 4 hours. If greater than every 4 hours is required, the physician will be contacted. 4. If the bronchospasm improves, the frequency of the bronchodilator can be decreased, based on the patient's RSI, but not less than home treatment regimen frequency. 5. Bronchodilator(s) will be discontinued if patient has a RSI less than 9 and has received no scheduled or as needed treatment for 72  Hrs. Patients Ordered on a Mucolytic Agent:    1. Must always be administered with a bronchodilator.     2. Discontinue if patient experiences worsened bronchospasm, or secretions have lessened to the point that the patient is able to clear them with a cough. Anti-inflammatory and Combination Medications:    1. If the patient lacks prior history of lung disease, is not using inhaled anti-inflammatory medication at home, and lacks wheezing by examination or by history for at least 24 hours, contact physician for possible discontinuation.

## 2020-12-01 NOTE — CONSULTS
In patient Neurology consult        [facility] Neurology      MD Shan Brownathon Zuleima  1965    Date of Service: 2020    Referring Physician: Matty Restrepo MD      Reason for the consult and CC: New onset intractable headache. HPI:   The patient is a 48y.o.  years old female with multiple medical problems who was admitted to the hospital yesterday with new onset intractable headache. Symptoms started 3 to 4 days ago. She describes daily headaches since that time. Description is holoacranial dull achy pain. Degree was severe. Duration was persistent for last 2 to 3 days. Other associated symptom including photophobia and phonophobia with nausea but no vomiting. No other relieving or aggravating factors. She tried over-the-counter medication without improvement. She has history of migraine with aura however she has not had severe migraine for quite some time. Symptom persisted and she decided to come to the ED few days ago and then decided to leave [address] Patient came back again yesterday for the same reason and was transported from [place] ED to [facility] for further work-up. The patient today feels better. Headache now is moderate to 4/10. She had MRI of the brain which showed no acute findings. She used to take Fioricet at home which she did help her headaches. She is requesting either \"Fioricet or Dilaudid\". She denies any weakness or numbness or visual changes. No chest pain. She is requesting to be discharged home if possible. Other review of system was unremarkable. Family History   Problem Relation Age of Onset    High Blood Pressure Mother     Heart Disease Mother     Diabetes Maternal Grandfather     Heart Disease Father     Heart Disease Brother      Past Surgical History:   Procedure Laterality Date    ANKLE SURGERY       SECTION      CHOLECYSTECTOMY      COLONOSCOPY N/A 2018    COLON W/ANES.  performed by Es Rivera MD at [address]  2005    HERNIA REPAIR      HYSTERECTOMY      TUMOR REMOVAL  1990    lt femur    UPPER GASTROINTESTINAL ENDOSCOPY N/A 12/20/2018    EGD BIOPSY performed by Es Rivera MD at [address] [facility]        Past Medical History:   Diagnosis Date    Ankle fracture     Anxiety     Arthritis     Benzodiazepine abuse ([address]     Depression     Fibromyalgia     Hyperlipidemia     Hypertension     Kidney cyst     x2 rt. ?    Migraine     Pneumonia 2008    bronchitis frequently since    TIA (transient ischemic attack) 2007    Ulcer, Stomach Peptic     frequent N/V    Unspecified cerebral artery occlusion with cerebral infarction 2007     mini stroke cused by severe migraine    Weight loss     frequent N/V, S/P cholecystectomy     Social History     Tobacco Use    Smoking status: Current Some Day Smoker     Packs/day: 0.50     Years: 27.00     Pack years: 13.50     Types: Cigarettes    Smokeless tobacco: Never Used    Tobacco comment: she smokes sometimes   Substance Use Topics    Alcohol use: No     Alcohol/week: 0.0 standard drinks     Comment: rare    Drug use: No     Allergies   Allergen Reactions    Benadryl [Diphenhydramine] Anxiety     \"Made me jump off the wall\"    Topamax [Topiramate] Hives    Tramadol Shortness Of Breath    Vitamin C [Ascorbic Acid] Hives    Lyrica [Pregabalin] Palpitations    Penicillins Hives and Itching     Current Facility-Administered Medications   Medication Dose Route Frequency Provider Last Rate Last Dose    hydrOXYzine (ATARAX) tablet 10 mg  10 mg Oral TID PRN Zhou Forward, APRN - CNP        albuterol (PROVENTIL) nebulizer solution 2.5 mg  2.5 mg Nebulization Q4H PRN Adams Cuenca MD        DULoxetine (CYMBALTA) extended release capsule 60 mg  60 mg Oral BID Adams Cuenca MD   60 mg at 12/01/20 0845    metoprolol succinate (TOPROL XL) extended release tablet 100 mg 100 mg Oral Daily Otilio Sullivan MD   100 mg at 12/01/20 0845    pantoprazole (PROTONIX) tablet 40 mg  40 mg Oral QAM AC Otilio Sullivan MD   40 mg at 12/01/20 0705    rosuvastatin (CRESTOR) tablet 10 mg  10 mg Oral Nightly Otilio Sullivan MD   10 mg at 11/30/20 2213    traZODone (DESYREL) tablet 150 mg  150 mg Oral Nightly Otilio Sullivan MD   150 mg at 11/30/20 2354    sodium chloride flush 0.9 % injection 10 mL  10 mL Intravenous 2 times per day Otilio Sullivan MD   Stopped at 11/30/20 2355    sodium chloride flush 0.9 % injection 10 mL  10 mL Intravenous PRN Otilio Sullivan MD        acetaminophen (TYLENOL) tablet 650 mg  650 mg Oral Q6H PRN Otilio Sullivan MD        Or   Kat Sherwood acetaminophen (TYLENOL) suppository 650 mg  650 mg Rectal Q6H PRN Otilio Sullivan MD        polyethylene glycol (GLYCOLAX) packet 17 g  17 g Oral Daily PRN Otilio Sullivan MD        promethazine (PHENERGAN) tablet 12.5 mg  12.5 mg Oral Q6H PRN Otilio Sullivan MD   12.5 mg at 12/01/20 0845    Or    ondansetron (ZOFRAN) injection 4 mg  4 mg Intravenous Q6H PRN Otilio Sullivan MD        enoxaparin (LOVENOX) injection 40 mg  40 mg Subcutaneous Daily Otilio Sullivan MD   40 mg at 12/01/20 0845    0.9 % sodium chloride infusion   Intravenous Continuous Otilio Sullivan MD 75 mL/hr at 11/30/20 2204      butalbital-acetaminophen-caffeine (FIORICET, ESGIC) per tablet 1 tablet  1 tablet Oral Q4H PRN Otilio Sullivan MD   1 tablet at 12/01/20 0818       ROS : A 10-14 system review of constitutional, cardiovascular, respiratory, eyes, musculoskeletal, endocrine, GI, ENT, skin, hematological, genitourinary, psychiatric and neurologic systems was obtained and updated today and is unremarkable except as mentioned in my HPI      Exam:     Constitutional:   Vitals:    12/01/20 0412 12/01/20 0757 12/01/20 0830 12/01/20 1119   BP: (!) 155/74 (!) 155/85 (!) 156/50 (!) 150/91

## 2020-12-01 NOTE — PROGRESS NOTES
Report called to B3 nurse, [address]. Stated she would be up to get patient as soon as possible.

## 2020-12-01 NOTE — PLAN OF CARE
Plan of care reviewed with patient. Encouraged to relay needs to staff. Expiration Date (Month Year): 01/2021

## 2020-12-01 NOTE — PROGRESS NOTES
MD notified Pt crying out in pain states pain in head 10/10 in frontal and rt partial areas. Pt concerned pain is from fall. PT also verbalizes dizziness. Fioricet given at 8am with no relief. Heat and ice provided without relief. MRI had been ordered but order was canceled.

## 2020-12-01 NOTE — H&P
Hospital Medicine History & Physical      PCP: Ha Solis MD    Date of Admission: 11/30/2020    Date of Service: Pt seen/examined on 11/30/2020 and Admitted to Inpatient with expected LOS greater than two midnights due to medical therapy. Chief Complaint: Constant headache      History Of Present Illness:      48 y.o. female who presented to [facility] with above complaints  Patient presented to [facility] today morning with complaints of headache. Patient reports she has had a continuous unrelenting headache for the last 3 days, 8/10 in intensity, bifrontal, associated with nausea, no aura, no vision problems, no neck stiffness or photophobia/phonophobia. Denies any subjective fevers chills or rigors. She does report previous history of migraine attacks. Has been taking ibuprofen at home without much relief. Reports last migraine headache was about 4 months ago. Patient did report a syncopal event on 11/29 when she was at home, passed out while she was holding some dishes, woke up to find the tissues were broken on the floor. she presented to the ED with similar complaints on 11/28, was evaluated in the ED but left [address] for further work-up of syncope and possible stroke. She returned to the ED today morning due to continuous headache. Patient reports she has been very anxious and depressed about not being able to see her father and mother, not being able to see her father because he has associated medical conditions that preclude from him being seen by her due to fear of giving Covid, and unable to see her mother who is in the nursing home who have restricted any visitors due to Covid concerns. She reports due to her anxiety levels she found an old bottle of Klonopin at home and took 1 tablet yesterday, reports did not help her symptoms much. Patient apparently gets Fioricet for headache from other providers that is not reflected on PDMP.   Currently patient denies any left arm paresthesias. She reports compliance with all her medications. Patient is very emotional and tearful during my interview. Past Medical History:          Diagnosis Date    Ankle fracture     Anxiety     Arthritis     Benzodiazepine abuse ([address]     Depression     Fibromyalgia     Hyperlipidemia     Hypertension     Kidney cyst     x2 rt. ?    Migraine     Pneumonia 2008    bronchitis frequently since    TIA (transient ischemic attack)     Ulcer, Stomach Peptic     frequent N/V    Unspecified cerebral artery occlusion with cerebral infarction      mini stroke cused by severe migraine    Weight loss     frequent N/V, S/P cholecystectomy       Past Surgical History:          Procedure Laterality Date    ANKLE SURGERY       SECTION      CHOLECYSTECTOMY      COLONOSCOPY N/A 2018    COLON W/ANES. performed by Jennette Dandy, MD at [address] [facility] SURGERY  2005   [address]      TUMOR REMOVAL      lt femur    UPPER GASTROINTESTINAL ENDOSCOPY N/A 2018    EGD BIOPSY performed by Jennette Dandy, MD at [address] [facility]       Medications Prior to Admission:      Prior to Admission medications    Medication Sig Start Date End Date Taking? Authorizing Provider   nystatin (MYCOSTATIN) 426444 UNIT/GM powder Apply 3 times daily.  11/3/20  Yes Rachel Becerra MD   albuterol sulfate HFA (VENTOLIN HFA) 108 (90 Base) MCG/ACT inhaler Inhale 2 puffs into the lungs every 6 hours as needed for Wheezing 11/3/20  Yes Rachel Becerra MD   rosuvastatin (CRESTOR) 10 MG tablet Take 1 tablet by mouth nightly 20  Yes Dang Pittman MD   traZODone (DESYREL) 150 MG tablet TAKE 1 TABLET AT BEDTIME 20  Yes Dang Pittman MD   ibuprofen (ADVIL;MOTRIN) 800 MG tablet TAKE 1 TABLET THREE TIMES DAILY  Patient taking differently: TAKE 1 TABLET THREE TIMES DAILY PRN 20  Yes Cassia Sandoval MD   pantoprazole (PROTONIX) 40 MG tablet TAKE 1 TABLET EACH MORNING PRIOR TO BREAKFAST 3/11/20  Yes Cassia Sandoval MD   DULoxetine (CYMBALTA) 60 MG extended release capsule TAKE (1) CAPSULE TWICE DAILY 2/13/20  Yes Cassia Sandoval MD   metoprolol succinate (TOPROL XL) 100 MG extended release tablet TAKE (1) TABLET DAILY 10/11/19  Yes Cassia Sandoval MD   Blood Pressure Monitor KIT Use daily 6/30/20   Cassia Sandoval MD   furosemide (LASIX) 20 MG tablet Take 20 mg by mouth daily as needed    Historical Provider, MD   polyethylene glycol (MIRALAX) POWD powder Take 1-2 capfuls of Miralax daily as needed 6/11/19   Cassia Sandoval MD   triamterene-hydrochlorothiazide (DYAZIDE) 37.5-25 MG per capsule Take 1 capsule by mouth daily  Patient taking differently: Take 1 capsule by mouth daily Only takes if BP is britany high and she cant get it to drop 6/11/19   Cassia Sandoval MD       Allergies:  Benadryl [diphenhydramine]; Topamax [topiramate]; Tramadol; Vitamin c [ascorbic acid]; Lyrica [pregabalin]; and Penicillins    Social History:      The patient currently lives at home    TOBACCO:   reports that she has been smoking cigarettes. She has a 13.50 pack-year smoking history. She has never used smokeless tobacco.  ETOH:   reports no history of alcohol use. E-Cigarettes Vaping or Juuling     Questions Responses    Vaping Use Never User    Start Date     Does device contain nicotine? Quit Date     Vaping Type             Family History:     Positive as follows:        Problem Relation Age of Onset    High Blood Pressure Mother     Heart Disease Mother     Diabetes Maternal Grandfather     Heart Disease Father     Heart Disease Brother        REVIEW OF SYSTEMS:   Pertinent positives as noted in the HPI. All other systems reviewed and negative.     PHYSICAL EXAM PERFORMED:    BP (!) 154/78   Pulse 71   Temp 97.5 °F (36.4 °C) (Oral) Resp 16   Ht 5' 3\" (1.6 m)   Wt 175 lb (79.4 kg)   SpO2 93%   BMI 31.00 kg/m²     General appearance:  No apparent distress, appears stated age and cooperative. HEENT:  Normal cephalic, atraumatic without obvious deformity. Pupils equal, round, and reactive to light. Extra ocular muscles intact. Conjunctivae/corneas clear. Neck: Supple, with full range of motion. No jugular venous distention. Trachea midline. Respiratory:  Normal respiratory effort. Clear to auscultation, bilaterally without Rales/Wheezes/Rhonchi. Cardiovascular:  Regular rate and rhythm with normal S1/S2 without murmurs, rubs or gallops. Abdomen: Soft, non-tender, non-distended with normal bowel sounds. Musculoskeletal:  No clubbing, cyanosis or edema bilaterally. Full range of motion without deformity. Skin: Skin color, texture, turgor normal.  No rashes or lesions. Neurologic:  Neurovascularly intact without any focal sensory/motor deficits.  Cranial nerves: II-XII intact, grossly non-focal.  Psychiatric:  Alert and oriented, thought content appropriate, normal insight  Capillary Refill: Brisk,< 3 seconds   Peripheral Pulses: +2 palpable, equal bilaterally       Labs:     Recent Labs     11/28/20  2030 11/30/20  1055   WBC 8.9 7.5   HGB 12.4 12.5   HCT 37.8 37.4    279     Recent Labs     11/28/20  2030 11/30/20  1055    142   K 3.5 3.4*  3.4*    111*   CO2 21 22   BUN 13 12   CREATININE 0.8 0.8   CALCIUM 9.7 9.7     Recent Labs     11/28/20  2030 11/30/20  1055   AST 14* 26   ALT 8* 21   BILITOT 0.3 0.4   ALKPHOS 64 70     Recent Labs     11/28/20  2030 11/30/20  1055   INR 1.06 1.07     Recent Labs     11/28/20  2030 11/30/20  1055 11/30/20  1546   TROPONINI <0.01 <0.01 <0.01       Urinalysis:      Lab Results   Component Value Date    NITRU Negative 11/28/2020    WBCUA 0-2 11/28/2020    BACTERIA Rare 03/21/2018    RBCUA 0-2 11/28/2020    BLOODU TRACE-INTACT 11/28/2020    SPECGRAV 1.020 11/28/2020    GLUCOSEU

## 2020-12-01 NOTE — CARE COORDINATION
CM reviewed chart for potential discharge needs. Triggered by diagnosis. Pt on B3 at this time. Being followed by internal medicine for evaluation of headache and syncope. Workup in progress at this time. Pt has PCP and insurance. No immediate CM needs identified. Please consult CM team if needs arise.      Duane Groves RN

## 2020-12-01 NOTE — PROGRESS NOTES
MD notified: Pt requesting discharge. Symptoms have not resolved. Pt still verbalizing dizziness, unstable gait, reporting head pain 8/10. Neurology has seen pt. MRI has resulted. Pt has began getting ready to go despite 6 conversations reminding pt she does not have discharge orders.

## 2020-12-01 NOTE — PROGRESS NOTES
4 Eyes Skin Assessment     The patient is being assess for   Admission    I agree that 2 RN's have performed a thorough Head to Toe Skin Assessment on the patient. ALL assessment sites listed below have been assessed. Areas assessed by both nurses:   [x]   Head, Face, and Ears   [x]   Shoulders, Back, and Chest, Abdomen  [x]   Arms, Elbows, and Hands   [x]   Coccyx, Sacrum, and Ischium  [x]   Legs, Feet, and Heels        **SHARE this note so that the co-signing nurse is able to place an eSignature**    Co-signer eSignature: Electronically signed by Lauren Gay RN on 12/1/20 at 6:31 AM EST    Does the Patient have Skin Breakdown?   No          Jonathan Prevention initiated:  No   Wound Care Orders initiated:  No      [facility] nurse consulted for Pressure Injury (Stage 3,4, Unstageable, DTI, NWPT, Complex wounds)and New or Established Ostomies:  No      Primary Nurse eSignature: Electronically signed by Yuniel Corbett RN on 12/1/20 at 6:00 AM EST

## 2020-12-02 ENCOUNTER — TELEPHONE (OUTPATIENT)
Dept: FAMILY MEDICINE CLINIC | Age: 55
End: 2020-12-02

## 2020-12-02 VITALS
OXYGEN SATURATION: 95 % | RESPIRATION RATE: 15 BRPM | TEMPERATURE: 98.4 F | BODY MASS INDEX: 31.01 KG/M2 | DIASTOLIC BLOOD PRESSURE: 93 MMHG | HEART RATE: 63 BPM | WEIGHT: 175 LBS | SYSTOLIC BLOOD PRESSURE: 160 MMHG | HEIGHT: 63 IN

## 2020-12-02 PROCEDURE — 6370000000 HC RX 637 (ALT 250 FOR IP): Performed by: NURSE PRACTITIONER

## 2020-12-02 PROCEDURE — 2580000003 HC RX 258: Performed by: INTERNAL MEDICINE

## 2020-12-02 PROCEDURE — 97166 OT EVAL MOD COMPLEX 45 MIN: CPT

## 2020-12-02 PROCEDURE — 97116 GAIT TRAINING THERAPY: CPT

## 2020-12-02 PROCEDURE — 6370000000 HC RX 637 (ALT 250 FOR IP): Performed by: INTERNAL MEDICINE

## 2020-12-02 PROCEDURE — 97161 PT EVAL LOW COMPLEX 20 MIN: CPT

## 2020-12-02 PROCEDURE — 6360000002 HC RX W HCPCS: Performed by: INTERNAL MEDICINE

## 2020-12-02 PROCEDURE — 97530 THERAPEUTIC ACTIVITIES: CPT

## 2020-12-02 PROCEDURE — 97112 NEUROMUSCULAR REEDUCATION: CPT

## 2020-12-02 RX ORDER — BUTALBITAL, ACETAMINOPHEN AND CAFFEINE 50; 325; 40 MG/1; MG/1; MG/1
1 TABLET ORAL EVERY 4 HOURS PRN
Qty: 10 TABLET | Refills: 0 | Status: SHIPPED | OUTPATIENT
Start: 2020-12-02 | End: 2021-01-19

## 2020-12-02 RX ORDER — HYDROXYZINE HYDROCHLORIDE 10 MG/1
10 TABLET, FILM COATED ORAL 3 TIMES DAILY PRN
Qty: 30 TABLET | Refills: 0 | Status: SHIPPED | OUTPATIENT
Start: 2020-12-02 | End: 2020-12-12

## 2020-12-02 RX ADMIN — HYDROXYZINE HYDROCHLORIDE 10 MG: 10 TABLET, FILM COATED ORAL at 00:04

## 2020-12-02 RX ADMIN — DULOXETINE HYDROCHLORIDE 60 MG: 60 CAPSULE, DELAYED RELEASE ORAL at 09:13

## 2020-12-02 RX ADMIN — PANTOPRAZOLE SODIUM 40 MG: 40 TABLET, DELAYED RELEASE ORAL at 05:48

## 2020-12-02 RX ADMIN — BUTALBITAL, ACETAMINOPHEN, AND CAFFEINE 1 TABLET: 50; 325; 40 TABLET ORAL at 09:13

## 2020-12-02 RX ADMIN — SODIUM CHLORIDE: 9 INJECTION, SOLUTION INTRAVENOUS at 02:20

## 2020-12-02 RX ADMIN — BUTALBITAL, ACETAMINOPHEN, AND CAFFEINE 1 TABLET: 50; 325; 40 TABLET ORAL at 02:19

## 2020-12-02 RX ADMIN — ENOXAPARIN SODIUM 40 MG: 40 INJECTION SUBCUTANEOUS at 09:12

## 2020-12-02 RX ADMIN — METOPROLOL SUCCINATE 100 MG: 50 TABLET, EXTENDED RELEASE ORAL at 09:12

## 2020-12-02 RX ADMIN — PROMETHAZINE HYDROCHLORIDE 12.5 MG: 25 TABLET ORAL at 02:27

## 2020-12-02 ASSESSMENT — PAIN DESCRIPTION - ONSET
ONSET: ON-GOING
ONSET: ON-GOING

## 2020-12-02 ASSESSMENT — PAIN DESCRIPTION - DESCRIPTORS
DESCRIPTORS: ACHING
DESCRIPTORS: ACHING

## 2020-12-02 ASSESSMENT — PAIN DESCRIPTION - LOCATION
LOCATION: HEAD
LOCATION: HEAD

## 2020-12-02 ASSESSMENT — PAIN DESCRIPTION - ORIENTATION: ORIENTATION: ANTERIOR

## 2020-12-02 ASSESSMENT — PAIN SCALES - GENERAL
PAINLEVEL_OUTOF10: 9
PAINLEVEL_OUTOF10: 10
PAINLEVEL_OUTOF10: 4
PAINLEVEL_OUTOF10: 4
PAINLEVEL_OUTOF10: 10

## 2020-12-02 ASSESSMENT — PAIN DESCRIPTION - PAIN TYPE
TYPE: ACUTE PAIN
TYPE: ACUTE PAIN

## 2020-12-02 ASSESSMENT — PAIN DESCRIPTION - FREQUENCY
FREQUENCY: CONTINUOUS
FREQUENCY: CONTINUOUS

## 2020-12-02 ASSESSMENT — PAIN DESCRIPTION - PROGRESSION: CLINICAL_PROGRESSION: NOT CHANGED

## 2020-12-02 NOTE — TELEPHONE ENCOUNTER
Sekou Chris with [facility] called stating they received a referral from [facility] for pt. States pt will be d/c from hospital today and they are requesting verbal for skilled nursing and PT/OT. Pt will needing to have a hospital f/u appt scheduled before they can start home care services. [facility] for pt to CB and schedule appt.  Call back [facility] with verbal order once appt scheduled [phone]

## 2020-12-02 NOTE — CARE COORDINATION
Call received from therapy team. States they are going to recommend ARU. Referral made to [facility], to screen patient. However, CM does not feel ARU is appropriate at this time d/t patient not having a diagnosis to support payor approving.

## 2020-12-02 NOTE — CARE COORDINATION
CASE MANAGEMENT DISCHARGE SUMMARY      Discharge to: home with [facility]    [id number] ordered/agency: none    Transportation:    Family/car: family     Confirmed discharge plan with:     Patient: yes     Facility/Agency, name:  [facility] obtained via Epic access per [facility] agency     RN, name: Chinyere Nath

## 2020-12-02 NOTE — DISCHARGE INSTR - COC
Continuity of Care Form    Patient Name: Niraj Butler   :  1965  MRN:  [id number]    Admit date:  2020  Discharge date:  20      Code Status Order: Full Code   Advance Directives:   [facility] Documentation       Date/Time Healthcare Directive Type of Healthcare Directive Copy in [address] Agent's Name Healthcare Agent's Phone Number    20 2214  No, patient does not have an advance directive for healthcare treatment -- -- -- -- --            Admitting Physician:  Shaq Aponte MD  PCP: Armando Knapp MD    Discharging Nurse: [facility] Unit/Room#: 3873/9175-36  Discharging Unit Phone Number: [phone]    Emergency Contact:   Extended Emergency Contact Information  Primary Emergency Contact: Tim Coronado  Address: [address] [place] [address]  [address] Phone: [phone]  Mobile Phone: [phone]  Relation: Spouse    Past Surgical History:  Past Surgical History:   Procedure Laterality Date    ANKLE SURGERY       SECTION      CHOLECYSTECTOMY      COLONOSCOPY N/A 2018    COLON W/ANES.  performed by Nina Amador MD at [address] [facility] SURGERY  2005   [address]      TUMOR REMOVAL      lt femur    UPPER GASTROINTESTINAL ENDOSCOPY N/A 2018    EGD BIOPSY performed by Nina Amador MD at [facility]       Immunization History:   Immunization History   Administered Date(s) Administered    Influenza Virus Vaccine 09/10/2014    Influenza, Samantha Herb, 6 mo and older, IM, PF (Flulaval, Fluarix) 2018    Influenza, Quadv, IM, (6 mo and older Fluzone, Flulaval, Fluarix and 3 yrs and older Afluria) 2016, 2017    Influenza, Quadv, IM, PF (6 mo and older Fluzone, Flulaval, Fluarix, and 3 yrs and older Afluria) 10/11/2019, 2020    Pneumococcal Conjugate 13-valent Ion Reyes) 04/12/2013    Pneumococcal Polysaccharide ([id number]) 08/23/2017    Tdap (Boostrix, Adacel) 10/06/2012       Active Problems:  Patient Active Problem List   Diagnosis Code    Cerebral artery occlusion with cerebral infarction (HCC) I63.50    Nonintractable migraine G43.009    TIA (transient ischemic attack) G45.9    Tension headache G44.209    Drug induced headache G44.40    Dysthymia F34.1    Fibromyalgia M79.7    Chronic anxiety F41.9    Right ankle pain M25.571    Peripheral edema R60.9    Peroneal tendon injury S86.309A    Migraine with aura, with intractable migraine, so stated, with status migrainosus G43. 111    Primary insomnia F51.01    Trigger finger, right ring finger M65.341    Greater trochanteric bursitis of left hip M70.62    Finger pain, right M79.644    Left hip pain M25.552    Insomnia due to mental condition F51.05    Altered mental status, unspecified R41.82    ARF (acute respiratory failure) (HCC) J96.00    Acute encephalopathy G93.40    HTN (hypertension) I10    OD (overdose of drug) T50.901A    Acute respiratory failure (HCC) J96.00    Benzodiazepine overdose T42.4X1A    Prolonged Q-T interval on ECG R94.31    Disorder of electrolytes E87.8    Accidental amitriptyline overdose T43.011A    Somnolence R40.0    Intractable chronic migraine without aura and without status migrainosus G43.719    New onset seizure (HCC) R56.9    Encephalopathy G93.40    Altered mental status R41.82    Episode of recurrent major depressive disorder (HCC) F33.9    Polysubstance overdose T50.901A    Reactive depression F32.9    Persistent migraine aura without cerebral infarction and without status migrainosus, not intractable G43.509    Chronic idiopathic constipation K59.04    Accidental drug overdose T50.901A    Chronic bilateral low back pain without sciatica M54.5, G89.29    H/O: CVA (cerebrovascular accident) Z80.78    Other chest pain R07.89    Epigastric pain R10.13    Dyspepsia R10.13    Duodenal ulcer K26.9    Gastroesophageal reflux disease with esophagitis K21.00    Hidradenitis axillaris L73.2    Skin tag L91.8    Abnormal pigmentation of skin L81.9    Seborrheic keratosis L82.1    Abdominal distension R14.0    Slow transit constipation K59.01    Abdominal pain R10.9    Right hip pain M25.551    Inflamed seborrheic keratosis L82.0    Chronic tension headache G44.229    Pulmonary nodule R91.1    Syncope and collapse R55    Headache R51.9       Isolation/Infection:   Isolation            No Isolation          Patient Infection Status       None to display            Nurse Assessment:  Last Vital Signs: /86   Pulse 75   Temp 98.3 °F (36.8 °C) (Oral)   Resp 14   Ht 5' 3\" (1.6 m)   Wt 175 lb (79.4 kg)   SpO2 93%   BMI 31.00 kg/m²     Last documented pain score (0-10 scale): Pain Level: 10  Last Weight:   Wt Readings from Last 1 Encounters:   12/01/20 175 lb (79.4 kg)     Mental Status:  oriented and alert    IV Access:  - None    Nursing Mobility/ADLs:  Walking   Independent  Transfer  Independent  Bathing  Independent  Dressing  Independent  Toileting  Independent  Feeding  Independent  Med Admin  Independent  Med Delivery   none    Wound Care Documentation and Therapy:        Elimination:  Continence:   · Bowel: No  · Bladder: No  Urinary Catheter: None   Colostomy/Ileostomy/Ileal Conduit: No       Date of Last BM: 12/02/20      Intake/Output Summary (Last 24 hours) at 12/2/2020 1046  Last data filed at 12/2/2020 1037  Gross per 24 hour   Intake 1680 ml   Output --   Net 1680 ml     I/O last 3 completed shifts:   In: 240 [P.O.:240]  Out: -     Safety Concerns:     None    Impairments/Disabilities:      None    Nutrition Therapy:  Current Nutrition Therapy:   - Oral Diet:  General    Routes of Feeding: None  Liquids: No Restrictions  Daily Fluid Restriction: no  Last Modified Barium Swallow with Video (Video Swallowing Test): not done    Treatments at the Time of Hospital Discharge:   Respiratory Treatments: ***  Oxygen Therapy:  is not on home oxygen therapy. Ventilator:    - No ventilator support    Rehab Therapies: Physical Therapy, Occupational Therapy and Nurse  Weight Bearing Status/Restrictions: No weight bearing restirctions  Other Medical Equipment (for information only, NOT a DME order):  ***  Other Treatments: ***    Patient's personal belongings (please select all that are sent with patient):  None    RN SIGNATURE:  Electronically signed by Duncan Kitchen RN on 12/2/20 at 1:27 PM EST    CASE MANAGEMENT/SOCIAL WORK SECTION    Inpatient Status Date: ***    Readmission Risk Assessment Score:  Readmission Risk              Risk of Unplanned Readmission:        9           Discharging to Facility/ Agency   · Name: [address] [place] [address]  · Address:  · Phone:[phone]  · Fax:[phone]    Dialysis Facility (if applicable)   · Name:  · Address:  · Dialysis Schedule:  · Phone:  · Fax:    / signature: {Esignature:[id number]:::0}    PHYSICIAN SECTION    Prognosis: Good    Condition at Discharge: Stable    Recommended Labs or Other Treatments After Discharge: Home PT/OT    Physician Certification: I certify the above information and transfer of Stephanie Gallardo  is necessary for the continuing treatment of the diagnosis listed and that she requires Home Care for greater 30 days.      Update Admission H&P: No change in H&P    PHYSICIAN SIGNATURE:  Electronically signed by DARÍO Mcdonough CNP on 12/2/20 at 10:47 AM EST

## 2020-12-02 NOTE — CARE COORDINATION
Call received from NP, , who confirmed pt is NOT appropriate for ARU. VM left for [place] to disregard referral. No pref for home care agency. Referral called to [facility], Anjelica Jang, who states she will review and let CM know if able to accept.

## 2020-12-02 NOTE — CARE COORDINATION
General acute hospital    Referral received from  to follow for home care services. I will follow for needs, and speak with patient to verify demos.     [facility] unable to staff timely  Referral accepted by [facility] spoke with Garry Saravia RN, BSN CTN  General acute hospital [phone]

## 2020-12-02 NOTE — PROGRESS NOTES
Occupational Therapy   Occupational Therapy Initial Assessment/Treatment    Date: 2020   Patient Name: Alice Lang  MRN: [id number]     : 1965    Date of Service: 2020    Discharge Recommendations:  Continue to assess pending progress, IP Rehab, if declines IP rehab patient would benefit from Home with Home health OT, S Level 3  OT Equipment Recommendations  Equipment Needed: No(Defer to PT)    Assessment   Performance deficits / Impairments: Decreased functional mobility ; Decreased ADL status; Decreased ROM; Decreased strength;Decreased safe awareness;Decreased cognition;Decreased endurance;Decreased sensation;Decreased balance;Decreased high-level IADLs;Decreased fine motor control;Decreased coordination    Assessment: Pt. is a 48 y.o. female presented to [facility] [place] d/t severe head pain and falls, transferred to [facility] for syncope and collapse. Pt. demo's significant weakness of the R side putting her at high risk for falls. Pt. currently functioning below baseline requiring up to [address] for LOB during mobiliyt w/ no AD, CGA for transfers and ADLs. Further observation during ADLs is reccommended d/t impaired bilateral fine motor control. D/t significant generalized R side weakness and impaired fine motor control, pt. would benefit from IPR level of therapy to address deficits listed above, if d/c home, pt. would benefit from S3 level HHOT. Therapists discussed this option w/ pt. who expressed understanding as well as willingness to use AD. Con't w/ acute OT follow-up. Treatment Diagnosis: R side weakness  Prognosis: Good  Decision Making: Medium Complexity  OT Education: Equipment;OT Role;Plan of Care;Precautions; ADL Adaptive Strategies;Transfer Training;Orientation  Patient Education: Safety w/ AD, POC  REQUIRES OT FOLLOW UP: Yes    Activity Tolerance  Activity Tolerance: Patient Tolerated treatment well;Patient limited by pain; Patient limited by fatigue  Activity Tolerance: Reported 10/10 head pain, did not limit actvitiy  Safety Devices  Safety Devices in place: Yes  Type of devices: Left in chair;Nurse notified;Call light within reach;Gait belt           Patient Diagnosis(es): There were no encounter diagnoses. has a past medical history of Ankle fracture, Anxiety, Arthritis, Benzodiazepine abuse ([address] Depression, Fibromyalgia, Hyperlipidemia, Hypertension, Kidney cyst, Migraine, Pneumonia, TIA (transient ischemic attack), Ulcer, Stomach Peptic, Unspecified cerebral artery occlusion with cerebral infarction, and Weight loss. has a past surgical history that includes Cholecystectomy; tumor removal ();  section; Hysterectomy; hernia repair; Hand surgery (); Ankle surgery; Upper gastrointestinal endoscopy (N/A, 2018); and Colonoscopy (N/A, 2018).     Treatment Diagnosis: R side weakness      Restrictions  Restrictions/Precautions  Restrictions/Precautions: Fall Risk, General Precautions, Up as Tolerated  Required Braces or Orthoses?: No  Position Activity Restriction  Other position/activity restrictions: Telemetry, IV (removed by nurse during session)    Subjective   General  Chart Reviewed: Yes, Orders, Progress Notes, History and Physical, Imaging  Patient assessed for rehabilitation services?: Yes  Family / Caregiver Present: No  Referring Practitioner: DARÍO Garrett CNP 2020  Diagnosis: Syncope and collapse 2020    Subjective  Subjective: Pt. standing in bathroom upon arrival, presents drowsy, \"I can't get this headache to go away\", \"I'm normally much more independent than this\", \"If I was to go anywhere for more help I guess I'd just stay here\", \"I want a Covid test because my son might have it\"    Patient Currently in Pain: Yes  Pain Assessment  Pain Assessment: 0-10  Pain Level: 10  Pain Type: Acute pain  Pain Location: Head  Pain Descriptors: Aching  Pain Frequency: Continuous  Pain Onset: On-going  Functional Pain Assessment: Prevents or interferes some active activities and ADLs  Non-Pharmaceutical Pain Intervention(s): Ambulation/Increased Activity;Repositioned; Therapeutic presence; Therapeutic touch; Distraction    Social/Functional History  Social/Functional History  Lives With: Spouse  Type of Home: House  Home Layout: Two level, Able to Live on Main level with bedroom/bathroom  Home Access: Stairs to enter without rails  Entrance Stairs - Number of Steps: 3 OSIEL  Bathroom Shower/Tub: Tub/Shower unit  Bathroom Toilet: Handicap height  Home Equipment: Cane, Crutches  ADL Assistance: Independent  Homemaking Assistance: Independent  Homemaking Responsibilities: Yes  Ambulation Assistance: Independent(w/out AD)  Transfer Assistance: Independent  Active : Yes  Occupation: Retired  Type of occupation: [employer], [employer]    Objective   Vision: Impaired  Vision Exceptions: Wears glasses at all times  Hearing: Within functional limits      Orientation  Overall Orientation Status: Impaired  Orientation Level: Oriented to place;Oriented to situation;Oriented to person;Disoriented to time(Correct year, incorrect date)    Balance  Sitting Balance: Stand by assistance  Standing Balance: Contact guard assistance  Standing Balance  Time: ~10 mins  Activity: Ambulation, standing EOB, transfers  Comment: No devices used, reccommended walker    Functional Mobility  Functional - Mobility Device: No device  Activity: Other(Bathroom>bed>hallway>chair)  Assist Level:  Moderate assistance(min-modA)  Functional Mobility Comments: Experienced multiple episodes of LOB d/t buckling R knee requiring up to [address] for support, continually demo'd knee weakness and fatigue    ADL  LE Bathing: Minimal assistance(For donning/doffing jacket)  LE Dressing: Contact guard assistance(Adjusting underwear while standing)  Additional Comments: R side weakness substantial to interfere w/ reaching and lifting objects for ADLs evidenced by difficulty reaching for cup on tray table and difficulty pulling sleeve off L arm  Tone RUE  RUE Tone: Normotonic  Tone LUE  LUE Tone: Normotonic  Coordination  Movements Are Fluid And Coordinated: Yes  Coordination and Movement description: Fine motor impairments;Decreased speed;Right UE;Left UE  Quality of Movement Other  Comment: Demo'd inability to tip pinch w/ all fingers bilaterally, pad-to-pad pinch w/ R&L and decreased speed and accuracy in R hand    Bed mobility  Supine to Sit: Unable to assess  Sit to Supine: Unable to assess  Comment: Pt. started session standing, ended session seated in chair, demo'd difficulty lifting R leg when crossing legs in chair    Transfers  Sit to stand: Contact guard assistance  Stand to sit: Contact guard assistance  Transfer Comments: CGA for R sided weakness when standing, no device used    Cognition  Overall Cognitive Status: Exceptions  Arousal/Alertness: Delayed responses to stimuli  Following Commands: Follows multistep commands with increased time; Follows multistep commands with repitition  Safety Judgement: Decreased awareness of need for safety;Decreased awareness of need for assistance  Insights: Decreased awareness of deficits  Initiation: Requires cues for some  Sequencing: Requires cues for some  Cognition Comment: Demo's delayed responses to verbal commands, decreased awareness of body sensations (strength)  Perception  Overall Perceptual Status: Impaired  Initiation: Cues to initiate tasks  Motor Planning: Cues to use objects appropriately(Movements are slow and cues to perform movements correctly)    Sensation  Overall Sensation Status: WFL    LUE AROM (degrees)  LUE AROM : WFL  Left Hand AROM (degrees)  Left Hand AROM: WFL  RUE AROM (degrees)  RUE AROM : WFL  RUE General AROM: Increased time to complete movements  Right Hand AROM (degrees)  Right Hand AROM: WFL  Right Hand General AROM: Increased time to process and complete movements accurately  LUE Strength  Gross LUE Strength: WFL  RUE Strength  Gross RUE Strength: Exceptions to [facility]  RUE Strength Comment: Grossly 3+/5, moves w/ min resistance  R Fingers  Full Assessment: No  Right Finger Strength Comment:  strength decreased in comparison to R     Plan   Plan  Times per week: 3-5x/wk in acute  Current Treatment Recommendations: Balance Training, Self-Care / ADL, Safety Education & Training, Endurance Training, Functional Mobility Training, Strengthening, ROM, Neuromuscular Re-education, Pain Management    AM-PAC Score  AM-[facility] Inpatient Daily Activity Raw Score: 19 (12/02/20 1108)  AM-PAC Inpatient ADL T-Scale Score : 40.22 (12/02/20 1108)  ADL Inpatient CMS 0-100% Score: 42.8 (12/02/20 1108)  ADL Inpatient CMS G-Code Modifier : CK (12/02/20 1108)    Goals  Short term goals  Time Frame for Short term goals: One week unless otherwise specified - 12/09  Short term goal 1: Pt. will perform toilet transfer w/ appropriate AD and SUP  Short term goal 2: Pt. will complete LE dressing w/ SUP  Short term goal 3: Pt. will perform 5 RUE exercises x15 in preparation for ADL tasks  Short term goal 4: Pt. will perform standing level grooming tasks w/ SBA and ~10 mins w/ fewer than 2 moments of LOB  Patient Goals   Patient goals : \"I want to get this headache under control and walk better\"       Therapy Time   Individual Concurrent Group Co-treatment   Time In 0947         Time Out 1027         Minutes 40         Timed Code Treatment Minutes: 30 Minutes       Christopher Calvert. Monico Dong, S/OT  Cosigned by   BALWINDER Santizo/L    If pt is unable to be seen after this session, please let this note serve as discharge summary. Please see case management note for discharge disposition. Thank you.

## 2020-12-02 NOTE — PROGRESS NOTES
Physical Therapy    Facility/Department: [facility] B3 - MED SURG  Initial Assessment    NAME: Brit Mckenzie  : 1965  MRN: [id number]    Date of Service: 2020    Discharge Recommendations:  IP Rehab   PT Equipment Recommendations  Equipment Needed: No(defer to next level of care. Would benefit from RW DC home)    Assessment   Body structures, Functions, Activity limitations: Decreased functional mobility ; Decreased strength;Decreased cognition;Decreased endurance;Decreased balance;Decreased fine motor control;Decreased coordination  Assessment: Pt admitted to [facility] with HA and frequent falls. Pt previously highly indep without AD and managing housework. Currently pt present with significant R sided weakness of 2+/5 compared to 4+/5 on the L. Frequent knee buckling with short ambulation distances needing up to Mod assist to correct. Pt reports fatigue follow brief bouts of activity. Pt also presents wtih some confusion needing additional cues to complete tasks and for orientation. Based on pt's high level of indep prior to admit and current deficits, pt would benefit from IP rehab to maximize return to OF. Treatment Diagnosis: decreased strength, balance and coordination  Prognosis: Excellent  Decision Making: Medium Complexity  PT Education: Goals; General Safety;Gait Training;Disease Specific Education;Orientation;PT Role;Plan of Care; Functional Mobility Training;Transfer Training;Precautions; Injury Prevention  Patient Education: Pt educated on safe mobility given R sided deficits. Pt expressed understanding  Barriers to Learning: none  REQUIRES PT FOLLOW UP: Yes  Activity Tolerance  Activity Tolerance: Patient Tolerated treatment well       Patient Diagnosis(es): There were no encounter diagnoses.      has a past medical history of Ankle fracture, Anxiety, Arthritis, Benzodiazepine abuse ([facility] [address] Depression, Fibromyalgia, Hyperlipidemia, Hypertension, Kidney cyst, Migraine, Pneumonia, TIA (transient ischemic attack), Ulcer, Stomach Peptic, Unspecified cerebral artery occlusion with cerebral infarction, and Weight loss. has a past surgical history that includes Cholecystectomy; tumor removal ();  section; Hysterectomy; hernia repair; Hand surgery (); Ankle surgery; Upper gastrointestinal endoscopy (N/A, 2018); and Colonoscopy (N/A, 2018). Restrictions  Restrictions/Precautions  Restrictions/Precautions: Fall Risk, General Precautions, Up as Tolerated  Required Braces or Orthoses?: No  Position Activity Restriction  Other position/activity restrictions: Telemetry, IV (removed by nurse during session)  Vision/Hearing  Vision: Impaired  Vision Exceptions: Wears glasses at all times  Hearing: Within functional limits     Subjective  General  Chart Reviewed: Yes  Patient assessed for rehabilitation services?: Yes  Response To Previous Treatment: Not applicable  Family / Caregiver Present: No  Referring Practitioner: DARÍO Stewart CNP  Referral Date : 20  Diagnosis: fall  Follows Commands: Within Functional Limits  General Comment  Comments: cleared by nursing  Subjective  Subjective: pt up in the bathroom by  herself upon entry. REports 10/10 LERMA.  RN notified  Pain Screening  Patient Currently in Pain: Yes          Orientation  Orientation  Overall Orientation Status: Impaired  Orientation Level: Disoriented to time;Oriented to person;Oriented to situation  Social/Functional History  Social/Functional History  Lives With: Spouse  Type of Home: House  Home Layout: Two level, Able to Live on Main level with bedroom/bathroom  Home Access: Stairs to enter without rails  Entrance Stairs - Number of Steps: 3 OSIEL  Bathroom Shower/Tub: Tub/Shower unit  Bathroom Toilet: Handicap height  Home Equipment: Cane, Crutches  ADL Assistance: Independent  Homemaking Assistance: Independent  Homemaking Responsibilities: Yes  Ambulation Assistance: Independent(w/out AD)  Transfer Assistance: Independent  Active : Yes  Occupation: Retired  Type of occupation: [address] 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[employer], paint by number  Cognition        Objective          PROM RLE (degrees)  RLE PROM: WFL  AROM RLE (degrees)  RLE AROM: WFL  PROM LLE (degrees)  LLE PROM: WFL  AROM LLE (degrees)  LLE AROM : WFL  Strength RLE  Strength RLE: Exception  Comment: 2+/5 hip, knee and ankle  Strength LLE  Comment: 4/5  Tone RLE  RLE Tone: Normotonic  Tone LLE  LLE Tone: Normotonic  Coordination  Rapid Alternating Movements: (slow movement on R due to strength, unable to demo rapid movements)  Sensation  Overall Sensation Status: WFL  Bed mobility  Supine to Sit: Unable to assess  Sit to Supine: Unable to assess  Transfers  Sit to Stand: Contact guard assistance  Stand to sit: Contact guard assistance  Ambulation  Ambulation?: Yes  More Ambulation?: No  Ambulation 1  Device: No Device  Assistance: Minimal assistance; Moderate assistance  Quality of Gait: Frequent R knee buckling and decreased step length. Slow cadance with decreased proprioception of R LE when stepping. Distance: 48'  Comments: 2 LOB needing up to Mod A to correct, Min assist for balance     Balance  Posture: Good  Sitting - Static: Good  Sitting - Dynamic: Good  Standing - Static: Fair  Standing - Dynamic: Poor        Plan   Plan  Times per week: 3-5x/week  Current Treatment Recommendations: Strengthening, Balance Training, Endurance Training, Functional Mobility Training, Gait Training, Transfer Training, Stair training, Home Exercise Program, Safety Education & Training, Patient/Caregiver Education & Training, Equipment Evaluation, Education, & procurement  Safety Devices  Type of devices:  All fall risk precautions in place, Left in chair, Gait belt, Patient at risk for falls, Nurse notified, Call light within reach  Restraints  Initially in place: No      Goals  Short term goals  Time Frame for Short term goals: 12/6  Short term goal 1: Pt will complete sit to stand transfer with supervision  Short term goal 2: Pt will ambulate x 150' without AD with supervision  Short term goal 3: Pt will complete 3 stairs with CGA  Short term goal 4: Pt will complete LE exercises to improve mobility by 12/4  Patient Goals   Patient goals : to be back to normal       Therapy Time   Individual Concurrent Group Co-treatment   Time In 0948         Time Out 1027         Minutes 39         Timed Code Treatment Minutes: [address] [facility], PT    If pt is unable to be seen after this session, please let this note serve as discharge summary. Please see case management note for discharge disposition. Thank you.

## 2020-12-04 ENCOUNTER — VIRTUAL VISIT (OUTPATIENT)
Dept: FAMILY MEDICINE CLINIC | Age: 55
End: 2020-12-04
Payer: COMMERCIAL

## 2020-12-04 PROCEDURE — 1111F DSCHRG MED/CURRENT MED MERGE: CPT | Performed by: FAMILY MEDICINE

## 2020-12-04 PROCEDURE — 99496 TRANSJ CARE MGMT HIGH F2F 7D: CPT | Performed by: FAMILY MEDICINE

## 2020-12-04 RX ORDER — METHYLPREDNISOLONE 4 MG/1
TABLET ORAL
Qty: 1 KIT | Refills: 0 | Status: SHIPPED | OUTPATIENT
Start: 2020-12-04 | End: 2020-12-15 | Stop reason: ALTCHOICE

## 2020-12-04 NOTE — PROGRESS NOTES
2020    TELEHEALTH EVALUATION -- Audio/Visual (During ZOWNA-17 public health emergency)    HPI:    Trena Lorenzana (:  1965) has requested an audio/video evaluation for the following concern(s):    Hospital follow up: [facility] -20 for syncope, headache. She is not feeling well at all. She has no feeling in her right side. She has a headache that will not go away. She was given Fioricet in the hospital and it helped but she only has a couple left. she wants to know if you can send a refill to her pharmacy. She feels nauseas. She has had another fall since she has been out of the hospital. She thinks she might need a walker. Patient originally was referred to the emergency room when I was contacted about a fall and should not been acting right since. She told me that it happened on the same day, but  stated that the fall actually occurred the day before. Patient seems somewhat confused during the visit. Her blood pressure had been quite high. I told her my concern was that she may have hit her head and had intracranial bleeding. She did go to the emergency room reluctantly or drug screen revealed the presence of benzodiazepine and barbiturate. She had some old Fioricet that she had taken and later admitted to findings smoke Klonopin. She seemed drowsy and sedated in the emergency room. She denied taking the benzodiazepine to the emergency room doctor but later admitted this to the inpatient provider. Home care nurse had contacted my nurse prior to this virtual visit. She was concerned the patient would not join the virtual visit. She was concerned that the patient had overuse medication. She was drowsy and appeared sedated. Patient wanted to know if I could prescribe some the Fioricet because that's the only thing that would help her headache. All other ROS negative    Prior to Visit Medications    Medication Sig Taking?  Authorizing Provider butalbital-acetaminophen-caffeine (FIORICET, ESGIC) -40 MG per tablet Take 1 tablet by mouth every 4 hours as needed for Headaches Yes DARÍO Orozco CNP   hydrOXYzine (ATARAX) 10 MG tablet Take 1 tablet by mouth 3 times daily as needed for Anxiety Yes DARÍO Orozco CNP   nystatin (MYCOSTATIN) 043809 UNIT/GM powder Apply 3 times daily.  Yes Ramin Britton MD   albuterol sulfate HFA (VENTOLIN HFA) 108 (90 Base) MCG/ACT inhaler Inhale 2 puffs into the lungs every 6 hours as needed for Wheezing Yes Ramin Britton MD   rosuvastatin (CRESTOR) 10 MG tablet Take 1 tablet by mouth nightly Yes Darryle Slain, MD   traZODone (DESYREL) 150 MG tablet TAKE 1 TABLET AT BEDTIME Yes Darryle Slain, MD   Blood Pressure Monitor KIT Use daily Yes Darryle Slain, MD   pantoprazole (PROTONIX) 40 MG tablet TAKE 1 TABLET EACH MORNING PRIOR TO BREAKFAST Yes Darryle Slain, MD   DULoxetine (CYMBALTA) 60 MG extended release capsule TAKE (1) CAPSULE TWICE DAILY Yes Darryle Slain, MD   metoprolol succinate (TOPROL XL) 100 MG extended release tablet TAKE (1) TABLET DAILY Yes Darryle Slain, MD   furosemide (LASIX) 20 MG tablet Take 20 mg by mouth daily as needed Yes Fred Lechuga MD   polyethylene glycol (MIRALAX) POWD powder Take 1-2 capfuls of Miralax daily as needed Yes Darryle Slain, MD   triamterene-hydrochlorothiazide (DYAZIDE) 37.5-25 MG per capsule Take 1 capsule by mouth daily  Patient taking differently: Take 1 capsule by mouth daily Only takes if BP is britany high and she cant get it to drop Yes Darryle Slain, MD       Allergies   Allergen Reactions    Benadryl [Diphenhydramine] Anxiety     \"Made me jump off the wall\"    Topamax [Topiramate] Hives    Tramadol Shortness Of Breath    Vitamin C [Ascorbic Acid] Hives    Lyrica [Pregabalin] Palpitations    Penicillins Hives and Itching       Past Medical History: Diagnosis Date    Ankle fracture     Anxiety     Arthritis     Ben[facility]odiazepine abuse ([facility] [address]     Depression     Fibromyalgia     Hyperlipidemia     Hypertension     Kidney cyst     x2 rt. ?    Migraine     Pneumonia 2008    bronchitis frequently since    TIA (transient ischemic attack)     Ulcer, Stomach Peptic     frequent N/V    Unspecified cerebral artery occlusion with cerebral infarction      mini stroke cused by severe migraine    Weight loss     frequent N/V, S/P cholecystectomy       Past Surgical History:   Procedure Laterality Date    ANKLE SURGERY       SECTION      CHOLECYSTECTOMY      COLONOSCOPY N/A 2018    COLON W/ANES. performed by Adolph Isaac MD at [address]      HERNIA REPAIR      HYSTERECTOMY      TUMOR REMOVAL      lt femur    UPPER GASTROINTESTINAL ENDOSCOPY N/A 2018    EGD BIOPSY performed by Adolph Isaac MD at [address] [place] [address] History     Tobacco Use    Smoking status: Current Some Day Smoker     Packs/day: 0.50     Years: 27.00     Pack years: 13.50     Types: Cigarettes    Smokeless tobacco: Never Used    Tobacco comment: she smokes sometimes   Substance Use Topics    Alcohol use: No     Alcohol/week: 0.0 standard drinks     Comment: rare    Drug use: No       PHYSICAL EXAMINATION:  [ INSTRUCTIONS:  \"[x]\" Indicates a positive item  \"[]\" Indicates a negative item  -- DELETE ALL ITEMS NOT EXAMINED]  Vital Signs: (As obtained by patient/caregiver or practitioner observation)  See flowsheet  Blood pressure- Heart rate-    Respiratory rate-    Temperature-  Pulse oximetry-     Constitutional: [x] Appears well-developed and well-nourished [] No apparent distress      [x] Abnormal- patient appears sedated. Her eyes are barely open. Some slurring of her words.     Mental status  [] Alert and awake  [x] Oriented to person/place/time [x]Able to follow commands      Eyes:  EOM []  Normal  [x] Abnormal-Unable to keep her eyes open  Sclera  []  Normal  [x] Abnormal -         Discharge [x]  None visible  [] Abnormal -    HENT:   [x] Normocephalic, atraumatic. [] Abnormal   [] Mouth/Throat: Mucous membranes are moist.     External Ears [x] Normal  [] Abnormal-     Neck: [x] No visualized mass     Pulmonary/Chest: [x] Respiratory effort normal.  [x] No visualized signs of difficulty breathing or respiratory distress        [] Abnormal-      Musculoskeletal:   [] Normal gait with no signs of ataxia         [x] Normal range of motion of neck        [] Abnormal-       Neurological:        [] No Facial Asymmetry (Cranial nerve 7 motor function) (limited exam to video visit)          [] No gaze palsy        [x] Abnormal- facial asymmetry        Skin:        [x] No significant exanthematous lesions or discoloration noted on facial skin         [] Abnormal-            Psychiatric:       [] Normal Affect [x] No Hallucinations        [x] Abnormal- Patient appears sedated    Other pertinent observable physical exam findings-      ASSESSMENT PLAN      Diagnosis Orders   1. Intractable chronic migraine without aura and without status migrainosus     2. Encephalopathy     3. Chronic tension-type headache, not intractable     4. Drug induced headache     5. Chronic anxiety     6. Somnolence     7. Essential hypertension     8. Syncope and collapse     this is a very complex patient and patient issue. The presentation I saw today appears similar to numerous presentations in the office. There will be times will feel like the patient had had a stroke. She was referred several times to the emergency room repeat CAT scans of the head unremarkable for evidence of CVA. I do believe the patient's migraines manifest in part in these neurologic changes, but also there has been repeated evidence of sedating and mental status altering medication either use or overuse.   It is quite possible that the initial fall was related to sedation from medication. Also there is an element of uncontrolled hypertension that may be contributing. When the patient was in the hospital she was seen by neurology who said it was okay for her to use the Fioricet but warned her not use it more than twice a week due to risk of analgesic rebound headache. Patient asked me for Fioricet and I told her I was not comfortable prescribing that. I do not believe we should be prescribing any type of sedating medication for the patient. The patient would be most benefited by intense headache clinic management as well as mental health services but this is all been declined in the past.  Likely does need more aggressive intervention with her blood pressure. We're following this issue up on December 8. I'm not sure how much support that she has at home or from others but likely recruitment of support would be crucial in eating about positive change for this patient. She also does have very legitimate concerns with not being able to see her mother in a nursing home and also concerns over her father's health. We could reach out to a care coordinator to assist.    Patient should call the office immediately with new or ongoing signs or symptoms or worsening, or proceed to the emergency room. All entries in chief complaint and history of present illness are reviewed and validated by me. No changes in past medical history, past surgical history, social history, or family history were noted during the patient encounter unless specifically listed above. All updates of past medical history, past surgical history, social history, or family history were reviewed personally by me during the office visit. All problems listed in the assessment are stable unless noted otherwise. Medication profile reviewed personally by me during the office visit. Medication side effects and possible impairments from medications were discussed as applicable.     Every effort

## 2020-12-08 ENCOUNTER — VIRTUAL VISIT (OUTPATIENT)
Dept: FAMILY MEDICINE CLINIC | Age: 55
End: 2020-12-08
Payer: COMMERCIAL

## 2020-12-08 PROCEDURE — G8427 DOCREV CUR MEDS BY ELIG CLIN: HCPCS | Performed by: FAMILY MEDICINE

## 2020-12-08 PROCEDURE — 4004F PT TOBACCO SCREEN RCVD TLK: CPT | Performed by: FAMILY MEDICINE

## 2020-12-08 PROCEDURE — 1111F DSCHRG MED/CURRENT MED MERGE: CPT | Performed by: FAMILY MEDICINE

## 2020-12-08 PROCEDURE — 99214 OFFICE O/P EST MOD 30 MIN: CPT | Performed by: FAMILY MEDICINE

## 2020-12-08 PROCEDURE — G8417 CALC BMI ABV UP PARAM F/U: HCPCS | Performed by: FAMILY MEDICINE

## 2020-12-08 PROCEDURE — 3017F COLORECTAL CA SCREEN DOC REV: CPT | Performed by: FAMILY MEDICINE

## 2020-12-08 PROCEDURE — G8482 FLU IMMUNIZE ORDER/ADMIN: HCPCS | Performed by: FAMILY MEDICINE

## 2020-12-08 RX ORDER — PROMETHAZINE HYDROCHLORIDE 25 MG/1
25 TABLET ORAL EVERY 6 HOURS PRN
Qty: 10 TABLET | Refills: 0 | Status: SHIPPED | OUTPATIENT
Start: 2020-12-08 | End: 2021-01-19 | Stop reason: SDUPTHER

## 2020-12-08 NOTE — PROGRESS NOTES
Yes Chun Mares MD   pantoprazole (PROTONIX) 40 MG tablet TAKE 1 TABLET EACH MORNING PRIOR TO BREAKFAST Yes Chun Mares MD   DULoxetine (CYMBALTA) 60 MG extended release capsule TAKE (1) CAPSULE TWICE DAILY Yes Chun Mares MD   metoprolol succinate (TOPROL XL) 100 MG extended release tablet TAKE (1) TABLET DAILY Yes Chun Mares MD   furosemide (LASIX) 20 MG tablet Take 20 mg by mouth daily as needed Yes Historical Provider, MD   polyethylene glycol (MIRALAX) POWD powder Take 1-2 capfuls of Miralax daily as needed Yes Chun Mares MD   triamterene-hydrochlorothiazide (DYAZIDE) 37.5-25 MG per capsule Take 1 capsule by mouth daily  Patient taking differently: Take 1 capsule by mouth daily Only takes if BP is britany high and she cant get it to drop Yes Chun Mares MD       Allergies   Allergen Reactions    Benadryl [Diphenhydramine] Anxiety     \"Made me jump off the wall\"    Topamax [Topiramate] Hives    Tramadol Shortness Of Breath    Vitamin C [Ascorbic Acid] Hives    Lyrica [Pregabalin] Palpitations    Penicillins Hives and Itching       Past Medical History:   Diagnosis Date    Ankle fracture     Anxiety     Arthritis     [facility]ine abuse ([facility] [address]     Depression     Fibromyalgia     Hyperlipidemia     Hypertension     Kidney cyst     x2 rt. ?    Migraine     Pneumonia 2008    bronchitis frequently since    TIA (transient ischemic attack)     Ulcer, Stomach Peptic     frequent N/V    Unspecified cerebral artery occlusion with cerebral infarction      mini stroke cused by severe migraine    Weight loss     frequent N/V, S/P cholecystectomy       Past Surgical History:   Procedure Laterality Date    ANKLE SURGERY       SECTION      CHOLECYSTECTOMY      COLONOSCOPY N/A 2018    COLON W/ANES.  performed by Kristin Medina MD at [address] [facility] SURGERY  2005   [address] [facility]  HYSTERECTOMY      TUMOR REMOVAL  1990    lt femur    UPPER GASTROINTESTINAL ENDOSCOPY N/A 12/20/2018    [facility] [address] History     Tobacco Use    Smoking status: Current Some Day Smoker     Packs/day: 0.50     Years: 27.00     Pack years: 13.50     Types: Cigarettes    Smokeless tobacco: Never Used    Tobacco comment: she smokes sometimes   Substance Use Topics    Alcohol use: No     Alcohol/week: 0.0 standard drinks     Comment: rare    Drug use: No       PHYSICAL EXAMINATION:  [ INSTRUCTIONS:  \"[x]\" Indicates a positive item  \"[]\" Indicates a negative item  -- DELETE ALL ITEMS NOT EXAMINED]  Vital Signs: (As obtained by patient/caregiver or practitioner observation)  See flowsheet  Blood pressure- Heart rate-    Respiratory rate-    Temperature-  Pulse oximetry-     Constitutional: [x] Appears well-developed and well-nourished [x] No apparent distress      [] Abnormal-   Mental status  [x] Alert and awake  [x] Oriented to person/place/time [x]Able to follow commands      Eyes:  EOM    [x]  Normal  [] Abnormal-  Sclera  [x]  Normal  [] Abnormal -         Discharge [x]  None visible  [] Abnormal -    HENT:   [x] Normocephalic, atraumatic.   [] Abnormal   [] Mouth/Throat: Mucous membranes are moist.     External Ears [x] Normal  [] Abnormal-     Neck: [x] No visualized mass     Pulmonary/Chest: [x] Respiratory effort normal.  [x] No visualized signs of difficulty breathing or respiratory distress        [] Abnormal-      Musculoskeletal:   [] Normal gait with no signs of ataxia         [x] Normal range of motion of neck        [] Abnormal-       Neurological:        [x] No Facial Asymmetry (Cranial nerve 7 motor function) (limited exam to video visit)          [x] No gaze palsy        [] Abnormal-         Skin:        [x] No significant exanthematous lesions or discoloration noted on facial skin         [] Abnormal- Psychiatric:       [x] Normal Affect [x] No Hallucinations        [] Abnormal-     Other pertinent observable physical exam findings-      ASSESSMENT PLAN      Diagnosis Orders   1. Intractable vomiting with nausea, unspecified vomiting type  promethazine (PHENERGAN) 25 MG tablet   2. Persistent migraine aura without cerebral infarction and without status migrainosus, not intractable  ERNIE - Ryan Rivera MD, Neurology, [facility]   3. Intractable chronic migraine without aura and without status migrainosus     4. Chronic tension-type headache, intractable     5. Essential hypertension     6. Dehydration     Will send Phenergan. Asked her to drink Gatorade. Ask home care to get a CBC CMP tomorrow. Refer to neurology for chronic daily headache. She was still laying down with her eyes shut but did not appear to be as sedated as she was a prior visit. Blood pressure appears to be better controlled. Virtual follow-up 1 week. Patient should call the office immediately with new or ongoing signs or symptoms or worsening, or proceed to the emergency room. No changes in past medical history, past surgical history, social history, or family history were noted during the patient encounter unless specifically listed above. All updates of past medical history, past surgical history, social history, or family history were reviewed personally by me during the office visit. All problems listed in the assessment are stable unless noted otherwise. Medication profile reviewed personally by me during the visit. Medication side effects and possible impairments from medications were discussed as applicable. This document was prepared by a combination of typing and transcription through a voice recognition software. I, Dr. He Haddad, personally performed the services described in this documentation, as scribed by the above signed scribe in my presence, and it is both accurate and complete.  I agree with the ROS and Past Histories independently gathered by the clinical support staff and the remaining scribed note accurately describes my personal service to the patient. 12/8/2020    2:39 PM          Karina Sharma is a 48 y.o. female being evaluated by a Virtual Visit (video visit) encounter to address concerns as mentioned above. A caregiver was present when appropriate. Due to this being a TeleHealth encounter (During [facility] emergency), evaluation of the following organ systems was limited: Vitals/Constitutional/EENT/Resp/CV/GI//MS/Neuro/Skin/Heme-Lymph-Imm. Pursuant to the emergency declaration under the [address] [place] [address] and the [employer] Act, this Virtual Visit was conducted with patient's (and/or legal guardian's) consent, to reduce the patient's risk of exposure to COVID-19 and provide necessary medical care. The patient (and/or legal guardian) has also been advised to contact this office for worsening conditions or problems, and seek emergency medical treatment and/or call 911 if deemed necessary. Services were provided through a video synchronous discussion virtually to substitute for in-person clinic visit. Patient and provider were located at their individual homes. --Rosalio Simon MA on 12/8/2020 at 2:08 PM    An electronic signature was used to authenticate this note.

## 2020-12-09 ENCOUNTER — TELEPHONE (OUTPATIENT)
Dept: FAMILY MEDICINE CLINIC | Age: 55
End: 2020-12-09

## 2020-12-09 NOTE — TELEPHONE ENCOUNTER
Karlee with [facility] called stating they had orders to draw CBC and CMP. They had an appt to see pt today, confirmed appt, but when arrived at home no answer at the door or by phone.  Call back [place] [phone]

## 2020-12-10 NOTE — DISCHARGE SUMMARY
Hospital Medicine Discharge Summary    Patient ID: Kanwal Cuello      Patient's PCP: Chi Bella MD    Admit Date: 11/30/2020     Discharge Date: 12/2/2020      Admitting Physician: Racheal Rolle MD     Discharge Physician: DARÍO Zepeda - CNP     Discharge Diagnoses: Active Hospital Problems    Diagnosis    Headache [R51.9]    Syncope and collapse [R55]    H/O: CVA (cerebrovascular accident) [Z86.73]    Reactive depression [F32.9]    HTN (hypertension) [I10]    Migraine with aura, with intractable migraine, so stated, with status migrainosus [G43.111]    Fibromyalgia [M79.7]    Chronic anxiety [F41.9]    Nonintractable migraine [G43.009]       The patient was seen and examined on day of discharge and this discharge summary is in conjunction with any daily progress note from day of discharge. Hospital Course:     48 y. o. female who presented to [facility] with above complaints  Patient presented to [facility] morning with complaints of headache.  Patient reports she has had a continuous unrelenting headache for the last 3 days, 8/10 in intensity, bifrontal, associated with nausea, no aura, no vision problems, no neck stiffness or photophobia/phonophobia.  Denies any subjective fevers chills or rigors.  She does report previous history of migraine attacks.  Has been taking ibuprofen at home without much relief.  Reports last migraine headache was about 4 months ago.  Patient did report a syncopal event on 11/29 when she was at home, passed out while she was holding some dishes, woke up to find the tissues were broken on the floor. she presented to the ED with similar complaints on 11/28, was evaluated in the ED but left [address] for further work-up of syncope and possible stroke.  She returned to the ED today morning due to continuous headache.  Patient reports she has been very anxious and depressed about not being able to see her father and mother, not being able to see her father because he has associated medical conditions that preclude from him being seen by her due to fear of giving Covid, and unable to see her mother who is in the nursing home who have restricted any visitors due to Covid concerns.  She reports due to her anxiety levels she found an old bottle of Klonopin at home and took 1 tablet yesterday, reports did not help her symptoms much.  Patient apparently gets Fioricet for headache from other providers that is not reflected on PDMP.   Currently patient denies any left arm paresthesias.  She reports compliance with all her medications.           Intractable headache   Likely recurrent migraine episode.  Treated with IV Toradol, IV Compazine.    - CT head, CTA head and neck unremarkable.      History of chronic migraine/chronic tension headaches  - neurology consulted. - recommended short course of Fioricet. This is a chronic problem for the patient. She has sought treatment at [facility] in the past.  She believes her symptoms are worse due to stress.     Syncope - unclear etiology, could be related to BZD overuse.  Patient has history of drug OD and misuse in the past including Fioricet and benzodiazepines.  Her UDS was positive for barbiturates [Fioricet] and BZD [Klonopin use that is not prescribed for her per PDMP]   Resume home HCTZ/Lasix  ECHO - EF 55%. No RWMAs. Reactive depression/chronic anxiety -  has not been well controlled especially in the last few months in the setting of pandemic and being unable to see her parents has really affected her.  Continue home meds including trazodone and Cymbalta. Added Atarax prn.     Hypertension - resume metoprolol.  Moderately well controlled.       Unsteady gait  CTH and CTA head/neck negative. MRI brain negative. PT/OT consulted.         Physical Exam Performed:     BP (!) 160/93   Pulse 63   Temp 98.4 °F (36.9 °C) (Oral)   Resp 15   Ht 5' 3\" (1.6 m)   Wt Stable    Discharge Instructions/Follow-up:  F/u with PCP in 1-2 weeks. F/u with neuro as needed. Code Status:  Full    Activity: activity as tolerated    Diet: general      Discharge Medications:     Discharge Medication List as of 12/2/2020  1:09 PM           Details   butalbital-acetaminophen-caffeine (FIORICET, ESGIC) -40 MG per tablet Take 1 tablet by mouth every 4 hours as needed for Headaches, Disp-10 tablet,R-0Print      hydrOXYzine (ATARAX) 10 MG tablet Take 1 tablet by mouth 3 times daily as needed for Anxiety, Disp-30 tablet,R-0Normal              Details   nystatin (MYCOSTATIN) 815860 UNIT/GM powder Apply 3 times daily. , Disp-30 g,R-0, Normal      albuterol sulfate HFA (VENTOLIN HFA) 108 (90 Base) MCG/ACT inhaler Inhale 2 puffs into the lungs every 6 hours as needed for Wheezing, Disp-1 Inhaler,R-0Normal      rosuvastatin (CRESTOR) 10 MG tablet Take 1 tablet by mouth nightly, Disp-30 tablet,R-5Normal      traZODone (DESYREL) 150 MG tablet TAKE 1 TABLET AT BEDTIME, Disp-30 tablet, R-11Normal      Blood Pressure Monitor KIT Disp-1 kit, R-0, NormalUse daily      pantoprazole (PROTONIX) 40 MG tablet TAKE 1 TABLET EACH MORNING PRIOR TO BREAKFAST, Disp-30 tablet, R-5Normal      DULoxetine (CYMBALTA) 60 MG extended release capsule TAKE (1) CAPSULE TWICE DAILY, Disp-60 capsule, R-0Normal      metoprolol succinate (TOPROL XL) 100 MG extended release tablet TAKE (1) TABLET DAILY, Disp-30 tablet, R-11Adjust Sig      furosemide (LASIX) 20 MG tablet Take 20 mg by mouth daily as neededHistorical Med      polyethylene glycol (MIRALAX) POWD powder Take 1-2 capfuls of Miralax daily as needed, Disp-255 g, R-11Normal      triamterene-hydrochlorothiazide (DYAZIDE) 37.5-25 MG per capsule Take 1 capsule by mouth daily, Disp-30 capsule, R-5Normal             Time Spent on discharge is more than 30 minutes in the examination, evaluation, counseling and review of medications and discharge plan. Signed:     Samreen Cam DARÍO Pelaez - CNP   12/10/2020      Thank you Raphael Giron MD for the opportunity to be involved in this patient's care. If you have any questions or concerns please feel free to contact me at [phone].

## 2020-12-15 ENCOUNTER — VIRTUAL VISIT (OUTPATIENT)
Dept: FAMILY MEDICINE CLINIC | Age: 55
End: 2020-12-15
Payer: COMMERCIAL

## 2020-12-15 PROCEDURE — 3017F COLORECTAL CA SCREEN DOC REV: CPT | Performed by: FAMILY MEDICINE

## 2020-12-15 PROCEDURE — 4004F PT TOBACCO SCREEN RCVD TLK: CPT | Performed by: FAMILY MEDICINE

## 2020-12-15 PROCEDURE — 99213 OFFICE O/P EST LOW 20 MIN: CPT | Performed by: FAMILY MEDICINE

## 2020-12-15 PROCEDURE — 1111F DSCHRG MED/CURRENT MED MERGE: CPT | Performed by: FAMILY MEDICINE

## 2020-12-15 PROCEDURE — G8427 DOCREV CUR MEDS BY ELIG CLIN: HCPCS | Performed by: FAMILY MEDICINE

## 2020-12-15 PROCEDURE — G8482 FLU IMMUNIZE ORDER/ADMIN: HCPCS | Performed by: FAMILY MEDICINE

## 2020-12-15 PROCEDURE — G8417 CALC BMI ABV UP PARAM F/U: HCPCS | Performed by: FAMILY MEDICINE

## 2020-12-15 RX ORDER — DULOXETIN HYDROCHLORIDE 60 MG/1
CAPSULE, DELAYED RELEASE ORAL
Qty: 30 CAPSULE | Refills: 0
Start: 2020-12-15 | End: 2021-07-23 | Stop reason: SDUPTHER

## 2020-12-15 NOTE — PROGRESS NOTES
12/15/2020    TELEHEALTH EVALUATION -- Audio/Visual (During LYKA-02 public health emergency)    HPI:    Ele Kahn (:  1965) has requested an audio/video evaluation for the following concern(s):    Patient said that she is not having any issues with migraines at this point but she is still not eating as well as she wants to be. As soon as she eats she will get nauseous and vomit or have diarrhea. She takes the phenergan when she has this nausea and it helps some. Patient has more concern with as soon she eats she has diarrhea no matter what. Patient is not drinking the Gatorade anymore due to it causing abdominal discomfort but she is drinking sprite and water. All other ROS negative    Prior to Visit Medications    Medication Sig Taking? Authorizing Provider   promethazine (PHENERGAN) 25 MG tablet Take 1 tablet by mouth every 6 hours as needed for Nausea  Betito Kennedy MD   methylPREDNISolone (MEDROL DOSEPACK) 4 MG tablet Take by mouth. Betito Kennedy MD   butalbital-acetaminophen-caffeine (FIORICET, ESGIC) -51 MG per tablet Take 1 tablet by mouth every 4 hours as needed for Headaches  Wayne Antonio, APRN - CNP   nystatin (MYCOSTATIN) 263978 UNIT/GM powder Apply 3 times daily.   Mortimer Needle, MD   albuterol sulfate HFA (VENTOLIN HFA) 108 (90 Base) MCG/ACT inhaler Inhale 2 puffs into the lungs every 6 hours as needed for Wheezing  Mortimer Needle, MD   rosuvastatin (CRESTOR) 10 MG tablet Take 1 tablet by mouth nightly  Betito Kennedy MD   traZODone (DESYREL) 150 MG tablet TAKE 1 TABLET AT BEDTIME  Betito Kennedy MD   Blood Pressure Monitor KIT Use daily  Betito Kennedy MD   pantoprazole (PROTONIX) 40 MG tablet TAKE 1 TABLET EACH MORNING PRIOR TO BREAKFAST  Betito Kennedy MD   DULoxetine (CYMBALTA) 60 MG extended release capsule TAKE (1) Anel Jaimes MD metoprolol succinate (TOPROL XL) 100 MG extended release tablet TAKE (1) TABLET DAILY  Keyona Ceja MD   furosemide (LASIX) 20 MG tablet Take 20 mg by mouth daily as needed  Historical Provider, MD   polyethylene glycol (MIRALAX) POWD powder Take 1-2 capfuls of Miralax daily as needed  Keyona Ceja MD   triamterene-hydrochlorothiazide (DYAZIDE) 37.5-25 MG per capsule Take 1 capsule by mouth daily  Patient taking differently: Take 1 capsule by mouth daily Only takes if BP is britany high and she cant get it to drop  Keyona Ceja MD       Allergies   Allergen Reactions    Benadryl [Diphenhydramine] Anxiety     \"Made me jump off the wall\"    Topamax [Topiramate] Hives    Tramadol Shortness Of Breath    Vitamin C [Ascorbic Acid] Hives    Lyrica [Pregabalin] Palpitations    Penicillins Hives and Itching       Past Medical History:   Diagnosis Date    Ankle fracture     Anxiety     Arthritis     Benzodiazepine abuse ([facility] [address]     Depression     Fibromyalgia     Hyperlipidemia     Hypertension     Kidney cyst     x2 rt. ?    Migraine     Pneumonia 2008    bronchitis frequently since    TIA (transient ischemic attack)     Ulcer, Stomach Peptic     frequent N/V    Unspecified cerebral artery occlusion with cerebral infarction      mini stroke cused by severe migraine    Weight loss     frequent N/V, S/P cholecystectomy       Past Surgical History:   Procedure Laterality Date    ANKLE SURGERY       SECTION      CHOLECYSTECTOMY      COLONOSCOPY N/A 2018    COLON W/ANES.  performed by Marian Rosenberg MD at [address]      HERNIA REPAIR      HYSTERECTOMY      TUMOR REMOVAL      lt femur    UPPER GASTROINTESTINAL ENDOSCOPY N/A 2018    EGD BIOPSY performed by Marian Rosenberg MD at [facility]. [address] History     Tobacco Use    Smoking status: Current Some Day Smoker Irina Baron is a 48 y.o. female being evaluated by a Virtual Visit (video visit) encounter to address concerns as mentioned above. A caregiver was present when appropriate. Due to this being a TeleHealth encounter (During [place] [address] public health emergency), evaluation of the following organ systems was limited: Vitals/Constitutional/EENT/Resp/CV/GI//MS/Neuro/Skin/Heme-Lymph-Imm. Pursuant to the emergency declaration under the [address] [place] [address] and the [employer] and [employer] Act, this Virtual Visit was conducted with patient's (and/or legal guardian's) consent, to reduce the patient's risk of exposure to COVID-19 and provide necessary medical care. The patient (and/or legal guardian) has also been advised to contact this office for worsening conditions or problems, and seek emergency medical treatment and/or call 911 if deemed necessary. Services were provided through a video synchronous discussion virtually to substitute for in-person clinic visit. Patient and provider were located at their individual homes. --Naldo Calhoun RN on 12/15/2020 at 3:02 PM    An electronic signature was used to authenticate this note.

## 2020-12-16 ENCOUNTER — TELEPHONE (OUTPATIENT)
Dept: FAMILY MEDICINE CLINIC | Age: 55
End: 2020-12-16

## 2020-12-16 NOTE — TELEPHONE ENCOUNTER
Patient was referred to neurology and has not seen them yet. Should she be evaluated by them prior to driving release due to symptoms she relayed during her video visit yesterday?
Pt informed of MD's response.
Pt was wanting to see when she can go back to driving after having the concussion.
Would recommend one month after concussion before resuming driving, and then only if concussive symptoms of confusion and dizziness and blurred vision are resolved
Clothing

## 2021-01-05 ENCOUNTER — VIRTUAL VISIT (OUTPATIENT)
Dept: FAMILY MEDICINE CLINIC | Age: 56
End: 2021-01-05
Payer: COMMERCIAL

## 2021-01-05 DIAGNOSIS — R11.0 NAUSEA: ICD-10-CM

## 2021-01-05 DIAGNOSIS — G43.509 PERSISTENT MIGRAINE AURA WITHOUT CEREBRAL INFARCTION AND WITHOUT STATUS MIGRAINOSUS, NOT INTRACTABLE: ICD-10-CM

## 2021-01-05 DIAGNOSIS — K59.04 CHRONIC IDIOPATHIC CONSTIPATION: ICD-10-CM

## 2021-01-05 DIAGNOSIS — I10 ESSENTIAL HYPERTENSION: ICD-10-CM

## 2021-01-05 DIAGNOSIS — K59.00 CONSTIPATION, UNSPECIFIED CONSTIPATION TYPE: Primary | ICD-10-CM

## 2021-01-05 DIAGNOSIS — I10 ESSENTIAL HYPERTENSION, BENIGN: ICD-10-CM

## 2021-01-05 DIAGNOSIS — I10 UNCONTROLLED HYPERTENSION: ICD-10-CM

## 2021-01-05 DIAGNOSIS — R51.9 CHRONIC DAILY HEADACHE: ICD-10-CM

## 2021-01-05 PROCEDURE — 3017F COLORECTAL CA SCREEN DOC REV: CPT | Performed by: FAMILY MEDICINE

## 2021-01-05 PROCEDURE — 99214 OFFICE O/P EST MOD 30 MIN: CPT | Performed by: FAMILY MEDICINE

## 2021-01-05 PROCEDURE — G8427 DOCREV CUR MEDS BY ELIG CLIN: HCPCS | Performed by: FAMILY MEDICINE

## 2021-01-05 PROCEDURE — G8417 CALC BMI ABV UP PARAM F/U: HCPCS | Performed by: FAMILY MEDICINE

## 2021-01-05 PROCEDURE — 4004F PT TOBACCO SCREEN RCVD TLK: CPT | Performed by: FAMILY MEDICINE

## 2021-01-05 PROCEDURE — G8482 FLU IMMUNIZE ORDER/ADMIN: HCPCS | Performed by: FAMILY MEDICINE

## 2021-01-05 RX ORDER — METOPROLOL SUCCINATE 100 MG/1
TABLET, EXTENDED RELEASE ORAL
Qty: 45 TABLET | Refills: 11
Start: 2021-01-05 | End: 2022-04-06 | Stop reason: SDUPTHER

## 2021-01-05 NOTE — PROGRESS NOTES
2021    TELEHEALTH EVALUATION -- Audio/Visual (During CXXQG-99 public health emergency)    HPI:    Eliot Liu (:  1965) has requested an audio/video evaluation for the following concern(s):    Constipation: pt seen about a week ago for diarrhea and nausea, pt said she had to start taking mirlax pt had not had a bowel movement in 7 days pt went for the first time yesterday. Patient believes the probiotic may have made her constipated but now her bowels are moving better with as needed use of MiraLAX  Nausea: pt is still having problems with nausea in her stomach pt believe it from her headaches. Headaches: pt said she is still having headaches but not the migraines   Hypertension: pt said she has been checking her bp at home since she been having headaches her reading have been 154/104, 142/88, 144/89, 132/87. Her rates have been running anywhere from 70-88. She has not called for the neurology appointment yet    All other ROS negative    Prior to Visit Medications    Medication Sig Taking? Authorizing Provider   metoprolol succinate (TOPROL XL) 100 MG extended release tablet TAKE (1) TABLET DAILY IN THE AM AND 1/2 TAB IN José Rebollar MD   DULoxetine (CYMBALTA) 60 MG extended release capsule 1 daily Yes Cindy Hyman MD   butalbital-acetaminophen-caffeine (FIORICET, ESGIC) -40 MG per tablet Take 1 tablet by mouth every 4 hours as needed for Headaches Yes DARÍO Bhat - CNP   nystatin (MYCOSTATIN) 125450 UNIT/GM powder Apply 3 times daily.  Yes Samuel Contreras MD   albuterol sulfate HFA (VENTOLIN HFA) 108 (90 Base) MCG/ACT inhaler Inhale 2 puffs into the lungs every 6 hours as needed for Wheezing Yes Samuel Contreras MD   rosuvastatin (CRESTOR) 10 MG tablet Take 1 tablet by mouth nightly Yes Cindy Hyman MD   traZODone (DESYREL) 150 MG tablet TAKE 1 TABLET AT BEDTIME Yes Cindy Hyman MD Monico Mckeon is a 48 y.o. female being evaluated by a Virtual Visit (video visit) encounter to address concerns as mentioned above. A caregiver was present when appropriate. Due to this being a TeleHealth encounter (During XSBBJ-20 public health emergency), evaluation of the following organ systems was limited: Vitals/Constitutional/EENT/Resp/CV/GI//MS/Neuro/Skin/Heme-Lymph-Imm. Pursuant to the emergency declaration under the [address] [place] [address] and the [employer] and [employer] Act, this Virtual Visit was conducted with patient's (and/or legal guardian's) consent, to reduce the patient's risk of exposure to COVID-19 and provide necessary medical care. The patient (and/or legal guardian) has also been advised to contact this office for worsening conditions or problems, and seek emergency medical treatment and/or call 911 if deemed necessary. Services were provided through a video synchronous discussion virtually to substitute for in-person clinic visit. Patient and provider were located at their individual homes. --Dorcas Lennox, MD on 1/5/2021 at 11:13 AM    An electronic signature was used to authenticate this note.

## 2021-01-05 NOTE — PROGRESS NOTES
Alessio Willett is a 48 y.o. female evaluated via telephone on 1/5/2021. Constipation: pt seen about a week ago for diarrhea and nausea, pt said she had to start taking mirlax pt had not had a bowel movement in 7 days pt went for the first time yesterday. Nausea: pt is still having problems with nausea in her stomach pt believe it from her headaches. Headaches: pt said she is still having headaches but not the migraines   Hypertension: pt said she has been checking her bp at home since she been having headaches her reading have been 154/104, 142/88, 144/89, 132/87     ASSESSMENT PLAN      Diagnosis Orders   1. Constipation, unspecified constipation type     2. 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 Essential hypertension     4. Nausea         Consent:  She and/or health care decision maker is aware that that she may receive a bill for this telephone service, depending on her insurance coverage, and has provided verbal consent to proceed:       Documentation:  I communicated with the patient and/or health care decision maker about ***. Details of this discussion including any medical advice provided: ***      I {AFFIRM/DO NOT AFFIRM:51500::\"affirm\"} this is a Patient Initiated Episode with an Established Patient who has not had a related appointment within my department in the past 7 days or scheduled within the next 24 hours.     Total Time: {minutes:89590::\"5-10 minutes\"}    Note: not billable if this call serves to triage the patient into an appointment for the relevant concern      Luis Miller

## 2021-01-07 DIAGNOSIS — J01.90 ACUTE BACTERIAL SINUSITIS: ICD-10-CM

## 2021-01-07 DIAGNOSIS — J18.9 COMMUNITY ACQUIRED PNEUMONIA, UNSPECIFIED LATERALITY: ICD-10-CM

## 2021-01-07 DIAGNOSIS — B96.89 ACUTE BACTERIAL SINUSITIS: ICD-10-CM

## 2021-01-08 RX ORDER — METOPROLOL SUCCINATE 50 MG/1
TABLET, EXTENDED RELEASE ORAL
Qty: 30 TABLET | Refills: 11 | Status: SHIPPED | OUTPATIENT
Start: 2021-01-08 | End: 2021-03-22

## 2021-01-08 RX ORDER — IBUPROFEN 800 MG/1
TABLET ORAL
Qty: 90 TABLET | Refills: 11 | Status: SHIPPED | OUTPATIENT
Start: 2021-01-08 | End: 2021-08-31

## 2021-01-19 ENCOUNTER — OFFICE VISIT (OUTPATIENT)
Dept: FAMILY MEDICINE CLINIC | Age: 56
End: 2021-01-19
Payer: COMMERCIAL

## 2021-01-19 VITALS
WEIGHT: 178 LBS | SYSTOLIC BLOOD PRESSURE: 132 MMHG | OXYGEN SATURATION: 98 % | DIASTOLIC BLOOD PRESSURE: 88 MMHG | BODY MASS INDEX: 31.53 KG/M2 | HEART RATE: 95 BPM | TEMPERATURE: 97.3 F

## 2021-01-19 DIAGNOSIS — G44.221 CHRONIC TENSION-TYPE HEADACHE, INTRACTABLE: ICD-10-CM

## 2021-01-19 DIAGNOSIS — K59.04 CHRONIC IDIOPATHIC CONSTIPATION: ICD-10-CM

## 2021-01-19 DIAGNOSIS — Z12.31 ENCOUNTER FOR SCREENING MAMMOGRAM FOR MALIGNANT NEOPLASM OF BREAST: ICD-10-CM

## 2021-01-19 DIAGNOSIS — G51.0 BELL'S PALSY: ICD-10-CM

## 2021-01-19 DIAGNOSIS — R11.2 INTRACTABLE VOMITING WITH NAUSEA, UNSPECIFIED VOMITING TYPE: ICD-10-CM

## 2021-01-19 DIAGNOSIS — G43.111 MIGRAINE WITH AURA, WITH INTRACTABLE MIGRAINE, SO STATED, WITH STATUS MIGRAINOSUS: ICD-10-CM

## 2021-01-19 DIAGNOSIS — G43.401 HEMIPLEGIC MIGRAINE WITH STATUS MIGRAINOSUS, NOT INTRACTABLE: ICD-10-CM

## 2021-01-19 DIAGNOSIS — I10 ESSENTIAL HYPERTENSION: Primary | ICD-10-CM

## 2021-01-19 PROBLEM — G43.409 HEADACHE, HEMIPLEGIC MIGRAINE: Status: ACTIVE | Noted: 2021-01-19

## 2021-01-19 PROCEDURE — G8427 DOCREV CUR MEDS BY ELIG CLIN: HCPCS | Performed by: FAMILY MEDICINE

## 2021-01-19 PROCEDURE — G8417 CALC BMI ABV UP PARAM F/U: HCPCS | Performed by: FAMILY MEDICINE

## 2021-01-19 PROCEDURE — 3017F COLORECTAL CA SCREEN DOC REV: CPT | Performed by: FAMILY MEDICINE

## 2021-01-19 PROCEDURE — 4004F PT TOBACCO SCREEN RCVD TLK: CPT | Performed by: FAMILY MEDICINE

## 2021-01-19 PROCEDURE — 99214 OFFICE O/P EST MOD 30 MIN: CPT | Performed by: FAMILY MEDICINE

## 2021-01-19 PROCEDURE — G8482 FLU IMMUNIZE ORDER/ADMIN: HCPCS | Performed by: FAMILY MEDICINE

## 2021-01-19 RX ORDER — PROMETHAZINE HYDROCHLORIDE 25 MG/1
25 TABLET ORAL EVERY 6 HOURS PRN
Qty: 10 TABLET | Refills: 0 | Status: SHIPPED | OUTPATIENT
Start: 2021-01-19 | End: 2021-01-26

## 2021-01-19 NOTE — PROGRESS NOTES
Chief Complaint   Patient presents with    Depression    Hypertension    Constipation     pt said she has not had a bowel movement in five days     Headache     pt said when she wears certain mask she gets a stress headache     Chronic Pain     pt said the weather is effecting this     Other     pt called anuj she said she was dismissed from dr Marni Augustin she awaiting a call from the      Anorexia     pt said she can not eat with out getting sick       HPI:  Monico Mckeon is a 48 y.o. (: 1965) here today for    Constipations: patient is still having issues with constipation, no Bm in 5 days. Takes one capful of miralax daily, she has taken 3 capfuls in the past as the most she has taken and it helps some with he bowels. She states that the linzess helps a lot with her bowels but it is too expensive for patient     Blood pressure: patients blood pressure is better today. She feels that the increase in metoprolol helped with her blood pressures and she states that her headaches have been slightly better. Nausea: patient still has no appetite has lost 18 lbs since her visit on 20. She just feels sick after eating. Thinks it may be due to her constipation    Patient also had a couple weeks when he was not able to walk, she kept falling and required using a walker. It was all on her right side. She could not move anything on her right side. Patient states that she is concerned it was a stroke. But since patient has full recovery now and it completely back to normal feel it was more bells palsy. And that sometimes during some of her headaches she has stroke like symptoms. Feel that with her migraine she may have had a hemiplegic migraine. Patient states that patient tried to call and return to [facility] neurology. Was informed that she was released from the practice she waiting to hear why. She thinks that she remembers dr. Emely Silver stating that she was too complex for her. Patient has tried botox in the past for her migraines with dr. Amos Rainey. Patient's medications, allergies, past medical, surgical, social and family histories were reviewed and updated asappropriate on 1/19/2021 at 9:02 AM.    ROS:  Review of Systems    All other systems reviewed and are negative except as noted above on 1/19/2021 at 9:02 AM. Additional review of systems may be scanned into the media section ofthis medical record. Any responses requiring further intervention were pursued.     Hemoglobin A1C (%)   Date Value   03/18/2020 5.4     Microscopic Examination (no units)   Date Value   11/28/2020 YES     LDL Calculated (mg/dL)   Date Value   09/23/2020 97     Past Medical History:   Diagnosis Date    Ankle fracture     Anxiety     Arthritis     Benzodiazepine abuse ([facility] [address]     Depression     Fibromyalgia     Hyperlipidemia     Hypertension     Kidney cyst     x2 rt. ?    Migraine     Pneumonia 2008    bronchitis frequently since    TIA (transient ischemic attack) 2007    Ulcer, Stomach Peptic     frequent N/V    Unspecified cerebral artery occlusion with cerebral infarction 2007     mini stroke cused by severe migraine    Weight loss     frequent N/V, S/P cholecystectomy     Family History   Problem Relation Age of Onset    High Blood Pressure Mother     Heart Disease Mother     Diabetes Maternal Grandfather     Heart Disease Father     Heart Disease Brother      Social History     Socioeconomic History    Marital status:      Spouse name: Not on file    Number of children: Not on file    Years of education: Not on file    Highest education level: Not on file   Occupational History    Not on file   Social Needs    Financial resource strain: Not on file    Food insecurity     Worry: Not on file     Inability: Not on file    Transportation needs     Medical: Not on file     Non-medical: Not on file   Tobacco Use    Smoking status: Current Some Day Smoker     Packs/day: 0.50 Years: 27.00     Pack years: 13.50     Types: Cigarettes    Smokeless tobacco: Never Used    Tobacco comment: she smokes sometimes   Substance and Sexual Activity    Alcohol use: No     Alcohol/week: 0.0 standard drinks     Comment: rare    Drug use: No    Sexual activity: Yes     Partners: Male   Lifestyle    Physical activity     Days per week: Not on file     Minutes per session: Not on file    Stress: Not on file   Relationships    Social connections     Talks on phone: Not on file     Gets together: Not on file     Attends Restoration service: Not on file     Active member of club or organization: Not on file     Attends meetings of clubs or organizations: Not on file     Relationship status: Not on file    Intimate partner violence     Fear of current or ex partner: Not on file     Emotionally abused: Not on file     Physically abused: Not on file     Forced sexual activity: Not on file   Other Topics Concern    Not on file   Social History Narrative    Not on file       Prior to Visit Medications    Medication Sig Taking? Authorizing Provider   ibuprofen (ADVIL;MOTRIN) 800 MG tablet TAKE 1 TABLET THREE TIMES DAILY Yes Amos Begum MD   metoprolol succinate (TOPROL XL) 50 MG extended release tablet TAKE (1) TABLET DAILY Yes Amos Begum MD   metoprolol succinate (TOPROL XL) 100 MG extended release tablet TAKE (1) TABLET DAILY IN THE AM AND 1/2 TAB IN THE EVENING Yes Amos Begum MD   DULoxetine (CYMBALTA) 60 MG extended release capsule 1 daily Yes Amos Begum MD   nystatin (MYCOSTATIN) 434974 UNIT/GM powder Apply 3 times daily.  Yes Thi Altamirano MD   rosuvastatin (CRESTOR) 10 MG tablet Take 1 tablet by mouth nightly Yes Amos Begum MD   traZODone (DESYREL) 150 MG tablet TAKE 1 TABLET AT BEDTIME Yes Amos Begum MD Pupils: Pupils are equal, round, and reactive to light. Neck:      Thyroid: No thyromegaly. Vascular: No JVD. Cardiovascular:      Rate and Rhythm: Normal rate and regular rhythm. Heart sounds: Normal heart sounds. Pulmonary:      Effort: Pulmonary effort is normal. No respiratory distress. Breath sounds: Normal breath sounds. Abdominal:      Palpations: Abdomen is soft. There is no hepatomegaly or splenomegaly. Tenderness: There is abdominal tenderness. Musculoskeletal:      Right lower leg: No edema. Left lower leg: No edema. Skin:     General: Skin is warm and dry. Coloration: Skin is not pale. Findings: No erythema or rash. Comments: Turgor normal   Neurological:      Mental Status: She is oriented to person, place, and time. Psychiatric:         Mood and Affect: Mood normal.         Behavior: Behavior normal.         Thought Content: Thought content normal.         Judgment: Judgment normal.              ASSESSMENT PLAN      Diagnosis Orders   1. Essential hypertension     2. Intractable vomiting with nausea, unspecified vomiting type  promethazine (PHENERGAN) 25 MG tablet   3. Encounter for screening mammogram for malignant neoplasm of breast  KEVYN DIGITAL SCREEN W OR WO CAD BILATERAL   4. Chronic idiopathic constipation     5. Hemiplegic migraine with status migrainosus, not intractable     6. Migraine with aura, with intractable migraine, so stated, with status migrainosus     7. Chronic tension-type headache, intractable     8.  Bell's palsy Blood pressure better controlled with the metoprolol  mg in the morning and 50 mg in the evening. Chronic nausea at baseline, states that she does not eat real well little appetite since December. May need GI referral.  May need to discuss PPI when we do a virtual visit in 1 month and back in the office in 2 months. She is asked to increase MiraLAX to twice a day routinely and may need to go up to 3 times a day. She is going to check on the drug  website to see if she can get assistance with Abeba Hood, she was on 290 mcg in the past and found this to be the best medicine for constipation she never taken but she could not afford it. Patient showed me a picture of the right face drooping from a few weeks ago which is consistent with what we have noted with her hemiplegic migraines in the past, also the possibility of Bell's palsy although there appears to be complete resolution of deficits at this time. I told the patient she would not have had a CVA with this type of recovery. She states that she was dismissed from the [facility] group by Dr. Jeramie Hutchinson still waiting to hear back from them once happening, but we may need to refer to Dr. Ramos Late for more in-depth headache management. Patient should call the office immediately with new or ongoing signs or symptoms or worsening, or proceed to the emergency room. No changes in past medical history, past surgical history, social history, or family history were noted during the patient encounter unless specifically listed above. All updates of past medical history, past surgical history, social history, or family history were reviewed personally by me during the office visit. All problems listed in the assessment are stable unless noted otherwise. Medication profile reviewed personally by me during the visit. Medication side effects and possible impairments from medications were discussed as applicable. This document was prepared by a combination of typing and transcription through a voice recognition software. Scribe attestation: Madi Parsons RN, am scribing for and in the presence of Richard Benjamin MD. Electronically signed by Tricia Resendiz RN on 1/19/2021 at 9:02 AM      Provider attestation:     I, Dr. Letha Jaimes, personally performed the services described in this documentation, as scribed by the above signed scribe in my presence, and it is both accurate and complete. I agree with the ROS and Past Histories independently gathered by the clinical support staff and the remaining scribed note accurately describes my personal service to the patient.       1/19/2021    9:43 AM

## 2021-01-19 NOTE — PATIENT INSTRUCTIONS
take one capful of miralax twice daily, if no benefit in bowel movements in one week increase tow three capfuls daily. Patient should call the office immediately with new or ongoing signs or symptoms or worsening, or proceed to the emergency room. If you are on medications which could impair your senses, you are at risk of weakness, falls, dizziness, or drowsiness. You should be careful during activities which could place you at risk of harm, such as climbing, using stairs, operating machinery, or driving vehicles. If you feel you cannot safely do these activities, you should request others to help you, or avoid the activities altogether. If you are drowsy for any other reason, you should use the same precautions as listed above.      Web Address for Advance Directive:    [email or web address]

## 2021-01-26 ENCOUNTER — TELEPHONE (OUTPATIENT)
Dept: FAMILY MEDICINE CLINIC | Age: 56
End: 2021-01-26

## 2021-02-09 ENCOUNTER — TELEPHONE (OUTPATIENT)
Dept: FAMILY MEDICINE CLINIC | Age: 56
End: 2021-02-09

## 2021-02-09 NOTE — TELEPHONE ENCOUNTER
Pt called wanting to know what she should do about the back pain she's been having. States she's coming out of quarantine from [address] and it feels like she's been \"working out\" with how back her back muscles hurt. Pt wanting to know if there's anything she can take to help with the pain.  Call back [phone]

## 2021-02-16 DIAGNOSIS — I10 ESSENTIAL HYPERTENSION, BENIGN: ICD-10-CM

## 2021-02-16 RX ORDER — TRIAMTERENE AND HYDROCHLOROTHIAZIDE 37.5; 25 MG/1; MG/1
CAPSULE ORAL
Qty: 30 CAPSULE | Refills: 5 | Status: SHIPPED | OUTPATIENT
Start: 2021-02-16 | End: 2022-02-02 | Stop reason: SDUPTHER

## 2021-02-16 NOTE — TELEPHONE ENCOUNTER
Last office visit was 1/19/21  Future Appointments   Date Time Provider Mariaelena Hogue   2/24/2021  9:20 AM Andrés Mei MD [facility]   3/22/2021  3:20 PM Andrés Mei MD [facility]

## 2021-02-17 ENCOUNTER — TELEPHONE (OUTPATIENT)
Dept: FAMILY MEDICINE CLINIC | Age: 56
End: 2021-02-17

## 2021-02-17 DIAGNOSIS — F41.9 ANXIETY: Primary | ICD-10-CM

## 2021-02-17 RX ORDER — HYDROXYZINE PAMOATE 25 MG/1
25 CAPSULE ORAL 3 TIMES DAILY PRN
Qty: 30 CAPSULE | Refills: 0 | Status: SHIPPED | OUTPATIENT
Start: 2021-02-17 | End: 2021-03-22 | Stop reason: SDUPTHER

## 2021-02-17 RX ORDER — HYDROXYZINE PAMOATE 25 MG/1
25 CAPSULE ORAL 3 TIMES DAILY PRN
Qty: 90 CAPSULE | Refills: 0 | Status: CANCELLED | OUTPATIENT
Start: 2021-02-17 | End: 2021-05-18

## 2021-02-19 DIAGNOSIS — K59.04 CHRONIC IDIOPATHIC CONSTIPATION: ICD-10-CM

## 2021-02-22 ENCOUNTER — TELEPHONE (OUTPATIENT)
Dept: ADMINISTRATIVE | Age: 56
End: 2021-02-22

## 2021-03-10 ENCOUNTER — VIRTUAL VISIT (OUTPATIENT)
Dept: FAMILY MEDICINE CLINIC | Age: 56
End: 2021-03-10
Payer: COMMERCIAL

## 2021-03-10 DIAGNOSIS — F41.9 CHRONIC ANXIETY: ICD-10-CM

## 2021-03-10 DIAGNOSIS — K59.04 CHRONIC IDIOPATHIC CONSTIPATION: Primary | ICD-10-CM

## 2021-03-10 DIAGNOSIS — R25.2 LEG CRAMPS: ICD-10-CM

## 2021-03-10 PROCEDURE — G8427 DOCREV CUR MEDS BY ELIG CLIN: HCPCS | Performed by: FAMILY MEDICINE

## 2021-03-10 PROCEDURE — G8417 CALC BMI ABV UP PARAM F/U: HCPCS | Performed by: FAMILY MEDICINE

## 2021-03-10 PROCEDURE — 3017F COLORECTAL CA SCREEN DOC REV: CPT | Performed by: FAMILY MEDICINE

## 2021-03-10 PROCEDURE — 99214 OFFICE O/P EST MOD 30 MIN: CPT | Performed by: FAMILY MEDICINE

## 2021-03-10 PROCEDURE — G8482 FLU IMMUNIZE ORDER/ADMIN: HCPCS | Performed by: FAMILY MEDICINE

## 2021-03-10 PROCEDURE — 4004F PT TOBACCO SCREEN RCVD TLK: CPT | Performed by: FAMILY MEDICINE

## 2021-03-10 NOTE — PROGRESS NOTES
3/10/2021    TELEHEALTH EVALUATION -- Audio/Visual (During [facility]-67 public health emergency)    HPI:    Shannan Sosa (:  1965) has requested an audio/video evaluation for the following concern(s):    Patient states that her constipation was doing much better going regularly on the miralax twice daily but in the last week she has been dealing with her father being very sick and now in hospice so she stopped the miralax due to forgetting about it. Since not having taken it for about 6 days she is now very constipated again. She started back on the miralax twice daily since she noticed the constipation and she was able to have a small BM this AM and is hoping she will be able to go in the next couple days. She states that she has found her anxiety has been very bad lately and this has been triggering her to have nausea again with eating and having a decreased appetite again. She was feeling much better before all the stress with her parents not doing good again occurred. She also brought up that she feels that since starting the rosuvastatin she has been having a lot of leg cramps started as just in the evening now it seems her legs cramp all the time. Wt Readings from Last 3 Encounters:   21 178 lb (80.7 kg)   20 175 lb (79.4 kg)   20 175 lb (79.4 kg)     BP Readings from Last 3 Encounters:   21 132/88   20 (!) 160/93   20 (!) 158/70     Lab Results   Component Value Date    LABA1C 5.4 2020    LABA1C 5.9 2019       [] Patient has completed an advance directive  [] Patient has NOT completed an advanced directive  [] Patient has a documented healthcare surrogate  [] Patient does NOT have a documented healthcare surrogate  [] Discussed the importance of establishing and updating an advanced directive. Patient has questions at this time and those were answered. [] Discussed the importance of establishing and updating an advanced directive.   Patient does NOT have questions at this time. Discussed with: [] Patient            [] Family             [] Other caregiver      All other ROS negative    Prior to Visit Medications    Medication Sig Taking? Authorizing Provider   linaclotide (LINZESS) 290 MCG CAPS capsule Take 1 capsule by mouth every morning (before breakfast) Yes Zahida Catalan MD   hydrOXYzine (VISTARIL) 25 MG capsule Take 1 capsule by mouth 3 times daily as needed for Anxiety Yes DARÍO Day - [facility]   triamterene-hydroCHLOROthiazide (DYAZIDE) 37.5-25 MG per capsule TAKE 1 CAPSULE ONCE DAILY Yes Zahida Catalan MD   ibuprofen (ADVIL;MOTRIN) 800 MG tablet TAKE 1 TABLET THREE TIMES DAILY Yes Zahida Catalan MD   metoprolol succinate (TOPROL XL) 50 MG extended release tablet TAKE (1) TABLET DAILY Yes Zahida Catalan MD   metoprolol succinate (TOPROL XL) 100 MG extended release tablet TAKE (1) TABLET DAILY IN THE AM AND 1/2 TAB IN THE EVENING Yes Zahida Catalan MD   DULoxetine (CYMBALTA) 60 MG extended release capsule 1 daily Yes Zahida Catalan MD   nystatin (MYCOSTATIN) 012800 UNIT/GM powder Apply 3 times daily.  Yes Juancarlos Goodwin MD   rosuvastatin (CRESTOR) 10 MG tablet Take 1 tablet by mouth nightly Yes Zahida Catalan MD   traZODone (DESYREL) 150 MG tablet TAKE 1 TABLET AT BEDTIME Yes Zahida Catalan MD   pantoprazole (PROTONIX) 40 MG tablet TAKE 1 TABLET EACH MORNING PRIOR TO BREAKFAST Yes Zahida Catalan MD   furosemide (LASIX) 20 MG tablet Take 20 mg by mouth daily as needed Yes Historical Provider, MD   polyethylene glycol (MIRALAX) POWD powder Take 1-2 capfuls of Miralax daily as needed Yes Zahida Catalan MD       Allergies   Allergen Reactions    Benadryl [Diphenhydramine] Anxiety     \"Made me jump off the wall\"    Topamax [Topiramate] Hives    Tramadol Shortness Of Breath    Vitamin C [Ascorbic Acid] Hives    Lyrica [Pregabalin] Palpitations    Penicillins Hives and Itching       Past Medical History:   Diagnosis Date    Ankle fracture     Anxiety     Arthritis     Ben[facility]ine abuse ([facility] [address]     Depression     Fibromyalgia     Hyperlipidemia     Hypertension     Kidney cyst     x2 rt. ?    Migraine     Pneumonia 2008    bronchitis frequently since    TIA (transient ischemic attack)     Ulcer, Stomach Peptic     frequent N/V    Unspecified cerebral artery occlusion with cerebral infarction      mini stroke cused by severe migraine    Weight loss     frequent N/V, S/P cholecystectomy       Past Surgical History:   Procedure Laterality Date    ANKLE SURGERY       SECTION      CHOLECYSTECTOMY      COLONOSCOPY N/A 2018    COLON W/ANES.  performed by Kyree Gutierrez MD at [address] [facility]      HERNIA REPAIR      HYSTERECTOMY      TUMOR REMOVAL      lt femur    UPPER GASTROINTESTINAL ENDOSCOPY N/A 2018    EGD BIOPSY performed by Kyree Gutierrez MD at [address] History     Tobacco Use    Smoking status: Current Some Day Smoker     Packs/day: 0.50     Years: 27.00     Pack years: 13.50     Types: Cigarettes    Smokeless tobacco: Never Used    Tobacco comment: she smokes sometimes   Substance Use Topics    Alcohol use: No     Alcohol/week: 0.0 standard drinks     Comment: rare    Drug use: No       PHYSICAL EXAMINATION:  [ INSTRUCTIONS:  \"[x]\" Indicates a positive item  \"[]\" Indicates a negative item  -- DELETE ALL ITEMS NOT EXAMINED]  Vital Signs: (As obtained by patient/caregiver or practitioner observation)  See flowsheet  Blood pressure- Heart rate-    Respiratory rate-    Temperature-  Pulse oximetry-     Constitutional: [x] Appears well-developed and well-nourished [x] No apparent distress      [] Abnormal-   Mental status  [x] Alert and awake  [x] Oriented to person/place/time [x]Able to follow commands Eyes:  EOM    [x]  Normal  [] Abnormal-  Sclera  [x]  Normal  [] Abnormal -         Discharge [x]  None visible  [] Abnormal -    HENT:   [x] Normocephalic, atraumatic. [] Abnormal   [] Mouth/Throat: Mucous membranes are moist.     External Ears [x] Normal  [] Abnormal-     Neck: [x] No visualized mass     Pulmonary/Chest: [x] Respiratory effort normal.  [x] No visualized signs of difficulty breathing or respiratory distress        [] Abnormal-      Musculoskeletal:   [] Normal gait with no signs of ataxia         [x] Normal range of motion of neck        [] Abnormal-       Neurological:        [x] No Facial Asymmetry (Cranial nerve 7 motor function) (limited exam to video visit)          [x] No gaze palsy        [] Abnormal-         Skin:        [x] No significant exanthematous lesions or discoloration noted on facial skin         [] Abnormal-            Psychiatric:       [x] Normal Affect [x] No Hallucinations        [] Abnormal-     Other pertinent observable physical exam findings-      ASSESSMENT PLAN      Diagnosis Orders   1. Chronic idiopathic constipation     2. Chronic anxiety     3. Leg cramps     Mood seemed pretty good today despite the stressors she has with her mother and father. She will get back on track with the routine MiraLAX. She is asked to increase water intake to deal with leg cramps. If that does not help, we will talk to her about vitamin D and coenzyme Q 10 when she comes to the office. Anxiety doing fairly well. Continue current meds    Patient should call the office immediately with new or ongoing signs or symptoms or worsening, or proceed to the emergency room. No changes in past medical history, past surgical history, social history, or family history were noted during the patient encounter unless specifically listed above. All updates of past medical history, past surgical history, social history, or family history were reviewed personally by me during the office visit.   All problems listed in the assessment are stable unless noted otherwise. Medication profile reviewed personally by me during the visit. Medication side effects and possible impairments from medications were discussed as applicable. This document was prepared by a combination of typing and transcription through a voice recognition software. Debo Bach is a 48 y.o. female being evaluated by a Virtual Visit (video visit) encounter to address concerns as mentioned above. A caregiver was present when appropriate. Due to this being a TeleHealth encounter (During [facility] public health emergency), evaluation of the following organ systems was limited: Vitals/Constitutional/EENT/Resp/CV/GI//MS/Neuro/Skin/Heme-Lymph-Imm. Pursuant to the emergency declaration under the [address] [place] [address] authority and the [employer] Act, this Virtual Visit was conducted with patient's (and/or legal guardian's) consent, to reduce the patient's risk of exposure to COVID-19 and provide necessary medical care. The patient (and/or legal guardian) has also been advised to contact this office for worsening conditions or problems, and seek emergency medical treatment and/or call 911 if deemed necessary. Services were provided through a video synchronous discussion virtually to substitute for in-person clinic visit. Patient and provider were located at their individual homes. --Monico Christina RN on 3/10/2021 at 8:31 AM    An electronic signature was used to authenticate this note.

## 2021-03-22 ENCOUNTER — OFFICE VISIT (OUTPATIENT)
Dept: FAMILY MEDICINE CLINIC | Age: 56
End: 2021-03-22
Payer: COMMERCIAL

## 2021-03-22 VITALS
TEMPERATURE: 97.9 F | BODY MASS INDEX: 31.71 KG/M2 | OXYGEN SATURATION: 97 % | WEIGHT: 179 LBS | HEIGHT: 63 IN | DIASTOLIC BLOOD PRESSURE: 88 MMHG | SYSTOLIC BLOOD PRESSURE: 140 MMHG | HEART RATE: 84 BPM

## 2021-03-22 DIAGNOSIS — K20.80 ESOPHAGITIS, LOS ANGELES GRADE A: ICD-10-CM

## 2021-03-22 DIAGNOSIS — I10 ESSENTIAL HYPERTENSION: ICD-10-CM

## 2021-03-22 DIAGNOSIS — G43.111 MIGRAINE WITH AURA, WITH INTRACTABLE MIGRAINE, SO STATED, WITH STATUS MIGRAINOSUS: ICD-10-CM

## 2021-03-22 DIAGNOSIS — F32.9 REACTIVE DEPRESSION: ICD-10-CM

## 2021-03-22 DIAGNOSIS — K59.01 SLOW TRANSIT CONSTIPATION: Primary | ICD-10-CM

## 2021-03-22 DIAGNOSIS — F41.9 CHRONIC ANXIETY: ICD-10-CM

## 2021-03-22 DIAGNOSIS — G44.221 CHRONIC TENSION-TYPE HEADACHE, INTRACTABLE: ICD-10-CM

## 2021-03-22 DIAGNOSIS — R07.9 CHEST PAIN, UNSPECIFIED TYPE: ICD-10-CM

## 2021-03-22 DIAGNOSIS — K21.00 GASTROESOPHAGEAL REFLUX DISEASE WITH ESOPHAGITIS WITHOUT HEMORRHAGE: ICD-10-CM

## 2021-03-22 DIAGNOSIS — K26.9 DUODENAL ULCER: ICD-10-CM

## 2021-03-22 DIAGNOSIS — F41.9 ANXIETY: ICD-10-CM

## 2021-03-22 DIAGNOSIS — R25.2 LEG CRAMPS: ICD-10-CM

## 2021-03-22 PROCEDURE — 4004F PT TOBACCO SCREEN RCVD TLK: CPT | Performed by: FAMILY MEDICINE

## 2021-03-22 PROCEDURE — 99214 OFFICE O/P EST MOD 30 MIN: CPT | Performed by: FAMILY MEDICINE

## 2021-03-22 PROCEDURE — G8427 DOCREV CUR MEDS BY ELIG CLIN: HCPCS | Performed by: FAMILY MEDICINE

## 2021-03-22 PROCEDURE — 93000 ELECTROCARDIOGRAM COMPLETE: CPT | Performed by: FAMILY MEDICINE

## 2021-03-22 PROCEDURE — 3017F COLORECTAL CA SCREEN DOC REV: CPT | Performed by: FAMILY MEDICINE

## 2021-03-22 PROCEDURE — G8417 CALC BMI ABV UP PARAM F/U: HCPCS | Performed by: FAMILY MEDICINE

## 2021-03-22 PROCEDURE — G8482 FLU IMMUNIZE ORDER/ADMIN: HCPCS | Performed by: FAMILY MEDICINE

## 2021-03-22 RX ORDER — PANTOPRAZOLE SODIUM 40 MG/1
40 TABLET, DELAYED RELEASE ORAL DAILY
Qty: 30 TABLET | Refills: 11 | Status: SHIPPED | OUTPATIENT
Start: 2021-03-22 | End: 2022-04-06 | Stop reason: SDUPTHER

## 2021-03-22 RX ORDER — HYDROXYZINE PAMOATE 25 MG/1
25 CAPSULE ORAL 3 TIMES DAILY PRN
Qty: 90 CAPSULE | Refills: 3 | Status: SHIPPED | OUTPATIENT
Start: 2021-03-22 | End: 2022-02-02 | Stop reason: SDUPTHER

## 2021-03-22 NOTE — PROGRESS NOTES
Chief Complaint   Patient presents with    Constipation    Migraine    Anxiety    Other     patient has multiple medical complaints       Wt Readings from Last 3 Encounters:   21 179 lb (81.2 kg)   21 178 lb (80.7 kg)   20 175 lb (79.4 kg)     BP Readings from Last 3 Encounters:   21 (!) 140/88   21 132/88   20 (!) 160/93      Lab Results   Component Value Date    LABA1C 5.4 2020    LABA1C 5.9 2019       HPI:  Monico Mckeon is a 48 y.o. (: 1965) here today for follow up for constipation, anxiety and migraines. She is taking care of her father who is ill and taking her mother to appointments and she does not have time to take care of herself. When she takes the Miralax  twice a day it works but she doesn't take the time to take it. She is also requesting a refill on her anxiety medication. She has been trying to drink more water and has noticed some improvement with the leg cramps. She has only had 1 migraine lately but it did last for a couple of days. [] Patient has completed an advance directive  [x] Patient has NOT completed an advanced directive  [] Patient has a documented healthcare surrogate  [x] Patient does NOT have a documented healthcare surrogate  [] Discussed the importance of establishing and updating an advanced directive. Patient has questions at this time and those were answered. [x] Discussed the importance of establishing and updating an advanced directive. Patient does NOT have questions at this time.     Discussed with: [x] Patient            [] Family             [] Other caregiver    Patient's medications, allergies, past medical, surgical, social and family histories were reviewed and updated asappropriate on 3/22/2021 at 4:01 PM.    ROS:  Review of Systems    All other systems reviewed and are negative except as noted above on 3/22/2021 at 4:01 PM. Additional review of systems may be scanned into the media section ofthis medical record. Any responses requiring further intervention were pursued.     Past Medical History:   Diagnosis Date    Ankle fracture     Anxiety     Arthritis     Benzodiazepine abuse ([facility] [address]     Depression     Fibromyalgia     Hyperlipidemia     Hypertension     Kidney cyst     x2 rt. ?    Migraine     Pneumonia 2008    bronchitis frequently since    TIA (transient ischemic attack) 2007    Ulcer, Stomach Peptic     frequent N/V    Unspecified cerebral artery occlusion with cerebral infarction 2007     mini stroke cused by severe migraine    Weight loss     frequent N/V, S/P cholecystectomy     Family History   Problem Relation Age of Onset    High Blood Pressure Mother     Heart Disease Mother     Diabetes Maternal Grandfather     Heart Disease Father     Heart Disease Brother      Social History     Socioeconomic History    Marital status:      Spouse name: Not on file    Number of children: Not on file    Years of education: Not on file    Highest education level: Not on file   Occupational History    Not on file   Social Needs    Financial resource strain: Not on file    Food insecurity     Worry: Not on file     Inability: Not on file    Transportation needs     Medical: Not on file     Non-medical: Not on file   Tobacco Use    Smoking status: Current Some Day Smoker     Packs/day: 0.50     Years: 27.00     Pack years: 13.50     Types: Cigarettes    Smokeless tobacco: Never Used    Tobacco comment: she smokes sometimes   Substance and Sexual Activity    Alcohol use: No     Alcohol/week: 0.0 standard drinks     Comment: rare    Drug use: No    Sexual activity: Yes     Partners: Male   Lifestyle    Physical activity     Days per week: Not on file     Minutes per session: Not on file    Stress: Not on file   Relationships    Social connections     Talks on phone: Not on file     Gets together: Not on file     Attends Zoroastrianism service: Not on file     Active member of club or organization: Not on file     Attends meetings of clubs or organizations: Not on file     Relationship status: Not on file    Intimate partner violence     Fear of current or ex partner: Not on file     Emotionally abused: Not on file     Physically abused: Not on file     Forced sexual activity: Not on file   Other Topics Concern    Not on file   Social History Narrative    Not on file     Prior to Visit Medications    Medication Sig Taking? Authorizing Provider   hydrOXYzine (VISTARIL) 25 MG capsule Take 1 capsule by mouth 3 times daily as needed for Anxiety Yes Philbert Meigs, MD   pantoprazole (PROTONIX) 40 MG tablet Take 1 tablet by mouth daily Yes Philbert Meigs, MD   triamterene-hydroCHLOROthiazide (DYAZIDE) 37.5-25 MG per capsule TAKE 1 CAPSULE ONCE DAILY Yes Philbert Meigs, MD   ibuprofen (ADVIL;MOTRIN) 800 MG tablet TAKE 1 TABLET THREE TIMES DAILY Yes Philbert Meigs, MD   metoprolol succinate (TOPROL XL) 100 MG extended release tablet TAKE (1) TABLET DAILY IN THE AM AND 1/2 TAB IN THE EVENING Yes Philbert Meigs, MD   DULoxetine (CYMBALTA) 60 MG extended release capsule 1 daily Yes Philbert Meigs, MD   nystatin (MYCOSTATIN) 632712 UNIT/GM powder Apply 3 times daily.  Yes Charlie Christie MD   rosuvastatin (CRESTOR) 10 MG tablet Take 1 tablet by mouth nightly Yes Philbert Meigs, MD   traZODone (DESYREL) 150 MG tablet TAKE 1 TABLET AT BEDTIME Yes Philbert Meigs, MD   furosemide (LASIX) 20 MG tablet Take 20 mg by mouth daily as needed Yes Historical Provider, MD   polyethylene glycol (MIRALAX) POWD powder Take 1-2 capfuls of Miralax daily as needed Yes Philbert Meigs, MD     Allergies   Allergen Reactions    Benadryl [Diphenhydramine] Anxiety     \"Made me jump off the wall\"    Topamax [Topiramate] Hives    Tramadol Shortness Of Breath    Vitamin C [Ascorbic Acid] Hives    Lyrica [Pregabalin] Palpitations  Penicillins Hives and Itching       OBJECTIVE:  Estimated body mass index is 31.71 kg/m² as calculated from the following:    Height as of this encounter: 5' 3\" (1.6 m). Weight as of this encounter: 179 lb (81.2 kg). Vitals:    03/22/21 1516 03/22/21 1551   BP: (!) 140/88 (!) 140/88   Site: Left Upper Arm    Position: Sitting    Cuff Size: Medium Adult    Pulse: 84    Temp: 97.9 °F (36.6 °C)    TempSrc: Temporal    SpO2: 97%    Weight: 179 lb (81.2 kg)    Height: 5' 3\" (1.6 m)        Physical Exam  Vitals signs and nursing note reviewed. Constitutional:       General: She is not in acute distress. Appearance: She is well-developed. She is not diaphoretic. HENT:      Head: Normocephalic and atraumatic. Right Ear: External ear normal.      Left Ear: External ear normal.      Nose: Nose normal.   Eyes:      General: Lids are normal. No scleral icterus. Right eye: No discharge. Left eye: No discharge. Pupils: Pupils are equal, round, and reactive to light. Neck:      Thyroid: No thyromegaly. Vascular: No JVD. Cardiovascular:      Rate and Rhythm: Normal rate and regular rhythm. Heart sounds: Normal heart sounds. Pulmonary:      Effort: Pulmonary effort is normal. No respiratory distress. Breath sounds: Normal breath sounds. Abdominal:      Palpations: Abdomen is soft. There is no hepatomegaly or splenomegaly. Tenderness: There is no abdominal tenderness. Skin:     General: Skin is warm and dry. Coloration: Skin is not pale. Findings: No erythema or rash. Comments: Turgor normal   Psychiatric:         Behavior: Behavior normal.         Thought Content: Thought content normal.         Judgment: Judgment normal.       EKG-normal sinus rhythm       ASSESSMENT PLAN      Diagnosis Orders   1. Slow transit constipation     2. Anxiety  hydrOXYzine (VISTARIL) 25 MG capsule   3. Duodenal ulcer  pantoprazole (PROTONIX) 40 MG tablet   4. Esophagitis, [place] grade A  pantoprazole (PROTONIX) 40 MG tablet   5. Chest pain, unspecified type  EKG 12 Lead   6. Gastroesophageal reflux disease with esophagitis without hemorrhage     7. Migraine with aura, with intractable migraine, so stated, with status migrainosus     8. Chronic tension-type headache, intractable     9. Chronic anxiety     10. Essential hypertension     11. Reactive depression     12. Leg cramps     Bowels are moving fine when she takes the MiraLAX twice a day as she is supposed to, but she has a very erratic schedule so does not always take her medicine consistently. She had a flutter in her chest and some pain, EKG unremarkable, may be anxiety related may be related to reflux, observe no further work-up. Pantoprazole generally is controlling her symptoms. One migraine that lasted 2 days with the paresis features that she normally has, no remaining deficit however. Blood pressure acceptable. Depression seems to be doing fine anxiety is what is periodic flaring Vistaril helps. Add vitamin D 2000 units and coenzyme Q10 200 mg daily to the increased water intake to help the leg cramps. Follow-up 4 months. Patient should call the office immediately with new or ongoing signs or symptoms or worsening, or proceed to the emergency room. No changes in past medical history, past surgical history, social history, or family history were noted during the patient encounter unless specifically listed above. All updates of past medical history, past surgical history, social history, or family history were reviewed personally by me during the office visit. All problems listed in the assessment are stable unless noted otherwise. Medication profile reviewed personally by me during the visit. Medication side effects and possible impairments from medications were discussed as applicable.     This document was prepared by a combination of typing and transcription through a voice recognition software. Scribe attestation: Arnoldo Talley MA, am scribing for and in the presence of Jessee Vigil MD. Electronically signed by Radha Steward MA on 3/22/2021 at 4:01 PM      Provider attestation:     I, Dr. Martín Anderson, personally performed the services described in this documentation, as scribed by the above signed scribe in my presence, and it is both accurate and complete. I agree with the ROS and Past Histories independently gathered by the clinical support staff and the remaining scribed note accurately describes my personal service to the patient.       3/22/2021    4:06 PM

## 2021-03-22 NOTE — PATIENT INSTRUCTIONS
Take 2000 units Vitamin D daily and 200 mg Co enzyme Q10 daily. Patient should call the office immediately with new or ongoing signs or symptoms or worsening, or proceed to the emergency room. If you are on medications which could impair your senses, you are at risk of weakness, falls, dizziness, or drowsiness. You should be careful during activities which could place you at risk of harm, such as climbing, using stairs, operating machinery, or driving vehicles. If you feel you cannot safely do these activities, you should request others to help you, or avoid the activities altogether. If you are drowsy for any other reason, you should use the same precautions as listed above.      Web Address for Advance Directive:    [email or web address]

## 2021-03-26 NOTE — TELEPHONE ENCOUNTER
Last OV 3/22/21  Future Appointments   Date Time Provider Mariaelena Hogue   7/23/2021 10:40 AM Katia Rendon MD [facility]

## 2021-03-27 RX ORDER — POTASSIUM CHLORIDE 750 MG/1
TABLET, FILM COATED, EXTENDED RELEASE ORAL
Qty: 30 TABLET | Refills: 11 | Status: SHIPPED | OUTPATIENT
Start: 2021-03-27 | End: 2021-07-09 | Stop reason: SDUPTHER

## 2021-03-27 RX ORDER — FUROSEMIDE 20 MG/1
TABLET ORAL
Qty: 60 TABLET | Refills: 3 | Status: SHIPPED | OUTPATIENT
Start: 2021-03-27 | End: 2021-12-17 | Stop reason: ALTCHOICE

## 2021-05-24 ENCOUNTER — TELEPHONE (OUTPATIENT)
Dept: FAMILY MEDICINE CLINIC | Age: 56
End: 2021-05-24

## 2021-05-24 RX ORDER — HYDROXYZINE 50 MG/1
50 TABLET, FILM COATED ORAL 3 TIMES DAILY PRN
Qty: 30 TABLET | Refills: 0 | Status: SHIPPED | OUTPATIENT
Start: 2021-05-24 | End: 2021-06-23

## 2021-05-24 NOTE — TELEPHONE ENCOUNTER
Pt called stating that she lost her mom over the weekend. Pt states that she's been having a really hard time dealing with this. States that she's been taking hydroxyzine 25mg 1 tablet 3x daily PRN but states that it's not really helping. Pt wanting to know if she'd be able to double the medication or if there's something else PCP could call in to [facility].  Call back pt with recommendations [phone]

## 2021-07-07 ENCOUNTER — TELEPHONE (OUTPATIENT)
Dept: FAMILY MEDICINE CLINIC | Age: 56
End: 2021-07-07

## 2021-07-07 DIAGNOSIS — R25.2 LEG CRAMPS: Primary | ICD-10-CM

## 2021-07-07 NOTE — TELEPHONE ENCOUNTER
Patient was last seen 3-2021 was advised to follow up in 4 months.  Appt is scheduled for in person 7-

## 2021-07-07 NOTE — TELEPHONE ENCOUNTER
Pt states that she has been having che horses all over legs ,hands, arms. States that she can hardly do anything hasn't been taking the potassium and was wondering if it could be from the fibromyalgia. And she uses [employer] [employer].

## 2021-07-07 NOTE — TELEPHONE ENCOUNTER
These leg cramps have been an ongoing issue that patient has been seen for during last few office visits. E. Visit not appropriate at this time. Please advise.

## 2021-07-08 ENCOUNTER — HOSPITAL ENCOUNTER (OUTPATIENT)
Age: 56
Discharge: HOME | End: 2021-07-08
Payer: COMMERCIAL

## 2021-07-08 DIAGNOSIS — R25.2 LEG CRAMPS: ICD-10-CM

## 2021-07-08 LAB
ANION GAP SERPL CALCULATED.3IONS-SCNC: 9 MMOL/L (ref 3–16)
BASOPHILS ABSOLUTE: 0.1 K/UL (ref 0–0.2)
BASOPHILS RELATIVE PERCENT: 0.7 %
BUN BLDV-MCNC: 14 MG/DL (ref 7–20)
CALCIUM SERPL-MCNC: 9.2 MG/DL (ref 8.3–10.6)
CHLORIDE BLD-SCNC: 111 MMOL/L (ref 99–110)
CO2: 23 MMOL/L (ref 21–32)
CREAT SERPL-MCNC: 0.8 MG/DL (ref 0.6–1.1)
EOSINOPHILS ABSOLUTE: 0.3 K/UL (ref 0–0.6)
EOSINOPHILS RELATIVE PERCENT: 3.3 %
GFR AFRICAN AMERICAN: >60
GFR NON-AFRICAN AMERICAN: >60
GLUCOSE BLD-MCNC: 89 MG/DL (ref 70–99)
HCT VFR BLD CALC: 35.4 % (ref 36–48)
HEMOGLOBIN: 11.8 G/DL (ref 12–16)
LYMPHOCYTES ABSOLUTE: 2.6 K/UL (ref 1–5.1)
LYMPHOCYTES RELATIVE PERCENT: 29 %
MAGNESIUM: 1.7 MG/DL (ref 1.8–2.4)
MCH RBC QN AUTO: 29.1 PG (ref 26–34)
MCHC RBC AUTO-ENTMCNC: 33.3 G/DL (ref 31–36)
MCV RBC AUTO: 87.4 FL (ref 80–100)
MONOCYTES ABSOLUTE: 0.4 K/UL (ref 0–1.3)
MONOCYTES RELATIVE PERCENT: 4.4 %
NEUTROPHILS ABSOLUTE: 5.6 K/UL (ref 1.7–7.7)
NEUTROPHILS RELATIVE PERCENT: 62.6 %
PDW BLD-RTO: 14.2 % (ref 12.4–15.4)
PLATELET # BLD: 303 K/UL (ref 135–450)
PMV BLD AUTO: 7.1 FL (ref 5–10.5)
POTASSIUM SERPL-SCNC: 3.7 MMOL/L (ref 3.5–5.1)
RBC # BLD: 4.06 M/UL (ref 4–5.2)
SODIUM BLD-SCNC: 143 MMOL/L (ref 136–145)
WBC # BLD: 8.9 K/UL (ref 4–11)

## 2021-07-08 PROCEDURE — 83735 ASSAY OF MAGNESIUM: CPT

## 2021-07-08 PROCEDURE — 80048 BASIC METABOLIC PNL TOTAL CA: CPT

## 2021-07-08 PROCEDURE — 36415 COLL VENOUS BLD VENIPUNCTURE: CPT

## 2021-07-08 PROCEDURE — 85025 COMPLETE CBC W/AUTO DIFF WBC: CPT

## 2021-07-09 DIAGNOSIS — E83.42 HYPOMAGNESEMIA: ICD-10-CM

## 2021-07-09 DIAGNOSIS — R25.2 LEG CRAMPS: Primary | ICD-10-CM

## 2021-07-09 RX ORDER — POTASSIUM CHLORIDE 750 MG/1
TABLET, FILM COATED, EXTENDED RELEASE ORAL
Qty: 60 TABLET | Refills: 11 | Status: SHIPPED | OUTPATIENT
Start: 2021-07-09 | End: 2021-12-17 | Stop reason: ALTCHOICE

## 2021-07-12 RX ORDER — TRAZODONE HYDROCHLORIDE 150 MG/1
TABLET ORAL
Qty: 30 TABLET | Refills: 11 | Status: SHIPPED | OUTPATIENT
Start: 2021-07-12 | End: 2022-04-06 | Stop reason: SDUPTHER

## 2021-07-23 ENCOUNTER — OFFICE VISIT (OUTPATIENT)
Dept: FAMILY MEDICINE CLINIC | Age: 56
End: 2021-07-23

## 2021-07-23 VITALS
DIASTOLIC BLOOD PRESSURE: 80 MMHG | WEIGHT: 186 LBS | OXYGEN SATURATION: 98 % | HEART RATE: 84 BPM | BODY MASS INDEX: 32.96 KG/M2 | HEIGHT: 63 IN | SYSTOLIC BLOOD PRESSURE: 132 MMHG

## 2021-07-23 DIAGNOSIS — R25.2 LEG CRAMPS: ICD-10-CM

## 2021-07-23 DIAGNOSIS — G43.111 MIGRAINE WITH AURA, WITH INTRACTABLE MIGRAINE, SO STATED, WITH STATUS MIGRAINOSUS: ICD-10-CM

## 2021-07-23 DIAGNOSIS — G44.221 CHRONIC TENSION-TYPE HEADACHE, INTRACTABLE: ICD-10-CM

## 2021-07-23 DIAGNOSIS — F33.9 EPISODE OF RECURRENT MAJOR DEPRESSIVE DISORDER, UNSPECIFIED DEPRESSION EPISODE SEVERITY (HCC): ICD-10-CM

## 2021-07-23 DIAGNOSIS — M79.7 FIBROMYALGIA: ICD-10-CM

## 2021-07-23 DIAGNOSIS — I10 ESSENTIAL HYPERTENSION: ICD-10-CM

## 2021-07-23 DIAGNOSIS — K21.00 GASTROESOPHAGEAL REFLUX DISEASE WITH ESOPHAGITIS WITHOUT HEMORRHAGE: ICD-10-CM

## 2021-07-23 DIAGNOSIS — K59.01 SLOW TRANSIT CONSTIPATION: ICD-10-CM

## 2021-07-23 DIAGNOSIS — F43.21 GRIEF REACTION: Primary | ICD-10-CM

## 2021-07-23 PROBLEM — F43.20 GRIEF REACTION: Status: ACTIVE | Noted: 2021-07-23

## 2021-07-23 PROCEDURE — 36415 COLL VENOUS BLD VENIPUNCTURE: CPT | Performed by: FAMILY MEDICINE

## 2021-07-23 PROCEDURE — 99214 OFFICE O/P EST MOD 30 MIN: CPT | Performed by: FAMILY MEDICINE

## 2021-07-23 PROCEDURE — 3017F COLORECTAL CA SCREEN DOC REV: CPT | Performed by: FAMILY MEDICINE

## 2021-07-23 PROCEDURE — G8427 DOCREV CUR MEDS BY ELIG CLIN: HCPCS | Performed by: FAMILY MEDICINE

## 2021-07-23 PROCEDURE — G8417 CALC BMI ABV UP PARAM F/U: HCPCS | Performed by: FAMILY MEDICINE

## 2021-07-23 PROCEDURE — 4004F PT TOBACCO SCREEN RCVD TLK: CPT | Performed by: FAMILY MEDICINE

## 2021-07-23 RX ORDER — DULOXETIN HYDROCHLORIDE 60 MG/1
CAPSULE, DELAYED RELEASE ORAL
Qty: 60 CAPSULE | Refills: 11 | Status: SHIPPED | OUTPATIENT
Start: 2021-07-23 | End: 2022-04-06 | Stop reason: SDUPTHER

## 2021-07-23 SDOH — ECONOMIC STABILITY: FOOD INSECURITY: WITHIN THE PAST 12 MONTHS, YOU WORRIED THAT YOUR FOOD WOULD RUN OUT BEFORE YOU GOT MONEY TO BUY MORE.: NEVER TRUE

## 2021-07-23 SDOH — ECONOMIC STABILITY: FOOD INSECURITY: WITHIN THE PAST 12 MONTHS, THE FOOD YOU BOUGHT JUST DIDN'T LAST AND YOU DIDN'T HAVE MONEY TO GET MORE.: NEVER TRUE

## 2021-07-23 ASSESSMENT — SOCIAL DETERMINANTS OF HEALTH (SDOH): HOW HARD IS IT FOR YOU TO PAY FOR THE VERY BASICS LIKE FOOD, HOUSING, MEDICAL CARE, AND HEATING?: NOT HARD AT ALL

## 2021-07-23 NOTE — PATIENT INSTRUCTIONS

## 2021-07-23 NOTE — PROGRESS NOTES
she is going to try and talk to her best friend and see if she can just sit and talk to her if that will help some. [x] Patient has completed an advance directive  [] Patient has NOT completed an advanced directive  [x] Patient has a documented healthcare surrogate  [] Patient does NOT have a documented healthcare surrogate  [] Discussed the importance of establishing and updating an advanced directive. Patient has questions at this time and those were answered. [x] Discussed the importance of establishing and updating an advanced directive. Patient does NOT have questions at this time. Discussed with: [x] Patient            [] Family             [] Other caregiver    Patient's medications, allergies, past medical, surgical, social and family histories were reviewed and updated asappropriate on 7/23/2021 at 10:50 AM.    ROS:  Review of Systems    All other systems reviewed and are negative except as noted above on 7/23/2021 at 10:50 AM. Additional review of systems may be scanned into the media section ofthis medical record. Any responses requiring further intervention were pursued.     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 ?    Migraine     Pneumonia 2008    bronchitis frequently since    TIA (transient ischemic attack) 2007    Ulcer, Stomach Peptic     frequent N/V    Unspecified cerebral artery occlusion with cerebral infarction 2007     mini stroke cused by severe migraine    Weight loss     frequent N/V, S/P cholecystectomy     Family History   Problem Relation Age of Onset    High Blood Pressure Mother     Heart Disease Mother     Diabetes Maternal Grandfather     Heart Disease Father     Heart Disease Brother      Social History     Socioeconomic History    Marital status:      Spouse name: Not on file  Number of children: Not on file    Years of education: Not on file    Highest education level: Not on file   Occupational History    Not on file   Tobacco Use    Smoking status: Current Some Day Smoker     Packs/day: 0.50     Years: 27.00     Pack years: 13.50     Types: Cigarettes    Smokeless tobacco: Never Used    Tobacco comment: she smokes sometimes   Vaping Use    Vaping Use: Never used   Substance and Sexual Activity    Alcohol use: No     Alcohol/week: 0.0 standard drinks     Comment: rare    Drug use: No    Sexual activity: Yes     Partners: Male   Other Topics Concern    Not on file   Social History Narrative    Not on file     Social Determinants of Health     Financial Resource Strain: Low Risk     Difficulty of Paying Living Expenses: Not hard at all   Food Insecurity: No Food Insecurity    Worried About Running Out of Food in the Last Year: Never true    [place] of Food in the Last Year: Never true   Transportation Needs:     Lack of Transportation (Medical):  Lack of Transportation (Non-Medical):    Physical Activity:     Days of Exercise per Week:     Minutes of Exercise per Session:    Stress:     Feeling of Stress :    Social Connections:     Frequency of Communication with Friends and Family:     Frequency of Social Gatherings with Friends and Family:     Attends Roman Catholic Services:     Active Member of Clubs or Organizations:     Attends Club or Organization Meetings:     Marital Status:    Intimate Partner Violence:     Fear of Current or Ex-Partner:     Emotionally Abused:     Physically Abused:     Sexually Abused:      Prior to Visit Medications    Medication Sig Taking?  Authorizing Provider   traZODone (DESYREL) 150 MG tablet TAKE 1 TABLET AT BEDTIME Yes Darryle Slain, MD   Magnesium 400 MG CAPS Take one tablet by mouth twice daily Yes Darryle Slain, MD   potassium chloride (KLOR-CON) 10 MEQ extended release tablet Take two tablets by mouth daily Yes Keyona Ceja MD   furosemide (LASIX) 20 MG tablet TAKE (1) TABLET DAILY AS NEEDED. Yes Keyona Ceja MD   hydrOXYzine (VISTARIL) 25 MG capsule Take 1 capsule by mouth 3 times daily as needed for Anxiety Yes Keyona Ceja MD   pantoprazole (PROTONIX) 40 MG tablet Take 1 tablet by mouth daily Yes Keyona Ceja MD   triamterene-hydroCHLOROthiazide (DYAZIDE) 37.5-25 MG per capsule TAKE 1 CAPSULE ONCE DAILY Yes Keyona Ceja MD   ibuprofen (ADVIL;MOTRIN) 800 MG tablet TAKE 1 TABLET THREE TIMES DAILY Yes Keyona Ceja MD   metoprolol succinate (TOPROL XL) 100 MG extended release tablet TAKE (1) TABLET DAILY IN THE AM AND 1/2 TAB IN THE EVENING Yes Keyona Ceja MD   DULoxetine (CYMBALTA) 60 MG extended release capsule 1 daily Yes Keyona Ceja MD   nystatin (MYCOSTATIN) 719204 UNIT/GM powder Apply 3 times daily. Yes Tyra Rankin MD   rosuvastatin (CRESTOR) 10 MG tablet Take 1 tablet by mouth nightly Yes Keyona Ceja MD   polyethylene glycol [facility]) POWD powder Take 1-2 capfuls of Miralax daily as needed Yes Keyona Ceja MD     Allergies   Allergen Reactions    Benadryl [Diphenhydramine] Anxiety     \"Made me jump off the wall\"    Topamax [Topiramate] Hives    Tramadol Shortness Of Breath    Vitamin C [Ascorbic Acid] Hives    Lyrica [Pregabalin] Palpitations    Penicillins Hives and Itching       OBJECTIVE:  Estimated body mass index is 32.95 kg/m² as calculated from the following:    Height as of this encounter: 5' 3\" (1.6 m). Weight as of this encounter: 186 lb (84.4 kg). Vitals:    07/23/21 1039   BP: 132/80   Site: Left Upper Arm   Position: Sitting   Cuff Size: Medium Adult   Pulse: 84   SpO2: 98%   Weight: 186 lb (84.4 kg)   Height: 5' 3\" (1.6 m)       Physical Exam  Vitals and nursing note reviewed.    Constitutional:       General: She is not in acute distress. Appearance: She is well-developed. She is not diaphoretic. HENT:      Head: Normocephalic and atraumatic. Right Ear: External ear normal.      Left Ear: External ear normal.      Nose: Nose normal.   Eyes:      General: Lids are normal. No scleral icterus. Right eye: No discharge. Left eye: No discharge. Pupils: Pupils are equal, round, and reactive to light. Neck:      Thyroid: No thyromegaly. Vascular: No JVD. Cardiovascular:      Rate and Rhythm: Normal rate and regular rhythm. Heart sounds: Normal heart sounds. Pulmonary:      Effort: Pulmonary effort is normal. No respiratory distress. Breath sounds: Normal breath sounds. Abdominal:      Palpations: Abdomen is soft. There is no hepatomegaly or splenomegaly. Tenderness: There is no abdominal tenderness. Musculoskeletal:      Right lower leg: No edema. Left lower leg: No edema. Skin:     General: Skin is warm and dry. Coloration: Skin is not pale. Findings: No erythema or rash. Comments: Turgor normal   Neurological:      Mental Status: She is oriented to person, place, and time. Psychiatric:         Mood and Affect: Mood normal.         Behavior: Behavior normal.         Thought Content: Thought content normal.         Judgment: Judgment normal.      Comments: Tearful              ASSESSMENT PLAN      Diagnosis Orders   1. Grief reaction     2. Leg cramps  MAGNESIUM    POTASSIUM   3. Episode of recurrent major depressive disorder, unspecified depression episode severity (HCC)  DULoxetine (CYMBALTA) 60 MG extended release capsule   4. Chronic tension-type headache, intractable     5. Fibromyalgia     6. Migraine with aura, with intractable migraine, so stated, with status migrainosus     7. Essential hypertension     8. Gastroesophageal reflux disease with esophagitis without hemorrhage     9.  Slow transit constipation     Patient still struggling with her mother's scribing for and in the presence of Sharmin Monzon MD. Electronically signed by Jesus Mercado RN on 7/23/2021 at 10:50 AM      Provider attestation:     I, Dr. Raymundo Lacy, personally performed the services described in this documentation, as scribed by the above signed scribe in my presence, and it is both accurate and complete. I agree with the ROS and Past Histories independently gathered by the clinical support staff and the remaining scribed note accurately describes my personal service to the patient.       7/23/2021    11:17 AM - prior history of VTE  - follow LE Dopplers  - no AC at this time due to ICH

## 2021-07-24 LAB
MAGNESIUM: 1.9 MG/DL (ref 1.8–2.4)
POTASSIUM SERPL-SCNC: 4.1 MMOL/L (ref 3.5–5.1)

## 2021-08-31 ENCOUNTER — VIRTUAL VISIT (OUTPATIENT)
Dept: FAMILY MEDICINE CLINIC | Age: 56
End: 2021-08-31
Payer: COMMERCIAL

## 2021-08-31 DIAGNOSIS — I10 ESSENTIAL HYPERTENSION: ICD-10-CM

## 2021-08-31 DIAGNOSIS — R25.2 LEG CRAMPS: ICD-10-CM

## 2021-08-31 DIAGNOSIS — G44.221 CHRONIC TENSION-TYPE HEADACHE, INTRACTABLE: ICD-10-CM

## 2021-08-31 DIAGNOSIS — M25.551 RIGHT HIP PAIN: ICD-10-CM

## 2021-08-31 DIAGNOSIS — K59.04 CHRONIC IDIOPATHIC CONSTIPATION: Primary | ICD-10-CM

## 2021-08-31 DIAGNOSIS — K21.00 GASTROESOPHAGEAL REFLUX DISEASE WITH ESOPHAGITIS WITHOUT HEMORRHAGE: ICD-10-CM

## 2021-08-31 PROCEDURE — G8417 CALC BMI ABV UP PARAM F/U: HCPCS | Performed by: FAMILY MEDICINE

## 2021-08-31 PROCEDURE — 3017F COLORECTAL CA SCREEN DOC REV: CPT | Performed by: FAMILY MEDICINE

## 2021-08-31 PROCEDURE — 4004F PT TOBACCO SCREEN RCVD TLK: CPT | Performed by: FAMILY MEDICINE

## 2021-08-31 PROCEDURE — 99214 OFFICE O/P EST MOD 30 MIN: CPT | Performed by: FAMILY MEDICINE

## 2021-08-31 PROCEDURE — G8427 DOCREV CUR MEDS BY ELIG CLIN: HCPCS | Performed by: FAMILY MEDICINE

## 2021-08-31 RX ORDER — CELECOXIB 100 MG/1
100 CAPSULE ORAL 2 TIMES DAILY
Qty: 60 CAPSULE | Refills: 3 | Status: SHIPPED | OUTPATIENT
Start: 2021-08-31 | End: 2021-12-17 | Stop reason: ALTCHOICE

## 2021-08-31 NOTE — PROGRESS NOTES
2021    TELEHEALTH EVALUATION -- Audio/Visual (During YQRYR-75 public health emergency)    HPI:    Angel Cotton (:  1965) has requested an audio/video evaluation for the following concern(s):    Patient is being seen for a follow up on her depression/anxiety. At her previous visit in July her Duloxetine was doubled to 60 mg BID. She has noticed any improvement at all. She admits she has not had any time to take care of herself due to traveling all of the time and taking care of her dad. She has been under a lot of stress with family issues. She has not been taking the Miralax and still deals with the constipation. She does not check her blood pressure very often but has not had any issues or concerns. She can tell when it is high because she gets headaches really bad. She has good and bad days with it. Internal Administration   First Dose      Second Dose           Last COVID Lab No results found for: SARS-COV-2, SARS-COV-2 RNA, SARS-COV-2, SARS-COV-2, SARS-COV-2 BY PCR, SARS-COV-2, SARS-COV-2, SARS-COV-2         Wt Readings from Last 3 Encounters:   21 186 lb (84.4 kg)   21 179 lb (81.2 kg)   21 178 lb (80.7 kg)     BP Readings from Last 3 Encounters:   21 132/80   21 (!) 140/88   21 132/88     Lab Results   Component Value Date    LABA1C 5.4 2020    LABA1C 5.9 2019       [x] Patient has completed an advance directive  [] Patient has NOT completed an advanced directive  [x] Patient has a documented healthcare surrogate  [] Patient does NOT have a documented healthcare surrogate  [] Discussed the importance of establishing and updating an advanced directive. Patient has questions at this time and those were answered. [x] Discussed the importance of establishing and updating an advanced directive. Patient does NOT have questions at this time.     Discussed with: [x] Patient            [] Family             [] Other caregiver      All other ROS  Fibromyalgia     Hyperlipidemia     Hypertension     Kidney cyst     x2 rt. ?    Migraine     Pneumonia 2008    bronchitis frequently since    TIA (transient ischemic attack)     Ulcer, Stomach Peptic     frequent N/V    Unspecified cerebral artery occlusion with cerebral infarction      mini stroke cused by severe migraine    Weight loss     frequent N/V, S/P cholecystectomy       Past Surgical History:   Procedure Laterality Date    ANKLE SURGERY       SECTION      CHOLECYSTECTOMY      COLONOSCOPY N/A 2018    COLON W/ANES.  performed by Liliana Moreno MD at [address]      HERNIA REPAIR      HYSTERECTOMY      TUMOR REMOVAL      lt femur    UPPER GASTROINTESTINAL ENDOSCOPY N/A 2018    EGD BIOPSY performed by Liliana Moreno MD at [address] History     Tobacco Use    Smoking status: Current Some Day Smoker     Packs/day: 0.50     Years: 27.00     Pack years: 13.50     Types: Cigarettes    Smokeless tobacco: Never Used    Tobacco comment: she smokes sometimes   Vaping Use    Vaping Use: Never used   Substance Use Topics    Alcohol use: No     Alcohol/week: 0.0 standard drinks     Comment: rare    Drug use: No       PHYSICAL EXAMINATION:  [ INSTRUCTIONS:  \"[x]\" Indicates a positive item  \"[]\" Indicates a negative item  -- DELETE ALL ITEMS NOT EXAMINED]  Vital Signs: (As obtained by patient/caregiver or practitioner observation)  See flowsheet  Blood pressure- Heart rate-    Respiratory rate-    Temperature-  Pulse oximetry-     Constitutional: [x] Appears well-developed and well-nourished [x] No apparent distress      [] Abnormal-   Mental status  [x] Alert and awake  [x] Oriented to person/place/time [x]Able to follow commands      Eyes:  EOM    [x]  Normal  [] Abnormal-  Sclera  [x]  Normal  [] Abnormal -         Discharge [x]  None visible  [] Abnormal -    HENT:   [x] Normocephalic, atraumatic. [] Abnormal   [] Mouth/Throat: Mucous membranes are moist.     External Ears [x] Normal  [] Abnormal-     Neck: [x] No visualized mass     Pulmonary/Chest: [x] Respiratory effort normal.  [x] No visualized signs of difficulty breathing or respiratory distress        [] Abnormal-      Musculoskeletal:   [] Normal gait with no signs of ataxia         [x] Normal range of motion of neck        [] Abnormal-       Neurological:        [x] No Facial Asymmetry (Cranial nerve 7 motor function) (limited exam to video visit)          [x] No gaze palsy        [] Abnormal-         Skin:        [x] No significant exanthematous lesions or discoloration noted on facial skin         [] Abnormal-            Psychiatric:       [x] Normal Affect [x] No Hallucinations        [] Abnormal-     Other pertinent observable physical exam findings-      ASSESSMENT PLAN      Diagnosis Orders   1. Chronic idiopathic constipation     2. Gastroesophageal reflux disease with esophagitis without hemorrhage     3. Chronic tension-type headache, intractable     4. Leg cramps     5. Right hip pain     6. Essential hypertension     Patient is not using her MiraLAX regularly as she is on the road frequently does not feel like she can take the medicine that way. She has not noticed any improvement with the duloxetine twice a day yet but realizes it may take more time. She no longer complains of leg cramps but does mention an aching pain in her right leg and at times has that in her left leg as well. She has had meloxicam in the past but did not find that useful. X-rays taken by Dr. Mercedes Callahan last year were unremarkable for arthritis. She does have a plate and screw in the left hip already. I question whether she is not bearing weight symmetrically or is there a leg length discrepancy. When she returns to the office in 6 to 8 weeks we can check that as well. Her blood pressure she reports has been fine. Headaches the same.   Reflux patient's risk of exposure to COVID-19 and provide necessary medical care. The patient (and/or legal guardian) has also been advised to contact this office for worsening conditions or problems, and seek emergency medical treatment and/or call 911 if deemed necessary. Services were provided through a video synchronous discussion virtually to substitute for in-person clinic visit. Patient and provider were located at their individual homes. --Kinza Carpenter MA on 8/31/2021 at 1:13 PM    An electronic signature was used to authenticate this note.

## 2021-08-31 NOTE — PROGRESS NOTES
She has already tried and failed meloxicam.  She has tried ibuprofen and naproxen without benefit as well.

## 2021-09-01 ENCOUNTER — TELEPHONE (OUTPATIENT)
Dept: ADMINISTRATIVE | Age: 56
End: 2021-09-01

## 2021-09-01 NOTE — TELEPHONE ENCOUNTER
Submitted PA for Celecoxib 100MG capsules, Key: [id number]. Medication has been APPROVED through 09/01/2022. Please notify patient. Thank you.

## 2021-09-17 ENCOUNTER — TELEPHONE (OUTPATIENT)
Dept: FAMILY MEDICINE CLINIC | Age: 56
End: 2021-09-17

## 2021-09-17 NOTE — TELEPHONE ENCOUNTER
Pt was wanting to see if she could get a handicap placard. States that she is finding that she is having trouble walking long distances.

## 2021-09-24 DIAGNOSIS — B96.89 ACUTE BACTERIAL SINUSITIS: ICD-10-CM

## 2021-09-24 DIAGNOSIS — J01.90 ACUTE BACTERIAL SINUSITIS: ICD-10-CM

## 2021-09-24 DIAGNOSIS — J18.9 COMMUNITY ACQUIRED PNEUMONIA, UNSPECIFIED LATERALITY: ICD-10-CM

## 2021-09-24 RX ORDER — IBUPROFEN 800 MG/1
TABLET ORAL
Qty: 90 TABLET | Refills: 11 | Status: SHIPPED | OUTPATIENT
Start: 2021-09-24 | End: 2022-10-03

## 2021-10-25 ENCOUNTER — TELEPHONE (OUTPATIENT)
Dept: FAMILY MEDICINE CLINIC | Age: 56
End: 2021-10-25

## 2021-10-25 DIAGNOSIS — J01.90 ACUTE BACTERIAL SINUSITIS: Primary | ICD-10-CM

## 2021-10-25 DIAGNOSIS — B96.89 ACUTE BACTERIAL SINUSITIS: Primary | ICD-10-CM

## 2021-10-25 DIAGNOSIS — Z20.822 COVID-19 RULED OUT: Primary | ICD-10-CM

## 2021-10-25 RX ORDER — AZITHROMYCIN 250 MG/1
250 TABLET, FILM COATED ORAL SEE ADMIN INSTRUCTIONS
Qty: 6 TABLET | Refills: 0 | Status: SHIPPED | OUTPATIENT
Start: 2021-10-25 | End: 2021-10-30

## 2021-10-25 NOTE — TELEPHONE ENCOUNTER
Appears patient has not been tested for covid. Called patient but unable to reach. LVm informing that in order to send in antibiotics would need a negative covid test. Asked patient to please be tested for covid and have the result sent to our office or we can send a order for a covid test to [facility]. If she would like to have the test completed her informed she needs to call back so that we can send the order in.

## 2021-10-25 NOTE — TELEPHONE ENCOUNTER
----- Message from Felicita Medina sent at 10/25/2021  8:51 AM EDT -----  Subject: Message to Provider    QUESTIONS  Information for Provider? patient wants to know if dr. Yevgeniy Acuña could   order her an antibiotic she has a sinus infection pressure in her face and   eyes just started yesterday temp is 99 no other symptoms. she uses [facility]   pharmacy. please call her back  ---------------------------------------------------------------------------  --------------  CALL BACK INFO  What is the best way for the office to contact you? OK to leave message on   voicemail  Preferred Call Back Phone Number? [phone]  ---------------------------------------------------------------------------  --------------  SCRIPT ANSWERS  Relationship to Patient?  Self

## 2021-10-25 NOTE — TELEPHONE ENCOUNTER
Received COVID19 results, NEGATIVE. Pt is requesting something be called in to [facility] in [facility] for pressure in her face and behind eyes that started yesterday, temp 99, no other symptoms.

## 2021-11-02 DIAGNOSIS — F41.8 SITUATIONAL ANXIETY: Primary | ICD-10-CM

## 2021-11-02 RX ORDER — LORAZEPAM 1 MG/1
1 TABLET ORAL 3 TIMES DAILY PRN
Qty: 30 TABLET | Refills: 0 | Status: SHIPPED | OUTPATIENT
Start: 2021-11-02 | End: 2021-12-02

## 2021-11-02 NOTE — TELEPHONE ENCOUNTER
----- Message from Kemi Almonte sent at 11/2/2021  8:40 AM EDT -----  Subject: Message to Provider    QUESTIONS  Information for Provider? Patient wanted the doctor to know her father is   in hospice . Wants something called in to [facility] pharmacy to hep her get   through. States the medication sent in when her mom passed didn't work. Wanting something for maybe a week to get through this hard time. ---------------------------------------------------------------------------  --------------  Kamala HERNANDEZ  What is the best way for the office to contact you? OK to leave message on   voicemail  Preferred Call Back Phone Number? [phone]  ---------------------------------------------------------------------------  --------------  SCRIPT ANSWERS  Relationship to Patient?  Self

## 2021-11-15 ENCOUNTER — TELEPHONE (OUTPATIENT)
Dept: FAMILY MEDICINE CLINIC | Age: 56
End: 2021-11-15

## 2021-11-15 NOTE — TELEPHONE ENCOUNTER
Pt called to let PCP know that the Ativan has helped. BP issues, migraine, and vomiting with migraine. Pt went to [facility] ER over the weekend. States that she doesn't want to go back to the ER due to not knowing what's going to happen to her dad. Pt uses [facility] in [facility].  Call back with recommendations [phone]

## 2021-11-15 NOTE — TELEPHONE ENCOUNTER
----- Message from Anthony Guzman sent at 11/15/2021  8:53 AM EST -----  Subject: Message to Provider    QUESTIONS  Information for Provider? Patient stated their blood pressure has been   running high and they're thinking its stress related. Patient is wanting   to know if Dr Nette Farnsworth wants them to double their ativan medication of   what they should do. Please advise. Readings have been; 188/104, 192/114,   182/102, 185/103, 160/100  ---------------------------------------------------------------------------  --------------  CALL BACK INFO  What is the best way for the office to contact you? OK to leave message on   voicemail  Preferred Call Back Phone Number? [phone]  ---------------------------------------------------------------------------  --------------  SCRIPT ANSWERS  Relationship to Patient?  Self

## 2021-11-15 NOTE — TELEPHONE ENCOUNTER
If patient is finding the lorazepam that useful it may be continued if a psychiatrist would see her and prescribe it, happy to make a referral for if she wishes to do so

## 2021-11-16 NOTE — TELEPHONE ENCOUNTER
Called pt and LMOR to increase metoprolol to 200mg daily and report BP and HR in 1 week. Also if pt is wanting to continue with lorazepam she would need to be referred to psychiatry for them to prescribe it.

## 2021-11-16 NOTE — TELEPHONE ENCOUNTER
Pt has not had the 100mg of toprol. Pt has 50mg and has been taking 2 50mg tabs for the past 3 days. Pt informed to continue the 2 tabs for this week and call back Friday to see how the 100mg is working.

## 2021-11-16 NOTE — TELEPHONE ENCOUNTER
Pt called and informed of message. Pt states that she took her BP about 30 min ago and it was 189/110. States that she took her medication and it's now 160/100 HR 74. Pt states that she has a terrible migraine right now too.  Call back pt with recommendations [phone]

## 2021-11-17 ENCOUNTER — TELEPHONE (OUTPATIENT)
Dept: FAMILY MEDICINE CLINIC | Age: 56
End: 2021-11-17

## 2021-11-17 NOTE — TELEPHONE ENCOUNTER
Pt was wanting to see if she could get someone to come out to help her. She said that she has no strength at all and was hoping to get it back.

## 2021-11-18 ENCOUNTER — TELEPHONE (OUTPATIENT)
Dept: FAMILY MEDICINE CLINIC | Age: 56
End: 2021-11-18

## 2021-11-18 DIAGNOSIS — R53.1 GENERALIZED WEAKNESS: Primary | ICD-10-CM

## 2021-11-23 ENCOUNTER — TELEPHONE (OUTPATIENT)
Dept: FAMILY MEDICINE CLINIC | Age: 56
End: 2021-11-23

## 2021-11-23 NOTE — TELEPHONE ENCOUNTER
----- Message from Alok Choi sent at 11/23/2021  9:35 AM EST -----  Subject: Message to Provider    QUESTIONS  Information for Provider? Physical Therapist sent over notes and report on   Pt Questioning if she might have had a mini stroke. She called yesterday   and was told the paperwork was o his desk Has he had a chance to read She   would like to talk with doctor Please advise She is concerned that she did   have a mini stroke   ---------------------------------------------------------------------------  --------------  CALL BACK INFO  What is the best way for the office to contact you? OK to leave message on   voicemail  Preferred Call Back Phone Number? [phone]  ---------------------------------------------------------------------------  --------------  SCRIPT ANSWERS  Relationship to Patient?  Self

## 2021-11-24 NOTE — TELEPHONE ENCOUNTER
Jaki Fowler  [facility] [address] [facility] Support Staff  Subject: Message to Provider     QUESTIONS   Information for Provider? pt I needing to know the status of the message   she put in yesterday about the physical therapist sending over notes. Please call/advise. Sent to office. Pt says that she cannot use her left   side for 5 days. ---------------------------------------------------------------------------   --------------   Eleazar Phan INFO   What is the best way for the office to contact you? OK to leave message on   voicemail   Preferred Call Back Phone Number? [phone]   ---------------------------------------------------------------------------   --------------   SCRIPT ANSWERS   Relationship to Patient?  Self

## 2021-11-24 NOTE — TELEPHONE ENCOUNTER
Pt called to follow up on message from yesterday. Pt has another appt with PT on Friday. She will try and call them today to refax notes from visit on Monday. Pt states that she's had mini strokes before and this feels the same. States that her left side is tingling, numb feeling, weakness, and has had a migraine for 6 days. Pt states that she's feeling a little better today. Due to the holiday weekend strongly advised pt that if she felt like these symptoms were getting worse to go to the ER. Pt voiced understanding.

## 2021-11-24 NOTE — TELEPHONE ENCOUNTER
----- Message from Alexander Woods sent at 11/24/2021  3:39 PM EST -----  Subject: Message to Provider    QUESTIONS  Information for Provider? pt I needing to know the status of the message   she put in yesterday about the physical therapist sending over notes. Please call/advise. Sent to office. Pt says that she cannot use her left   side for 5 days. ---------------------------------------------------------------------------  --------------  Omelia Counter INFO  What is the best way for the office to contact you? OK to leave message on   voicemail  Preferred Call Back Phone Number? [phone]  ---------------------------------------------------------------------------  --------------  SCRIPT ANSWERS  Relationship to Patient?  Self

## 2021-12-14 ENCOUNTER — TELEPHONE (OUTPATIENT)
Dept: FAMILY MEDICINE CLINIC | Age: 56
End: 2021-12-14

## 2021-12-14 NOTE — TELEPHONE ENCOUNTER
Pt said that she has a lot of pain in her hips and legs. 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  Was wanting to see if she could get in to have it looked at

## 2021-12-17 ENCOUNTER — OFFICE VISIT (OUTPATIENT)
Dept: FAMILY MEDICINE CLINIC | Age: 56
End: 2021-12-17
Payer: COMMERCIAL

## 2021-12-17 VITALS
WEIGHT: 186 LBS | SYSTOLIC BLOOD PRESSURE: 146 MMHG | HEART RATE: 99 BPM | OXYGEN SATURATION: 87 % | DIASTOLIC BLOOD PRESSURE: 88 MMHG | BODY MASS INDEX: 32.96 KG/M2 | HEIGHT: 63 IN

## 2021-12-17 DIAGNOSIS — M53.3 SI (SACROILIAC) JOINT DYSFUNCTION: Primary | ICD-10-CM

## 2021-12-17 DIAGNOSIS — R07.89 CHEST WALL PAIN: ICD-10-CM

## 2021-12-17 DIAGNOSIS — M62.838 MUSCLE SPASM: ICD-10-CM

## 2021-12-17 DIAGNOSIS — E04.1 THYROID NODULE: ICD-10-CM

## 2021-12-17 PROCEDURE — G8427 DOCREV CUR MEDS BY ELIG CLIN: HCPCS | Performed by: NURSE PRACTITIONER

## 2021-12-17 PROCEDURE — G8417 CALC BMI ABV UP PARAM F/U: HCPCS | Performed by: NURSE PRACTITIONER

## 2021-12-17 PROCEDURE — G8484 FLU IMMUNIZE NO ADMIN: HCPCS | Performed by: NURSE PRACTITIONER

## 2021-12-17 PROCEDURE — 3017F COLORECTAL CA SCREEN DOC REV: CPT | Performed by: NURSE PRACTITIONER

## 2021-12-17 PROCEDURE — 4004F PT TOBACCO SCREEN RCVD TLK: CPT | Performed by: NURSE PRACTITIONER

## 2021-12-17 PROCEDURE — 99214 OFFICE O/P EST MOD 30 MIN: CPT | Performed by: NURSE PRACTITIONER

## 2021-12-17 RX ORDER — PREDNISONE 10 MG/1
TABLET ORAL
Qty: 30 TABLET | Refills: 0 | Status: SHIPPED | OUTPATIENT
Start: 2021-12-17 | End: 2022-02-02

## 2021-12-17 RX ORDER — CYCLOBENZAPRINE HCL 5 MG
5 TABLET ORAL 3 TIMES DAILY PRN
Qty: 30 TABLET | Refills: 0 | Status: SHIPPED | OUTPATIENT
Start: 2021-12-17 | End: 2021-12-27

## 2021-12-17 NOTE — PROGRESS NOTES
[facility]  PHYSICIAN PRACTICES  [facility]  [address] [place] [address]  [id number]  Dept: [phone]  Dept Fax: [phone]  Loc: [phone]    William Ramires is a 55 y.o. female who presents today for her medical conditions/complaints as noted below. William Ramires is c/o of Hip Pain (pt states she has had hip pain for the past month she feels is getting worse and not going away that radiates down her legs. )        HPI:     Chief Complaint   Patient presents with    Hip Pain     pt states she has had hip pain for the past month she feels is getting worse and not going away that radiates down her legs. Hip Pain   The incident occurred more than 1 week ago (More than a month ). There was no injury mechanism. The pain is present in the right hip (Right buttock). The quality of the pain is described as aching. The pain is at a severity of 7/10. The pain is moderate. The pain has been constant since onset. Pertinent negatives include no inability to bear weight, loss of motion, loss of sensation, muscle weakness, numbness or tingling. She reports no foreign bodies present. The symptoms are aggravated by movement (Walking more ). She has tried acetaminophen and NSAIDs for the symptoms. The treatment provided mild relief. Back Pain  This is a chronic problem. The current episode started more than 1 month ago. The problem occurs 2 to 4 times per day. The problem has been gradually worsening since onset. The pain is present in the thoracic spine. The quality of the pain is described as aching and shooting. The pain does not radiate. The pain is at a severity of 7/10. The pain is moderate. The pain is worse during the day. The symptoms are aggravated by position and standing.  Pertinent negatives include no abdominal pain, bladder incontinence, bowel incontinence, chest pain, dysuria, fever, headaches, leg pain, numbness, paresis, paresthesias, pelvic pain, perianal numbness, tingling, weakness or weight loss. Risk factors include obesity, sedentary lifestyle and lack of exercise. She has tried NSAIDs for the symptoms. The treatment provided no relief. Baron Argueta admits that over the last two weeks she has noticed a lump on the side of her neck. She states the lump is painless and moves. She has not been sick lately. She is not having any sore throat or ear pain. She is worried about cancer as she states she has a strong history of cancer in her family. She denies any trouble swallowing. No hoarse voice. Baron Argueta admits to having chest wall tenderness. She has noticed this for several days now. She admits that if she touches her chest she will have tenderness. She denies any trauma to her chest.  She states she does not have the pain daily. Sometimes she notices the pain when she takes a deep breath. Past Medical History:   Diagnosis Date    Ankle fracture     Anxiety     Arthritis     Benzodiazepine abuse ([facility] [address]     Depression     Fibromyalgia     Hyperlipidemia     Hypertension     Kidney cyst     x2 rt. ?    Migraine     Pneumonia 2008    bronchitis frequently since    TIA (transient ischemic attack)     Ulcer, Stomach Peptic     frequent N/V    Unspecified cerebral artery occlusion with cerebral infarction      mini stroke cused by severe migraine    Weight loss     frequent N/V, S/P cholecystectomy      Past Surgical History:   Procedure Laterality Date    ANKLE SURGERY       SECTION      CHOLECYSTECTOMY      COLONOSCOPY N/A 2018    COLON W/ANES.  performed by Herminia Lanes, MD at [address]      HERNIA REPAIR      HYSTERECTOMY      TUMOR REMOVAL      lt femur    UPPER GASTROINTESTINAL ENDOSCOPY N/A 2018    EGD BIOPSY performed by Herminia Lanes, MD at [facility]       Family History   Problem Relation Age of Onset    High Blood Pressure Mother     Heart Disease Mother    [place] Diabetes Maternal Grandfather     Heart Disease Father     Heart Disease Brother        Social History     Tobacco Use    Smoking status: Current Some Day Smoker     Packs/day: 0.50     Years: 27.00     Pack years: 13.50     Types: Cigarettes    Smokeless tobacco: Never Used    Tobacco comment: she smokes sometimes   Substance Use Topics    Alcohol use: No     Alcohol/week: 0.0 standard drinks     Comment: rare      Current Outpatient Medications   Medication Sig Dispense Refill    predniSONE (DELTASONE) 10 MG tablet Take 40 mg for 3 days then 30 mg for 3 days then 20 mg for 3 days then 10 mg for 3 days 30 tablet 0    cyclobenzaprine (FLEXERIL) 5 MG tablet Take 1 tablet by mouth 3 times daily as needed for Muscle spasms 30 tablet 0    ibuprofen (ADVIL;MOTRIN) 800 MG tablet TAKE 1 TABLET THREE TIMES DAILY 90 tablet 11    DULoxetine (CYMBALTA) 60 MG extended release capsule Take one tablet by mouth twice daily 60 capsule 11    traZODone (DESYREL) 150 MG tablet TAKE 1 TABLET AT BEDTIME 30 tablet 11    hydrOXYzine (VISTARIL) 25 MG capsule Take 1 capsule by mouth 3 times daily as needed for Anxiety 90 capsule 3    pantoprazole (PROTONIX) 40 MG tablet Take 1 tablet by mouth daily 30 tablet 11    triamterene-hydroCHLOROthiazide (DYAZIDE) 37.5-25 MG per capsule TAKE 1 CAPSULE ONCE DAILY 30 capsule 5    metoprolol succinate (TOPROL XL) 100 MG extended release tablet TAKE (1) TABLET DAILY IN THE AM AND 1/2 TAB IN THE EVENING 45 tablet 11    nystatin (MYCOSTATIN) 672658 UNIT/GM powder Apply 3 times daily. 30 g 0    polyethylene glycol (MIRALAX) POWD powder Take 1-2 capfuls of Miralax daily as needed 255 g 11     No current facility-administered medications for this visit.      Allergies   Allergen Reactions    Benadryl [Diphenhydramine] Anxiety     \"Made me jump off the wall\"    Topamax [Topiramate] Hives    Tramadol Shortness Of Breath    Vitamin C [Ascorbic Acid] Hives    Lyrica [Pregabalin] Palpitations  Penicillins Hives and Itching       :      Review of Systems   Constitutional: Positive for fatigue (Chronic). Negative for activity change, appetite change, chills, diaphoresis, fever, unexpected weight change and weight loss. HENT: Negative. Respiratory: Negative. Cardiovascular: Negative. Negative for chest pain, palpitations and leg swelling. Gastrointestinal: Negative. Negative for abdominal pain and bowel incontinence. Genitourinary: Negative for bladder incontinence, dysuria and pelvic pain. Musculoskeletal: Positive for arthralgias, back pain and myalgias. Negative for gait problem, joint swelling, neck pain and neck stiffness. Skin: Negative. Neurological: Negative. Negative for tingling, weakness, numbness, headaches and paresthesias. Psychiatric/Behavioral: Negative. Objective:     Vitals:    12/17/21 1425 12/17/21 1427   BP: (!) 153/83 (!) 146/88   Site: Right Upper Arm Left Upper Arm   Position: Sitting Sitting   Cuff Size: Medium Adult Large Adult   Pulse: 99    SpO2: (!) 87%    Weight: 186 lb (84.4 kg)    Height: 5' 3\" (1.6 m)      Wt Readings from Last 3 Encounters:   12/17/21 186 lb (84.4 kg)   07/23/21 186 lb (84.4 kg)   03/22/21 179 lb (81.2 kg)     Temp Readings from Last 3 Encounters:   03/22/21 97.9 °F (36.6 °C) (Temporal)   01/19/21 97.3 °F (36.3 °C) (Temporal)   12/02/20 98.4 °F (36.9 °C) (Oral)     BP Readings from Last 3 Encounters:   12/17/21 (!) 146/88   07/23/21 132/80   03/22/21 (!) 140/88     Pulse Readings from Last 3 Encounters:   12/17/21 99   07/23/21 84   03/22/21 84     Physical Exam  Vitals and nursing note reviewed. Constitutional:       General: She is not in acute distress. Appearance: Normal appearance. She is well-developed. She is obese. She is not diaphoretic. HENT:      Head: Normocephalic and atraumatic. Right Ear: Tympanic membrane, ear canal and external ear normal. There is no impacted cerumen.       Left Ear: Tympanic membrane, ear canal and external ear normal. There is no impacted cerumen. Nose: Nose normal. No congestion or rhinorrhea. Mouth/Throat:      Mouth: Mucous membranes are moist.      Pharynx: Oropharynx is clear. No oropharyngeal exudate or posterior oropharyngeal erythema. Eyes:      General: No scleral icterus. Right eye: No discharge. Left eye: No discharge. Extraocular Movements: Extraocular movements intact. Conjunctiva/sclera: Conjunctivae normal.      Pupils: Pupils are equal, round, and reactive to light. Neck:      Vascular: No carotid bruit. Trachea: No tracheal deviation. Cardiovascular:      Rate and Rhythm: Normal rate and regular rhythm. Pulses: Normal pulses. Heart sounds: Normal heart sounds. No murmur heard. No friction rub. No gallop. Pulmonary:      Effort: Pulmonary effort is normal. No respiratory distress. Breath sounds: Normal breath sounds. No stridor. No wheezing, rhonchi or rales. Chest:      Chest wall: Tenderness present. No mass, lacerations, deformity, swelling, crepitus or edema. There is no dullness to percussion. Abdominal:      General: Bowel sounds are normal. There is no distension. Palpations: Abdomen is soft. There is no mass. Tenderness: There is no abdominal tenderness. There is no guarding or rebound. Hernia: No hernia is present. Musculoskeletal:         General: No swelling, deformity or signs of injury. Normal range of motion. Cervical back: Normal range of motion and neck supple. No rigidity. No muscular tenderness. Thoracic back: Spasms and tenderness present. Right lower leg: Tenderness present. No edema. Left lower leg: Tenderness present. No edema. Legs:    Lymphadenopathy:      Cervical: No cervical adenopathy. Skin:     General: Skin is warm and dry. Capillary Refill: Capillary refill takes less than 2 seconds.       Coloration: Skin is not jaundiced or pale. Findings: No bruising, erythema, lesion or rash. Neurological:      General: No focal deficit present. Mental Status: She is alert and oriented to person, place, and time. Mental status is at baseline. Cranial Nerves: No cranial nerve deficit. Sensory: No sensory deficit. Motor: No weakness or abnormal muscle tone. Coordination: Coordination normal.      Gait: Gait normal.      Deep Tendon Reflexes: Reflexes are normal and symmetric. Reflexes normal.   Psychiatric:         Mood and Affect: Mood normal.         Speech: Speech is tangential.         Behavior: Behavior normal.         Thought Content: Thought content normal.         Judgment: Judgment normal.         Office Visit on 07/23/2021   Component Date Value Ref Range Status    Magnesium 07/23/2021 1.90  1.80 - 2.40 mg/dL Final    Potassium 07/23/2021 4.1  3.5 - 5.1 mmol/L Final           Assessment & Plan: The following diagnoses and conditions are stable with no further orders unless indicated:  1. SI (sacroiliac) joint dysfunction    2. Muscle spasm    3. Chest wall pain    4. Thyroid nodule        Sherif Herr was seen today for hip pain. Sacroiliac joint pain noted - recommend her continue with her physical therapy. Exercises were given to her today in her AVS.  She may also try Tylenol with the Prednisone. Recommend alternating between heat and ice. Flexeril prescribed for the thoracic muscle spasms. Flexeril prescribed that she may take up to 3 times daily as needed. She may try medical massage as well. Recommend her trying to alternate between heat and ice and working on improvement in her posture. She may discuss this further with physical therapy. She is to call the office that she needs any additional orders for her physical therapy that she is currently getting. Educated her on potential side effects of Flexeril and she verbalized understanding.   She would like to go ahead and try the Flexeril to see if this may help with her muscle spasms. Patient with chest wall tenderness. She has pain to her chest when she touches that. She admits that every once in while she notes that she will have tenderness to her chest that she is taking a good deep breath. Informed her that she may get a chest x-ray; however, the chest wall tenderness that she is having seems to be more musculoskeletal in nature. Informed her that the prednisone and the Flexeril would probably help with the chest tenderness that she is having. She may also try alternate between heat and ice as well. If she develops any actual chest pain or feels like she is becoming short of breath she is to go to the ER. She verbalized understanding. She was informed not to take any NSAIDs while she is taking the prednisone. She verbalized understanding. Possible thyroid nodule noted on examination today. Patient continues to states she feels something \"nodule like\" in her neck. She only noticed this nodule-like sensation in her neck about 1 week ago. Low suspicion for cancer. She may get an ultrasound to follow-up on the thyroid nodules/     Diagnoses and all orders for this visit:    SI (sacroiliac) joint dysfunction  -     predniSONE (DELTASONE) 10 MG tablet; Take 40 mg for 3 days then 30 mg for 3 days then 20 mg for 3 days then 10 mg for 3 days    Muscle spasm  -     cyclobenzaprine (FLEXERIL) 5 MG tablet; Take 1 tablet by mouth 3 times daily as needed for Muscle spasms    Chest wall pain  -     XR CHEST STANDARD (2 VW); Future    Thyroid nodule  -     US HEAD NECK SOFT TISSUE THYROID; Future      Prior to Visit Medications    Medication Sig Taking?  Authorizing Provider   predniSONE (DELTASONE) 10 MG tablet Take 40 mg for 3 days then 30 mg for 3 days then 20 mg for 3 days then 10 mg for 3 days Yes Benita Hughes, APRN - CNP   cyclobenzaprine (FLEXERIL) 5 MG tablet Take 1 tablet by mouth 3 times daily as needed for Muscle spasms Yes DARÍO Jacome - CNP   ibuprofen (ADVIL;MOTRIN) 800 MG tablet TAKE 1 TABLET THREE TIMES DAILY Yes Siri Mathew MD   DULoxetine (CYMBALTA) 60 MG extended release capsule Take one tablet by mouth twice daily Yes Siri Mathew MD   traZODone (DESYREL) 150 MG tablet TAKE 1 TABLET AT BEDTIME Yes Siri Mathew MD   hydrOXYzine (VISTARIL) 25 MG capsule Take 1 capsule by mouth 3 times daily as needed for Anxiety Yes Siri Mathew MD   pantoprazole (PROTONIX) 40 MG tablet Take 1 tablet by mouth daily Yes Siri Mathew MD   triamterene-hydroCHLOROthiazide (DYAZIDE) 37.5-25 MG per capsule TAKE 1 CAPSULE ONCE DAILY Yes Siri Mathew MD   metoprolol succinate (TOPROL XL) 100 MG extended release tablet TAKE (1) TABLET DAILY IN THE AM AND 1/2 TAB IN THE EVENING Yes Siri Mathew MD   nystatin (MYCOSTATIN) 973572 UNIT/GM powder Apply 3 times daily. Yes Alva Winston MD   polyethylene glycol [facility]) POWD powder Take 1-2 capfuls of Miralax daily as needed Yes Siri Mathew MD     Orders Placed This Encounter   Medications    predniSONE (DELTASONE) 10 MG tablet     Sig: Take 40 mg for 3 days then 30 mg for 3 days then 20 mg for 3 days then 10 mg for 3 days     Dispense:  30 tablet     Refill:  0    cyclobenzaprine (FLEXERIL) 5 MG tablet     Sig: Take 1 tablet by mouth 3 times daily as needed for Muscle spasms     Dispense:  30 tablet     Refill:  0         Return if symptoms worsen or fail to improve. Patient should call the office immediately with new or ongoing signs or symptoms or worsening, or proceedto the emergency room. No changes in past medical history, past surgical history, social history, or family history were noted during the patient encounter unless specifically listed above.   All updates of past medicalhistory, past surgical history, social history, or family history were reviewed personally by me during the office visit. All problems listed in the assessment are stable unless noted otherwise. Medication profilereviewed personally by me during the office visit. Medication side effects and possible impairments from medications were discussed as applicable. Call if pattern of symptoms change or persists for an extended time. This document was prepared by a combination of typing and transcription through a voice recognition software. All medications have the potential for adverse effects. All medications effect each person differently. Please read and review provided information related to medication. If the medication that you have been prescribed has the potential to cause sedation, do not drive or operate car, truck, or heavy machinery until you know how the medication will effect you. If you experience any adverse effects from the medication, please call the office or report to the emergency department. Information given to patient in the office- What are the possible side effects of cyclobenzaprine? Get emergency medical help if you have signs of an allergic reaction: hives; difficult breathing; swelling of your face, lips, tongue, or throat. Stop using cyclobenzaprine and call your doctor at once if you have:  · severe drowsiness, fast heart rate;  · tremors or shaking;  · pounding heartbeats or fluttering in your chest; or  · agitation, hallucinations, fever, overactive reflexes, nausea, vomiting, diarrhea, loss of coordination, fainting. Common side effects may include:  · headache, dizziness;  · drowsiness, tired feeling;  · trouble concentrating;  · blurred vision, dry mouth or throat, altered sense of taste; or  · nausea, upset stomach, constipation. This is not a complete list of side effects and others may occur. Call your doctor for medical advice about side effects. You may report side effects to FDA at 6-819-FDA-6033.   What other drugs will affect cyclobenzaprine? Taking cyclobenzaprine with other drugs that make you sleepy or slow your breathing can cause dangerous or life-threatening side effects. Ask your doctor before taking a sleeping pill, narcotic pain medicine, prescription cough medicine, a muscle relaxer, or medicine for anxiety, depression, or seizures. This list is not complete. Other drugs may interact with cyclobenzaprine, including prescription and over-the-counter medicines, vitamins, and herbal products. Not all possible interactions are listed in this medication guide.

## 2021-12-17 NOTE — PATIENT INSTRUCTIONS
Patient Education   Patient Education        Sacroiliac Pain: Exercises  Introduction  Here are some examples of exercises for you to try. The exercises may be suggested for a condition or for rehabilitation. Start each exercise slowly. Ease off the exercises if you start to have pain. You will be told when to start these exercises and which ones will work best for you. How to do the exercises  Knee-to-chest stretch    1. Do not do the knee-to-chest exercise if it causes or increases back or leg pain. 2. Lie on your back with your knees bent and your feet flat on the floor. You can put a small pillow under your head and neck if it is more comfortable. 3. Grasp your hands under one knee and bring the knee to your chest, keeping the other foot flat on the floor. 4. Keep your lower back pressed to the floor. Hold for at least 15 to 30 seconds. 5. Relax and lower the knee to the starting position. Repeat with the other leg. 6. Repeat 2 to 4 times with each leg. 7. To get more stretch, keep your other leg flat on the floor while pulling your knee to your chest.  Bridging    1. Lie on your back with both knees bent. Your knees should be bent about 90 degrees. 2. Tighten your belly muscles by pulling in your belly button toward your spine. Then push your feet into the floor, squeeze your buttocks, and lift your hips off the floor until your shoulders, hips, and knees are all in a straight line. 3. Hold for about 6 seconds as you continue to breathe normally, and then slowly lower your hips back down to the floor and rest for up to 10 seconds. 4. Repeat 8 to 12 times. Hip extension    1. Get down on your hands and knees on the floor. 2. Keeping your back and neck straight, lift one leg straight out behind you. When you lift your leg, keep your hips level. Don't let your back twist, and don't let your hip drop toward the floor. 3. Hold for 6 seconds. Repeat 8 to 12 times with each leg.   4. If you feel steady and strong when you do this exercise, you can make it more difficult. To do this, when you lift your leg, also lift the opposite arm straight out in front of you. For example, lift the left leg and the right arm at the same time. (This is sometimes called the \"bird dog exercise. \") Hold for 6 seconds, and repeat 8 to 12 times on each side. Clamshell    1. Lie on your side with a pillow under your head. Keep your feet and knees together and your knees bent. 2. Raise your top knee, but keep your feet together. Do not let your hips roll back. Your legs should open up like a clamshell. 3. Hold for 6 seconds. 4. Slowly lower your knee back down. Rest for 10 seconds. 5. Repeat 8 to 12 times. 6. Switch to your other side and repeat steps 1 through 5. Hamstring wall stretch    1. Lie on your back in a doorway, with one leg through the open door. 2. Slide your affected leg up the wall to straighten your knee. You should feel a gentle stretch down the back of your leg. 3. Hold the stretch for at least 1 minute to begin. Then try to lengthen the time you hold the stretch to as long as 6 minutes. 4. Switch legs, and repeat steps 1 through 3.  5. Repeat 2 to 4 times. 6. If you do not have a place to do this exercise in a doorway, there is another way to do it:  7. Lie on your back, and bend one knee. 8. Loop a towel under the ball and toes of that foot, and hold the ends of the towel in your hands. 9. Straighten your knee, and slowly pull back on the towel. You should feel a gentle stretch down the back of your leg. 10. Switch legs, and repeat steps 1 through 3.  11. Repeat 2 to 4 times. 1. Do not arch your back. 2. Do not bend either knee. 3. Keep one heel touching the floor and the other heel touching the wall. Do not point your toes. Lower abdominal strengthening    1. Lie on your back with your knees bent and your feet flat on the floor.   2. Tighten your belly muscles by pulling your belly button in toward your spine. 3. Lift one foot off the floor and bring your knee toward your chest, so that your knee is straight above your hip and your leg is bent like the letter \"L. \"  4. Lift the other knee up to the same position. 5. Lower one leg at a time to the starting position. 6. Keep alternating legs until you have lifted each leg 8 to 12 times. 7. Be sure to keep your belly muscles tight and your back still as you are moving your legs. Be sure to breathe normally. Piriformis stretch    1. Lie on your back with your legs straight. 2. Lift your affected leg, and bend your knee. With your opposite hand, reach across your body, and then gently pull your knee toward your opposite shoulder. 3. Hold the stretch for 15 to 30 seconds. 4. Switch legs and repeat steps 1 through 3.  5. Repeat 2 to 4 times. Follow-up care is a key part of your treatment and safety. Be sure to make and go to all appointments, and call your doctor if you are having problems. It's also a good idea to know your test results and keep a list of the medicines you take. Where can you learn more? Go to https://makeristpepicSyntaxin.[email or web address] org and sign in to your [employer] account. Enter R703 in the Cogo box to learn more about \"Sacroiliac Pain: Exercises. \"     If you do not have an account, please click on the \"Sign Up Now\" link. Current as of: July 1, 2021               Content Version: 13.0  © 2006-2021 [employer]. Care instructions adapted under license by [facility] ([facility]). If you have questions about a medical condition or this instruction, always ask your healthcare professional. [place] [address] any warranty or liability for your use of this information. Sacroiliac Joint Pain: Care Instructions  Your Care Instructions     The sacroiliac joints connect the spine and each side of the pelvis. These joints bear the weight and stress of your torso. This makes them easy to injure.  Injury stomach. Learn how to use good posture, safe lifting techniques, and proper body mechanics. When should you call for help? Call 911 anytime you think you may need emergency care. For example, call if:    · You are unable to move a leg at all. Call your doctor now or seek immediate medical care if:    · You have new or worse symptoms in your legs or buttocks. Symptoms may include:  ? Numbness or tingling. ? Weakness. ? Pain.     · You lose bladder or bowel control. Watch closely for changes in your health, and be sure to contact your doctor if:    · You are not getting better as expected. Where can you learn more? Go to https://P2iperPatheb."[employer]". org and sign in to your [employer] account. Enter W811 in the Reevoo box to learn more about \"Sacroiliac Joint Pain: Care Instructions. \"     If you do not have an account, please click on the \"Sign Up Now\" link. Current as of: July 1, 2021               Content Version: 13.0  © 2006-2021 [employer] Care instructions adapted under license by [facility] ([facility]). If you have questions about a medical condition or this instruction, always ask your healthcare professional. [place] [address] any warranty or liability for your use of this information. Patient Education        cyclobenzaprine  Pronunciation:  sye kloe ETHEL za preen  Brand:  Amrix, Comfort Pac with Cyclobenzaprine, Fexmid  What is the most important information I should know about cyclobenzaprine? You should not use cyclobenzaprine if you have a thyroid disorder, heart block, congestive heart failure, a heart rhythm disorder, or you have recently had a heart attack. Do not use cyclobenzaprine if you have taken an MAO inhibitor in the past 14 days, such as isocarboxazid, linezolid, phenelzine, rasagiline, selegiline, or tranylcypromine. What is cyclobenzaprine? Cyclobenzaprine is a muscle relaxant.  It works by blocking nerve impulses (or pain sensations) that are sent to your brain. Cyclobenzaprine is used together with rest and physical therapy to relieve muscle spasms caused by painful conditions such as an injury. Cyclobenzaprine may also be used for purposes not listed in this medication guide. What should I discuss with my healthcare provider before taking cyclobenzaprine? You should not use cyclobenzaprine if you are allergic to it, or if you have:  · a thyroid disorder;  · heart block, heart rhythm disorder, congestive heart failure; or  · if you have recently had a heart attack. Cyclobenzaprine is not approved for use by anyone younger than 13years old. Do not use cyclobenzaprine if you have taken an MAO inhibitor in the past 14 days. A dangerous drug interaction could occur. MAO inhibitors include isocarboxazid, linezolid, phenelzine, rasagiline, selegiline, and tranylcypromine. Some medicines can interact with cyclobenzaprine and cause a serious condition called serotonin syndrome. Be sure your doctor knows if you also take stimulant medicine, opioid medicine, herbal products, or medicine for depression, mental illness, Parkinson's disease, migraine headaches, serious infections, or prevention of nausea and vomiting. Ask your doctor before making any changes in how or when you take your medications. Tell your doctor if you have ever had:  · liver disease;  · glaucoma;  · enlarged prostate; or  · problems with urination. It is not known whether this medicine will harm an unborn baby. Tell your doctor if you are pregnant or plan to become pregnant. It may not be safe to breast-feed while using this medicine. Ask your doctor about any risk. Older adults may be more sensitive to the effects of this medicine. How should I take cyclobenzaprine? Follow all directions on your prescription label and read all medication guides or instruction sheets. Your doctor may occasionally change your dose.  Use the medicine exactly as directed. Cyclobenzaprine is usually taken once daily for only 2 or 3 weeks. Follow your doctor's dosing instructions very carefully. Swallow the capsule whole and do not crush, chew, break, or open it. Take the medicine at the same time each day. Call your doctor if your symptoms do not improve after 3 weeks, or if they get worse. Store at room temperature away from moisture, heat, and light. What happens if I miss a dose? Take the medicine as soon as you can, but skip the missed dose if it is almost time for your next dose. Do not take two doses at one time. What happens if I overdose? Seek emergency medical attention or call the Poison Help line at 1-933.570.6989. An overdose of cyclobenzaprine can be fatal.  Overdose symptoms may include severe drowsiness, vomiting, fast heartbeats, tremors, agitation, or hallucinations. What should I avoid while taking cyclobenzaprine? Avoid driving or hazardous activity until you know how this medicine will affect you. Your reactions could be impaired. Avoid drinking alcohol. Dangerous side effects could occur. What are the possible side effects of cyclobenzaprine? Get emergency medical help if you have signs of an allergic reaction: hives; difficult breathing; swelling of your face, lips, tongue, or throat. Stop using cyclobenzaprine and call your doctor at once if you have:  · fast or irregular heartbeats;  · chest pain or pressure, pain spreading to your jaw or shoulder; or  · sudden numbness or weakness (especially on one side of the body), slurred speech, balance problems. Seek medical attention right away if you have symptoms of serotonin syndrome, such as: agitation, hallucinations, fever, sweating, shivering, fast heart rate, muscle stiffness, twitching, loss of coordination, nausea, vomiting, or diarrhea. Serious side effects may be more likely in older adults.   Common side effects may include:  · drowsiness, tiredness;  · headache, dizziness;  · dry mouth; or  · upset stomach, nausea, constipation. This is not a complete list of side effects and others may occur. Call your doctor for medical advice about side effects. You may report side effects to FDA at 4-778-SMF-5505. What other drugs will affect cyclobenzaprine? Using cyclobenzaprine with other drugs that make you drowsy can worsen this effect. Ask your doctor before using opioid medication, a sleeping pill, a muscle relaxer, or medicine for anxiety or seizures. Tell your doctor about all your other medicines, especially:  · bupropion (Zyban, for smoking cessation);  · meperidine;  · tramadol;  · verapamil;  · cold or allergy medicine that contains an antihistamine (Benadryl and others);  · medicine to treat Parkinson's disease;  · medicine to treat excess stomach acid, stomach ulcer, motion sickness, or irritable bowel syndrome;  · medicine to treat overactive bladder; or  · bronchodilator asthma medication. This list is not complete. Other drugs may affect cyclobenzaprine, including prescription and over-the-counter medicines, vitamins, and herbal products. Not all possible drug interactions are listed here. Where can I get more information? Your pharmacist can provide more information about cyclobenzaprine. Remember, keep this and all other medicines out of the reach of children, never share your medicines with others, and use this medication only for the indication prescribed. Every effort has been made to ensure that the information provided by [address] is accurate, up-to-date, and complete, but no guarantee is made to that effect. Drug information contained herein may be time sensitive. [facility] information has been compiled for use by healthcare practitioners and consumers in the [place] and therefore [facility] does not warrant that uses outside of the [place] are appropriate, unless specifically indicated otherwise.  [facility] drug information does not endorse drugs, diagnose patients or recommend therapy. [facility]'s drug information is an informational resource designed to assist licensed healthcare practitioners in caring for their patients and/or to serve consumers viewing this service as a supplement to, and not a substitute for, the expertise, skill, knowledge and judgment of healthcare practitioners. The absence of a warning for a given drug or drug combination in no way should be construed to indicate that the drug or drug combination is safe, effective or appropriate for any given patient. [facility] does not assume any responsibility for any aspect of healthcare administered with the aid of information [facility] provides. The information contained herein is not intended to cover all possible uses, directions, precautions, warnings, drug interactions, allergic reactions, or adverse effects. If you have questions about the drugs you are taking, check with your doctor, nurse or pharmacist.  Copyright 2808-0666 [address]. Version: 5.01. Revision date: 9/26/2018. Care instructions adapted under license by [facility] ([facility]). If you have questions about a medical condition or this instruction, always ask your healthcare professional. [place] [address] any warranty or liability for your use of this information.

## 2021-12-20 ASSESSMENT — ENCOUNTER SYMPTOMS
BACK PAIN: 1
ABDOMINAL PAIN: 0
GASTROINTESTINAL NEGATIVE: 1
BOWEL INCONTINENCE: 0
RESPIRATORY NEGATIVE: 1

## 2021-12-21 ENCOUNTER — HOSPITAL ENCOUNTER (OUTPATIENT)
Age: 56
Discharge: HOME | End: 2021-12-21
Payer: COMMERCIAL

## 2021-12-21 ENCOUNTER — HOSPITAL ENCOUNTER (OUTPATIENT)
Dept: GENERAL RADIOLOGY | Age: 56
Discharge: HOME | End: 2021-12-21
Payer: COMMERCIAL

## 2021-12-21 DIAGNOSIS — R07.89 CHEST WALL PAIN: ICD-10-CM

## 2021-12-21 PROCEDURE — 71046 X-RAY EXAM CHEST 2 VIEWS: CPT

## 2022-01-14 ENCOUNTER — TELEMEDICINE (OUTPATIENT)
Dept: FAMILY MEDICINE CLINIC | Age: 57
End: 2022-01-14
Payer: COMMERCIAL

## 2022-01-14 ENCOUNTER — TELEPHONE (OUTPATIENT)
Dept: FAMILY MEDICINE CLINIC | Age: 57
End: 2022-01-14

## 2022-01-14 DIAGNOSIS — B37.31 VAGINA, CANDIDIASIS: Primary | ICD-10-CM

## 2022-01-14 PROCEDURE — 99213 OFFICE O/P EST LOW 20 MIN: CPT | Performed by: NURSE PRACTITIONER

## 2022-01-14 RX ORDER — FLUCONAZOLE 150 MG/1
150 TABLET ORAL ONCE
Qty: 2 TABLET | Refills: 0 | Status: SHIPPED | OUTPATIENT
Start: 2022-01-14 | End: 2022-01-14

## 2022-01-14 ASSESSMENT — PATIENT HEALTH QUESTIONNAIRE - PHQ9
SUM OF ALL RESPONSES TO PHQ QUESTIONS 1-9: 16
8. MOVING OR SPEAKING SO SLOWLY THAT OTHER PEOPLE COULD HAVE NOTICED. OR THE OPPOSITE, BEING SO FIGETY OR RESTLESS THAT YOU HAVE BEEN MOVING AROUND A LOT MORE THAN USUAL: 0
9. THOUGHTS THAT YOU WOULD BE BETTER OFF DEAD, OR OF HURTING YOURSELF: 0
10. IF YOU CHECKED OFF ANY PROBLEMS, HOW DIFFICULT HAVE THESE PROBLEMS MADE IT FOR YOU TO DO YOUR WORK, TAKE CARE OF THINGS AT HOME, OR GET ALONG WITH OTHER PEOPLE: 1
SUM OF ALL RESPONSES TO PHQ QUESTIONS 1-9: 16
SUM OF ALL RESPONSES TO PHQ9 QUESTIONS 1 & 2: 5
5. POOR APPETITE OR OVEREATING: 2
3. TROUBLE FALLING OR STAYING ASLEEP: 3
6. FEELING BAD ABOUT YOURSELF - OR THAT YOU ARE A FAILURE OR HAVE LET YOURSELF OR YOUR FAMILY DOWN: 0
2. FEELING DOWN, DEPRESSED OR HOPELESS: 2
4. FEELING TIRED OR HAVING LITTLE ENERGY: 3
SUM OF ALL RESPONSES TO PHQ QUESTIONS 1-9: 16
7. TROUBLE CONCENTRATING ON THINGS, SUCH AS READING THE NEWSPAPER OR WATCHING TELEVISION: 3
SUM OF ALL RESPONSES TO PHQ QUESTIONS 1-9: 16
1. LITTLE INTEREST OR PLEASURE IN DOING THINGS: 3

## 2022-01-14 ASSESSMENT — ENCOUNTER SYMPTOMS
ANAL BLEEDING: 0
ABDOMINAL PAIN: 0
ALLERGIC/IMMUNOLOGIC NEGATIVE: 1
GASTROINTESTINAL NEGATIVE: 1
EYES NEGATIVE: 1
SHORTNESS OF BREATH: 0
RESPIRATORY NEGATIVE: 1
BLOOD IN STOOL: 0
NAUSEA: 0

## 2022-01-14 NOTE — PROGRESS NOTES
2022    TELEHEALTH EVALUATION -- Audio/Visual (During [facility]- public health emergency)    Chief Complaint   Patient presents with    Other     Possible yeast infection       HPI:  Nico Burris (:  1965) has requested an audio/video evaluation for the following concern(s):      Vaginitis  Patient complains of an abnormal vaginal discharge for 1 day. Vaginal symptoms include discharge described as white and curd-like, local irritation and vulvar itching. Vulvar symptoms include local irritation and vulvar itching. STI Risk: Very low risk of STD exposureDischarge described as: scant   no recent antibiotic  Menstrual pattern: menopausal      Used vagisil external with minimal improvement    Review of Systems   Constitutional: Negative. Negative for appetite change, fatigue and unexpected weight change. HENT: Negative. Eyes: Negative. Respiratory: Negative. Negative for shortness of breath. Cardiovascular: Negative. Negative for chest pain, palpitations and leg swelling. Gastrointestinal: Negative. Negative for abdominal pain, anal bleeding, blood in stool and nausea. Endocrine: Negative. Genitourinary: Positive for vaginal discharge. Negative for genital sores and hematuria. Musculoskeletal: Negative. Skin: Negative. Negative for rash. Allergic/Immunologic: Negative. Neurological: Negative. Negative for dizziness, syncope, light-headedness and numbness. Hematological: Negative. Does not bruise/bleed easily. Psychiatric/Behavioral: Negative. All other systems reviewed and are negative. Prior to Visit Medications    Medication Sig Taking?  Authorizing Provider   predniSONE (DELTASONE) 10 MG tablet Take 40 mg for 3 days then 30 mg for 3 days then 20 mg for 3 days then 10 mg for 3 days Yes DARÍO Douglas - CNP   ibuprofen (ADVIL;MOTRIN) 800 MG tablet TAKE 1 TABLET THREE TIMES DAILY Yes Gavin Lopez MD   DULoxetine (CYMBALTA) 60 MG extended release capsule Take one tablet by mouth twice daily Yes Gavin Lopez MD   traZODone (DESYREL) 150 MG tablet TAKE 1 TABLET AT BEDTIME Yes Gavin Lopez MD   hydrOXYzine (VISTARIL) 25 MG capsule Take 1 capsule by mouth 3 times daily as needed for Anxiety Yes Gavin Lopez MD   pantoprazole (PROTONIX) 40 MG tablet Take 1 tablet by mouth daily Yes Gavin Lopez MD   triamterene-hydroCHLOROthiazide (DYAZIDE) 37.5-25 MG per capsule TAKE 1 CAPSULE ONCE DAILY Yes Gavin Lopez MD   metoprolol succinate (TOPROL XL) 100 MG extended release tablet TAKE (1) TABLET DAILY IN THE AM AND 1/2 TAB IN THE EVENING Yes Gavin Lopez MD   nystatin (MYCOSTATIN) 244856 UNIT/GM powder Apply 3 times daily.  Yes Myrtle Orellana MD   polyethylene glycol [facility]) POWD powder Take 1-2 capfuls of Miralax daily as needed Yes Gavin Lopez MD       Social History     Tobacco Use    Smoking status: Current Some Day Smoker     Packs/day: 0.50     Years: 27.00     Pack years: 13.50     Types: Cigarettes    Smokeless tobacco: Never Used    Tobacco comment: she smokes sometimes   Vaping Use    Vaping Use: Never used   Substance Use Topics    Alcohol use: No     Alcohol/week: 0.0 standard drinks     Comment: rare    Drug use: No        Allergies   Allergen Reactions    Benadryl [Diphenhydramine] Anxiety     \"Made me jump off the wall\"    Topamax [Topiramate] Hives    Tramadol Shortness Of Breath    Vitamin C [Ascorbic Acid] Hives    Lyrica [Pregabalin] Palpitations    Penicillins Hives and Itching   ,   Past Medical History:   Diagnosis Date    Ankle fracture     Anxiety     Arthritis     Benzodiazepine abuse ([facility] [address]     Depression     Fibromyalgia     Hyperlipidemia     Hypertension     Kidney cyst     x2 rt. ?    Migraine     Pneumonia 2008    bronchitis frequently since    TIA (transient ischemic attack) 2007    Ulcer, Stomach Peptic     frequent N/V    Unspecified cerebral artery occlusion with cerebral infarction      mini stroke cused by severe migraine    Weight loss     frequent N/V, S/P cholecystectomy   ,   Past Surgical History:   Procedure Laterality Date    ANKLE SURGERY       SECTION      CHOLECYSTECTOMY      COLONOSCOPY N/A 2018    COLON W/ANES.  performed by Erick Kidd MD at [address]      HERNIA REPAIR      HYSTERECTOMY      TUMOR REMOVAL      lt femur    UPPER GASTROINTESTINAL ENDOSCOPY N/A 2018    EGD BIOPSY performed by Erick Kidd MD at [address] [facility]   ,   Social History     Tobacco Use    Smoking status: Current Some Day Smoker     Packs/day: 0.50     Years: 27.00     Pack years: 13.50     Types: Cigarettes    Smokeless tobacco: Never Used    Tobacco comment: she smokes sometimes   Vaping Use    Vaping Use: Never used   Substance Use Topics    Alcohol use: No     Alcohol/week: 0.0 standard drinks     Comment: rare    Drug use: No   ,   Family History   Problem Relation Age of Onset    High Blood Pressure Mother     Heart Disease Mother     Diabetes Maternal Grandfather     Heart Disease Father     Heart Disease Brother    ,   Immunization History   Administered Date(s) Administered    Influenza Virus Vaccine 09/10/2014    Influenza, Quadv, 6 mo and older, IM, PF (Flulaval, Fluarix) 2018    Influenza, Quadv, IM, (6 mo and older Fluzone, Flulaval, Fluarix and 3 yrs and older Afluria) 2016, 2017    Influenza, Quadv, IM, PF (6 mo and older Fluzone, Flulaval, Fluarix, and 3 yrs and older Afluria) 10/11/2019, 2020    Pneumococcal Conjugate 13-valent ([id number]) 2013    Pneumococcal Polysaccharide ([id number]) 2017    Tdap (Boostrix, Adacel) 10/06/2012   ,   Health Maintenance   Topic Date Due    Hepatitis C screen  Never done    Depression Monitoring  Never done    HIV screen  Never done    Shingles Vaccine (1 of 2) Never done    Breast cancer screen  07/30/2020    Flu vaccine (1) 09/01/2021    COVID-19 Vaccine (1) 06/14/2023 (Originally 7/31/1970)    Creatinine monitoring  07/08/2022    Potassium monitoring  07/23/2022    DTaP/Tdap/Td vaccine (2 - Td or Tdap) 10/06/2022    Lipid screen  09/23/2025    Colon cancer screen colonoscopy  12/20/2028    Pneumococcal 0-64 years Vaccine (2 of 2 - PPSV23) 07/31/2031    Hepatitis A vaccine  Aged Out    Hepatitis B vaccine  Aged Out    Hib vaccine  Aged Out    Meningococcal (ACWY) vaccine  Aged Out       PHYSICAL EXAMINATION:  [ INSTRUCTIONS:  \"[x]\" Indicates a positive item  \"[]\" Indicates a negative item  -- DELETE ALL ITEMS NOT EXAMINED]  Vital Signs: (As obtained by patient/caregiver or practitioner observation)    Blood pressure-  Heart rate-    Respiratory rate-    Temperature-  Pulse oximetry-     Constitutional: [x] Appears well-developed and well-nourished [x] No apparent distress      [] Abnormal-   Mental status  [x] Alert and awake  [x] Oriented to person/place/time [x]Able to follow commands      Eyes:  EOM    [x]  Normal  [] Abnormal-  Sclera  [x]  Normal  [] Abnormal -         Discharge [x]  None visible  [] Abnormal -    HENT:   [x] Normocephalic, atraumatic.   [] Abnormal   [x] Mouth/Throat: Mucous membranes are moist.     External Ears [x] Normal  [] Abnormal-     Neck: [x] No visualized mass     Pulmonary/Chest: [x] Respiratory effort normal.  [x] No visualized signs of difficulty breathing or respiratory distress        [] Abnormal-      Musculoskeletal:   [x] Normal gait with no signs of ataxia         [x] Normal range of motion of neck        [] Abnormal-       Neurological:        [x] No Facial Asymmetry (Cranial nerve 7 motor function) (limited exam to video visit)          [x] No gaze palsy        [] Abnormal-         Skin:        [x] No significant exanthematous lesions or discoloration noted on facial skin         [] Abnormal-            Psychiatric:       [x] Normal Affect [x] No Hallucinations        [] Abnormal-     Other pertinent observable physical exam findings-     ASSESSMENT/PLAN:  1. Vagina, candidiasis  May continue with otc external vagisil  Start   - fluconazole (DIFLUCAN) 150 MG tablet; Take 1 tablet by mouth once for 1 dose May repeat in 5 days, if symptoms persist or recur. Dispense: 2 tablet; Refill: 0      Return if symptoms worsen or fail to improve. Ree Betts is a 55 y.o. female being evaluated by a Virtual Visit (video visit) encounter to address concerns as mentioned above. A caregiver was present when appropriate. Due to this being a TeleHealth encounter (During [facility]-39 public health emergency), evaluation of the following organ systems was limited: Vitals/Constitutional/EENT/Resp/CV/GI//MS/Neuro/Skin/Heme-Lymph-Imm. Pursuant to the emergency declaration under the [address] [place] [address] and the [employer] and [employer] Act, this Virtual Visit was conducted with patient's (and/or legal guardian's) consent, to reduce the patient's risk of exposure to COVID-19 and provide necessary medical care. The patient (and/or legal guardian) has also been advised to contact this office for worsening conditions or problems, and seek emergency medical treatment and/or call 911 if deemed necessary. Patient identification was verified at the start of the visit: Yes    Total time spent on this encounter: 7 minutes    Services were provided through a video synchronous discussion virtually to substitute for in-person clinic visit. Patient and provider were located at their individual homes. --DARÍO Cerrato CNP on 1/14/2022 at 4:54 PM    An electronic signature was used to authenticate this note.

## 2022-01-14 NOTE — TELEPHONE ENCOUNTER
----- Message from José Miguel Randhawa sent at 1/14/2022 11:02 AM EST -----  Subject: Message to Provider    QUESTIONS  Information for Provider? Pt has a yeast infection currently and would   like the physician to provided a script for her. years ago he gave her a   script for 1 pill that cleared up her infection. Please give pt call back. ---------------------------------------------------------------------------  --------------  Raven HERNANDEZ  What is the best way for the office to contact you? OK to leave message on   voicemail  Preferred Call Back Phone Number? [phone]  ---------------------------------------------------------------------------  --------------  SCRIPT ANSWERS  Relationship to Patient?  Self

## 2022-01-14 NOTE — TELEPHONE ENCOUNTER
Spoke with patient and she is unable to download anything on her phone.   Asking to do a virtual visit

## 2022-01-19 ENCOUNTER — TELEPHONE (OUTPATIENT)
Dept: FAMILY MEDICINE CLINIC | Age: 57
End: 2022-01-19

## 2022-01-19 NOTE — TELEPHONE ENCOUNTER
As long as heart rate is staying above 60 would have her increase the metoprolol  mg to 1 p.o. twice daily and increase Dyazide to 2 daily.   Should schedule an in office visit in the next 2 to 3 weeks for follow-up

## 2022-02-02 ENCOUNTER — TELEMEDICINE (OUTPATIENT)
Dept: FAMILY MEDICINE CLINIC | Age: 57
End: 2022-02-02
Payer: COMMERCIAL

## 2022-02-02 DIAGNOSIS — M25.571 RIGHT ANKLE PAIN, UNSPECIFIED CHRONICITY: ICD-10-CM

## 2022-02-02 DIAGNOSIS — K21.00 GASTROESOPHAGEAL REFLUX DISEASE WITH ESOPHAGITIS WITHOUT HEMORRHAGE: ICD-10-CM

## 2022-02-02 DIAGNOSIS — F41.9 CHRONIC ANXIETY: ICD-10-CM

## 2022-02-02 DIAGNOSIS — F41.9 ANXIETY: ICD-10-CM

## 2022-02-02 DIAGNOSIS — R60.9 PERIPHERAL EDEMA: ICD-10-CM

## 2022-02-02 DIAGNOSIS — G43.509 PERSISTENT MIGRAINE AURA WITHOUT CEREBRAL INFARCTION AND WITHOUT STATUS MIGRAINOSUS, NOT INTRACTABLE: Primary | ICD-10-CM

## 2022-02-02 DIAGNOSIS — I10 ESSENTIAL HYPERTENSION, BENIGN: ICD-10-CM

## 2022-02-02 DIAGNOSIS — G44.209 TENSION HEADACHE: ICD-10-CM

## 2022-02-02 PROCEDURE — 99214 OFFICE O/P EST MOD 30 MIN: CPT | Performed by: FAMILY MEDICINE

## 2022-02-02 RX ORDER — HYDROXYZINE PAMOATE 25 MG/1
25 CAPSULE ORAL 3 TIMES DAILY PRN
Qty: 90 CAPSULE | Refills: 5 | Status: SHIPPED | OUTPATIENT
Start: 2022-02-02

## 2022-02-02 RX ORDER — TRIAMTERENE AND HYDROCHLOROTHIAZIDE 37.5; 25 MG/1; MG/1
CAPSULE ORAL
Qty: 30 CAPSULE | Refills: 5 | Status: SHIPPED | OUTPATIENT
Start: 2022-02-02 | End: 2022-03-23 | Stop reason: SDUPTHER

## 2022-02-02 NOTE — PROGRESS NOTES
2022    TELEHEALTH EVALUATION -- Audio/Visual (During [facility]-78 public health emergency)    HPI:    Devika Marmolejo (:  1965) has requested an audio/video evaluation for the following concern(s):    hypertension pt said her number are still on the higher side before she takes her med it is 162/101 and after it goes down to 147/90  Depression /anxiety - pt said she is not doing well with this her father recently passed away and she is not sure how to deal with that   Constipation - is controlled with the miralax   Aching and pain in the leg is not improved she thinks that it is anxiety and stress driven   Headache - she also feels her stress is effecting this and the passing of her father   Reflux is controlled      Internal Administration   First Dose      Second Dose           Last COVID Lab No results found for: SARS-COV-2, SARS-COV-2 RNA, SARS-COV-2, SARS-COV-2, SARS-COV-2 BY PCR, SARS-COV-2, SARS-COV-2, SARS-COV-2         Wt Readings from Last 3 Encounters:   21 186 lb (84.4 kg)   21 186 lb (84.4 kg)   21 179 lb (81.2 kg)     BP Readings from Last 3 Encounters:   21 (!) 146/88   21 132/80   21 (!) 140/88     Lab Results   Component Value Date    LABA1C 5.4 2020    LABA1C 5.9 2019       [] Patient has completed an advance directive  [] Patient has NOT completed an advanced directive  [] Patient has a documented healthcare surrogate  [] Patient does NOT have a documented healthcare surrogate  [] Discussed the importance of establishing and updating an advanced directive. Patient has questions at this time and those were answered. [] Discussed the importance of establishing and updating an advanced directive. Patient does NOT have questions at this time. Discussed with: [] Patient            [] Family             [] Other caregiver      All other ROS negative    Prior to Visit Medications    Medication Sig Taking?  Authorizing Provider   hydrOXYzine (VISTARIL) 25 MG capsule Take 1 capsule by mouth 3 times daily as needed for Anxiety Yes Yuli Burleson MD   triamterene-hydroCHLOROthiazide (DYAZIDE) 37.5-25 MG per capsule TAKE 1 CAPSULE ONCE DAILY Yes Yuli Burleson MD   ibuprofen (ADVIL;MOTRIN) 800 MG tablet TAKE 1 TABLET THREE TIMES DAILY Yes Yuli Burleson MD   DULoxetine (CYMBALTA) 60 MG extended release capsule Take one tablet by mouth twice daily Yes Yuli Burleson MD   traZODone (DESYREL) 150 MG tablet TAKE 1 TABLET AT BEDTIME Yes Yuli Burleson MD   pantoprazole (PROTONIX) 40 MG tablet Take 1 tablet by mouth daily Yes Yuli Burleson MD   metoprolol succinate (TOPROL XL) 100 MG extended release tablet TAKE (1) TABLET DAILY IN THE AM AND 1/2 TAB IN THE EVENING Yes Yuli Burleson MD   nystatin (MYCOSTATIN) 883700 UNIT/GM powder Apply 3 times daily.  Yes Joanne Dominguez MD   polyethylene glycol [facility]) POWD powder Take 1-2 capfuls of Miralax daily as needed Yes Yuli Burleson MD       Allergies   Allergen Reactions    Benadryl [Diphenhydramine] Anxiety     \"Made me jump off the wall\"    Topamax [Topiramate] Hives    Tramadol Shortness Of Breath    Vitamin C [Ascorbic Acid] Hives    Lyrica [Pregabalin] Palpitations    Penicillins Hives and Itching       Past Medical History:   Diagnosis Date    Ankle fracture     Anxiety     Arthritis     Benzodiazepine abuse ([facility] [address]     Depression     Fibromyalgia     Hyperlipidemia     Hypertension     Kidney cyst     x2 rt. ?    Migraine     Pneumonia 2008    bronchitis frequently since    TIA (transient ischemic attack) 2007    Ulcer, Stomach Peptic     frequent N/V    Unspecified cerebral artery occlusion with cerebral infarction 2007     mini stroke cused by severe migraine    Weight loss     frequent N/V, S/P cholecystectomy       Past Surgical History:   Procedure Laterality Date    ANKLE SURGERY      motor function) (limited exam to video visit)          [x] No gaze palsy        [] Abnormal-         Skin:        [x] No significant exanthematous lesions or discoloration noted on facial skin         [] Abnormal-            Psychiatric:       [x] Normal Affect [x] No Hallucinations        [] Abnormal-     Other pertinent observable physical exam findings-      ASSESSMENT PLAN      Diagnosis Orders   1. Persistent migraine aura without cerebral infarction and without status migrainosus, not intractable     2. Tension headache     3. Chronic anxiety     4. Right ankle pain, unspecified chronicity     5. Peripheral edema     6. Gastroesophageal reflux disease with esophagitis without hemorrhage     7. Anxiety  hydrOXYzine (VISTARIL) 25 MG capsule   8. Essential hypertension, benign  triamterene-hydroCHLOROthiazide (DYAZIDE) 37.5-25 MG per capsule   Patient's headaches are little worse her pain is worse. Insurance is changed causing her to have to pay more which is going to limit her therapy sessions. Also having trouble maintaining a behavioral counselor related to insurance issues or the counselors actually stopping. She received about the lorazepam from one group but refused to take it and throw it away because she does not want to get hooked on those medicines again. She has been using some hydroxyzine which I reassured her would be fine and nonhabit-forming. Her blood pressure elevated again related to stress. She has lost both now her mother and father. Also has some lingering effects of Covid. Complaining of lack of concentration concerned with her mother's history of dementia. Told her I thought this was more likely stress related. She will restart Dyazide for blood pressure control, continue 150 mg of metoprolol daily. 40-minute in office appointment 6 weeks to follow-up all the above.     Patient should call the office immediately with new or ongoing signs or symptoms or worsening, or proceed to the emergency room. No changes in past medical history, past surgical history, social history, or family history were noted during the patient encounter unless specifically listed above. All updates of past medical history, past surgical history, social history, or family history were reviewed personally by me during the office visit. All problems listed in the assessment are stable unless noted otherwise. Medication profile reviewed personally by me during the visit. Medication side effects and possible impairments from medications were discussed as applicable. This document was prepared by a combination of typing and transcription through a voice recognition software. I, Dr. Tien Peguero, personally performed the services described in this documentation, as scribed by the above signed scribe in my presence, and it is both accurate and complete. I agree with the ROS and Past Histories independently gathered by the clinical support staff and the remaining scribed note accurately describes my personal service to the patient. 2/2/2022    9:48 AM           Sabine Mars, was evaluated through a synchronous (real-time) audio-video encounter. The patient (or guardian if applicable) is aware that this is a billable service, which includes applicable co-pays. This Virtual Visit was conducted with patient's (and/or legal guardian's) consent. The visit was conducted pursuant to the emergency declaration under the [address] [place] [address] [facility] authority and the [employer] and [employer] Act. Patient identification was verified, and a caregiver was present when appropriate. The patient was located in a state where the provider was licensed to provide care. Sabine Mars is a 55 y.o. female being evaluated by a Virtual Visit (video visit) encounter to address concerns as mentioned above. A caregiver was present when appropriate. Due to this being a TeleHealth encounter (During [facility]-05 public health emergency), evaluation of the following organ systems was limited: Vitals/Constitutional/EENT/Resp/CV/GI//MS/Neuro/Skin/Heme-Lymph-Imm. Pursuant to the emergency declaration under the [address] [place] [address] and the [employer] and [employer] Act, this Virtual Visit was conducted with patient's (and/or legal guardian's) consent, to reduce the patient's risk of exposure to COVID-19 and provide necessary medical care. The patient (and/or legal guardian) has also been advised to contact this office for worsening conditions or problems, and seek emergency medical treatment and/or call 911 if deemed necessary. Services were provided through a video synchronous discussion virtually to substitute for in-person clinic visit. Patient and provider were located at their individual homes. --Easton Bryan MD on 2/2/2022 at 9:35 AM    An electronic signature was used to authenticate this note. Warm

## 2022-02-02 NOTE — PROGRESS NOTES
Enma Chow is a 55 y.o. female evaluated via telephone on 2/2/2022.      hypertension pt said her number are still on the higher side before she takes her med it is 162/101 and after it goes down to 147/90  Depression /anxiety - pt said she is not doing well with this her father recently passed away and she is not sure how to deal with that   Constipation - is controlled with the miralax   Aching and pain in the leg is not improved she thinks that it is anxiety and stress driven   Headache - she also feels her stress is effecting this and the passing of her father   Reflux is controlled      Internal Administration   First Dose      Second Dose           Last COVID Lab No results found for: SARS-COV-2, SARS-COV-2 RNA, SARS-COV-2, SARS-COV-2, SARS-COV-2 BY PCR, SARS-COV-2, SARS-COV-2, SARS-COV-2         Wt Readings from Last 3 Encounters:   12/17/21 186 lb (84.4 kg)   07/23/21 186 lb (84.4 kg)   03/22/21 179 lb (81.2 kg)     BP Readings from Last 3 Encounters:   12/17/21 (!) 146/88   07/23/21 132/80   03/22/21 (!) 140/88     Lab Results   Component Value Date    LABA1C 5.4 03/18/2020    LABA1C 5.9 04/08/2019        ASSESSMENT PLAN      Diagnosis Orders   1. Persistent migraine aura without cerebral infarction and without status migrainosus, not intractable     2. Tension headache     3. Chronic anxiety     4. Right ankle pain, unspecified chronicity     5. Peripheral edema     6. Primary hypertension     7. Gastroesophageal reflux disease with esophagitis without hemorrhage         [] Patient has completed an advance directive  [] Patient has NOT completed an advanced directive  [] Patient has a documented healthcare surrogate  [] Patient does NOT have a documented healthcare surrogate  [] Discussed the importance of establishing and updating an advanced directive. Patient has questions at this time and those were answered. [] Discussed the importance of establishing and updating an advanced directive. Patient does NOT have questions at this time. Discussed with: [] Patient            [] Family             [] Other caregiver         Consent:  She and/or health care decision maker is aware that that she may receive a bill for this telephone service, depending on her insurance coverage, and has provided verbal consent to proceed: Yes      Documentation:  I communicated with the patient and/or health care decision maker about ***. Details of this discussion including any medical advice provided: ***      I {AFFIRM/DO NOT AFFIRM:62705::\"affirm\"} this is a Patient Initiated Episode with an Established Patient who has not had a related appointment within my department in the past 7 days or scheduled within the next 24 hours.     Total Time: {minutes:50701::\"5-10 minutes\"}    Note: not billable if this call serves to triage the patient into an appointment for the relevant concern      Thi Cardona MA

## 2022-03-23 ENCOUNTER — OFFICE VISIT (OUTPATIENT)
Dept: FAMILY MEDICINE CLINIC | Age: 57
End: 2022-03-23
Payer: COMMERCIAL

## 2022-03-23 VITALS
SYSTOLIC BLOOD PRESSURE: 154 MMHG | HEIGHT: 63 IN | BODY MASS INDEX: 32.36 KG/M2 | HEART RATE: 80 BPM | RESPIRATION RATE: 18 BRPM | OXYGEN SATURATION: 97 % | WEIGHT: 182.6 LBS | DIASTOLIC BLOOD PRESSURE: 100 MMHG

## 2022-03-23 DIAGNOSIS — K59.04 CHRONIC IDIOPATHIC CONSTIPATION: ICD-10-CM

## 2022-03-23 DIAGNOSIS — I10 ESSENTIAL HYPERTENSION, BENIGN: ICD-10-CM

## 2022-03-23 DIAGNOSIS — E04.1 THYROID NODULE: ICD-10-CM

## 2022-03-23 DIAGNOSIS — L30.4 INTERTRIGO: ICD-10-CM

## 2022-03-23 DIAGNOSIS — E04.9 THYROID ENLARGED: ICD-10-CM

## 2022-03-23 DIAGNOSIS — I10 UNCONTROLLED HYPERTENSION: Primary | ICD-10-CM

## 2022-03-23 DIAGNOSIS — Z13.21 ENCOUNTER FOR VITAMIN DEFICIENCY SCREENING: ICD-10-CM

## 2022-03-23 DIAGNOSIS — Z13.220 SCREENING FOR LIPID DISORDERS: ICD-10-CM

## 2022-03-23 DIAGNOSIS — K21.00 GASTROESOPHAGEAL REFLUX DISEASE WITH ESOPHAGITIS WITHOUT HEMORRHAGE: ICD-10-CM

## 2022-03-23 DIAGNOSIS — L82.1 SEBORRHEIC KERATOSIS: ICD-10-CM

## 2022-03-23 LAB
A/G RATIO: 1.8 (ref 1.1–2.2)
ALBUMIN SERPL-MCNC: 4.6 G/DL (ref 3.4–5)
ALP BLD-CCNC: 69 U/L (ref 40–129)
ALT SERPL-CCNC: 12 U/L (ref 10–40)
ANION GAP SERPL CALCULATED.3IONS-SCNC: 17 MMOL/L (ref 3–16)
AST SERPL-CCNC: 17 U/L (ref 15–37)
BASOPHILS ABSOLUTE: 0.1 K/UL (ref 0–0.2)
BASOPHILS RELATIVE PERCENT: 0.6 %
BILIRUB SERPL-MCNC: 0.5 MG/DL (ref 0–1)
BUN BLDV-MCNC: 11 MG/DL (ref 7–20)
CALCIUM SERPL-MCNC: 10.4 MG/DL (ref 8.3–10.6)
CHLORIDE BLD-SCNC: 104 MMOL/L (ref 99–110)
CHOLESTEROL, TOTAL: 225 MG/DL (ref 0–199)
CO2: 19 MMOL/L (ref 21–32)
CREAT SERPL-MCNC: 0.9 MG/DL (ref 0.6–1.1)
EOSINOPHILS ABSOLUTE: 0.2 K/UL (ref 0–0.6)
EOSINOPHILS RELATIVE PERCENT: 1.9 %
GFR AFRICAN AMERICAN: >60
GFR NON-AFRICAN AMERICAN: >60
GLUCOSE BLD-MCNC: 104 MG/DL (ref 70–99)
HCT VFR BLD CALC: 42.7 % (ref 36–48)
HDLC SERPL-MCNC: 76 MG/DL (ref 40–60)
HEMOGLOBIN: 14.2 G/DL (ref 12–16)
LDL CHOLESTEROL CALCULATED: 135 MG/DL
LYMPHOCYTES ABSOLUTE: 2 K/UL (ref 1–5.1)
LYMPHOCYTES RELATIVE PERCENT: 18.9 %
MCH RBC QN AUTO: 29.2 PG (ref 26–34)
MCHC RBC AUTO-ENTMCNC: 33.3 G/DL (ref 31–36)
MCV RBC AUTO: 87.7 FL (ref 80–100)
MONOCYTES ABSOLUTE: 0.5 K/UL (ref 0–1.3)
MONOCYTES RELATIVE PERCENT: 5 %
NEUTROPHILS ABSOLUTE: 7.6 K/UL (ref 1.7–7.7)
NEUTROPHILS RELATIVE PERCENT: 73.6 %
PDW BLD-RTO: 13.9 % (ref 12.4–15.4)
PLATELET # BLD: 313 K/UL (ref 135–450)
PMV BLD AUTO: 8.4 FL (ref 5–10.5)
POTASSIUM SERPL-SCNC: 4.1 MMOL/L (ref 3.5–5.1)
RBC # BLD: 4.86 M/UL (ref 4–5.2)
SODIUM BLD-SCNC: 140 MMOL/L (ref 136–145)
T4 FREE: 1.1 NG/DL (ref 0.9–1.8)
TOTAL PROTEIN: 7.1 G/DL (ref 6.4–8.2)
TRIGL SERPL-MCNC: 72 MG/DL (ref 0–150)
TSH REFLEX: 2.49 UIU/ML (ref 0.27–4.2)
VITAMIN D 25-HYDROXY: 41.2 NG/ML
VLDLC SERPL CALC-MCNC: 14 MG/DL
WBC # BLD: 10.4 K/UL (ref 4–11)

## 2022-03-23 PROCEDURE — 99214 OFFICE O/P EST MOD 30 MIN: CPT | Performed by: FAMILY MEDICINE

## 2022-03-23 RX ORDER — POLYETHYLENE GLYCOL 3350 17 G/17G
POWDER ORAL
Qty: 850 G | Refills: 11 | Status: SHIPPED | OUTPATIENT
Start: 2022-03-23

## 2022-03-23 RX ORDER — NYSTATIN 100000 [USP'U]/G
POWDER TOPICAL
Qty: 30 G | Refills: 0 | Status: SHIPPED | OUTPATIENT
Start: 2022-03-23

## 2022-03-23 RX ORDER — TRIAMTERENE AND HYDROCHLOROTHIAZIDE 37.5; 25 MG/1; MG/1
CAPSULE ORAL
Qty: 60 CAPSULE | Refills: 11 | Status: SHIPPED | OUTPATIENT
Start: 2022-03-23 | End: 2022-04-06 | Stop reason: SDUPTHER

## 2022-03-23 NOTE — PATIENT INSTRUCTIONS
We want your blood pressure to be under 140/90     Begin taking two tablets daily of the dyazide    Patient should call the office immediately with new or ongoing signs or symptoms or worsening, or proceed to the emergency room. If you are on medications which could impair your senses, you are at risk of weakness, falls, dizziness, or drowsiness. You should be careful during activities which could place you at risk of harm, such as climbing, using stairs, operating machinery, or driving vehicles. If you feel you cannot safely do these activities, you should request others to help you, or avoid the activities altogether. If you are drowsy for any other reason, you should use the same precautions as listed above.      Web Address for Advance Directive:    [email or web address]

## 2022-03-23 NOTE — PROGRESS NOTES
Chief Complaint   Patient presents with    Hypertension    Migraine        Internal Administration   First Dose      Second Dose           Last COVID Lab No results found for: SARS-COV-2, SARS-COV-2 RNA, SARS-COV-2, SARS-COV-2, SARS-COV-2 BY PCR, SARS-COV-2, SARS-COV-2, SARS-COV-2          Wt Readings from Last 3 Encounters:   22 182 lb 9.6 oz (82.8 kg)   21 186 lb (84.4 kg)   21 186 lb (84.4 kg)     BP Readings from Last 3 Encounters:   22 (!) 154/100   21 (!) 146/88   21 132/80      Lab Results   Component Value Date    LABA1C 5.4 2020    LABA1C 5.9 2019       HPI:  Law Jon is a 55 y.o. (: 1965) here today for    Patient states that insurance ultimately will not cover her counseling and she states at this point she is not going to worry about it, she states that she just talks to her close friend. She states that she is really struggling emotionally due to a lot of death in her family. Her blood pressure at home runs in the 150/80-90s at home. Will have her increase her dyazide to two tablets daily. [] Patient has completed an advance directive  [x] Patient has NOT completed an advanced directive  [] Patient has a documented healthcare surrogate  [x] Patient does NOT have a documented healthcare surrogate  [] Discussed the importance of establishing and updating an advanced directive. Patient has questions at this time and those were answered. [x] Discussed the importance of establishing and updating an advanced directive. Patient does NOT have questions at this time.     Discussed with: [x] Patient            [] Family             [] Other caregiver    Patient's medications, allergies, past medical, surgical, social and family histories were reviewed and updated asappropriate on 3/23/2022 at 9:30 AM.    ROS:  Review of Systems    All other systems reviewed and are negative except as noted above on 3/23/2022 at 9:30 AM. Additional review of systems may [facility] Any responses requiring further intervention were pursued.     Past Medical History:   Diagnosis Date    Ankle fracture     Anxiety     Arthritis     Benzodiazepine abuse ([facility] [address]     Depression     Fibromyalgia     Hyperlipidemia     Hypertension     Kidney cyst     x2 rt. ?    Migraine     Pneumonia 2008    bronchitis frequently since    TIA (transient ischemic attack) 2007    Ulcer, Stomach Peptic     frequent N/V    Unspecified cerebral artery occlusion with cerebral infarction 2007     mini stroke cused by severe migraine    Weight loss     frequent N/V, S/P cholecystectomy     Family History   Problem Relation Age of Onset    High Blood Pressure Mother     Heart Disease Mother     Diabetes Maternal Grandfather     Heart Disease Father     Heart Disease Brother      Social History     Socioeconomic History    Marital status:      Spouse name: Not on file    Number of children: Not on file    Years of education: Not on file    Highest education level: Not on file   Occupational History    Not on file   Tobacco Use    Smoking status: Current Some Day Smoker     Packs/day: 0.50     Years: 27.00     Pack years: 13.50     Types: Cigarettes    Smokeless tobacco: Never Used    Tobacco comment: she smokes sometimes   Vaping Use    Vaping Use: Never used   Substance and Sexual Activity    Alcohol use: No     Alcohol/week: 0.0 standard drinks     Comment: rare    Drug use: No    Sexual activity: Yes     Partners: Male   Other Topics Concern    Not on file   Social History Narrative    Not on file     Social Determinants of Health     Financial Resource Strain: Low Risk     Difficulty of Paying Living Expenses: Not hard at all   Food Insecurity: No Food Insecurity    Worried About Running Out of Food in the Last Year: Never true    [place] of Food in the Last Year: Never true   Transportation Needs:     Lack of Transportation (Medical): Not on file    Lack of Transportation (Non-Medical): Not on file   Physical Activity:     Days of Exercise per Week: Not on file    Minutes of Exercise per Session: Not on file   Stress:     Feeling of Stress : Not on file   Social Connections:     Frequency of Communication with Friends and Family: Not on file    Frequency of Social Gatherings with Friends and Family: Not on file    Attends [employer] Services: Not on file    Active Member of [address] [place] [address] or Organizations: Not on file    Attends Club or Organization Meetings: Not on file    Marital Status: Not on file   Intimate Partner Violence:     Fear of Current or Ex-Partner: Not on file    Emotionally Abused: Not on file    Physically Abused: Not on file    Sexually Abused: Not on file   Housing Stability:     Unable to Pay for Housing in the Last Year: Not on file    Number of [place] in the Last Year: Not on file    Unstable Housing in the Last Year: Not on file     Prior to Visit Medications    Medication Sig Taking?  Authorizing Provider   hydrOXYzine (VISTARIL) 25 MG capsule Take 1 capsule by mouth 3 times daily as needed for Anxiety Yes Alexandro Ro MD   triamterene-hydroCHLOROthiazide (DYAZIDE) 37.5-25 MG per capsule TAKE 1 CAPSULE ONCE DAILY Yes Alexandro Ro MD   ibuprofen (ADVIL;MOTRIN) 800 MG tablet TAKE 1 TABLET THREE TIMES DAILY Yes Alexandro Ro MD   DULoxetine (CYMBALTA) 60 MG extended release capsule Take one tablet by mouth twice daily Yes Alexandro Ro MD   traZODone (DESYREL) 150 MG tablet TAKE 1 TABLET AT BEDTIME Yes Alexandro Ro MD   pantoprazole (PROTONIX) 40 MG tablet Take 1 tablet by mouth daily Yes Alexandro Ro MD   metoprolol succinate (TOPROL XL) 100 MG extended release tablet TAKE (1) TABLET DAILY IN THE AM AND 1/2 TAB IN THE EVENING Yes Alexandro Ro MD   nystatin (MYCOSTATIN) 745848 UNIT/GM powder Apply 3 times daily. Yes Sahra Starks MD   polyethylene glycol [facility]) POWD powder Take 1-2 capfuls of Miralax daily as needed Yes Alicia Abdullahi MD     Allergies   Allergen Reactions    Benadryl [Diphenhydramine] Anxiety     \"Made me jump off the wall\"    Topamax [Topiramate] Hives    Tramadol Shortness Of Breath    Vitamin C [Ascorbic Acid] Hives    Lyrica [Pregabalin] Palpitations    Penicillins Hives and Itching       OBJECTIVE:  Estimated body mass index is 32.35 kg/m² as calculated from the following:    Height as of this encounter: 5' 3\" (1.6 m). Weight as of this encounter: 182 lb 9.6 oz (82.8 kg). Vitals:    03/23/22 0918 03/23/22 0920   BP: (!) 158/103 (!) 154/100   Site: Left Upper Arm Right Upper Arm   Position: Sitting Sitting   Cuff Size: Medium Adult Medium Adult   Pulse: 80    Resp: 18    SpO2: 97%    Weight: 182 lb 9.6 oz (82.8 kg)    Height: 5' 3\" (1.6 m)        Physical Exam  Vitals and nursing note reviewed. Constitutional:       General: She is not in acute distress. Appearance: She is well-developed. She is not diaphoretic. HENT:      Head: Normocephalic and atraumatic. Right Ear: External ear normal.      Left Ear: External ear normal.      Nose: Nose normal.   Eyes:      General: Lids are normal. No scleral icterus. Right eye: No discharge. Left eye: No discharge. Pupils: Pupils are equal, round, and reactive to light. Neck:      Thyroid: Thyromegaly (left lower lobe) present. Vascular: No JVD. Cardiovascular:      Rate and Rhythm: Normal rate and regular rhythm. Heart sounds: Normal heart sounds. Pulmonary:      Effort: Pulmonary effort is normal. No respiratory distress. Breath sounds: Normal breath sounds. Abdominal:      Palpations: Abdomen is soft. There is no hepatomegaly or splenomegaly. Tenderness: There is no abdominal tenderness. Musculoskeletal:      Right lower leg: No edema.       Left lower leg: No edema. Skin:     General: Skin is warm and dry. Coloration: Skin is not pale. Findings: No erythema or rash. Comments: Turgor normal   Neurological:      Mental Status: She is oriented to person, place, and time. Psychiatric:         Mood and Affect: Mood normal.         Behavior: Behavior normal.         Thought Content: Thought content normal.         Judgment: Judgment normal.              ASSESSMENT PLAN      Diagnosis Orders   1. Uncontrolled hypertension     2. Essential hypertension, benign  triamterene-hydroCHLOROthiazide (DYAZIDE) 37.5-25 MG per capsule   3. Screening for lipid disorders  LIPID PANEL   4. Encounter for vitamin deficiency screening  Vitamin D 25 Hydroxy   5. Thyroid enlarged  TSH with Reflex    T4, Free   6. Thyroid nodule     7. Gastroesophageal reflux disease with esophagitis without hemorrhage     8. Seborrheic keratosis     9. Chronic idiopathic constipation  polyethylene glycol (MIRALAX) 17 GM/SCOOP POWD powder   Increase Dyazide to 2 daily which she takes in the afternoon or evening. Keep metoprolol XL at 150 mg daily. Follow-up 3 months. She has not been able to get into more counseling because of insurance cost.  She does talk to a close friend and that is her primary resource. Labs to be updated. Obstipation controlled with the MiraLAX. She mentioned a very small area to the right of her nose that comes times gets scaly, a seborrheic keratosis only about 1 to 2 mm size is present there I think this can be observed. Reflux symptoms based upon patient's history is controlled and no changes in medications for reflux as listed in the medication profile is necessary. Patient should call the office immediately with new or ongoing signs or symptoms or worsening, or proceed to the emergency room.   No changes in past medical history, past surgical history, social history, or family history were noted during the patient encounter unless specifically listed above. All updates of past medical history, past surgical history, social history, or family history were reviewed personally by me during the office visit. All problems listed in the assessment are stable unless noted otherwise. Medication profile reviewed personally by me during the visit. Medication side effects and possible impairments from medications were discussed as applicable. This document was prepared by a combination of typing and transcription through a voice recognition software. Scribe attestation: Dom Durbin RN, am scribing for and in the presence of Christopher Mcdowell MD. Electronically signed by Katarzyna Mcclain RN on 3/23/2022 at 9:31 AM      Provider attestation:     I, Dr. Michael Coreas, personally performed the services described in this documentation, as scribed by the above signed scribe in my presence, and it is both accurate and complete. I agree with the ROS and Past Histories independently gathered by the clinical support staff and the remaining scribed note accurately describes my personal service to the patient.       3/23/2022    9:56 AM

## 2022-03-24 ENCOUNTER — TELEPHONE (OUTPATIENT)
Dept: FAMILY MEDICINE CLINIC | Age: 57
End: 2022-03-24

## 2022-03-24 DIAGNOSIS — I10 ESSENTIAL HYPERTENSION, BENIGN: ICD-10-CM

## 2022-03-24 DIAGNOSIS — E87.8 DISORDER OF ELECTROLYTES: Primary | ICD-10-CM

## 2022-03-24 DIAGNOSIS — Z13.220 SCREENING FOR LIPID DISORDERS: ICD-10-CM

## 2022-03-24 RX ORDER — ROSUVASTATIN CALCIUM 10 MG/1
10 TABLET, COATED ORAL NIGHTLY
Qty: 30 TABLET | Refills: 3 | Status: SHIPPED | OUTPATIENT
Start: 2022-03-24 | End: 2022-04-06 | Stop reason: SDUPTHER

## 2022-03-24 NOTE — TELEPHONE ENCOUNTER
----- Message from Irina Davenport MD sent at 3/24/2022 10:12 AM EDT -----  The ASCVD Risk score (Media Pile., et al., 2013) failed to calculate for the following reasons:     The patient has a prior MI or stroke diagnosis  Start Crestor 10 mg daily, lipid liver cpk 6 weeks

## 2022-04-06 ENCOUNTER — TELEPHONE (OUTPATIENT)
Dept: ADMINISTRATIVE | Age: 57
End: 2022-04-06

## 2022-04-06 ENCOUNTER — TELEPHONE (OUTPATIENT)
Dept: FAMILY MEDICINE CLINIC | Age: 57
End: 2022-04-06

## 2022-04-06 DIAGNOSIS — F33.9 EPISODE OF RECURRENT MAJOR DEPRESSIVE DISORDER, UNSPECIFIED DEPRESSION EPISODE SEVERITY (HCC): ICD-10-CM

## 2022-04-06 DIAGNOSIS — K20.80 ESOPHAGITIS, LOS ANGELES GRADE A: ICD-10-CM

## 2022-04-06 DIAGNOSIS — I10 UNCONTROLLED HYPERTENSION: ICD-10-CM

## 2022-04-06 DIAGNOSIS — I10 ESSENTIAL HYPERTENSION, BENIGN: ICD-10-CM

## 2022-04-06 DIAGNOSIS — K26.9 DUODENAL ULCER: ICD-10-CM

## 2022-04-06 RX ORDER — TRIAMTERENE AND HYDROCHLOROTHIAZIDE 37.5; 25 MG/1; MG/1
CAPSULE ORAL
Qty: 180 CAPSULE | Refills: 3 | Status: SHIPPED | OUTPATIENT
Start: 2022-04-06 | End: 2022-04-07 | Stop reason: SDUPTHER

## 2022-04-06 RX ORDER — PANTOPRAZOLE SODIUM 40 MG/1
40 TABLET, DELAYED RELEASE ORAL DAILY
Qty: 90 TABLET | Refills: 3 | Status: SHIPPED | OUTPATIENT
Start: 2022-04-06 | End: 2022-04-07 | Stop reason: SDUPTHER

## 2022-04-06 RX ORDER — DULOXETIN HYDROCHLORIDE 60 MG/1
CAPSULE, DELAYED RELEASE ORAL
Qty: 180 CAPSULE | Refills: 3 | Status: SHIPPED | OUTPATIENT
Start: 2022-04-06 | End: 2022-04-07 | Stop reason: SDUPTHER

## 2022-04-06 RX ORDER — ROSUVASTATIN CALCIUM 10 MG/1
10 TABLET, COATED ORAL NIGHTLY
Qty: 90 TABLET | Refills: 3 | Status: SHIPPED | OUTPATIENT
Start: 2022-04-06

## 2022-04-06 RX ORDER — METOPROLOL SUCCINATE 100 MG/1
TABLET, EXTENDED RELEASE ORAL
Qty: 135 TABLET | Refills: 3 | Status: SHIPPED | OUTPATIENT
Start: 2022-04-06 | End: 2022-04-07 | Stop reason: SDUPTHER

## 2022-04-06 RX ORDER — TRAZODONE HYDROCHLORIDE 150 MG/1
TABLET ORAL
Qty: 90 TABLET | Refills: 3 | Status: SHIPPED | OUTPATIENT
Start: 2022-04-06 | End: 2022-04-07 | Stop reason: SDUPTHER

## 2022-04-06 NOTE — TELEPHONE ENCOUNTER
----- Message from Aliciasamira Charliesamira sent at 4/6/2022  8:17 AM EDT -----  Subject: Message to Provider    QUESTIONS  Information for Provider? Pt is having trouble getting her medications   filled ta her phcy through her insurance. Pt insurance informed her that   her medications will need to filled 3 months at a time. Is Dr. Anisha Bowden   able to write a 3 month rx? Please call pt back to discuss options.   ---------------------------------------------------------------------------  --------------  CALL BACK INFO  What is the best way for the office to contact you? OK to leave message on   voicemail  Preferred Call Back Phone Number? [phone]  ---------------------------------------------------------------------------  --------------  SCRIPT ANSWERS  Relationship to Patient?  Self

## 2022-04-06 NOTE — TELEPHONE ENCOUNTER
Submitted PA for Metoprolol Succinate ER 100MG er tablets, Key: [id number]. Medication has been APPROVED through 04/11/2023. Please notify patient. Thank you.

## 2022-04-07 ENCOUNTER — TELEPHONE (OUTPATIENT)
Dept: FAMILY MEDICINE CLINIC | Age: 57
End: 2022-04-07

## 2022-04-07 DIAGNOSIS — K26.9 DUODENAL ULCER: ICD-10-CM

## 2022-04-07 DIAGNOSIS — I10 UNCONTROLLED HYPERTENSION: ICD-10-CM

## 2022-04-07 DIAGNOSIS — I10 ESSENTIAL HYPERTENSION, BENIGN: ICD-10-CM

## 2022-04-07 DIAGNOSIS — K20.80 ESOPHAGITIS, LOS ANGELES GRADE A: ICD-10-CM

## 2022-04-07 DIAGNOSIS — F33.9 EPISODE OF RECURRENT MAJOR DEPRESSIVE DISORDER, UNSPECIFIED DEPRESSION EPISODE SEVERITY (HCC): ICD-10-CM

## 2022-04-07 RX ORDER — TRIAMTERENE AND HYDROCHLOROTHIAZIDE 37.5; 25 MG/1; MG/1
CAPSULE ORAL
Qty: 180 CAPSULE | Refills: 0 | Status: SHIPPED | OUTPATIENT
Start: 2022-04-07

## 2022-04-07 RX ORDER — PANTOPRAZOLE SODIUM 40 MG/1
40 TABLET, DELAYED RELEASE ORAL DAILY
Qty: 90 TABLET | Refills: 0 | Status: SHIPPED | OUTPATIENT
Start: 2022-04-07

## 2022-04-07 RX ORDER — TRAZODONE HYDROCHLORIDE 150 MG/1
TABLET ORAL
Qty: 90 TABLET | Refills: 0 | Status: SHIPPED | OUTPATIENT
Start: 2022-04-07

## 2022-04-07 RX ORDER — METOPROLOL SUCCINATE 100 MG/1
TABLET, EXTENDED RELEASE ORAL
Qty: 135 TABLET | Refills: 0 | Status: SHIPPED | OUTPATIENT
Start: 2022-04-07

## 2022-04-07 RX ORDER — DULOXETIN HYDROCHLORIDE 60 MG/1
CAPSULE, DELAYED RELEASE ORAL
Qty: 180 CAPSULE | Refills: 0 | Status: SHIPPED | OUTPATIENT
Start: 2022-04-07

## 2022-04-07 NOTE — TELEPHONE ENCOUNTER
Rx's sent to wrong pharmacy. Due to pt's insurance Rx's must go to [facility] for 90 day supply. Routing to [facility] due to PCP out of office.

## 2022-04-07 NOTE — TELEPHONE ENCOUNTER
Pt called stating that she can't take the rosouvastatin. States that she was up all night with muscle/joint pain and this morning her legs felt like jello. Pt wanting to know if there's anything else she could try.  Pt uses [address] [place] [address]. Call back [phone]

## 2022-05-10 ENCOUNTER — TELEPHONE (OUTPATIENT)
Dept: FAMILY MEDICINE CLINIC | Age: 57
End: 2022-05-10

## 2022-05-10 NOTE — TELEPHONE ENCOUNTER
----- Message from Gallito Chance sent at 5/10/2022  2:45 PM EDT -----  Subject: Message to Provider    QUESTIONS  Information for Provider? Josie Garcia called on 05/10 @ 2:42 pm. She is   no longer under [place] blue cross blue shield, but under [id number] [facility] This Insurance will not be in effect until 06/01, but   she would like to let Dr. George Harper know of the update.  ---------------------------------------------------------------------------  --------------  CALL BACK INFO  What is the best way for the office to contact you? OK to leave message on   voicemail  Preferred Call Back Phone Number? [phone]  ---------------------------------------------------------------------------  --------------  SCRIPT ANSWERS  Relationship to Patient?  Self

## 2022-06-29 ENCOUNTER — OFFICE VISIT (OUTPATIENT)
Dept: FAMILY MEDICINE CLINIC | Age: 57
End: 2022-06-29
Payer: COMMERCIAL

## 2022-06-29 VITALS
WEIGHT: 185 LBS | BODY MASS INDEX: 32.77 KG/M2 | DIASTOLIC BLOOD PRESSURE: 72 MMHG | SYSTOLIC BLOOD PRESSURE: 129 MMHG | HEART RATE: 88 BPM | OXYGEN SATURATION: 95 %

## 2022-06-29 DIAGNOSIS — R25.2 LEG CRAMPS: ICD-10-CM

## 2022-06-29 DIAGNOSIS — I10 PRIMARY HYPERTENSION: Primary | ICD-10-CM

## 2022-06-29 DIAGNOSIS — F32.9 REACTIVE DEPRESSION: ICD-10-CM

## 2022-06-29 DIAGNOSIS — R60.9 PERIPHERAL EDEMA: ICD-10-CM

## 2022-06-29 DIAGNOSIS — E87.8 DISORDER OF ELECTROLYTES: ICD-10-CM

## 2022-06-29 DIAGNOSIS — K59.04 CHRONIC IDIOPATHIC CONSTIPATION: ICD-10-CM

## 2022-06-29 DIAGNOSIS — Z13.220 SCREENING FOR LIPID DISORDERS: ICD-10-CM

## 2022-06-29 DIAGNOSIS — K21.00 GASTROESOPHAGEAL REFLUX DISEASE WITH ESOPHAGITIS WITHOUT HEMORRHAGE: ICD-10-CM

## 2022-06-29 DIAGNOSIS — I10 ESSENTIAL HYPERTENSION, BENIGN: ICD-10-CM

## 2022-06-29 LAB
ALBUMIN SERPL-MCNC: 4.5 G/DL (ref 3.4–5)
ALP BLD-CCNC: 71 U/L (ref 40–129)
ALT SERPL-CCNC: 10 U/L (ref 10–40)
ANION GAP SERPL CALCULATED.3IONS-SCNC: 14 MMOL/L (ref 3–16)
AST SERPL-CCNC: 12 U/L (ref 15–37)
BILIRUB SERPL-MCNC: 0.4 MG/DL (ref 0–1)
BILIRUBIN DIRECT: <0.2 MG/DL (ref 0–0.3)
BILIRUBIN, INDIRECT: ABNORMAL MG/DL (ref 0–1)
BUN BLDV-MCNC: 20 MG/DL (ref 7–20)
CALCIUM SERPL-MCNC: 9.6 MG/DL (ref 8.3–10.6)
CHLORIDE BLD-SCNC: 110 MMOL/L (ref 99–110)
CHOLESTEROL, TOTAL: 184 MG/DL (ref 0–199)
CO2: 19 MMOL/L (ref 21–32)
CREAT SERPL-MCNC: 0.8 MG/DL (ref 0.6–1.1)
GFR AFRICAN AMERICAN: >60
GFR NON-AFRICAN AMERICAN: >60
GLUCOSE BLD-MCNC: 108 MG/DL (ref 70–99)
HDLC SERPL-MCNC: 68 MG/DL (ref 40–60)
LDL CHOLESTEROL CALCULATED: 103 MG/DL
MAGNESIUM: 1.8 MG/DL (ref 1.8–2.4)
POTASSIUM SERPL-SCNC: 4 MMOL/L (ref 3.5–5.1)
SODIUM BLD-SCNC: 143 MMOL/L (ref 136–145)
TOTAL CK: 57 U/L (ref 26–192)
TOTAL PROTEIN: 7.1 G/DL (ref 6.4–8.2)
TRIGL SERPL-MCNC: 67 MG/DL (ref 0–150)
VLDLC SERPL CALC-MCNC: 13 MG/DL

## 2022-06-29 PROCEDURE — 99214 OFFICE O/P EST MOD 30 MIN: CPT | Performed by: FAMILY MEDICINE

## 2022-06-29 PROCEDURE — 36415 COLL VENOUS BLD VENIPUNCTURE: CPT | Performed by: FAMILY MEDICINE

## 2022-06-29 NOTE — PROGRESS NOTES
Chief Complaint   Patient presents with    Anxiety    Hypertension        Internal Administration   First Dose      Second Dose           Last COVID Lab No results found for: SARS-COV-2, SARS-COV-2 RNA, SARS-COV-2, SARS-COV-2, SARS-COV-2 BY PCR, SARS-COV-2, SARS-COV-2, SARS-COV-2          Wt Readings from Last 3 Encounters:   22 185 lb (83.9 kg)   22 182 lb 9.6 oz (82.8 kg)   21 186 lb (84.4 kg)     BP Readings from Last 3 Encounters:   22 129/72   22 (!) 154/100   21 (!) 146/88      Lab Results   Component Value Date    LABA1C 5.4 2020    LABA1C 5.9 2019       HPI:  Juan Topete is a 55 y.o. (: 1965) here today for    Patient states that she continues to take the miralax daily and it helps to keep her bowels regular. She states that she has not been taking the rosuvastatin due to leg cramps. She is also on two a day dyazide now so at increased dose so will check her BMP and mag to see if this could be low. Patient states that her acid reflux is okay. She states that her anxiety is the same. She continues to struggle with it but is working to talk with her close friend and work through her emotions. [] Patient has completed an advance directive  [x] Patient has NOT completed an advanced directive  [] Patient has a documented healthcare surrogate  [x] Patient does NOT have a documented healthcare surrogate  [] Discussed the importance of establishing and updating an advanced directive. Patient has questions at this time and those were answered. [x] Discussed the importance of establishing and updating an advanced directive. Patient does NOT have questions at this time.     Discussed with: [x] Patient            [] Family             [] Other caregiver    Patient's medications, allergies, past medical, surgical, social and family histories were reviewed and updated asappropriate on 2022 at 9:23 AM.    ROS:  Review of Systems    All other systems reviewed and are negative except as noted above on 6/29/2022 at 9:23 AM. Additional review of systems may be scanned into the media section ofthis medical record. Any responses requiring further intervention were pursued.     Past Medical History:   Diagnosis Date    Ankle fracture     Anxiety     Arthritis     Benzodiazepine abuse ([facility] [address]     Depression     Fibromyalgia     Hyperlipidemia     Hypertension     Kidney cyst     x2 rt. ?    Migraine     Pneumonia 2008    bronchitis frequently since    TIA (transient ischemic attack) 2007    Ulcer, Stomach Peptic     frequent N/V    Unspecified cerebral artery occlusion with cerebral infarction 2007     mini stroke cused by severe migraine    Weight loss     frequent N/V, S/P cholecystectomy     Family History   Problem Relation Age of Onset    High Blood Pressure Mother     Heart Disease Mother     Diabetes Maternal Grandfather     Heart Disease Father     Heart Disease Brother      Social History     Socioeconomic History    Marital status:      Spouse name: Not on file    Number of children: Not on file    Years of education: Not on file    Highest education level: Not on file   Occupational History    Not on file   Tobacco Use    Smoking status: Current Some Day Smoker     Packs/day: 0.50     Years: 27.00     Pack years: 13.50     Types: Cigarettes    Smokeless tobacco: Never Used    Tobacco comment: she smokes sometimes   Vaping Use    Vaping Use: Never used   Substance and Sexual Activity    Alcohol use: No     Alcohol/week: 0.0 standard drinks     Comment: rare    Drug use: No    Sexual activity: Yes     Partners: Male   Other Topics Concern    Not on file   Social History Narrative    Not on file     Social Determinants of Health     Financial Resource Strain: Low Risk     Difficulty of Paying Living Expenses: Not hard at all   Food Insecurity: No Food Insecurity    Worried About Running Out of Food in the Last Year: Never true    Ran Out of Food in the Last Year: Never true   Transportation Needs:     Lack of Transportation (Medical): Not on file    Lack of Transportation (Non-Medical): Not on file   Physical Activity:     Days of Exercise per Week: Not on file    Minutes of Exercise per Session: Not on file   Stress:     Feeling of Stress : Not on file   Social Connections:     Frequency of Communication with Friends and Family: Not on file    Frequency of Social Gatherings with Friends and Family: Not on file    Attends Congregation Services: Not on file    Active Member of [address] [place] [address] [employer] or Organizations: Not on file    Attends Club or Organization Meetings: Not on file    Marital Status: Not on file   Intimate Partner Violence:     Fear of Current or Ex-Partner: Not on file    Emotionally Abused: Not on file    Physically Abused: Not on file    Sexually Abused: Not on file   Housing Stability:     Unable to Pay for Housing in the Last Year: Not on file    Number of [place] in the Last Year: Not on file    Unstable Housing in the Last Year: Not on file     Prior to Visit Medications    Medication Sig Taking?  Authorizing Provider   DULoxetine (CYMBALTA) 60 MG extended release capsule Take one tablet by mouth twice daily Yes DARÍO Schmidt CNP   metoprolol succinate (TOPROL XL) 100 MG extended release tablet TAKE (1) TABLET DAILY IN THE AM AND 1/2 TAB IN THE EVENING Yes DARÍO Schmidt CNP   pantoprazole (PROTONIX) 40 MG tablet Take 1 tablet by mouth daily Yes DARÍO Schmidt CNP   traZODone (DESYREL) 150 MG tablet TAKE 1 TABLET AT BEDTIME Yes DARÍO Schmidt CNP   triamterene-hydroCHLOROthiazide (DYAZIDE) 37.5-25 MG per capsule TAKE 2 CAPSULE ONCE DAILY Yes DARÍO Schmidt CNP   rosuvastatin (CRESTOR) 10 MG tablet Take 1 tablet by mouth nightly Yes Chantell High MD   polyethylene glycol [facility]) 17 GM/SCOOP POWD powder Take 1-2 capfuls of Miralax daily as needed Yes Chandler Marrufo MD   nystatin (MYCOSTATIN) 373104 UNIT/GM powder Apply 3 times daily. Yes Chandler Marrufo MD   hydrOXYzine (VISTARIL) 25 MG capsule Take 1 capsule by mouth 3 times daily as needed for Anxiety Yes Chandler Marrufo MD   ibuprofen (ADVIL;MOTRIN) 800 MG tablet TAKE 1 TABLET THREE TIMES DAILY Yes Chandler Marrufo MD     Allergies   Allergen Reactions    Benadryl [Diphenhydramine] Anxiety     \"Made me jump off the wall\"    Topamax [Topiramate] Hives    Tramadol Shortness Of Breath    Vitamin C [Ascorbic Acid] Hives    Lyrica [Pregabalin] Palpitations    Penicillins Hives and Itching       OBJECTIVE:  Estimated body mass index is 32.77 kg/m² as calculated from the following:    Height as of 3/23/22: 5' 3\" (1.6 m). Weight as of this encounter: 185 lb (83.9 kg). Vitals:    06/29/22 0901   BP: 129/72   Pulse: 88   SpO2: 95%   Weight: 185 lb (83.9 kg)       Physical Exam  Vitals and nursing note reviewed. Constitutional:       General: She is not in acute distress. Appearance: She is well-developed. She is not diaphoretic. HENT:      Head: Normocephalic and atraumatic. Right Ear: External ear normal.      Left Ear: External ear normal.      Nose: Nose normal.   Eyes:      General: Lids are normal. No scleral icterus. Right eye: No discharge. Left eye: No discharge. Pupils: Pupils are equal, round, and reactive to light. Neck:      Thyroid: No thyromegaly. Vascular: No JVD. Cardiovascular:      Rate and Rhythm: Normal rate and regular rhythm. Heart sounds: Normal heart sounds. Pulmonary:      Effort: Pulmonary effort is normal. No respiratory distress. Breath sounds: Normal breath sounds. Abdominal:      Palpations: Abdomen is soft. There is no hepatomegaly or splenomegaly. Tenderness: There is no abdominal tenderness.    Musculoskeletal:      Right lower leg: No edema. Left lower leg: No edema. Skin:     General: Skin is warm and dry. Coloration: Skin is not pale. Findings: No erythema or rash. Comments: Wart on right achilles  Skin tag on right side of back   Neurological:      Mental Status: She is oriented to person, place, and time. Psychiatric:         Mood and Affect: Mood normal.         Behavior: Behavior normal.         Thought Content: Thought content normal.         Judgment: Judgment normal.              ASSESSMENT PLAN      Diagnosis Orders   1. Primary hypertension     2. Disorder of electrolytes  Hepatic Function Panel   3. Screening for lipid disorders  Lipid Panel   4. Essential hypertension, benign  Lipid Panel    CK   5. Leg cramps  Basic Metabolic Panel    Magnesium   6. Reactive depression     7. Peripheral edema     8. Chronic idiopathic constipation     9. Gastroesophageal reflux disease with esophagitis without hemorrhage     Current blood pressure readings in the office or at home are acceptable and current antihypertensive medications as listed in the medication list require no change. She has worked very hard to get her lipids down cannot tolerate statins. Complaining of muscle cramps. Already supplementing potassium, asked her to supplement magnesium. Continue adequate hydration. Nerves seem to be doing okay on. Some mild residual swelling of her legs. Bowels moving well with MiraLAX. Reflux symptoms based upon patient's history is controlled and no changes in medications for reflux as listed in the medication profile is necessary. Follow-up 6 months    Patient should call the office immediately with new or ongoing signs or symptoms or worsening, or proceed to the emergency room. No changes in past medical history, past surgical history, social history, or family history were noted during the patient encounter unless specifically listed above.   All updates of past medical history, past surgical history, social history, or family history were reviewed personally by me during the office visit. All problems listed in the assessment are stable unless noted otherwise. Medication profile reviewed personally by me during the visit. Medication side effects and possible impairments from medications were discussed as applicable. This document was prepared by a combination of typing and transcription through a voice recognition software. Scribe attestation: Trung Mcfarland RN, am scribing for and in the presence of Federica Wilson MD. Electronically signed by Roc Cantu RN on 6/29/2022 at 9:23 AM      Provider attestation:     I, Dr. Edward Hays, personally performed the services described in this documentation, as scribed by the above signed scribe in my presence, and it is both accurate and complete. I agree with the ROS and Past Histories independently gathered by the clinical support staff and the remaining scribed note accurately describes my personal service to the patient.       6/29/2022    12:47 PM

## 2022-06-29 NOTE — PATIENT INSTRUCTIONS

## 2022-07-05 ENCOUNTER — TELEPHONE (OUTPATIENT)
Dept: FAMILY MEDICINE CLINIC | Age: 57
End: 2022-07-05

## 2022-07-05 NOTE — TELEPHONE ENCOUNTER
----- Message from Ishmael Palomino sent at 7/5/2022  3:13 PM EDT -----  Subject: Results Request    QUESTIONS  Results: [id number] ; Ordered   by: Boston Jha   Date Performed: 2022-06-30  ---------------------------------------------------------------------------  --------------  Adam Espana INFO    [id number]; OK to leave message on voicemail  ---------------------------------------------------------------------------  --------------

## 2022-09-07 ENCOUNTER — OFFICE VISIT (OUTPATIENT)
Dept: FAMILY MEDICINE CLINIC | Age: 57
End: 2022-09-07
Payer: COMMERCIAL

## 2022-09-07 VITALS
WEIGHT: 183 LBS | HEART RATE: 95 BPM | OXYGEN SATURATION: 97 % | DIASTOLIC BLOOD PRESSURE: 88 MMHG | BODY MASS INDEX: 32.42 KG/M2 | SYSTOLIC BLOOD PRESSURE: 138 MMHG

## 2022-09-07 DIAGNOSIS — B07.9 VIRAL WARTS, UNSPECIFIED TYPE: ICD-10-CM

## 2022-09-07 DIAGNOSIS — R20.9 SKIN SENSATION DISTURBANCE: ICD-10-CM

## 2022-09-07 DIAGNOSIS — L91.8 INFLAMED SKIN TAG: Primary | ICD-10-CM

## 2022-09-07 PROCEDURE — 17110 DESTRUCTION B9 LES UP TO 14: CPT | Performed by: FAMILY MEDICINE

## 2022-09-07 PROCEDURE — 11200 RMVL SKIN TAGS UP TO&INC 15: CPT | Performed by: FAMILY MEDICINE

## 2022-09-07 SDOH — ECONOMIC STABILITY: FOOD INSECURITY: WITHIN THE PAST 12 MONTHS, YOU WORRIED THAT YOUR FOOD WOULD RUN OUT BEFORE YOU GOT MONEY TO BUY MORE.: NEVER TRUE

## 2022-09-07 SDOH — ECONOMIC STABILITY: FOOD INSECURITY: WITHIN THE PAST 12 MONTHS, THE FOOD YOU BOUGHT JUST DIDN'T LAST AND YOU DIDN'T HAVE MONEY TO GET MORE.: NEVER TRUE

## 2022-09-07 ASSESSMENT — SOCIAL DETERMINANTS OF HEALTH (SDOH): HOW HARD IS IT FOR YOU TO PAY FOR THE VERY BASICS LIKE FOOD, HOUSING, MEDICAL CARE, AND HEATING?: NOT HARD AT ALL

## 2022-09-08 NOTE — PROGRESS NOTES
Chief Complaint   Patient presents with    Skin Tag     removal        Internal Administration   First Dose      Second Dose           Last COVID Lab No results found for: SARS-COV-2, SARS-COV-2 RNA, SARS-COV-2, SARS-COV-2, SARS-COV-2 BY PCR, SARS-COV-2, SARS-COV-2, SARS-COV-2         Wt Readings from Last 3 Encounters:   22 183 lb (83 kg)   22 185 lb (83.9 kg)   22 182 lb 9.6 oz (82.8 kg)     BP Readings from Last 3 Encounters:   22 138/88   22 129/72   22 (!) 154/100     Lab Results   Component Value Date    LABA1C 5.4 2020    LABA1C 5.9 2019         HPI:   Haley Flowers is a 65 y.o. (: 1965) here today for removal of the skin tag on her right side as well as the wart over the right Achilles tendon. These were identified at a previous visit. The location of the wart is troublesome as it rubs against her shoe and makes it uncomfortable to walk. Clothing irritates the skin tag on the right side. [] Patient has completed an advance directive  [] Patient has NOT completed an advanced directive  [] Patient has a documented healthcare surrogate  [] Patient does NOT have a documented healthcare surrogate  [] Discussed the importance of establishing and updating an advanced directive. Patient has questions at this time and those were answered. [] Discussed the importance of establishing and updating an advanced directive. Patient does NOT have questions at this time. Discussed with: [] Patient            [] Family             [] Other caregiver    Patient's medications, allergies, past medical, surgical, social and family histories were reviewed and updated as appropriateon 2022 at 9:33 AM.      Review of Systems  Additional review of systems may be scanned into the mediasection of this medical record. Any responses requiring further intervention were pursued.   OBJECTIVE:  Estimated body mass index is 32.42 kg/m² as calculated from the following: Height as of 3/23/22: 5' 3\" (1.6 m). Weight as of this encounter: 183 lb (83 kg). Vitals:    09/07/22 1502   BP: 138/88   Pulse: 95   SpO2: 97%   Weight: 183 lb (83 kg)     Physical Exam  Vitals and nursing note reviewed. Constitutional:       General: She is not in acute distress. Appearance: She is well-developed. She is not diaphoretic. HENT:      Head: Normocephalic and atraumatic. Cardiovascular:      Rate and Rhythm: Normal rate. Pulmonary:      Effort: Pulmonary effort is normal. No respiratory distress. Skin:     Comments: 6 mm skin tag of the right side inflamed. 4 mm wart over the right Achilles tendon   Neurological:      Mental Status: She is alert and oriented to person, place, and time. Psychiatric:         Behavior: Behavior normal.         Thought Content: Thought content normal.         Judgment: Judgment normal.     PROCEDURE NOTE    Written consent obtained. Patient and site confirmed. A millimeter and 4 mm lesions indicated for removal by shave excision and excision by shave, electrocautery and curettage. Indication for removal inflammation due to areas of location. 1.5 milliliters of Xylocaine without epinephrine administered. Shave excision of the inflamed skin tag on the right side performed. Shave excision of the wart over the right Achilles tendon performed followed by 4 applications of electrocautery and curettage. Base of the wart clean and dry after procedure. Hemostasis by electrocautery. Wound closure necessary no. Pathology sent no. Wound care instructions. Signs and symptoms of infection discussed. Return to office as needed. ASSESSMENT PLAN        Diagnosis Orders   1. Inflamed skin tag  86618 - NH SHAV SKIN LES 6-10MM TRUNK,ARM,LEG      2.  Skin sensation disturbance  16496 - DESTRUCTION BENIGN LESIONS 15 OR MORE    92543 - [place] SHAV SKIN LES 6-10MM TRUNK,ARM,LEG      3. Viral warts, unspecified type  65458 - DESTRUCTION BENIGN LESIONS 15 OR MORE            Patient should call the office immediately with new or ongoing signs or symptoms or worsening, or proceed to the emergency room. No changes in past medical history, past surgical history, social history, or family history were noted during the patient encounter unless specifically listed above. All updates of past medical history, past surgical history, social history, or family history were reviewed personally by me during the office visit. All problems listed in the assessment are stable unless noted otherwise. Medication profile reviewed personally by me during the visit. Medication side effects and possible impairments from medications were discussed as applicable. This document was prepared by a combination of typing and transcription through a voice recognition software. Scribe attestation: Ace Vinson MD, am scribing for and in the presence of Africa Christine MD. Electronically signed by Africa Christine MD on 9/8/2022 at 9:33 AM    Physician attestation:     I, Dr. Caryl Woods, personally performed the services described in this documentation, as scribed by the above signed scribe in my presence, and it is both accurate and complete. I agree with the ROS and Past Histories independently gathered by the clinical support staff and the remaining scribed note accurately describes my personal service to the patient.       9/8/2022    9:37 AM

## 2022-09-09 ENCOUNTER — TELEPHONE (OUTPATIENT)
Dept: FAMILY MEDICINE CLINIC | Age: 57
End: 2022-09-09

## 2022-09-09 NOTE — TELEPHONE ENCOUNTER
Pt was in the office on 9/7/22 and had a wart removed on her right foot over the Achilles Tendon. She states that she has a tendency to get MRSA. She states that tt looks like it is getting infected and was wanting to see if she could get something called in to [address]

## 2022-09-09 NOTE — TELEPHONE ENCOUNTER
Please call patient and inform that our office no longer prescribes medication without an evaluation.     The options available are to do an E-visit, a virtual visit, urgent care visit or schedule an in office visit with a provider for next week

## 2022-09-09 NOTE — TELEPHONE ENCOUNTER
Spoke with patient to advise on Niya's message.   She is at a [facility] [address] [place] [address] with her daughter and will call back to office

## 2022-09-27 ENCOUNTER — TELEPHONE (OUTPATIENT)
Dept: FAMILY MEDICINE CLINIC | Age: 57
End: 2022-09-27

## 2022-09-27 NOTE — TELEPHONE ENCOUNTER
Unable to reach patient, LVM informing of need to determine if she is still seeing her neurologist for her disability.

## 2022-09-28 NOTE — TELEPHONE ENCOUNTER
Spoke with Lucio and she said she hasnt seen a neurologist in 3 years because there was no more they could do for her. She had tried all of their recommendations with no improvement. Currently she is just monitoring them at home and calling PCP if more treatment is needed. I called and LM informing Isaura Delong.

## 2022-10-03 DIAGNOSIS — B96.89 ACUTE BACTERIAL SINUSITIS: ICD-10-CM

## 2022-10-03 DIAGNOSIS — J18.9 COMMUNITY ACQUIRED PNEUMONIA, UNSPECIFIED LATERALITY: ICD-10-CM

## 2022-10-03 DIAGNOSIS — J01.90 ACUTE BACTERIAL SINUSITIS: ICD-10-CM

## 2022-10-03 RX ORDER — IBUPROFEN 800 MG/1
TABLET ORAL
Qty: 90 TABLET | Refills: 0 | Status: SHIPPED | OUTPATIENT
Start: 2022-10-03

## 2022-10-10 ENCOUNTER — OFFICE VISIT (OUTPATIENT)
Dept: FAMILY MEDICINE CLINIC | Age: 57
End: 2022-10-10
Payer: COMMERCIAL

## 2022-10-10 ENCOUNTER — HOSPITAL ENCOUNTER (OUTPATIENT)
Dept: ULTRASOUND IMAGING | Age: 57
Discharge: HOME | End: 2022-10-10
Payer: COMMERCIAL

## 2022-10-10 VITALS
SYSTOLIC BLOOD PRESSURE: 157 MMHG | BODY MASS INDEX: 32.42 KG/M2 | HEART RATE: 77 BPM | WEIGHT: 183 LBS | DIASTOLIC BLOOD PRESSURE: 80 MMHG

## 2022-10-10 DIAGNOSIS — M77.42 METATARSALGIA OF LEFT FOOT: Primary | ICD-10-CM

## 2022-10-10 DIAGNOSIS — Z23 NEED FOR INFLUENZA VACCINATION: ICD-10-CM

## 2022-10-10 DIAGNOSIS — E04.1 THYROID NODULE: ICD-10-CM

## 2022-10-10 DIAGNOSIS — M79.672 FOOT PAIN, LEFT: ICD-10-CM

## 2022-10-10 PROCEDURE — 99213 OFFICE O/P EST LOW 20 MIN: CPT | Performed by: NURSE PRACTITIONER

## 2022-10-10 PROCEDURE — 76536 US EXAM OF HEAD AND NECK: CPT

## 2022-10-10 RX ORDER — PREDNISONE 10 MG/1
TABLET ORAL
Qty: 30 TABLET | Refills: 0 | Status: SHIPPED | OUTPATIENT
Start: 2022-10-10

## 2022-10-10 ASSESSMENT — ENCOUNTER SYMPTOMS
PHOTOPHOBIA: 0
EYE PAIN: 0
SINUS PRESSURE: 0
BLOOD IN STOOL: 0
COUGH: 0
DIARRHEA: 0
NAUSEA: 0
STRIDOR: 0
RHINORRHEA: 0
EYE ITCHING: 0
CONSTIPATION: 0
BACK PAIN: 0
CHEST TIGHTNESS: 0
ABDOMINAL PAIN: 0
SORE THROAT: 0
EYE REDNESS: 0
TROUBLE SWALLOWING: 0
GASTROINTESTINAL NEGATIVE: 1
CHOKING: 0
APNEA: 0
WHEEZING: 0
VOMITING: 0
ALLERGIC/IMMUNOLOGIC NEGATIVE: 1
RESPIRATORY NEGATIVE: 1
EYE DISCHARGE: 0
COLOR CHANGE: 0
SHORTNESS OF BREATH: 0

## 2022-10-10 NOTE — PROGRESS NOTES
[facility] PHYSICIAN PRACTICES  [facility] FAMILY MEDICINE  [address] [facility].  [address], [address]  Dept: [phone]  Dept Fax: [phone]  Loc: [phone]    John Gardiner is a 65 y.o. female who presents today for her medical conditions/complaints as noted below. John Gardiner is c/o of Foot Pain (Left ball of foot. Been going on for a while)        HPI:     Chief Complaint   Patient presents with    Foot Pain     Left ball of foot. Been going on for a while       Foot Pain   Pain location: Left ball of foot. This is a new problem. The current episode started 1 to 4 weeks ago (2 weeks). There has been no history of extremity trauma. The problem has been gradually worsening. The quality of the pain is described as aching (At times throbbing and sharp shooting pains when walking to 2nd and 3rd metatarsal). The pain is at a severity of 5/10 (8/10 at worse). The pain is moderate. Pertinent negatives include no fever, inability to bear weight, itching, joint locking, joint swelling, limited range of motion, numbness, stiffness or tingling. The symptoms are aggravated by activity. She has tried NSAIDS and rest (wearing gym shoes) for the symptoms. The treatment provided no relief. Family history does not include gout or rheumatoid arthritis. There is no history of diabetes, gout, osteoarthritis or rheumatoid arthritis. Zackary Sharma admits to feeling like she is walking on a pebble at times under the ball of her foot. 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 She admits to wear sandals frequently until recently where she started to wear gym shoes and feels like this has helped some.      Past Medical History:   Diagnosis Date    Ankle fracture     Anxiety     Arthritis     Benzodiazepine abuse ([address]     Depression     Fibromyalgia     Hyperlipidemia     Hypertension     Kidney cyst     x2 rt. ?    Migraine     Pneumonia 2008    bronchitis frequently since    TIA (transient ischemic attack)     Ulcer, Stomach Peptic     frequent N/V    Unspecified cerebral artery occlusion with cerebral infarction      mini stroke cused by severe migraine    Weight loss     frequent N/V, S/P cholecystectomy      Past Surgical History:   Procedure Laterality Date    ANKLE SURGERY       SECTION      CHOLECYSTECTOMY      COLONOSCOPY N/A 2018    COLON W/ANES.  performed by Zane Hawkins MD at [address] [place] [address] Drive      HERNIA REPAIR      HYSTERECTOMY (CERVIX STATUS UNKNOWN)      TUMOR REMOVAL      lt femur    UPPER GASTROINTESTINAL ENDOSCOPY N/A 2018    EGD BIOPSY performed by Zane Hawkins MD at [facility]       Family History   Problem Relation Age of Onset    High Blood Pressure Mother     Heart Disease Mother     Diabetes Maternal Grandfather     Heart Disease Father     Heart Disease Brother        Social History     Tobacco Use    Smoking status: Some Days     Packs/day: 0.50     Years: 27.00     Pack years: 13.50     Types: Cigarettes    Smokeless tobacco: Never    Tobacco comments:     she smokes sometimes   Substance Use Topics    Alcohol use: No     Alcohol/week: 0.0 standard drinks     Comment: rare      Current Outpatient Medications   Medication Sig Dispense Refill    predniSONE (DELTASONE) 10 MG tablet Take 40 mg for 3 days then 30 mg for 3 days then 20 mg for 3 days then 10 mg for 3 days 30 tablet 0    ibuprofen (ADVIL;MOTRIN) 800 MG tablet TAKE 1 TABLET THREE TIMES DAILY 90 tablet 0    DULoxetine (CYMBALTA) 60 MG extended release capsule Take one tablet by mouth twice daily 180 capsule 0    metoprolol succinate (TOPROL XL) 100 MG extended release tablet TAKE (1) TABLET DAILY IN THE AM AND 1/2 TAB IN THE EVENING 135 tablet 0    pantoprazole (PROTONIX) 40 MG tablet Take 1 tablet by mouth daily 90 tablet 0    traZODone (DESYREL) 150 MG tablet TAKE 1 TABLET AT BEDTIME 90 tablet 0 triamterene-hydroCHLOROthiazide (DYAZIDE) 37.5-25 MG per capsule TAKE 2 CAPSULE ONCE DAILY 180 capsule 0    rosuvastatin (CRESTOR) 10 MG tablet Take 1 tablet by mouth nightly 90 tablet 3    polyethylene glycol (MIRALAX) 17 GM/SCOOP POWD powder Take 1-2 capfuls of Miralax daily as needed 850 g 11    nystatin (MYCOSTATIN) 076482 UNIT/GM powder Apply 3 times daily. 30 g 0    hydrOXYzine (VISTARIL) 25 MG capsule Take 1 capsule by mouth 3 times daily as needed for Anxiety 90 capsule 5     No current facility-administered medications for this visit. Allergies   Allergen Reactions    Benadryl [Diphenhydramine] Anxiety     \"Made me jump off the wall\"    Topamax [Topiramate] Hives    Tramadol Shortness Of Breath    Vitamin C [Ascorbic Acid] Hives    Lyrica [Pregabalin] Palpitations    Penicillins Hives and Itching       :      Review of Systems   Constitutional: Negative. Negative for activity change, appetite change, chills, diaphoresis, fatigue, fever and unexpected weight change. HENT: Negative. Negative for ear pain, rhinorrhea, sinus pressure, sneezing, sore throat and trouble swallowing. Eyes:  Negative for photophobia, pain, discharge, redness, itching and visual disturbance. Respiratory: Negative. Negative for apnea, cough, choking, chest tightness, shortness of breath, wheezing and stridor. Cardiovascular:  Negative for chest pain, palpitations and leg swelling. Gastrointestinal: Negative. Negative for abdominal pain, blood in stool, constipation, diarrhea, nausea and vomiting. Genitourinary: Negative. Negative for decreased urine volume, difficulty urinating, dysuria, enuresis, flank pain, frequency, genital sores, hematuria and urgency. Musculoskeletal:  Positive for arthralgias (Left ball of foot pain) and gait problem (Walking on outside of left foot secondary to ball of foot pain). Negative for back pain, gout, joint swelling, myalgias, neck pain, neck stiffness and stiffness.    Skin: Negative. Negative for color change, itching, pallor, rash and wound. Allergic/Immunologic: Negative. Neurological:  Negative for dizziness, tingling, facial asymmetry, weakness, light-headedness, numbness and headaches. Psychiatric/Behavioral:  Negative for agitation, behavioral problems, confusion, decreased concentration, dysphoric mood, hallucinations, self-injury, sleep disturbance and suicidal ideas. The patient is not nervous/anxious and is not hyperactive. Objective:     Vitals:    10/10/22 1226 10/10/22 1227   BP: (!) 168/82 (!) 157/80   Pulse: 77    Weight: 183 lb (83 kg)      Wt Readings from Last 3 Encounters:   10/10/22 183 lb (83 kg)   09/07/22 183 lb (83 kg)   06/29/22 185 lb (83.9 kg)     Temp Readings from Last 3 Encounters:   03/22/21 97.9 °F (36.6 °C) (Temporal)   01/19/21 97.3 °F (36.3 °C) (Temporal)   12/02/20 98.4 °F (36.9 °C) (Oral)     BP Readings from Last 3 Encounters:   10/10/22 (!) 157/80   09/07/22 138/88   06/29/22 129/72     Pulse Readings from Last 3 Encounters:   10/10/22 77   09/07/22 95   06/29/22 88     Physical Exam  Vitals and nursing note reviewed. Constitutional:       General: She is not in acute distress. Appearance: Normal appearance. She is well-developed. She is obese. She is not diaphoretic. HENT:      Head: Normocephalic and atraumatic. Right Ear: External ear normal.      Left Ear: External ear normal.      Nose: Nose normal.   Eyes:      General:         Right eye: No discharge. Left eye: No discharge. Conjunctiva/sclera: Conjunctivae normal.   Cardiovascular:      Rate and Rhythm: Normal rate. Pulmonary:      Effort: Pulmonary effort is normal.   Musculoskeletal:         General: No deformity. Normal range of motion. Cervical back: Normal range of motion and neck supple. No rigidity. Feet:    Skin:     General: Skin is warm and dry. Coloration: Skin is not jaundiced or pale.       Findings: No bruising, erythema, lesion or rash. Neurological:      Mental Status: She is alert and oriented to person, place, and time. Mental status is at baseline. Psychiatric:         Mood and Affect: Mood normal.         Behavior: Behavior normal.         Thought Content: Thought content normal.         Judgment: Judgment normal.       Office Visit on 06/29/2022   Component Date Value Ref Range Status    Total Protein 06/29/2022 7.1  6.4 - 8.2 g/dL Final    Albumin 06/29/2022 4.5  3.4 - 5.0 g/dL Final    Alkaline Phosphatase 06/29/2022 71  40 - 129 U/L Final    ALT 06/29/2022 10  10 - 40 U/L Final    AST 06/29/2022 12 (A)  15 - 37 U/L Final    Total Bilirubin 06/29/2022 0.4  0.0 - 1.0 mg/dL Final    Bilirubin, Direct 06/29/2022 <0.2  0.0 - 0.3 mg/dL Final    Bilirubin, Indirect 06/29/2022 see below  0.0 - 1.0 mg/dL Final    Comment: Indirect Bilirubin cannot be calculated since Total Bilirubin  and/or Direct Bilirubin is below measurable range. Cholesterol, Total 06/29/2022 184  0 - 199 mg/dL Final    Triglycerides 06/29/2022 67  0 - 150 mg/dL Final    HDL 06/29/2022 68 (A)  40 - 60 mg/dL Final    LDL Calculated 06/29/2022 103 (A)  <100 mg/dL Final    VLDL Cholesterol Calculated 06/29/2022 13  Not Established mg/dL Final    Total CK 06/29/2022 57  26 - 192 U/L Final    Sodium 06/29/2022 143  136 - 145 mmol/L Final    Potassium 06/29/2022 4.0  3.5 - 5.1 mmol/L Final    Chloride 06/29/2022 110  99 - 110 mmol/L Final    CO2 06/29/2022 19 (A)  21 - 32 mmol/L Final    Anion Gap 06/29/2022 14  3 - 16 Final    Glucose 06/29/2022 108 (A)  70 - 99 mg/dL Final    BUN 06/29/2022 20  7 - 20 mg/dL Final    Creatinine 06/29/2022 0.8  0.6 - 1.1 mg/dL Final    GFR Non- 06/29/2022 >60  >60 Final    Comment: >60 mL/min/1.73m2 EGFR, calc. for ages 25 and older using the  MDRD formula (not corrected for weight), is valid for stable  renal function.       GFR  06/29/2022 >60  >60 Final    Comment: Chronic Kidney DARÍO Jaimes CNP   DULoxetine (CYMBALTA) 60 MG extended release capsule Take one tablet by mouth twice daily Yes DARÍO Nelson CNP   metoprolol succinate (TOPROL XL) 100 MG extended release tablet TAKE (1) TABLET DAILY IN THE AM AND 1/2 TAB IN THE EVENING Yes DARÍO Nelson CNP   pantoprazole (PROTONIX) 40 MG tablet Take 1 tablet by mouth daily Yes DARÍO Nelson CNP   traZODone (DESYREL) 150 MG tablet TAKE 1 TABLET AT BEDTIME Yes DARÍO Nelson CNP   triamterene-hydroCHLOROthiazide (DYAZIDE) 37.5-25 MG per capsule TAKE 2 CAPSULE ONCE DAILY Yes DARÍO Nelson CNP   rosuvastatin (CRESTOR) 10 MG tablet Take 1 tablet by mouth nightly Yes Mena Miller MD   polyethylene glycol [facility]) 17 GM/SCOOP POWD powder Take 1-2 capfuls of Miralax daily as needed Yes Mena Miller MD   nystatin (MYCOSTATIN) 609489 UNIT/GM powder Apply 3 times daily. Yes Mena Miller MD   hydrOXYzine (VISTARIL) 25 MG capsule Take 1 capsule by mouth 3 times daily as needed for Anxiety Yes Mena Miller MD     Orders Placed This Encounter   Medications    predniSONE (DELTASONE) 10 MG tablet     Sig: Take 40 mg for 3 days then 30 mg for 3 days then 20 mg for 3 days then 10 mg for 3 days     Dispense:  30 tablet     Refill:  0         Return if symptoms worsen or fail to improve. Patient should call the office immediately with new or ongoing signs or symptoms or worsening, or proceedto the emergency room. No changes in past medical history, past surgical history, social history, or family history were noted during the patient encounter unless specifically listed above. All updates of past medicalhistory, past surgical history, social history, or family history were reviewed personally by me during the office visit. All problems listed in the assessment are stable unless noted otherwise.   Medication profilereviewed personally by me during the office visit. Medication side effects and possible impairments from medications were discussed as applicable. Call if pattern of symptoms change or persists for an extended time. This document was prepared by a combination of typing and transcription through a voice recognition software. All medications have the potential for adverse effects. All medications effect each person differently. Please read and review provided information related to medication. If the medication that you have been prescribed has the potential to cause sedation, do not drive or operate car, truck, or heavy machinery until you know how the medication will effect you. If you experience any adverse effects from the medication, please call the office or report to the emergency department.

## 2022-11-01 ENCOUNTER — OFFICE VISIT (OUTPATIENT)
Dept: FAMILY MEDICINE CLINIC | Age: 57
End: 2022-11-01
Payer: COMMERCIAL

## 2022-11-01 VITALS
DIASTOLIC BLOOD PRESSURE: 82 MMHG | SYSTOLIC BLOOD PRESSURE: 122 MMHG | OXYGEN SATURATION: 99 % | WEIGHT: 190 LBS | HEART RATE: 89 BPM | BODY MASS INDEX: 33.66 KG/M2

## 2022-11-01 DIAGNOSIS — F41.9 ANXIETY: ICD-10-CM

## 2022-11-01 DIAGNOSIS — K59.01 SLOW TRANSIT CONSTIPATION: ICD-10-CM

## 2022-11-01 DIAGNOSIS — M77.42 METATARSALGIA OF LEFT FOOT: ICD-10-CM

## 2022-11-01 DIAGNOSIS — G44.209 TENSION HEADACHE: ICD-10-CM

## 2022-11-01 DIAGNOSIS — K58.2 IRRITABLE BOWEL SYNDROME WITH BOTH CONSTIPATION AND DIARRHEA: ICD-10-CM

## 2022-11-01 DIAGNOSIS — K21.00 GASTROESOPHAGEAL REFLUX DISEASE WITH ESOPHAGITIS WITHOUT HEMORRHAGE: ICD-10-CM

## 2022-11-01 DIAGNOSIS — M47.812 SPONDYLOSIS OF CERVICAL REGION WITHOUT MYELOPATHY OR RADICULOPATHY: Primary | ICD-10-CM

## 2022-11-01 DIAGNOSIS — Z12.31 ENCOUNTER FOR SCREENING MAMMOGRAM FOR MALIGNANT NEOPLASM OF BREAST: ICD-10-CM

## 2022-11-01 DIAGNOSIS — I10 ESSENTIAL HYPERTENSION, BENIGN: ICD-10-CM

## 2022-11-01 DIAGNOSIS — M21.6X2 PROMINENT METATARSAL HEAD OF LEFT FOOT: ICD-10-CM

## 2022-11-01 PROBLEM — K59.04 CHRONIC IDIOPATHIC CONSTIPATION: Status: RESOLVED | Noted: 2017-12-01 | Resolved: 2022-11-01

## 2022-11-01 PROCEDURE — 3074F SYST BP LT 130 MM HG: CPT | Performed by: FAMILY MEDICINE

## 2022-11-01 PROCEDURE — 3078F DIAST BP <80 MM HG: CPT | Performed by: FAMILY MEDICINE

## 2022-11-01 PROCEDURE — 99214 OFFICE O/P EST MOD 30 MIN: CPT | Performed by: FAMILY MEDICINE

## 2022-11-01 NOTE — PROGRESS NOTES
Chief Complaint   Patient presents with    Hypertension    Anxiety        Internal Administration   First Dose      Second Dose           Last COVID Lab No results found for: SARS-COV-2, SARS-COV-2 RNA, SARS-COV-2, SARS-COV-2, SARS-COV-2 BY PCR, SARS-COV-2, SARS-COV-2, SARS-COV-2          Wt Readings from Last 3 Encounters:   22 190 lb (86.2 kg)   10/10/22 183 lb (83 kg)   22 183 lb (83 kg)     BP Readings from Last 3 Encounters:   22 122/82   10/10/22 (!) 157/80   22 138/88      Lab Results   Component Value Date    LABA1C 5.4 2020    LABA1C 5.9 2019       HPI:  Shireen Adan is a 65 y.o. (: 1965) here today for    She states that the spot that was removed is healing well. Patient states that her anxiety is about the same, but is not terrible. She states that her bowels are not doing well, continues to have constipation but states that she has not been using her miralax daily as she is prescribed to do. She states that some days she is also having issues with bowel incontinence. She states that she has been having some issues of headaches out of no where that feels like she can head her bones cracking  almost states that it feels like crunching is occurring. She is tender of the C-spine. She states that the steroids she was on for the pain on the bottom of her left foot pad did not help the issue. Discussed how upon inspection it looks like she has a bone that has dropped in her left foot causing increased pressure in areas that do not typically have this. Discussed using inserts. She states that she has been using specialized inserts with little benefit. [] Patient has completed an advance directive  [x] Patient has NOT completed an advanced directive  [] Patient has a documented healthcare surrogate  [x] Patient does NOT have a documented healthcare surrogate  [] Discussed the importance of establishing and updating an advanced directive. Patient has questions at this time and those were answered. [x] Discussed the importance of establishing and updating an advanced directive. Patient does NOT have questions at this time. Discussed with: [x] Patient            [] Family             [] Other caregiver    Patient's medications, allergies, past medical, surgical, social and family histories were reviewed and updated asappropriate on 11/1/2022 at 9:11 AM.    ROS:  Review of Systems    All other systems reviewed and are negative except as noted above on 11/1/2022 at 9:11 AM. Additional review of systems may be scanned into the media section ofthis medical record. Any responses requiring further intervention were pursued. Past Medical History:   Diagnosis Date    Ankle fracture     Anxiety     Arthritis     Benzodiazepine abuse ([facility] [address]     Depression     Fibromyalgia     Hyperlipidemia     Hypertension     Kidney cyst     x2 rt. ?    Migraine     Pneumonia 2008    bronchitis frequently since    TIA (transient ischemic attack) 2007    Ulcer, Stomach Peptic     frequent N/V    Unspecified cerebral artery occlusion with cerebral infarction 2007     mini stroke cused by severe migraine    Weight loss     frequent N/V, S/P cholecystectomy     Family History   Problem Relation Age of Onset    High Blood Pressure Mother     Heart Disease Mother     Diabetes Maternal Grandfather     Heart Disease Father     Heart Disease Brother      Social History     Socioeconomic History    Marital status:      Spouse name: Not on file    Number of children: Not on file    Years of education: Not on file    Highest education level: Not on file   Occupational History    Not on file   Tobacco Use    Smoking status: Some Days     Packs/day: 0.50     Years: 27.00     Pack years: 13.50     Types: Cigarettes    Smokeless tobacco: Never    Tobacco comments:     she smokes sometimes   Vaping Use    Vaping Use: Never used   Substance and Sexual Activity    Alcohol use:  No Alcohol/week: 0.0 standard drinks     Comment: rare    Drug use: No    Sexual activity: Yes     Partners: Male   Other Topics Concern    Not on file   Social History Narrative    Not on file     Social Determinants of Health     Financial Resource Strain: Low Risk     Difficulty of Paying Living Expenses: Not hard at all   Food Insecurity: No Food Insecurity    Worried About Running Out of Food in the Last Year: Never true    Ran Out of Food in the Last Year: Never true   Transportation Needs: Not on file   Physical Activity: Not on file   Stress: Not on file   Social Connections: Not on file   Intimate Partner Violence: Not on file   Housing Stability: Not on file     Prior to Visit Medications    Medication Sig Taking?  Authorizing Provider   predniSONE (DELTASONE) 10 MG tablet Take 40 mg for 3 days then 30 mg for 3 days then 20 mg for 3 days then 10 mg for 3 days Yes DARÍO Carver CNP   ibuprofen (ADVIL;MOTRIN) 800 MG tablet TAKE 1 TABLET THREE TIMES DAILY Yes DARÍO Carver CNP   DULoxetine (CYMBALTA) 60 MG extended release capsule Take one tablet by mouth twice daily Yes DARÍO Carver CNP   metoprolol succinate (TOPROL XL) 100 MG extended release tablet TAKE (1) TABLET DAILY IN THE AM AND 1/2 TAB IN THE EVENING Yes DARÍO Carver CNP   pantoprazole (PROTONIX) 40 MG tablet Take 1 tablet by mouth daily Yes DARÍO Carver CNP   traZODone (DESYREL) 150 MG tablet TAKE 1 TABLET AT BEDTIME Yes DARÍO Carver CNP   triamterene-hydroCHLOROthiazide (DYAZIDE) 37.5-25 MG per capsule TAKE 2 CAPSULE ONCE DAILY Yes DARÍO Carver CNP   rosuvastatin (CRESTOR) 10 MG tablet Take 1 tablet by mouth nightly Yes Abigail Paniagua MD   polyethylene glycol [facility]) 17 GM/SCOOP POWD powder Take 1-2 capfuls of Miralax daily as needed Yes Abigail Paniagua MD   nystatin (MYCOSTATIN) 152037 UNIT/GM powder Apply 3 times daily. Yes Vipin Ornelas, MD   hydrOXYzine (VISTARIL) 25 MG capsule Take 1 capsule by mouth 3 times daily as needed for Anxiety Yes Vipin Ornelas MD     Allergies   Allergen Reactions    Benadryl [Diphenhydramine] Anxiety     \"Made me jump off the wall\"    Topamax [Topiramate] Hives    Tramadol Shortness Of Breath    Vitamin C [Ascorbic Acid] Hives    Lyrica [Pregabalin] Palpitations    Penicillins Hives and Itching       OBJECTIVE:  Estimated body mass index is 33.66 kg/m² as calculated from the following:    Height as of 3/23/22: 5' 3\" (1.6 m). Weight as of this encounter: 190 lb (86.2 kg). Vitals:    11/01/22 0853   BP: 122/82   Pulse: 89   SpO2: 99%   Weight: 190 lb (86.2 kg)       Physical Exam  Vitals and nursing note reviewed. Constitutional:       General: She is not in acute distress. Appearance: She is well-developed. She is not diaphoretic. HENT:      Head: Normocephalic and atraumatic. Right Ear: External ear normal.      Left Ear: External ear normal.      Nose: Nose normal.   Eyes:      General: Lids are normal. No scleral icterus. Right eye: No discharge. Left eye: No discharge. Pupils: Pupils are equal, round, and reactive to light. Neck:      Thyroid: No thyromegaly. Vascular: No JVD. Cardiovascular:      Rate and Rhythm: Normal rate and regular rhythm. Heart sounds: Normal heart sounds. Pulmonary:      Effort: Pulmonary effort is normal. No respiratory distress. Breath sounds: Normal breath sounds. Abdominal:      Palpations: Abdomen is soft. There is no hepatomegaly or splenomegaly. Tenderness: There is no abdominal tenderness. Musculoskeletal:         General: Tenderness (C-spine) present. Right lower leg: Edema (trace) present. Left lower leg: Edema (trace) present. Skin:     General: Skin is warm and dry. Coloration: Skin is not pale. Findings: No erythema or rash.       Comments: Turgor normal   Neurological:      Mental Status: She is oriented to person, place, and time. Psychiatric:         Mood and Affect: Mood normal.         Behavior: Behavior normal.         Thought Content: Thought content normal.         Judgment: Judgment normal.            ASSESSMENT PLAN      Diagnosis Orders   1. Spondylosis of cervical region without myelopathy or radiculopathy  External Referral To Massage Therapy      2. Essential hypertension, benign        3. Slow transit constipation        4. Gastroesophageal reflux disease with esophagitis without hemorrhage        5. Anxiety        6. Prominent dropped metatarsal head of left foot  Cresencio Varma DPM, Podiatry, [facility]      7. Irritable bowel syndrome with both constipation and diarrhea        8. Encounter for screening mammogram for malignant neoplasm of breast  KEV[facility] DIGITAL SCREEN W OR WO CAD BILATERAL      9. Metatarsalgia of left foot        10. Tension headache        Crepitance and neck and head discomfort I believe all coming from the cervical spine 2 years ago CT cervical spine was unremarkable but there is palpable spasm of the paracervical muscle left greater than right. Also tenderness to palpation over the cervical spine. Try medical massage first.  I told her I thought that was the source of her headache and also thought that the chance of a tumor which she was most concerned about was very unlikely, imaging of the brain 2 years ago unremarkable. She is reminded to use MiraLAX regularly. The pain in the left foot is due to the metatarsal dropping in a plantar direction so that area is hitting first when she walks. Discussed ways to use inserts, hold sponge rubber if that does not work refer to podiatry to have custom-made orthotics. Current blood pressure readings in the office or at home are acceptable and current antihypertensive medications as listed in the medication list require no change.   Reflux symptoms based upon patient's history is controlled and no changes in medications for reflux as listed in the medication profile is necessary. Nerves are doing fairly well. Follow-up 6 months    Patient should call the office immediately with new or ongoing signs or symptoms or worsening, or proceed to the emergency room. No changes in past medical history, past surgical history, social history, or family history were noted during the patient encounter unless specifically listed above. All updates of past medical history, past surgical history, social history, or family history were reviewed personally by me during the office visit. All problems listed in the assessment are stable unless noted otherwise. Medication profile reviewed personally by me during the visit. Medication side effects and possible impairments from medications were discussed as applicable. This document was prepared by a combination of typing and transcription through a voice recognition software. Scribe attestation: Edward Dumont RN, am scribing for and in the presence of Gretel Lane MD. Electronically signed by Elizabeth Pineda RN on 11/1/2022 at 9:11 AM      Provider attestation:     I, Dr. Taurus Estrella, personally performed the services described in this documentation, as scribed by the above signed scribe in my presence, and it is both accurate and complete. I agree with the ROS and Past Histories independently gathered by the clinical support staff and the remaining scribed note accurately describes my personal service to the patient.       11/1/2022    9:55 AM

## 2022-11-01 NOTE — PATIENT INSTRUCTIONS
Call [phone] to schedule your mammogram    Patient should call the office immediately with new or ongoing signs or symptoms or worsening, or proceed to the emergency room. If you are on medications which could impair your senses, you are at risk of weakness, falls, dizziness, or drowsiness. You should be careful during activities which could place you at risk of harm, such as climbing, using stairs, operating machinery, or driving vehicles. If you feel you cannot safely do these activities, you should request others to help you, or avoid the activities altogether. If you are drowsy for any other reason, you should use the same precautions as listed above.      Web Address for Advance Directive:    [email or web address]

## 2022-12-12 DIAGNOSIS — J01.90 ACUTE BACTERIAL SINUSITIS: ICD-10-CM

## 2022-12-12 DIAGNOSIS — B96.89 ACUTE BACTERIAL SINUSITIS: ICD-10-CM

## 2022-12-12 DIAGNOSIS — J18.9 COMMUNITY ACQUIRED PNEUMONIA, UNSPECIFIED LATERALITY: ICD-10-CM

## 2022-12-12 RX ORDER — IBUPROFEN 800 MG/1
TABLET ORAL
Qty: 90 TABLET | Refills: 0 | Status: SHIPPED | OUTPATIENT
Start: 2022-12-12

## 2023-01-12 ENCOUNTER — TELEPHONE (OUTPATIENT)
Dept: FAMILY MEDICINE CLINIC | Age: 58
End: 2023-01-12

## 2023-01-12 DIAGNOSIS — M25.552 LEFT HIP PAIN: Primary | ICD-10-CM

## 2023-01-12 DIAGNOSIS — M54.2 NECK PAIN: ICD-10-CM

## 2023-01-12 DIAGNOSIS — M54.9 UPPER BACK PAIN: ICD-10-CM

## 2023-01-12 NOTE — TELEPHONE ENCOUNTER
----- Message from Anthony Villaseñor sent at 1/12/2023 12:03 PM EST -----  Subject: Referral Request    Reason for referral request? URGENT: Patient with neck and upper back   pain. She has spoken to Dr. Farooq Sood previously. Pain is constant now. Provider patient wants to be referred to(if known):     Provider Phone Number(if known): Additional Information for Provider? Patient requests that the referral be   as close as possible to her home. If she needs to see Dr. Radha Whitehead first, she   will come back in. Please call patient asap to discuss.   ---------------------------------------------------------------------------  --------------  Darrell ECKERT    [phone]; OK to leave message on voicemail  ---------------------------------------------------------------------------  --------------

## 2023-01-17 ENCOUNTER — HOSPITAL ENCOUNTER (OUTPATIENT)
Dept: MAMMOGRAPHY | Age: 58
Discharge: HOME | End: 2023-01-17
Payer: COMMERCIAL

## 2023-01-17 DIAGNOSIS — Z12.31 ENCOUNTER FOR SCREENING MAMMOGRAM FOR MALIGNANT NEOPLASM OF BREAST: ICD-10-CM

## 2023-01-17 PROCEDURE — 77067 SCR MAMMO BI INCL CAD: CPT

## 2023-01-18 ENCOUNTER — TELEPHONE (OUTPATIENT)
Dept: MAMMOGRAPHY | Age: 58
End: 2023-01-18

## 2023-01-25 ENCOUNTER — HOSPITAL ENCOUNTER (OUTPATIENT)
Dept: MAMMOGRAPHY | Age: 58
Discharge: HOME | End: 2023-01-25
Payer: COMMERCIAL

## 2023-01-25 ENCOUNTER — HOSPITAL ENCOUNTER (OUTPATIENT)
Dept: ULTRASOUND IMAGING | Age: 58
Discharge: HOME | End: 2023-01-25
Payer: COMMERCIAL

## 2023-01-25 DIAGNOSIS — R92.8 ABNORMAL MAMMOGRAM: ICD-10-CM

## 2023-01-25 PROCEDURE — G0279 TOMOSYNTHESIS, MAMMO: HCPCS

## 2023-01-25 PROCEDURE — 76642 ULTRASOUND BREAST LIMITED: CPT

## 2023-01-31 ENCOUNTER — OFFICE VISIT (OUTPATIENT)
Dept: ORTHOPEDIC SURGERY | Age: 58
End: 2023-01-31
Payer: COMMERCIAL

## 2023-01-31 VITALS — BODY MASS INDEX: 33.66 KG/M2 | HEIGHT: 63 IN | WEIGHT: 190 LBS

## 2023-01-31 DIAGNOSIS — M47.812 CERVICAL SPONDYLOSIS: Primary | ICD-10-CM

## 2023-01-31 DIAGNOSIS — M54.2 NECK PAIN: ICD-10-CM

## 2023-01-31 PROCEDURE — 99204 OFFICE O/P NEW MOD 45 MIN: CPT | Performed by: STUDENT IN AN ORGANIZED HEALTH CARE EDUCATION/TRAINING PROGRAM

## 2023-01-31 NOTE — PROGRESS NOTES
Date:  2023    Name:  Gifty Montgomery  Address:  [address]  [place] [address]    :  1965      Age:   65 y.o.    SSN:  [id number]      Medical Record Number:  [id number]    Reason for Visit:    Chief Complaint    Neck Pain (NP Cervical spine)      DOS:2023     HPI: Chapo Rios is a 65 y.o. female here today for new patient evaluation regarding neck and upper back pain. She was referred to me by Dr. Wes Forrest. The patient is been having worsening pain for the past year. She reports that other people tell her that her neck is crooked. She has a sensation of crunching in her neck when she moves it. She has noted decreased motion in her neck. She denies any weakness in the hands but does note occasional numbness into her fingers especially on the left hand. The patient denies any bowel or bladder changes. She denies any balance issues. Sometimes she gets a good amount of pain along her rhomboids and her upper back. She is relatively healthy without any major medical issues. ROS: All systems reviewed on patient intake form. Pertinent items are noted in HPI. Past Medical History:   Diagnosis Date    Ankle fracture     Anxiety     Arthritis     Benzodiazepine abuse ([facility] [address]     Depression     Fibromyalgia     Hyperlipidemia     Hypertension     Kidney cyst     x2 rt. ?    Migraine     Pneumonia 2008    bronchitis frequently since    TIA (transient ischemic attack) 2007    Ulcer, Stomach Peptic     frequent N/V    Unspecified cerebral artery occlusion with cerebral infarction     mini stroke cused by severe migraine    Weight loss     frequent N/V, S/P cholecystectomy        Past Surgical History:   Procedure Laterality Date    ANKLE SURGERY       SECTION      CHOLECYSTECTOMY      COLONOSCOPY N/A 2018    COLON W/ANES.  performed by Kait Ruffin MD at [address] [place] [address] (CERVIX STATUS UNKNOWN)      TUMOR REMOVAL  1990    lt femur    UPPER GASTROINTESTINAL ENDOSCOPY N/A 12/20/2018    EGD BIOPSY performed by Jonah Barboza MD at [facility]       Family History   Problem Relation Age of Onset    High Blood Pressure Mother     Heart Disease Mother     Diabetes Maternal Grandfather     Heart Disease Father     Heart Disease Brother        Social History     Socioeconomic History    Marital status:      Spouse name: None    Number of children: None    Years of education: None    Highest education level: None   Tobacco Use    Smoking status: Some Days     Packs/day: 0.50     Years: 27.00     Pack years: 13.50     Types: Cigarettes    Smokeless tobacco: Never    Tobacco comments:     she smokes sometimes   Vaping Use    Vaping Use: Never used   Substance and Sexual Activity    Alcohol use: No     Alcohol/week: 0.0 standard drinks     Comment: rare    Drug use: No    Sexual activity: Yes     Partners: Male     Social Determinants of Health     Financial Resource Strain: Low Risk     Difficulty of Paying Living Expenses: Not hard at all   Food Insecurity: No Food Insecurity    Worried About Running Out of Food in the Last Year: Never true    Ran Out of Food in the Last Year: Never true       Current Outpatient Medications   Medication Sig Dispense Refill    diclofenac (VOLTAREN) 50 MG EC tablet Take 1 tablet by mouth 2 times daily (with meals) 60 tablet 3    ibuprofen (ADVIL;MOTRIN) 800 MG tablet TAKE ONE (1) TABLET THREE TIMES DAILY 90 tablet 0    predniSONE (DELTASONE) 10 MG tablet Take 40 mg for 3 days then 30 mg for 3 days then 20 mg for 3 days then 10 mg for 3 days 30 tablet 0    DULoxetine (CYMBALTA) 60 MG extended release capsule Take one tablet by mouth twice daily 180 capsule 0    metoprolol succinate (TOPROL XL) 100 MG extended release tablet TAKE (1) TABLET DAILY IN THE AM AND 1/2 TAB IN THE EVENING 135 tablet 0    pantoprazole (PROTONIX) 40 MG tablet Take 1 tablet by mouth daily 90 tablet 0    traZODone (DESYREL) 150 MG tablet TAKE 1 TABLET AT BEDTIME 90 tablet 0    triamterene-hydroCHLOROthiazide (DYAZIDE) 37.5-25 MG per capsule TAKE 2 CAPSULE ONCE DAILY 180 capsule 0    rosuvastatin (CRESTOR) 10 MG tablet Take 1 tablet by mouth nightly 90 tablet 3    polyethylene glycol (MIRALAX) 17 GM/SCOOP POWD powder Take 1-2 capfuls of Miralax daily as needed 850 g 11    nystatin (MYCOSTATIN) 199277 UNIT/GM powder Apply 3 times daily. 30 g 0    hydrOXYzine (VISTARIL) 25 MG capsule Take 1 capsule by mouth 3 times daily as needed for Anxiety 90 capsule 5     No current facility-administered medications for this visit. Allergies   Allergen Reactions    Benadryl [Diphenhydramine] Anxiety     \"Made me jump off the wall\"    Topamax [Topiramate] Hives    Tramadol Shortness Of Breath    Vitamin C [Ascorbic Acid] Hives    Lyrica [Pregabalin] Palpitations    Penicillins Hives and Itching       Vital signs:  Ht 5' 3\" (1.6 m)   Wt 190 lb (86.2 kg)   BMI 33.66 kg/m²      Cervical spine examination demonstrates exquisite tenderness along the midline of the cervical spine and upper thoracic spine. Tenderness over the bilateral rhomboids right worse than left. Limited range of motion to the neck especially in left rotation and sidebending. Positive Spurling's. Negative Delicia's and clonus. C5-T1 distributions intact bilaterally without paresthesias. AIN/PIN/radial/ulnar/median motor 5 out of 5 bilaterally. Diagnostics:  Radiology:       4 views of the cervical spine taken in the office today demonstrates multilevel degenerative disease and slight anterolisthesis C4-C5 junction. Assessment: 65 y.o. female with multilevel cervical spondylosis    Plan: Pertinent imaging was reviewed. The etiology, natural history, and treatment options for the disorder were discussed.   The roles of activity medication, antiinflammatories, injections, bracing, physical therapy, and surgical interventions were all described to the patient and questions were answered. The patient has moderate to severe findings of multilevel cervical spondylosis with limited cervical range of motion. She also appears to have slight anterior translation on flexion views at the C4-C5 junction. Given these findings I will order her Voltaren to take as she has already tried oral steroids without much benefit. I will also give her a referral to our spine team.  Follow-up with me as needed. Jose Gonsales is in agreement with this plan. All questions were answered to patient's satisfaction and was encouraged to call with any further questions.          Sergio Peralta DO  Orthopedic Surgery and Sports Medicine  1/31/2023    Orders Placed This Encounter   Procedures    XR CERVICAL SPINE (4-5 VIEWS)     Standing Status:   Future     Number of Occurrences:   1     Standing Expiration Date:   1/27/2024    [address] [place] [address], DOUG Brewer,  Orthopedic Surgery (Spine), [facility]     Referral Priority:   Routine     Referral Type:   Eval and Treat     Referral Reason:   Specialty Services Required     Referred to Provider:   Murtaza Hyatt PA-C     Requested Specialty:   Physician Assistant Surgical     Number of Visits Requested:   1

## 2023-02-07 ENCOUNTER — TELEMEDICINE (OUTPATIENT)
Dept: FAMILY MEDICINE CLINIC | Age: 58
End: 2023-02-07
Payer: COMMERCIAL

## 2023-02-07 DIAGNOSIS — M47.812 CERVICAL SPONDYLOSIS: ICD-10-CM

## 2023-02-07 DIAGNOSIS — G43.719 INTRACTABLE CHRONIC MIGRAINE WITHOUT AURA AND WITHOUT STATUS MIGRAINOSUS: Primary | ICD-10-CM

## 2023-02-07 DIAGNOSIS — G43.401 HEMIPLEGIC MIGRAINE WITH STATUS MIGRAINOSUS, NOT INTRACTABLE: ICD-10-CM

## 2023-02-07 DIAGNOSIS — I63.50 CEREBRAL ARTERY OCCLUSION WITH CEREBRAL INFARCTION (HCC): ICD-10-CM

## 2023-02-07 DIAGNOSIS — I10 PRIMARY HYPERTENSION: ICD-10-CM

## 2023-02-07 DIAGNOSIS — M79.7 FIBROMYALGIA: ICD-10-CM

## 2023-02-07 DIAGNOSIS — G45.9 TIA (TRANSIENT ISCHEMIC ATTACK): ICD-10-CM

## 2023-02-07 PROCEDURE — 99443 PR PHYS/QHP TELEPHONE EVALUATION 21-30 MIN: CPT | Performed by: FAMILY MEDICINE

## 2023-02-07 ASSESSMENT — PATIENT HEALTH QUESTIONNAIRE - PHQ9
5. POOR APPETITE OR OVEREATING: 1
8. MOVING OR SPEAKING SO SLOWLY THAT OTHER PEOPLE COULD HAVE NOTICED. OR THE OPPOSITE, BEING SO FIGETY OR RESTLESS THAT YOU HAVE BEEN MOVING AROUND A LOT MORE THAN USUAL: 0
7. TROUBLE CONCENTRATING ON THINGS, SUCH AS READING THE NEWSPAPER OR WATCHING TELEVISION: 1
SUM OF ALL RESPONSES TO PHQ QUESTIONS 1-9: 14
4. FEELING TIRED OR HAVING LITTLE ENERGY: 3
3. TROUBLE FALLING OR STAYING ASLEEP: 3
SUM OF ALL RESPONSES TO PHQ QUESTIONS 1-9: 14
10. IF YOU CHECKED OFF ANY PROBLEMS, HOW DIFFICULT HAVE THESE PROBLEMS MADE IT FOR YOU TO DO YOUR WORK, TAKE CARE OF THINGS AT HOME, OR GET ALONG WITH OTHER PEOPLE: 1
1. LITTLE INTEREST OR PLEASURE IN DOING THINGS: 1
6. FEELING BAD ABOUT YOURSELF - OR THAT YOU ARE A FAILURE OR HAVE LET YOURSELF OR YOUR FAMILY DOWN: 2
SUM OF ALL RESPONSES TO PHQ QUESTIONS 1-9: 14
SUM OF ALL RESPONSES TO PHQ QUESTIONS 1-9: 14
SUM OF ALL RESPONSES TO PHQ9 QUESTIONS 1 & 2: 4
9. THOUGHTS THAT YOU WOULD BE BETTER OFF DEAD, OR OF HURTING YOURSELF: 0
2. FEELING DOWN, DEPRESSED OR HOPELESS: 3

## 2023-02-07 NOTE — PROGRESS NOTES
Eddi Gamez is a 65 y.o. female evaluated via telephone on 2/7/2023. Discuss getting back on disability. Internal Administration   First Dose      Second Dose           Last COVID Lab No results found for: SARS-COV-2, SARS-COV-2 RNA, SARS-COV-2, SARS-COV-2, SARS-COV-2 BY PCR, SARS-COV-2, SARS-COV-2, SARS-COV-2         Wt Readings from Last 3 Encounters:   01/31/23 190 lb (86.2 kg)   01/31/23 190 lb (86.2 kg)   11/01/22 190 lb (86.2 kg)     BP Readings from Last 3 Encounters:   11/01/22 122/82   10/10/22 (!) 157/80   09/07/22 138/88     Lab Results   Component Value Date    LABA1C 5.4 03/18/2020    LABA1C 5.9 04/08/2019        ASSESSMENT PLAN      Diagnosis Orders   1. Intractable chronic migraine without aura and without status migrainosus        2. TIA (transient ischemic attack)        3. Primary hypertension        4. Cerebral artery occlusion with cerebral infarction ([facility] [address]        5. Hemiplegic migraine with status migrainosus, not intractable        6. Fibromyalgia        7. Cervical spondylosis        Patient is asked that we prepare a letter for her  in terms of getting disability reinstated. Please refer to that letter for further details. Patient should call the office immediately with new or ongoing signs or symptoms or worsening, or proceed to the emergency room. No changes in past medical history, past surgical history, social history, or family history were noted during the patient encounter unless specifically listed above. All updates of past medical history, past surgical history, social history, or family history were reviewed personally by me during the office visit. All problems listed in the assessment are stable unless noted otherwise. Medication profile reviewed personally by me during the visit. Medication side effects and possible impairments from medications were discussed as applicable.     Unless stated otherwise, we will continue current medications as listed in the medication review report contained in the patient's medical record. This document was prepared by a combination of typing and transcription through a voice recognition software. [] Patient has completed an advance directive  [] Patient has NOT completed an advanced directive  [] Patient has a documented healthcare surrogate  [] Patient does NOT have a documented healthcare surrogate  [] Discussed the importance of establishing and updating an advanced directive. Patient has questions at this time and those were answered. [] Discussed the importance of establishing and updating an advanced directive. Patient does NOT have questions at this time. Discussed with: [] Patient            [] Family             [] Other caregiver         Consent:  She and/or health care decision maker is aware that that she may receive a bill for this telephone service, depending on her insurance coverage, and has provided verbal consent to proceed: Yes      Documentation:  I communicated with the patient and/or health care decision maker about see above. Details of this discussion including any medical advice provided: See above      I affirm this is a Patient Initiated Episode with an Established Patient who has not had a related appointment within my department in the past 7 days or scheduled within the next 24 hours.     Total Time: minutes: 21-30 minutes    Note: not billable if this call serves to triage the patient into an appointment for the relevant concern      Bernadette Pack

## 2023-03-01 ENCOUNTER — TELEMEDICINE (OUTPATIENT)
Dept: FAMILY MEDICINE CLINIC | Age: 58
End: 2023-03-01
Payer: COMMERCIAL

## 2023-03-01 DIAGNOSIS — F41.9 ANXIETY: ICD-10-CM

## 2023-03-01 DIAGNOSIS — K59.01 SLOW TRANSIT CONSTIPATION: Primary | ICD-10-CM

## 2023-03-01 PROCEDURE — 99441 PR PHYS/QHP TELEPHONE EVALUATION 5-10 MIN: CPT | Performed by: FAMILY MEDICINE

## 2023-03-01 RX ORDER — HYDROXYZINE PAMOATE 25 MG/1
25 CAPSULE ORAL 3 TIMES DAILY PRN
Qty: 90 CAPSULE | Refills: 5 | Status: SHIPPED | OUTPATIENT
Start: 2023-03-01

## 2023-03-01 SDOH — ECONOMIC STABILITY: INCOME INSECURITY: HOW HARD IS IT FOR YOU TO PAY FOR THE VERY BASICS LIKE FOOD, HOUSING, MEDICAL CARE, AND HEATING?: NOT HARD AT ALL

## 2023-03-01 SDOH — ECONOMIC STABILITY: HOUSING INSECURITY
IN THE LAST 12 MONTHS, WAS THERE A TIME WHEN YOU DID NOT HAVE A STEADY PLACE TO SLEEP OR SLEPT IN A SHELTER (INCLUDING NOW)?: NO

## 2023-03-01 SDOH — ECONOMIC STABILITY: FOOD INSECURITY: WITHIN THE PAST 12 MONTHS, THE FOOD YOU BOUGHT JUST DIDN'T LAST AND YOU DIDN'T HAVE MONEY TO GET MORE.: NEVER TRUE

## 2023-03-01 SDOH — ECONOMIC STABILITY: FOOD INSECURITY: WITHIN THE PAST 12 MONTHS, YOU WORRIED THAT YOUR FOOD WOULD RUN OUT BEFORE YOU GOT MONEY TO BUY MORE.: NEVER TRUE

## 2023-03-01 SDOH — ECONOMIC STABILITY: FOOD INSECURITY: WITHIN THE PAST 12 MONTHS, THE FOOD YOU BOUGHT JUST DIDN'T LAST AND YOU DIDN'T HAVE MONEY TO GET MORE.: OFTEN TRUE

## 2023-03-01 SDOH — ECONOMIC STABILITY: FOOD INSECURITY: WITHIN THE PAST 12 MONTHS, YOU WORRIED THAT YOUR FOOD WOULD RUN OUT BEFORE YOU GOT MONEY TO BUY MORE.: OFTEN TRUE

## 2023-03-01 SDOH — ECONOMIC STABILITY: INCOME INSECURITY: HOW HARD IS IT FOR YOU TO PAY FOR THE VERY BASICS LIKE FOOD, HOUSING, MEDICAL CARE, AND HEATING?: VERY HARD

## 2023-03-01 ASSESSMENT — PATIENT HEALTH QUESTIONNAIRE - PHQ9
5. POOR APPETITE OR OVEREATING: 2
SUM OF ALL RESPONSES TO PHQ QUESTIONS 1-9: 23
8. MOVING OR SPEAKING SO SLOWLY THAT OTHER PEOPLE COULD HAVE NOTICED. OR THE OPPOSITE, BEING SO FIGETY OR RESTLESS THAT YOU HAVE BEEN MOVING AROUND A LOT MORE THAN USUAL: 3
6. FEELING BAD ABOUT YOURSELF - OR THAT YOU ARE A FAILURE OR HAVE LET YOURSELF OR YOUR FAMILY DOWN: 3
SUM OF ALL RESPONSES TO PHQ QUESTIONS 1-9: 23
SUM OF ALL RESPONSES TO PHQ QUESTIONS 1-9: 23
10. IF YOU CHECKED OFF ANY PROBLEMS, HOW DIFFICULT HAVE THESE PROBLEMS MADE IT FOR YOU TO DO YOUR WORK, TAKE CARE OF THINGS AT HOME, OR GET ALONG WITH OTHER PEOPLE: 3
2. FEELING DOWN, DEPRESSED OR HOPELESS: 3
1. LITTLE INTEREST OR PLEASURE IN DOING THINGS: 3
4. FEELING TIRED OR HAVING LITTLE ENERGY: 3
SUM OF ALL RESPONSES TO PHQ QUESTIONS 1-9: 23
3. TROUBLE FALLING OR STAYING ASLEEP: 3
7. TROUBLE CONCENTRATING ON THINGS, SUCH AS READING THE NEWSPAPER OR WATCHING TELEVISION: 3
SUM OF ALL RESPONSES TO PHQ9 QUESTIONS 1 & 2: 6
9. THOUGHTS THAT YOU WOULD BE BETTER OFF DEAD, OR OF HURTING YOURSELF: 0

## 2023-03-01 ASSESSMENT — COLUMBIA-SUICIDE SEVERITY RATING SCALE - C-SSRS
2. HAVE YOU ACTUALLY HAD ANY THOUGHTS OF KILLING YOURSELF?: NO
6. HAVE YOU EVER DONE ANYTHING, STARTED TO DO ANYTHING, OR PREPARED TO DO ANYTHING TO END YOUR LIFE?: NO
1. WITHIN THE PAST MONTH, HAVE YOU WISHED YOU WERE DEAD OR WISHED YOU COULD GO TO SLEEP AND NOT WAKE UP?: NO

## 2023-03-01 NOTE — PROGRESS NOTES
Og Croft is a 65 y.o. female evaluated via telephone on 3/1/2023. Follow up on chronic conditions. Bowels are about the same. Uses Mirilax PRN which helps. Anxiety has been \"out the roof\" lately. States she has a lot going on. She is currently going through a divorce and is not longer getting her disability check so she is struggling to make ends meet. States she would like information on financial resources. She did do the massage therapy for her neck and back but it made it worse. She is now following up with ortho. Internal Administration   First Dose      Second Dose           Last COVID Lab No results found for: SARS-COV-2, SARS-COV-2 RNA, SARS-COV-2, SARS-COV-2, SARS-COV-2 BY PCR, SARS-COV-2, SARS-COV-2, SARS-COV-2         Wt Readings from Last 3 Encounters:   01/31/23 190 lb (86.2 kg)   01/31/23 190 lb (86.2 kg)   11/01/22 190 lb (86.2 kg)     BP Readings from Last 3 Encounters:   11/01/22 122/82   10/10/22 (!) 157/80   09/07/22 138/88     Lab Results   Component Value Date    LABA1C 5.4 03/18/2020    LABA1C 5.9 04/08/2019        ASSESSMENT PLAN      Diagnosis Orders   1. Slow transit constipation  linaclotide (LINZESS) 145 MCG capsule      2. Anxiety  hydrOXYzine pamoate (VISTARIL) 25 MG capsule      Like to restart Linzess if it is available to her, prescription sent. She is going through her divorce, loss of income, asked for help with financial resources. That will be mailed to her. Renewed her hydroxyzine that she uses for anxiety. Follow-up in the office 4 months. Patient sounds good on the phone not making any threats to harm herself or others. Patient should call the office immediately with new or ongoing signs or symptoms or worsening, or proceed to the emergency room. No changes in past medical history, past surgical history, social history, or family history were noted during the patient encounter unless specifically listed above.   All updates of past medical history, past surgical history, social history, or family history were reviewed personally by me during the office visit. All problems listed in the assessment are stable unless noted otherwise. Medication profile reviewed personally by me during the visit. Medication side effects and possible impairments from medications were discussed as applicable. Unless stated otherwise, we will continue current medications as listed in the medication review report contained in the patient's medical record. This document was prepared by a combination of typing and transcription through a voice recognition software. [] Patient has completed an advance directive  [] Patient has NOT completed an advanced directive  [] Patient has a documented healthcare surrogate  [] Patient does NOT have a documented healthcare surrogate  [] Discussed the importance of establishing and updating an advanced directive. Patient has questions at this time and those were answered. [] Discussed the importance of establishing and updating an advanced directive. Patient does NOT have questions at this time. Discussed with: [] Patient            [] Family             [] Other caregiver         Consent:  She and/or health care decision maker is aware that that she may receive a bill for this telephone service, depending on her insurance coverage, and has provided verbal consent to proceed: Yes      Documentation:  I communicated with the patient and/or health care decision maker about see above. Details of this discussion including any medical advice provided: See above      I affirm this is a Patient Initiated Episode with an Established Patient who has not had a related appointment within my department in the past 7 days or scheduled within the next 24 hours.     Total Time: minutes: 5-10 minutes    Note: not billable if this call serves to triage the patient into an appointment for the relevant concern      Bernadette Pack

## 2023-03-06 ENCOUNTER — OFFICE VISIT (OUTPATIENT)
Dept: ORTHOPEDIC SURGERY | Age: 58
End: 2023-03-06
Payer: COMMERCIAL

## 2023-03-06 VITALS — WEIGHT: 190 LBS | HEIGHT: 63 IN | BODY MASS INDEX: 33.66 KG/M2

## 2023-03-06 DIAGNOSIS — M47.812 CERVICAL SPONDYLOSIS: ICD-10-CM

## 2023-03-06 DIAGNOSIS — M43.12 SPONDYLOLISTHESIS, CERVICAL REGION: Primary | ICD-10-CM

## 2023-03-06 PROCEDURE — 99214 OFFICE O/P EST MOD 30 MIN: CPT | Performed by: PHYSICIAN ASSISTANT

## 2023-03-06 NOTE — PROGRESS NOTES
History of present illness:   Ms. Kayla Sheldon is a pleasant 65 y.o. old female with a PMH of stomach ulcer, fibromyalgia, benzodiazepine abuse, and depression kindly referred by Dr. Shi Hughes for consultation regarding Ms. Kamran Coronado's neck, and bilateral arm pain. She states the pain began initially several years ago, but has become more frequent over the past 12months. She rates her neck pain 8-10/10 and shoulder and arm pain 9/10. She describes the pain as aching and intermittent. The arm pain radiates to her shoulders and interscapular area bilaterally, occasionally down to her mid thoracic spine. She notes a history of migraine headaches, that initially improved after a piercing treatment. Her pain is only present with certain activities, such as vacuuming and doing dishes. She denies upper extremity radicular pain, numbness, or weakness. She denies lower extremity symptoms, gait abnormality and bowel or bladder dysfunction. The pain occasionally interferes with her sleep. She has tried NSAIDs. She takes ibuprofen. Pain Assessment  Location of Pain: Neck  Severity of Pain: 8  Quality of Pain: Other (Comment)  Duration of Pain: Other (Comment)  Frequency of Pain: Other (Comment)  Aggravating Factors: Other (Comment)  Relieving Factors: Other (Comment)]    Past medical history:   Her past medical history has been reviewed and is significant for stomach ulcer, fibromyalgia, benzodiazepine abuse, and depression. Her past surgical history has been reviewed and is non-contributory to her present illness. Her  medications and allergies were reviewed. Her social history has been reviewed and she smokes 1/2 ppd, 13.5 pack year history. Her family history has been reviewed is significant for HTN, heart disease, and diabetes.      Review of symptoms:   Her review of symptoms was reviewed and is significant for neck pain and negative for recent weight loss, fatigue, chills, night sweats, blood in stool or urine, recent infection, chest pain, visual disturbance, or shortness of breath. All other ROS were negative. Physical examination:   Ms. David Coronado's most recent vitals:  Vitals  Height: 5' 2.99\" (160 cm)  Weight: 190 lb (86.2 kg)  Body mass index is 33.67 kg/m². General Exam:  She is well-developed and well-nourished, is in obvious pain and alert and oriented to person, place, and time. She demonstrates appropriate mood and affect. She walks with a normal gait. HEENT:   Her cervical flexion, extension, and axial rotation are moderately reduced with pain. Her skin is warm and dry. She has no tenderness over her cervical spine and no obvious muscle spasm. The skin over her cervical spine is normal without surgical scar. Upper extremities:  She has 5/5 strength of her interosseous muscles, wrist dorsiflexors and volarflexors, biceps, triceps, deltoids, and internal and external rotators of her shoulders, bilaterally. Her biceps, triceps, bracheoradialis, quadriceps and achilles reflexes are 2+, bilaterally. Sensation is intact to light touchfrom C6 to C8. She has no clonus and negative Green's bilaterally. Lower extremities:  Normal DTR knees, no clonus. Imaging:   X-rays: X-rays of the cervical spine that were obtained on 1/31/2023 to include AP, lateral, flexion extension views were independently reviewed with the patient which shows anterolisthesis of C4 on C5 with translation anteriorly noted on the flexion view and neutral on the extension view. There is to space narrowing at C4-5 and C5-6. CT scan of her cervical spine that was obtained on 11/28/2020 was independently reviewed with the patient which shows no acute fractures or malalignment. There is degenerative changes noted especially at C4-5 and C5-6. I reviewed CT images of her cervical spine from 3/21/18. They show mild degenerative disc changes C5-6.  No obvious acute fracture or spinal stenosis noted.    Assessment:   Cervical spondylosis with radiculopathy. Anterior listhesis C4 on C5  DDD C4-5 C5-6    Plan:   We discussed treatment options including observation, epidural injections, physical therapy and additional imaging. I recommend she obtain an MRI of the cervical spine due to the chronicity of her symptoms along with the increasing pain into both upper extremities.     Follow-up: After the MRI to review the results and discuss treatment options    Total evaluation time 34 minutes    Adolfo Thakkar PA-C  Board certified by the [address] [place], [place]

## 2023-03-14 ENCOUNTER — NURSE ONLY (OUTPATIENT)
Dept: FAMILY MEDICINE CLINIC | Age: 58
End: 2023-03-14
Payer: COMMERCIAL

## 2023-03-14 DIAGNOSIS — N95.1 MENOPAUSAL SYMPTOMS: Primary | ICD-10-CM

## 2023-03-14 DIAGNOSIS — Z13.29 SCREENING FOR THYROID DISORDER: ICD-10-CM

## 2023-03-14 DIAGNOSIS — R73.9 HYPERGLYCEMIA: ICD-10-CM

## 2023-03-14 DIAGNOSIS — I10 PRIMARY HYPERTENSION: ICD-10-CM

## 2023-03-14 DIAGNOSIS — Z13.220 SCREENING FOR LIPID DISORDERS: ICD-10-CM

## 2023-03-14 LAB
ALBUMIN SERPL-MCNC: 4.2 G/DL (ref 3.4–5)
ALBUMIN/GLOB SERPL: 1.5 {RATIO} (ref 1.1–2.2)
ALP SERPL-CCNC: 61 U/L (ref 40–129)
ALT SERPL-CCNC: 9 U/L (ref 10–40)
ANION GAP SERPL CALCULATED.3IONS-SCNC: 13 MMOL/L (ref 3–16)
AST SERPL-CCNC: 12 U/L (ref 15–37)
BASOPHILS # BLD: 0 K/UL (ref 0–0.2)
BASOPHILS NFR BLD: 0 %
BILIRUB SERPL-MCNC: 0.3 MG/DL (ref 0–1)
BUN SERPL-MCNC: 17 MG/DL (ref 7–20)
CALCIUM SERPL-MCNC: 9.8 MG/DL (ref 8.3–10.6)
CHLORIDE SERPL-SCNC: 106 MMOL/L (ref 99–110)
CHOLEST SERPL-MCNC: 193 MG/DL (ref 0–199)
CO2 SERPL-SCNC: 22 MMOL/L (ref 21–32)
CREAT SERPL-MCNC: 0.8 MG/DL (ref 0.6–1.1)
DEPRECATED RDW RBC AUTO: 14 % (ref 12.4–15.4)
EOSINOPHIL # BLD: 0.5 K/UL (ref 0–0.6)
EOSINOPHIL NFR BLD: 6 %
FSH SERPL-ACNC: 72.2 MIU/ML
GFR SERPLBLD CREATININE-BSD FMLA CKD-EPI: >60 ML/MIN/{1.73_M2}
GLUCOSE SERPL-MCNC: 102 MG/DL (ref 70–99)
HCT VFR BLD AUTO: 38.7 % (ref 36–48)
HDLC SERPL-MCNC: 72 MG/DL (ref 40–60)
HGB BLD-MCNC: 13.4 G/DL (ref 12–16)
LDLC SERPL CALC-MCNC: 106 MG/DL
LYMPHOCYTES # BLD: 2.2 K/UL (ref 1–5.1)
LYMPHOCYTES NFR BLD: 24 %
MAGNESIUM SERPL-MCNC: 1.7 MG/DL (ref 1.8–2.4)
MCH RBC QN AUTO: 29.6 PG (ref 26–34)
MCHC RBC AUTO-ENTMCNC: 34.5 G/DL (ref 31–36)
MCV RBC AUTO: 85.8 FL (ref 80–100)
MONOCYTES # BLD: 0.6 K/UL (ref 0–1.3)
MONOCYTES NFR BLD: 7 %
NEUTROPHILS # BLD: 5.4 K/UL (ref 1.7–7.7)
NEUTROPHILS NFR BLD: 62 %
PLATELET # BLD AUTO: 296 K/UL (ref 135–450)
PLATELET BLD QL SMEAR: ADEQUATE
PMV BLD AUTO: 8.2 FL (ref 5–10.5)
POTASSIUM SERPL-SCNC: 3.9 MMOL/L (ref 3.5–5.1)
PROT SERPL-MCNC: 7 G/DL (ref 6.4–8.2)
RBC # BLD AUTO: 4.51 M/UL (ref 4–5.2)
RBC MORPH BLD: NORMAL
SLIDE REVIEW: NORMAL
SODIUM SERPL-SCNC: 141 MMOL/L (ref 136–145)
TRIGL SERPL-MCNC: 75 MG/DL (ref 0–150)
TSH SERPL DL<=0.005 MIU/L-ACNC: 2.2 UIU/ML (ref 0.27–4.2)
VARIANT LYMPHS NFR BLD MANUAL: 1 % (ref 0–6)
VLDLC SERPL CALC-MCNC: 15 MG/DL
WBC # BLD AUTO: 8.7 K/UL (ref 4–11)

## 2023-03-14 PROCEDURE — 36415 COLL VENOUS BLD VENIPUNCTURE: CPT | Performed by: FAMILY MEDICINE

## 2023-03-15 DIAGNOSIS — E55.9 VITAMIN D DEFICIENCY: Primary | ICD-10-CM

## 2023-03-15 DIAGNOSIS — E83.42 HYPOMAGNESEMIA: ICD-10-CM

## 2023-03-15 LAB
25(OH)D3 SERPL-MCNC: 29.7 NG/ML
EST. AVERAGE GLUCOSE BLD GHB EST-MCNC: 108.3 MG/DL
FOLATE SERPL-MCNC: 9.29 NG/ML (ref 4.78–24.2)
HBA1C MFR BLD: 5.4 %
VIT B12 SERPL-MCNC: 322 PG/ML (ref 211–911)

## 2023-03-22 ENCOUNTER — TELEMEDICINE (OUTPATIENT)
Dept: FAMILY MEDICINE CLINIC | Age: 58
End: 2023-03-22
Payer: COMMERCIAL

## 2023-03-22 ENCOUNTER — HOSPITAL ENCOUNTER (OUTPATIENT)
Dept: CT IMAGING | Age: 58
Discharge: HOME | End: 2023-03-22
Payer: COMMERCIAL

## 2023-03-22 ENCOUNTER — TELEPHONE (OUTPATIENT)
Dept: FAMILY MEDICINE CLINIC | Age: 58
End: 2023-03-22

## 2023-03-22 ENCOUNTER — NURSE ONLY (OUTPATIENT)
Dept: FAMILY MEDICINE CLINIC | Age: 58
End: 2023-03-22
Payer: COMMERCIAL

## 2023-03-22 DIAGNOSIS — R10.9 RIGHT FLANK PAIN: ICD-10-CM

## 2023-03-22 DIAGNOSIS — N39.0 ACUTE UTI: Primary | ICD-10-CM

## 2023-03-22 DIAGNOSIS — R30.0 DYSURIA: ICD-10-CM

## 2023-03-22 DIAGNOSIS — R31.9 HEMATURIA, UNSPECIFIED TYPE: Primary | ICD-10-CM

## 2023-03-22 LAB
BILIRUBIN, POC: ABNORMAL
BLOOD URINE, POC: ABNORMAL
CLARITY, POC: ABNORMAL
COLOR, POC: YELLOW
GLUCOSE URINE, POC: ABNORMAL
KETONES, POC: ABNORMAL
LEUKOCYTE EST, POC: ABNORMAL
NITRITE, POC: ABNORMAL
PH, POC: 5.5
PROTEIN, POC: ABNORMAL
SPECIFIC GRAVITY, POC: 1.03
UROBILINOGEN, POC: 0.2

## 2023-03-22 PROCEDURE — 99213 OFFICE O/P EST LOW 20 MIN: CPT | Performed by: NURSE PRACTITIONER

## 2023-03-22 PROCEDURE — 74176 CT ABD & PELVIS W/O CONTRAST: CPT

## 2023-03-22 PROCEDURE — 81002 URINALYSIS NONAUTO W/O SCOPE: CPT | Performed by: NURSE PRACTITIONER

## 2023-03-22 RX ORDER — SULFAMETHOXAZOLE AND TRIMETHOPRIM 800; 160 MG/1; MG/1
1 TABLET ORAL 2 TIMES DAILY
Qty: 14 TABLET | Refills: 0 | Status: SHIPPED | OUTPATIENT
Start: 2023-03-22 | End: 2023-03-29

## 2023-03-22 RX ORDER — PHENAZOPYRIDINE HYDROCHLORIDE 100 MG/1
100 TABLET, FILM COATED ORAL 3 TIMES DAILY PRN
Qty: 10 TABLET | Refills: 0 | Status: SHIPPED | OUTPATIENT
Start: 2023-03-22

## 2023-03-22 ASSESSMENT — ENCOUNTER SYMPTOMS
RESPIRATORY NEGATIVE: 1
NAUSEA: 1
RECTAL PAIN: 0
VOMITING: 0
DIARRHEA: 1
ABDOMINAL DISTENTION: 0
CONSTIPATION: 0
ABDOMINAL PAIN: 1
BLOOD IN STOOL: 0
ANAL BLEEDING: 0
BACK PAIN: 1

## 2023-03-22 NOTE — PROGRESS NOTES
distress        [] Abnormal -      Musculoskeletal:   [x] Normal gait with no signs of ataxia         [x] Normal range of motion of neck        [] Abnormal -     Neurological:        [x] No Facial Asymmetry (Cranial nerve 7 motor function) (limited exam due to video visit)          [x] No gaze palsy        [] Abnormal -          Skin:        [x] No significant exanthematous lesions or discoloration noted on facial skin         [] Abnormal -            Psychiatric:       [x] Normal Affect [] Abnormal -        [x] No Hallucinations         Sowmya Ramirez, was evaluated through a synchronous (real-time) audio-video encounter. The patient (or guardian if applicable) is aware that this is a billable service, which includes applicable co-pays. This Virtual Visit was conducted with patient's (and/or legal guardian's) consent. The visit was conducted pursuant to the emergency declaration under the [address] [place] [address], [address] [place] [address] authority and the [employer] General Act. Patient identification was verified, and a caregiver was present when appropriate.    The patient was located at Home: [address] [place] [address]  Provider was located at [place] [address] (Appt Dept): [address],  [address] [facility] [place], LISA, APRN - CNP

## 2023-03-22 NOTE — TELEPHONE ENCOUNTER
Pt called stating that she's been having a lot of burning with urination, urgency, frequency, no back pain. Okay for pt to come to office for sample and do a VV? No avail appts today.  Call back pt [phone]  Routing to Lizet due to PCP out of office

## 2023-03-24 LAB — BACTERIA UR CULT: NORMAL

## 2023-03-28 DIAGNOSIS — N39.0 ACUTE UTI: Primary | ICD-10-CM

## 2023-03-28 DIAGNOSIS — R31.9 HEMATURIA, UNSPECIFIED TYPE: ICD-10-CM

## 2023-03-28 DIAGNOSIS — R30.0 DYSURIA: ICD-10-CM

## 2023-04-03 ENCOUNTER — TELEPHONE (OUTPATIENT)
Dept: FAMILY MEDICINE CLINIC | Age: 58
End: 2023-04-03

## 2023-04-03 ENCOUNTER — OFFICE VISIT (OUTPATIENT)
Dept: FAMILY MEDICINE CLINIC | Age: 58
End: 2023-04-03
Payer: COMMERCIAL

## 2023-04-03 VITALS
BODY MASS INDEX: 34.73 KG/M2 | OXYGEN SATURATION: 98 % | WEIGHT: 196 LBS | SYSTOLIC BLOOD PRESSURE: 162 MMHG | HEART RATE: 74 BPM | DIASTOLIC BLOOD PRESSURE: 90 MMHG | HEIGHT: 63 IN

## 2023-04-03 DIAGNOSIS — M79.7 FIBROMYALGIA: ICD-10-CM

## 2023-04-03 DIAGNOSIS — Z87.440 HISTORY OF RECURRENT UTIS: ICD-10-CM

## 2023-04-03 DIAGNOSIS — L72.9 SKIN CYST: Primary | ICD-10-CM

## 2023-04-03 DIAGNOSIS — I10 ESSENTIAL HYPERTENSION, BENIGN: ICD-10-CM

## 2023-04-03 DIAGNOSIS — R03.0 ELEVATED BLOOD PRESSURE READING: ICD-10-CM

## 2023-04-03 LAB
BILIRUBIN, POC: NORMAL
BLOOD URINE, POC: NORMAL
CLARITY, POC: NORMAL
COLOR, POC: NORMAL
GLUCOSE URINE, POC: NORMAL
KETONES, POC: NORMAL
LEUKOCYTE EST, POC: NORMAL
NITRITE, POC: NORMAL
PH, POC: 6
PROTEIN, POC: NORMAL
SPECIFIC GRAVITY, POC: <=1.005
UROBILINOGEN, POC: 0.2

## 2023-04-03 PROCEDURE — 3074F SYST BP LT 130 MM HG: CPT | Performed by: NURSE PRACTITIONER

## 2023-04-03 PROCEDURE — 81002 URINALYSIS NONAUTO W/O SCOPE: CPT | Performed by: NURSE PRACTITIONER

## 2023-04-03 PROCEDURE — 3078F DIAST BP <80 MM HG: CPT | Performed by: NURSE PRACTITIONER

## 2023-04-03 PROCEDURE — 99214 OFFICE O/P EST MOD 30 MIN: CPT | Performed by: NURSE PRACTITIONER

## 2023-04-03 NOTE — PROGRESS NOTES
capsule by mouth every morning (before breakfast) 30 capsule 5    diclofenac (VOLTAREN) 50 MG EC tablet Take 1 tablet by mouth 2 times daily (with meals) 60 tablet 3    ibuprofen (ADVIL;MOTRIN) 800 MG tablet TAKE ONE (1) TABLET THREE TIMES DAILY 90 tablet 0    DULoxetine (CYMBALTA) 60 MG extended release capsule Take one tablet by mouth twice daily 180 capsule 0    metoprolol succinate (TOPROL XL) 100 MG extended release tablet TAKE (1) TABLET DAILY IN THE AM AND 1/2 TAB IN THE EVENING 135 tablet 0    pantoprazole (PROTONIX) 40 MG tablet Take 1 tablet by mouth daily 90 tablet 0    traZODone (DESYREL) 150 MG tablet TAKE 1 TABLET AT BEDTIME 90 tablet 0    triamterene-hydroCHLOROthiazide (DYAZIDE) 37.5-25 MG per capsule TAKE 2 CAPSULE ONCE DAILY 180 capsule 0    polyethylene glycol (MIRALAX) 17 GM/SCOOP POWD powder Take 1-2 capfuls of Miralax daily as needed 850 g 11    nystatin (MYCOSTATIN) 558480 UNIT/GM powder Apply 3 times daily. 30 g 0     No current facility-administered medications for this visit. Allergies   Allergen Reactions    Benadryl [Diphenhydramine] Anxiety     \"Made me jump off the wall\"    Topamax [Topiramate] Hives    Tramadol Shortness Of Breath    Vitamin C [Ascorbic Acid] Hives    Lyrica [Pregabalin] Palpitations    Penicillins Hives and Itching       :      Review of Systems   Constitutional: Negative. Respiratory: Negative. Cardiovascular: Negative. Genitourinary: Negative. Musculoskeletal:  Positive for myalgias (Chronic - generalized). Negative for arthralgias, back pain, gait problem, joint swelling, neck pain and neck stiffness. Skin: Negative. Neurological: Negative. Psychiatric/Behavioral: Negative.          Objective:     Vitals:    04/03/23 1152 04/03/23 1154   BP: (!) 163/96 (!) 162/90   Site: Right Upper Arm Left Upper Arm   Position: Sitting Sitting   Cuff Size: Large Adult Medium Adult   Pulse: 74    SpO2: 98%    Weight: 196 lb (88.9 kg)    Height: 5' 3\" (1.6

## 2023-04-05 PROBLEM — L72.9 SKIN CYST: Status: ACTIVE | Noted: 2023-04-05

## 2023-04-05 ASSESSMENT — ENCOUNTER SYMPTOMS
BACK PAIN: 0
RESPIRATORY NEGATIVE: 1

## 2023-04-12 ENCOUNTER — INITIAL CONSULT (OUTPATIENT)
Dept: SURGERY | Age: 58
End: 2023-04-12
Payer: COMMERCIAL

## 2023-04-12 VITALS
SYSTOLIC BLOOD PRESSURE: 120 MMHG | DIASTOLIC BLOOD PRESSURE: 90 MMHG | HEIGHT: 63 IN | BODY MASS INDEX: 35.08 KG/M2 | WEIGHT: 198 LBS

## 2023-04-12 DIAGNOSIS — L72.3 SEBACEOUS CYST: Primary | ICD-10-CM

## 2023-04-12 PROCEDURE — 3074F SYST BP LT 130 MM HG: CPT | Performed by: SURGERY

## 2023-04-12 PROCEDURE — 3078F DIAST BP <80 MM HG: CPT | Performed by: SURGERY

## 2023-04-12 PROCEDURE — 99213 OFFICE O/P EST LOW 20 MIN: CPT | Performed by: SURGERY

## 2023-04-12 RX ORDER — SODIUM CHLORIDE 9 MG/ML
INJECTION, SOLUTION INTRAVENOUS PRN
Status: CANCELLED | OUTPATIENT
Start: 2023-04-12

## 2023-04-12 RX ORDER — SODIUM CHLORIDE 0.9 % (FLUSH) 0.9 %
5-40 SYRINGE (ML) INJECTION PRN
Status: CANCELLED | OUTPATIENT
Start: 2023-04-12

## 2023-04-12 RX ORDER — CLINDAMYCIN HYDROCHLORIDE 300 MG/1
300 CAPSULE ORAL 3 TIMES DAILY
Qty: 21 CAPSULE | Refills: 0 | Status: ON HOLD | OUTPATIENT
Start: 2023-04-12 | End: 2023-04-18 | Stop reason: SDUPTHER

## 2023-04-12 RX ORDER — SODIUM CHLORIDE 0.9 % (FLUSH) 0.9 %
5-40 SYRINGE (ML) INJECTION EVERY 12 HOURS SCHEDULED
Status: CANCELLED | OUTPATIENT
Start: 2023-04-12

## 2023-04-17 ENCOUNTER — ANESTHESIA EVENT (OUTPATIENT)
Dept: OPERATING ROOM | Age: 58
End: 2023-04-17
Payer: COMMERCIAL

## 2023-04-18 ENCOUNTER — ANESTHESIA (OUTPATIENT)
Dept: OPERATING ROOM | Age: 58
End: 2023-04-18
Payer: COMMERCIAL

## 2023-04-18 ENCOUNTER — HOSPITAL ENCOUNTER (OUTPATIENT)
Age: 58
Setting detail: OUTPATIENT SURGERY
Discharge: HOME | End: 2023-04-18
Attending: SURGERY | Admitting: SURGERY
Payer: COMMERCIAL

## 2023-04-18 VITALS
HEART RATE: 75 BPM | TEMPERATURE: 97.7 F | HEIGHT: 63 IN | BODY MASS INDEX: 33.84 KG/M2 | RESPIRATION RATE: 16 BRPM | OXYGEN SATURATION: 97 % | SYSTOLIC BLOOD PRESSURE: 127 MMHG | DIASTOLIC BLOOD PRESSURE: 56 MMHG | WEIGHT: 191 LBS

## 2023-04-18 DIAGNOSIS — L72.0 EPIDERMOID CYST OF SKIN OF CHEST: ICD-10-CM

## 2023-04-18 PROBLEM — L72.3 SEBACEOUS CYST: Status: ACTIVE | Noted: 2023-04-18

## 2023-04-18 PROCEDURE — 11402 EXC TR-EXT B9+MARG 1.1-2 CM: CPT | Performed by: SURGERY

## 2023-04-18 PROCEDURE — 88304 TISSUE EXAM BY PATHOLOGIST: CPT

## 2023-04-18 PROCEDURE — 2580000003 HC RX 258: Performed by: SURGERY

## 2023-04-18 PROCEDURE — 12032 INTMD RPR S/A/T/EXT 2.6-7.5: CPT | Performed by: SURGERY

## 2023-04-18 PROCEDURE — 3600000012 HC SURGERY LEVEL 2 ADDTL 15MIN: Performed by: SURGERY

## 2023-04-18 PROCEDURE — 3600000002 HC SURGERY LEVEL 2 BASE: Performed by: SURGERY

## 2023-04-18 PROCEDURE — 7100000011 HC PHASE II RECOVERY - ADDTL 15 MIN: Performed by: SURGERY

## 2023-04-18 PROCEDURE — 2500000003 HC RX 250 WO HCPCS

## 2023-04-18 PROCEDURE — 7100000010 HC PHASE II RECOVERY - FIRST 15 MIN: Performed by: SURGERY

## 2023-04-18 PROCEDURE — 3700000001 HC ADD 15 MINUTES (ANESTHESIA): Performed by: SURGERY

## 2023-04-18 PROCEDURE — 6360000002 HC RX W HCPCS

## 2023-04-18 PROCEDURE — 2500000003 HC RX 250 WO HCPCS: Performed by: SURGERY

## 2023-04-18 PROCEDURE — A4217 STERILE WATER/SALINE, 500 ML: HCPCS | Performed by: SURGERY

## 2023-04-18 PROCEDURE — 2709999900 HC NON-CHARGEABLE SUPPLY: Performed by: SURGERY

## 2023-04-18 PROCEDURE — 3700000000 HC ANESTHESIA ATTENDED CARE: Performed by: SURGERY

## 2023-04-18 RX ORDER — MAGNESIUM HYDROXIDE 1200 MG/15ML
LIQUID ORAL CONTINUOUS PRN
Status: COMPLETED | OUTPATIENT
Start: 2023-04-18 | End: 2023-04-18

## 2023-04-18 RX ORDER — PROPOFOL 10 MG/ML
INJECTION, EMULSION INTRAVENOUS PRN
Status: DISCONTINUED | OUTPATIENT
Start: 2023-04-18 | End: 2023-04-18 | Stop reason: SDUPTHER

## 2023-04-18 RX ORDER — LIDOCAINE HYDROCHLORIDE 20 MG/ML
INJECTION, SOLUTION EPIDURAL; INFILTRATION; INTRACAUDAL; PERINEURAL PRN
Status: DISCONTINUED | OUTPATIENT
Start: 2023-04-18 | End: 2023-04-18 | Stop reason: SDUPTHER

## 2023-04-18 RX ORDER — SODIUM CHLORIDE 9 MG/ML
INJECTION, SOLUTION INTRAVENOUS PRN
Status: DISCONTINUED | OUTPATIENT
Start: 2023-04-18 | End: 2023-04-18 | Stop reason: HOSPADM

## 2023-04-18 RX ORDER — MIDAZOLAM HYDROCHLORIDE 1 MG/ML
INJECTION INTRAMUSCULAR; INTRAVENOUS PRN
Status: DISCONTINUED | OUTPATIENT
Start: 2023-04-18 | End: 2023-04-18 | Stop reason: SDUPTHER

## 2023-04-18 RX ORDER — SODIUM CHLORIDE 0.9 % (FLUSH) 0.9 %
5-40 SYRINGE (ML) INJECTION PRN
Status: DISCONTINUED | OUTPATIENT
Start: 2023-04-18 | End: 2023-04-18 | Stop reason: HOSPADM

## 2023-04-18 RX ORDER — SODIUM CHLORIDE 0.9 % (FLUSH) 0.9 %
5-40 SYRINGE (ML) INJECTION EVERY 12 HOURS SCHEDULED
Status: DISCONTINUED | OUTPATIENT
Start: 2023-04-18 | End: 2023-04-18 | Stop reason: HOSPADM

## 2023-04-18 RX ORDER — FENTANYL CITRATE 50 UG/ML
INJECTION, SOLUTION INTRAMUSCULAR; INTRAVENOUS PRN
Status: DISCONTINUED | OUTPATIENT
Start: 2023-04-18 | End: 2023-04-18 | Stop reason: SDUPTHER

## 2023-04-18 RX ORDER — LIDOCAINE HYDROCHLORIDE 10 MG/ML
1 INJECTION, SOLUTION EPIDURAL; INFILTRATION; INTRACAUDAL; PERINEURAL
Status: DISCONTINUED | OUTPATIENT
Start: 2023-04-18 | End: 2023-04-18 | Stop reason: HOSPADM

## 2023-04-18 RX ORDER — SODIUM CHLORIDE, SODIUM LACTATE, POTASSIUM CHLORIDE, CALCIUM CHLORIDE 600; 310; 30; 20 MG/100ML; MG/100ML; MG/100ML; MG/100ML
INJECTION, SOLUTION INTRAVENOUS CONTINUOUS
Status: DISCONTINUED | OUTPATIENT
Start: 2023-04-18 | End: 2023-04-18 | Stop reason: HOSPADM

## 2023-04-18 RX ORDER — CLINDAMYCIN HYDROCHLORIDE 300 MG/1
300 CAPSULE ORAL 3 TIMES DAILY
Qty: 21 CAPSULE | Refills: 0 | Status: SHIPPED | OUTPATIENT
Start: 2023-04-18 | End: 2023-04-25

## 2023-04-18 RX ORDER — CLINDAMYCIN PHOSPHATE 600 MG/50ML
600 INJECTION INTRAVENOUS ONCE
Status: DISCONTINUED | OUTPATIENT
Start: 2023-04-18 | End: 2023-04-18 | Stop reason: HOSPADM

## 2023-04-18 RX ADMIN — FENTANYL CITRATE 50 MCG: 50 INJECTION INTRAMUSCULAR; INTRAVENOUS at 08:43

## 2023-04-18 RX ADMIN — PROPOFOL 50 MG: 10 INJECTION, EMULSION INTRAVENOUS at 08:43

## 2023-04-18 RX ADMIN — PROPOFOL 100 MCG/KG/MIN: 10 INJECTION, EMULSION INTRAVENOUS at 08:44

## 2023-04-18 RX ADMIN — LIDOCAINE HYDROCHLORIDE 50 MG: 20 INJECTION, SOLUTION EPIDURAL; INFILTRATION; INTRACAUDAL; PERINEURAL at 08:43

## 2023-04-18 RX ADMIN — MIDAZOLAM 2 MG: 1 INJECTION INTRAMUSCULAR; INTRAVENOUS at 08:43

## 2023-04-18 ASSESSMENT — PAIN - FUNCTIONAL ASSESSMENT
PAIN_FUNCTIONAL_ASSESSMENT: PREVENTS OR INTERFERES SOME ACTIVE ACTIVITIES AND ADLS
PAIN_FUNCTIONAL_ASSESSMENT: 0-10

## 2023-04-18 ASSESSMENT — PAIN DESCRIPTION - DESCRIPTORS: DESCRIPTORS: ACHING;BURNING

## 2023-04-18 ASSESSMENT — LIFESTYLE VARIABLES: SMOKING_STATUS: 1

## 2023-04-18 NOTE — H&P
[facility] GENERAL SURGERY      The H&P was reviewed, the patient was examined, and no change has occurred in the patient's condition since the H&P was completed. The indications for the procedure were reviewed, and any questions were answered. I updated the progress note from 4/12/2023 which is the H&P.     Vitals:    04/18/23 0742   BP: (!) 168/89   Pulse: 67   Resp: 16   Temp: 98 °F (36.7 °C)   SpO2: 98%

## 2023-04-18 NOTE — PROGRESS NOTES
..1.  Do not eat or drink anything after 12 midnight prior to surgery. This includes no water, chewing gum or mints, except for bowel prep complete per MD.  2.  Take the following pills with a small sip of water on the morning of surgery 04/18/2023.  3.  Aspirin, Ibuprofen, Advil, Naproxen, Vitamin E and other Anti-inflammatory products should be stopped for 5 days before surgery or as directed by your physician. 4.  Check with your doctor regarding stopping Plavix, Coumadin, Lovenox, Fragmin or other blood thinners. 5.  Do not smoke and do not drink alcoholic beverages 24 hours prior to surgery. This includes NA Beer. 6.  You may brush your teeth and gargle the morning of surgery. DO NOT SWALLOW WATER.  7.  You MUST make arrangements for a responsible adult to take you home after your surgery. You will not be allowed to leave alone or drive yourself home. It is strongly suggested someone stay with you the first 24 hours. Your surgery will be cancelled if you do not have a ride home. 8.  A parent/legal guardian must accompany a child scheduled for surgery and plan to stay at the hospital until the child is discharged. Please do not bring other children with you. 9.  Please wear simple, loose fitting clothing to the hospital.  E[employer] not bring valuables ( money, credit cards, checkbooks, etc.)  Do not wear any makeup (including no eye makeup) or nail polish on your fingers or toes. 10.  Do not wear any jewelry or piercing on the day of surgery. All body piercing jewelry must be removed. 11.  If you have dentures, they will be removed before going to the OR; we will provide you a container. If you wear contact lenses or glasses, they will be removed; please bring a case for them.   15.  Notify your Surgeon if you develop any illness between now and surgery time; cough, cold, fever, sore throat, nausea, vomiting, etc.  Please notify your surgeon if you experience dizziness, shortness of breath or blurred
.1.  Do not eat or drink anything after 12 midnight prior to surgery. This includes no water, chewing gum or mints. 2.  Take the following pills with a small sip of water on the morning of surgery 04/18/2023.  3.  Aspirin, Ibuprofen, Advil, Naproxen, Vitamin E and other Anti-inflammatory products should be stopped for 5 days before surgery or as directed by your physician. 4.  Check with your doctor regarding stopping Plavix, Coumadin, Lovenox, Fragmin or other blood thinners. 5.  Do not smoke and do not drink alcoholic beverages 24 hours prior to surgery. This includes NA Beer. 6.  You may brush your teeth and gargle the morning of surgery. DO NOT SWALLOW WATER.  7.  You MUST make arrangements for a responsible adult to take you home after your surgery. You will not be allowed to leave alone or drive yourself home. It is strongly suggested someone stay with you the first 24 hours. Your surgery will be cancelled if you do not have a ride home. 8.  A parent/legal guardian must accompany a child scheduled for surgery and plan to stay at the hospital until the child is discharged. Please do not bring other children with you. 9.  Please wear simple, loose fitting clothing to the hospital.  Nicolleevelinanevaeh Artis not bring valuables ( money, credit cards, checkbooks, etc.)  Do not wear any makeup (including no eye makeup) or nail polish on your fingers or toes. 10.  Do not wear any jewelry or piercing on the day of surgery. All body piercing jewelry must be removed. 11.  If you have dentures, they will be removed before going to the OR; we will provide you a container. If you wear contact lenses or glasses, they will be removed; please bring a case for them. 12.  Please see your family doctor/pediatrician for a history & physical and/or concerning medications. Bring any test results/reports from your physician's office the day of surgery.     13.  Remember to bring Blood Bank Bracelet to the hospital on the day of
Called and spoke to pt  Hema Wyatt. Discharge instructions explained in detail over the phone to pt  Hema Wyatt while at bedside allowing pt to hear as well. Pt and  received copy of discharge instructions and made aware to  prescriptions x 1 at [facility] in [facility]. Pt  and  deny having any questions about discharge.
IV x 1 removed per discharge hemostasis achieved, dressing applied, no complications noted. Patient denies further needs. Pt transported to private car via wheel chair. Vitals per doc flow, pt  Maida Mandujano assumes responsibility.
PAT complete with patient aware of 0645 arrival time on 04/18/2023 at main [facility] states her spouse Almyra Duane will be  and caregiver day of procedure. June Zamarripa RN
Phase II:  1. Patient is identified using name and the date of birth. 2.  The patient is free from signs and symptoms of chemical, electrical, laser, radiation, positioning, or transfer/transport injury. 3.  The patient receives appropriate medication(s), safely administered during the Perioperative period. 4.  The patient has wound/tissue perfusion consistent with or improved from baseline levels established preoperatively. 5.  The patient is at or returning to normothermia at the conclusion of the immediate postoperative period. 6.  The patient's fluid, electrolyte, and acid base balances are consistent with or improved from baseline levels established preoperatively. 7.  The patient's pulmonary function is consistent with or improved from baseline levels established preoperatively. 8.  The patient's cardiovascular status is consistent with or improved from baseline levels established preoperatively. 9.  The patient/caregiver demonstrates knowledge of nutritional management related to the operative or other invasive procedure. 10. The patient/caregiver demonstrates knowledge of medication, pain, and wound management. 11. The patient participates in the rehabilitation process as applicable. 12.  The patient/caregiver participates in decisions affection his or her Perioperative plan of care. 13.  The patient's care is consistent with the individualized Perioperative plan of care. 14.  The patient's right to privacy is maintained. 15. The patient is the recipient of competent and ethical care within legal standards of practice. 16.  The patient's value system, lifestyle, ethnicity, and culture are considered, respected, and incorporated in the Perioperative plan of care and understands special services available. 17.  The patient demonstrates and/or reports adequate pain control throughout the the Perioperative period. 18.   The patient's neurological status is consistent with or improved from
Pt arrived to PACU from OR in stable condition. Report received from [facility] and CRNA. Phase II. Care of pt transferred at this time. Pt placed on cont pulse ox and BP. Pt arrived to unit without any oxygen requirements. SPO2 96% on RA.
and Phase II process. 23.  Patient pain level is established preoperatively using age appropriate pain scale. 24.  The patient will move to fall risk upon sedation- during and through the recovery phase. Interventions- orient the patient to the environment, especially the location of the bathroom; provide treaded socks/non-skid footwear; demonstrate and teach back use of the nurse's call system; instruct the patient to call for help before getting out of bed; lock all movable equipment before transferring patient; keep bed in lowest position possible.  25.  Other:

## 2023-04-18 NOTE — ANESTHESIA PRE PROCEDURE
Department of Anesthesiology  Preprocedure Note       Name:  Sarah Leong   Age:  65 y.o.  :  1965                                          MRN:  [id number]         Date:  2023      Surgeon: Christos Degroot):  Venkata Leon MD    Procedure: Procedure(s):  EXCISION CYST ON CHEST    Medications prior to admission:   Prior to Admission medications    Medication Sig Start Date End Date Taking?  Authorizing Provider   clindamycin (CLEOCIN) 300 MG capsule Take 1 capsule by mouth 3 times daily for 7 days  Patient not taking: Reported on 2023  Venkata Leon MD   furosemide (LASIX) 20 MG tablet TAKE (1) TABLET DAILY AS NEEDED. 4/10/23   Dread Gilbert MD   ibuprofen (ADVIL;MOTRIN) 800 MG tablet TAKE (1) TABLET THREE TIMES      DAILY  Patient not taking: Reported on 2023 4/10/23   Dread Gilbert MD   hydroCHLOROthiazide (HYDRODIURIL) 25 MG tablet Take 1 tablet by mouth every morning 4/10/23   Dread Gilbert MD   losartan (COZAAR) 50 MG tablet Take 1 tablet by mouth daily  Patient not taking: Reported on 2023 4/10/23   Dread Gilbert MD   hydrOXYzine pamoate (VISTARIL) 25 MG capsule Take 1 capsule by mouth 3 times daily as needed for Anxiety 3/1/23   Dread Gilbert MD   diclofenac (VOLTAREN) 50 MG EC tablet Take 1 tablet by mouth 2 times daily (with meals) 23   Christa Beach DO   DULoxetine (CYMBALTA) 60 MG extended release capsule Take one tablet by mouth twice daily 22   DARÍO Smith - CNP   metoprolol succinate (TOPROL XL) 100 MG extended release tablet TAKE (1) TABLET DAILY IN THE AM AND 1/2 TAB IN THE EVENING 22   DARÍO Smith - CNP   pantoprazole (PROTONIX) 40 MG tablet Take 1 tablet by mouth daily 22   DARÍO Smith - CNP   traZODone (DESYREL) 150 MG tablet TAKE 1 TABLET AT BEDTIME  Patient not taking: Reported on 2023   Rani Mcfarland

## 2023-04-18 NOTE — ANESTHESIA POSTPROCEDURE EVALUATION
Department of Anesthesiology  Postprocedure Note    Patient: Kristal Eubanks  MRN: [id number]  YOB: 1965  Date of evaluation: 4/18/2023      Procedure Summary     Date: 04/18/23 Room / Location: [place] [address] / [facility]    Anesthesia Start: 6795 Anesthesia Stop: 9944    Procedure: EXCISION CYST ON CHEST (Chest) Diagnosis:       Epidermoid cyst of skin of chest      (Epidermoid cyst of skin of chest [L72.0])    Surgeons: Eden Chery MD Responsible Provider: Hesham Gambino MD    Anesthesia Type: MAC ASA Status: 3          Anesthesia Type: No value filed. Alexia Phase I: Alexia Score: 10    Alexia Phase II: Alexia Score: 10      Anesthesia Post Evaluation    Patient location during evaluation: PACU  Patient participation: complete - patient participated  Level of consciousness: awake and alert  Airway patency: patent  Nausea & Vomiting: no nausea and no vomiting  Complications: no  Cardiovascular status: blood pressure returned to baseline  Respiratory status: acceptable  Hydration status: euvolemic  Comments: VSS on transfer to phase 2 recovery. No anesthetic complications.

## 2023-04-18 NOTE — DISCHARGE INSTRUCTIONS
[facility] AND [place]    Nancy Crisostomo. Raul Mckeon M.D. [facility] [address] [facility]                [address]ee M.D. [address] [place] [address]         [facility]  [address] [facility] Basia BROCK [place]                         [phone]        [facility] Alessandro Dunlap M.D. [facility]       POST-OPERATIVE INSTRUCTIONS FOLLOWING EXCISION OF A MASS    The office will call with biopsy results. Call the office if you have not heard any results in 1 week. You may remove your dressing tomorrow. Leave the steri-stips in place. These will peel away in 7-10 days. You may shower after you have removed the dressing. Wash incisions gently, and pat them dry. Do not rub your incisions.        Watch for signs of infection:       Fever over 100.5°     Excessive warmth, or bright redness around your incision    Leakage of bloody or cloudy fluid from you incisions

## 2023-04-18 NOTE — PROGRESS NOTES
[facility]    HPI:  Patient is 65y.o. year old female seen at request of Miguel Ángel Cutler MD.  She presents because of lump located on  chest .  There has been pain at or near the lesion and a recent increase in size. There is   previous history of similar. There has not been any biopsy. Past Medical History:   Diagnosis Date    Ankle fracture     Anxiety     Arthritis     Benzodiazepine abuse ([address]     Depression     Fibromyalgia     Hyperlipidemia     Hypertension     Kidney cyst     x2 rt. ?    Migraine     Pneumonia 2008    bronchitis frequently since    TIA (transient ischemic attack)     Ulcer, Stomach Peptic     frequent N/V    Unspecified cerebral artery occlusion with cerebral infarction     mini stroke cused by severe migraine    Weight loss     frequent N/V, S/P cholecystectomy       Past Surgical History:   Procedure Laterality Date    ANKLE SURGERY       SECTION      CHOLECYSTECTOMY      COLONOSCOPY N/A 2018    COLON W/ANES. performed by Perez Li MD at [address] [place] [address] (CERVIX STATUS UNKNOWN)      TUMOR REMOVAL      lt femur    UPPER GASTROINTESTINAL ENDOSCOPY N/A 2018    EGD BIOPSY performed by Perez Li MD at [id number] [place] [address]       Current Outpatient Medications on File Prior to Visit   Medication Sig Dispense Refill    furosemide (LASIX) 20 MG tablet TAKE (1) TABLET DAILY AS NEEDED.  60 tablet 11    ibuprofen (ADVIL;MOTRIN) 800 MG tablet TAKE (1) TABLET THREE TIMES      DAILY (Patient not taking: Reported on 2023) 90 tablet 3    hydroCHLOROthiazide (HYDRODIURIL) 25 MG tablet Take 1 tablet by mouth every morning 90 tablet 0    losartan (COZAAR) 50 MG tablet Take 1 tablet by mouth daily 90 tablet 0    hydrOXYzine pamoate (VISTARIL) 25 MG capsule Take 1 capsule by mouth 3 times daily as needed for Anxiety 90 capsule 5    diclofenac (VOLTAREN) 50

## 2023-04-18 NOTE — BRIEF OP NOTE
Brief Postoperative Note      Patient: Musa Gutierrez  YOB: 1965  MRN: [id number]    Date of Procedure: 4/18/2023    Pre-Op Diagnosis Codes:     * Epidermoid cyst of skin of chest [L72.0]    Post-Op Diagnosis: Same       Procedure(s):  EXCISION CYST ON CHEST    Surgeon(s):  Tressa Arango MD    Assistant:  Surgical Assistant: Jorge Luis Qiu    Anesthesia: Monitor Anesthesia Care    Estimated Blood Loss (mL): Minimal    Complications: None    Specimens:   * No specimens in log *    Implants:  * No implants in log *      Drains: * No LDAs found *    Findings: As above        Electronically signed by Jeni Cooks, MD on 4/18/2023 at 8:58 AM

## 2023-04-19 ENCOUNTER — TELEPHONE (OUTPATIENT)
Dept: SURGERY | Age: 58
End: 2023-04-19

## 2023-04-19 NOTE — TELEPHONE ENCOUNTER
----- Message from Antwon Rooney MD sent at 4/19/2023  3:28 PM EDT -----  Tell the patient that the lump removed was a benign cyst.  Follow up prn.

## 2023-05-23 DIAGNOSIS — K20.80 ESOPHAGITIS, LOS ANGELES GRADE A: ICD-10-CM

## 2023-05-23 DIAGNOSIS — K26.9 DUODENAL ULCER: ICD-10-CM

## 2023-05-23 RX ORDER — PANTOPRAZOLE SODIUM 40 MG/1
TABLET, DELAYED RELEASE ORAL
Qty: 90 TABLET | Refills: 3 | Status: SHIPPED | OUTPATIENT
Start: 2023-05-23

## 2023-05-23 NOTE — TELEPHONE ENCOUNTER
Juarez Rebollarr is requesting refill(s)   Last OV 11/1/22 (pertaining to medication)  LR 4/7/22 (per medication requested)  Next office visit scheduled or attempted Yes   If no, reason:  7/11/23

## 2023-07-19 ENCOUNTER — OFFICE VISIT (OUTPATIENT)
Dept: FAMILY MEDICINE CLINIC | Age: 58
End: 2023-07-19
Payer: COMMERCIAL

## 2023-07-19 VITALS
WEIGHT: 193 LBS | DIASTOLIC BLOOD PRESSURE: 92 MMHG | BODY MASS INDEX: 34.19 KG/M2 | OXYGEN SATURATION: 99 % | HEART RATE: 77 BPM | SYSTOLIC BLOOD PRESSURE: 158 MMHG

## 2023-07-19 DIAGNOSIS — R03.0 ELEVATED BLOOD PRESSURE READING: ICD-10-CM

## 2023-07-19 DIAGNOSIS — M54.50 CHRONIC BILATERAL LOW BACK PAIN WITHOUT SCIATICA: ICD-10-CM

## 2023-07-19 DIAGNOSIS — L82.0 INFLAMED SEBORRHEIC KERATOSIS: ICD-10-CM

## 2023-07-19 DIAGNOSIS — I10 ESSENTIAL HYPERTENSION, BENIGN: ICD-10-CM

## 2023-07-19 DIAGNOSIS — F41.9 ANXIETY: ICD-10-CM

## 2023-07-19 DIAGNOSIS — G89.29 CHRONIC BILATERAL LOW BACK PAIN WITHOUT SCIATICA: ICD-10-CM

## 2023-07-19 DIAGNOSIS — R07.9 CHEST PAIN, UNSPECIFIED TYPE: Primary | ICD-10-CM

## 2023-07-19 PROCEDURE — 99214 OFFICE O/P EST MOD 30 MIN: CPT | Performed by: FAMILY MEDICINE

## 2023-07-19 PROCEDURE — 93000 ELECTROCARDIOGRAM COMPLETE: CPT | Performed by: FAMILY MEDICINE

## 2023-07-19 PROCEDURE — 3079F DIAST BP 80-89 MM HG: CPT | Performed by: FAMILY MEDICINE

## 2023-07-19 PROCEDURE — 3077F SYST BP >= 140 MM HG: CPT | Performed by: FAMILY MEDICINE

## 2023-07-19 NOTE — PATIENT INSTRUCTIONS

## 2023-07-19 NOTE — PROGRESS NOTES
Content: Thought content normal.         Judgment: Judgment normal.            ASSESSMENT PLAN      Diagnosis Orders   1. Chest pain, unspecified type  EKG 12 lead      2. Chronic bilateral low back pain without sciatica        3. Anxiety        4. Essential hypertension, benign        5. Elevated blood pressure reading        6. Inflamed seborrheic keratosis        EKG showed normal sinus rhythm. The similar pattern to 2021. She had echo and GXT in 2020 which were unremarkable. She has no other associated symptoms, pain is fairly constant no difference at rest or exertion no radiation. Described as sharp and not really pressure. I told her I thought it was low likelihood cardiac but we did discuss signs and symptoms of worsening which would raise concern for which she should seek immediate medical attention. She has been under significant amount of stress. She has not taking her Protonix for a few days and not taking her blood pressure medicine for a few days. She has an inflamed seborrheic keratosis versus skin tag in the left fold between the perineum and left thigh. Since it is painful and bothersome to her we will remove with shave excision. Regular follow-up 6 months    Patient should call the office immediately with new or ongoing signs or symptoms or worsening, or proceed to the emergency room. No changes in past medical history, past surgical history, social history, or family history were noted during the patient encounter unless specifically listed above. All updates of past medical history, past surgical history, social history, or family history were reviewed personally by me during the office visit. All problems listed in the assessment are stable unless noted otherwise. Medication profile reviewed personally by me during the visit. Medication side effects and possible impairments from medications were discussed as applicable.     Unless stated otherwise, we will continue current
(1) Drift; limb holds 90 (or 45) degrees, but drifts down before full 10 secs; does not hit bed or other support
(1) Drift; limb holds 90 (or 45) degrees, but drifts down before full 10 secs; does not hit bed or other support
Double M-Plasty Intermediate Repair Preamble Text (Leave Blank If You Do Not Want): Undermining was performed with blunt dissection.

## 2023-08-03 DIAGNOSIS — B96.89 ACUTE BACTERIAL SINUSITIS: ICD-10-CM

## 2023-08-03 DIAGNOSIS — J01.90 ACUTE BACTERIAL SINUSITIS: ICD-10-CM

## 2023-08-03 DIAGNOSIS — J18.9 COMMUNITY ACQUIRED PNEUMONIA, UNSPECIFIED LATERALITY: ICD-10-CM

## 2023-08-03 RX ORDER — IBUPROFEN 800 MG/1
TABLET ORAL
Qty: 90 TABLET | Refills: 3 | OUTPATIENT
Start: 2023-08-03

## 2023-08-15 DIAGNOSIS — J18.9 COMMUNITY ACQUIRED PNEUMONIA, UNSPECIFIED LATERALITY: ICD-10-CM

## 2023-08-15 DIAGNOSIS — J01.90 ACUTE BACTERIAL SINUSITIS: ICD-10-CM

## 2023-08-15 DIAGNOSIS — B96.89 ACUTE BACTERIAL SINUSITIS: ICD-10-CM

## 2023-08-15 RX ORDER — IBUPROFEN 800 MG/1
TABLET ORAL
Qty: 90 TABLET | Refills: 0 | Status: SHIPPED | OUTPATIENT
Start: 2023-08-15

## 2023-08-15 NOTE — TELEPHONE ENCOUNTER
Refill Request     CONFIRM preferrred pharmacy with the patient. If Mail Order Rx - Pend for 90 day refill. Last Seen: Last Seen Department: 7/19/2023  Last Seen by PCP: 7/19/2023    Last Written: 4/10/23    If no future appointment scheduled, route STAFF MESSAGE with patient name to the [facility] for scheduling. Next Appointment:   Future Appointments   Date Time Provider [address] [place] [address]   8/21/2023 11:20 AM MD Rizwan Avalos  [place] [facility] [place]   1/24/2024 10:20 AM Cami Barriga MD [facility] [place] [facility]       Message sent to [address] [place] [address] to schedule appt with patient?   N/A      Requested Prescriptions     Pending Prescriptions Disp Refills    ibuprofen (ADVIL;MOTRIN) 800 MG tablet 90 tablet 0     Sig: TAKE ONE (1) TABLET THREE TIMES DAILY

## 2023-08-21 ENCOUNTER — PROCEDURE VISIT (OUTPATIENT)
Dept: FAMILY MEDICINE CLINIC | Age: 58
End: 2023-08-21

## 2023-08-21 VITALS
DIASTOLIC BLOOD PRESSURE: 80 MMHG | WEIGHT: 190 LBS | SYSTOLIC BLOOD PRESSURE: 138 MMHG | HEART RATE: 82 BPM | OXYGEN SATURATION: 97 % | BODY MASS INDEX: 33.66 KG/M2

## 2023-08-21 DIAGNOSIS — R20.9 SKIN SENSATION DISTURBANCE: ICD-10-CM

## 2023-08-21 DIAGNOSIS — L82.0 INFLAMED SEBORRHEIC KERATOSIS: Primary | ICD-10-CM

## 2023-08-21 NOTE — PROGRESS NOTES
Chief Complaint   Patient presents with    Procedure     Skin tag removal         Internal Administration   First Dose      Second Dose           Last COVID Lab No results found for: SARS-COV-2, SARS-COV-2 RNA, SARS-COV-2, SARS-COV-2, SARS-COV-2 BY PCR, SARS-COV-2, SARS-COV-2, SARS-COV-2          Wt Readings from Last 3 Encounters:   23 190 lb (86.2 kg)   23 193 lb (87.5 kg)   23 191 lb (86.6 kg)     BP Readings from Last 3 Encounters:   23 138/80   23 (!) 158/92   23 (!) 127/56      Lab Results   Component Value Date    LABA1C 5.4 2023    LABA1C 5.4 2020    LABA1C 5.9 2019       HPI:  Huyen Patel is a 63 y.o. (: 1965) here today for    Patient is in the office today for a skin tag removal from the inner left groin area. The lesion continues to get inflammed and irritated causing pain. [] Patient has completed an advance directive  [x] Patient has NOT completed an advanced directive  [] Patient has a documented healthcare surrogate  [x] Patient does NOT have a documented healthcare surrogate  [] Discussed the importance of establishing and updating an advanced directive. Patient has questions at this time and those were answered. [x] Discussed the importance of establishing and updating an advanced directive. Patient does NOT have questions at this time. Discussed with: [x] Patient            [] Family             [] Other caregiver    Patient's medications, allergies, past medical, surgical, social and family histories were reviewed and updated asappropriate on 2023 at 12:07 PM.    ROS:  Review of Systems    All other systems reviewed and are negative except as noted above on 2023 at 12:07 PM. Additional review of systems may be scanned into the media section ofthis medical record. Any responses requiring further intervention were pursued.     Past Medical History:   Diagnosis Date    Ankle fracture     Anxiety     Arthritis

## 2023-10-24 ENCOUNTER — TELEPHONE (OUTPATIENT)
Dept: FAMILY MEDICINE CLINIC | Age: 58
End: 2023-10-24

## 2023-10-24 NOTE — TELEPHONE ENCOUNTER
Patient is requesting an appt for right elbow pain that has been going on for awhile just the pain is getting worse. Also pain in the left pinky finger. Okay to schedule with you?   Nothing available for Dr. Germania Bruno

## 2023-10-26 ENCOUNTER — TELEPHONE (OUTPATIENT)
Dept: FAMILY MEDICINE CLINIC | Age: 58
End: 2023-10-26

## 2023-10-30 ENCOUNTER — HOSPITAL ENCOUNTER (OUTPATIENT)
Age: 58
Discharge: HOME | End: 2023-10-30
Payer: COMMERCIAL

## 2023-10-30 ENCOUNTER — HOSPITAL ENCOUNTER (OUTPATIENT)
Dept: GENERAL RADIOLOGY | Age: 58
Discharge: HOME | End: 2023-10-30
Payer: COMMERCIAL

## 2023-10-30 ENCOUNTER — OFFICE VISIT (OUTPATIENT)
Dept: FAMILY MEDICINE CLINIC | Age: 58
End: 2023-10-30
Payer: COMMERCIAL

## 2023-10-30 VITALS
HEART RATE: 75 BPM | WEIGHT: 195 LBS | SYSTOLIC BLOOD PRESSURE: 158 MMHG | OXYGEN SATURATION: 96 % | HEIGHT: 63 IN | DIASTOLIC BLOOD PRESSURE: 82 MMHG | BODY MASS INDEX: 34.55 KG/M2

## 2023-10-30 DIAGNOSIS — M25.521 RIGHT ELBOW PAIN: ICD-10-CM

## 2023-10-30 DIAGNOSIS — R20.2 NUMBNESS AND TINGLING OF RIGHT ARM: ICD-10-CM

## 2023-10-30 DIAGNOSIS — R20.0 NUMBNESS AND TINGLING OF RIGHT ARM: ICD-10-CM

## 2023-10-30 DIAGNOSIS — M77.8 ELBOW TENDONITIS: ICD-10-CM

## 2023-10-30 DIAGNOSIS — K21.9 GASTROESOPHAGEAL REFLUX DISEASE WITHOUT ESOPHAGITIS: ICD-10-CM

## 2023-10-30 DIAGNOSIS — M77.8 ELBOW TENDONITIS: Primary | ICD-10-CM

## 2023-10-30 PROCEDURE — 99213 OFFICE O/P EST LOW 20 MIN: CPT | Performed by: NURSE PRACTITIONER

## 2023-10-30 PROCEDURE — 3079F DIAST BP 80-89 MM HG: CPT | Performed by: NURSE PRACTITIONER

## 2023-10-30 PROCEDURE — 3077F SYST BP >= 140 MM HG: CPT | Performed by: NURSE PRACTITIONER

## 2023-10-30 PROCEDURE — 73080 X-RAY EXAM OF ELBOW: CPT

## 2023-10-30 RX ORDER — METHYLPREDNISOLONE 4 MG/1
TABLET ORAL
Qty: 1 KIT | Refills: 0 | Status: SHIPPED | OUTPATIENT
Start: 2023-10-30

## 2023-10-30 RX ORDER — PANTOPRAZOLE SODIUM 40 MG/1
40 TABLET, DELAYED RELEASE ORAL DAILY
Qty: 90 TABLET | Refills: 1 | Status: SHIPPED | OUTPATIENT
Start: 2023-10-30

## 2023-10-30 ASSESSMENT — ENCOUNTER SYMPTOMS
RESPIRATORY NEGATIVE: 1
BACK PAIN: 0

## 2023-10-30 NOTE — PROGRESS NOTES
08/21/23 138/80   07/19/23 (!) 158/92     Pulse Readings from Last 3 Encounters:   10/30/23 75   08/21/23 82   07/19/23 77     Physical Exam  Vitals and nursing note reviewed. Constitutional:       General: She is not in acute distress. Appearance: Normal appearance. She is well-developed. She is obese. She is not diaphoretic. HENT:      Head: Normocephalic and atraumatic. Right Ear: External ear normal.      Left Ear: External ear normal.      Nose: Nose normal.   Eyes:      General:         Right eye: No discharge. Left eye: No discharge. Conjunctiva/sclera: Conjunctivae normal.   Cardiovascular:      Rate and Rhythm: Normal rate. Pulmonary:      Effort: Pulmonary effort is normal.   Musculoskeletal:         General: No deformity. Right elbow: Decreased range of motion. Tenderness present in medial epicondyle and lateral epicondyle. Right forearm: Edema and tenderness present. Right wrist: Tenderness present. Decreased range of motion. Right hand: Tenderness present. Cervical back: Normal range of motion and neck supple. No rigidity. Skin:     General: Skin is warm and dry. Coloration: Skin is not jaundiced or pale. Findings: No bruising, erythema, lesion or rash. Neurological:      Mental Status: She is alert and oriented to person, place, and time. Mental status is at baseline. Psychiatric:         Mood and Affect: Mood normal.         Behavior: Behavior normal.         Thought Content:  Thought content normal.         Judgment: Judgment normal.         Office Visit on 04/03/2023   Component Date Value Ref Range Status    Glucose, UA POC 04/03/2023 neg   Final    Bilirubin, UA 04/03/2023 neg   Final    Ketones, UA 04/03/2023 neg   Final    Spec Grav, UA 04/03/2023 <=1.005   Final    Blood, UA POC 04/03/2023 trace   Final    pH, UA 04/03/2023 6.0   Final    Protein, UA POC 04/03/2023 neg   Final    Urobilinogen, UA 04/03/2023 0.2   Final

## 2023-11-16 ENCOUNTER — TELEPHONE (OUTPATIENT)
Dept: FAMILY MEDICINE CLINIC | Age: 58
End: 2023-11-16

## 2023-11-17 DIAGNOSIS — I10 UNCONTROLLED HYPERTENSION: ICD-10-CM

## 2023-11-17 DIAGNOSIS — I10 ESSENTIAL HYPERTENSION, BENIGN: ICD-10-CM

## 2023-11-17 RX ORDER — METOPROLOL SUCCINATE 100 MG/1
TABLET, EXTENDED RELEASE ORAL
Qty: 45 TABLET | Refills: 2 | Status: SHIPPED | OUTPATIENT
Start: 2023-11-17

## 2023-11-17 NOTE — TELEPHONE ENCOUNTER
Patient called to state that her B/P was high this morning before he went to work. She does not have her B/P machine with her to recheck it now that she has taken her metoprolol. This was a few hours ago. She request to take a 2nd metoprolol now. She states she started a new job and is feeling anxious and having a headache. She works at [employer] in [place]. [place]. RN requested if she could come to PCP office for a B/P check at lunch or after work. Patient will consider this. RN reviewed meds with patient and asked if she has been taking he meds as ordered. Patient states that she has been taking \" samples that our office gave her or samples she got in the mail in a white bottle. RN indicated to patient that we do not supply samples. RN called [facility] in [place] to see when med was last picked up by patient. They stated October 2022. Patient requested refill for metoprolol. [facility]

## 2023-11-17 NOTE — TELEPHONE ENCOUNTER
Refill Request     CONFIRM preferrred pharmacy with the patient. If Mail Order Rx - Pend for 90 day refill. Last Seen: Last Seen Department: 10/30/2023  Last Seen by PCP: Visit date not found    Last Written: 04/07/2022    If no future appointment scheduled, route STAFF MESSAGE with patient name to the [facility] for scheduling. Next Appointment:   Future Appointments   Date Time Provider [address] [place] [address]   1/24/2024 10:20 AM Steff Yoder MD [facility]  [place] [facility]       Message sent to [employer] to schedule appt with patient?   NO      Requested Prescriptions     Pending Prescriptions Disp Refills    metoprolol succinate (TOPROL XL) 100 MG extended release tablet 135 tablet 0     Sig: TAKE (1) TABLET DAILY IN THE AM AND 1/2 TAB IN THE EVENING

## 2024-01-15 ENCOUNTER — TELEPHONE (OUTPATIENT)
Dept: FAMILY MEDICINE CLINIC | Age: 59
End: 2024-01-15

## 2024-01-15 NOTE — TELEPHONE ENCOUNTER
Patient calling stating she needs to know if her TB is up to date.  She needs to know for work.  Please call patient and advise.

## 2024-01-18 DIAGNOSIS — B96.89 ACUTE BACTERIAL SINUSITIS: ICD-10-CM

## 2024-01-18 DIAGNOSIS — J01.90 ACUTE BACTERIAL SINUSITIS: ICD-10-CM

## 2024-01-18 DIAGNOSIS — J18.9 COMMUNITY ACQUIRED PNEUMONIA, UNSPECIFIED LATERALITY: ICD-10-CM

## 2024-01-19 RX ORDER — IBUPROFEN 800 MG/1
TABLET ORAL
Qty: 90 TABLET | Refills: 0 | Status: SHIPPED | OUTPATIENT
Start: 2024-01-19

## 2024-01-19 NOTE — TELEPHONE ENCOUNTER
Refill Request     CONFIRM preferrred pharmacy with the patient.    If Mail Order Rx - Pend for 90 day refill.      Last Seen: Last Seen Department: 10/30/2023  Last Seen by PCP: 8/21/2023    Last Written: 8/15/2023    If no future appointment scheduled, route STAFF MESSAGE with patient name to the  Pool for scheduling.      Next Appointment:   Future Appointments   Date Time Provider Department Center   1/24/2024 10:20 AM Dontrell Musa MD [facility] [place] - [facility]       Message sent to  to schedule appt with patient?  NO      Requested Prescriptions     Pending Prescriptions Disp Refills    ibuprofen (ADVIL;MOTRIN) 800 MG tablet [Pharmacy Med Name: IBUPROFEN 800MG TABLET] 90 tablet 0     Sig: TAKE ONE (1) TABLET THREE TIMES DAILY

## 2024-03-07 DIAGNOSIS — K21.9 GASTROESOPHAGEAL REFLUX DISEASE WITHOUT ESOPHAGITIS: ICD-10-CM

## 2024-03-07 RX ORDER — PANTOPRAZOLE SODIUM 40 MG/1
40 TABLET, DELAYED RELEASE ORAL DAILY
Qty: 90 TABLET | Refills: 0 | Status: SHIPPED | OUTPATIENT
Start: 2024-03-07

## 2024-03-07 RX ORDER — TRIAMTERENE AND HYDROCHLOROTHIAZIDE 37.5; 25 MG/1; MG/1
CAPSULE ORAL
Qty: 180 CAPSULE | Refills: 3 | OUTPATIENT
Start: 2024-03-07

## 2024-03-07 NOTE — TELEPHONE ENCOUNTER
Refill Request     CONFIRM preferrred pharmacy with the patient.    If Mail Order Rx - Pend for 90 day refill.      Last Seen: Last Seen Department: 10/30/2023  Last Seen by PCP: 10/30/2023    Last Written: 10-    If no future appointment scheduled, route STAFF MESSAGE with patient name to the  Pool for scheduling.      Next Appointment:   No future appointments.    Message sent to  to schedule appt with patient?  NO      Requested Prescriptions     Pending Prescriptions Disp Refills    pantoprazole (PROTONIX) 40 MG tablet [Pharmacy Med Name: PANTOPRAZOLE SODIUM 40MG TABLET DR] 90 tablet 1     Sig: TAKE ONE (1) TABLET BY MOUTH DAILY        LMOR to set up follow up prison w/ PCP

## 2024-03-13 ENCOUNTER — TELEPHONE (OUTPATIENT)
Dept: FAMILY MEDICINE CLINIC | Age: 59
End: 2024-03-13

## 2024-03-13 DIAGNOSIS — M54.50 CHRONIC BILATERAL LOW BACK PAIN WITHOUT SCIATICA: Primary | ICD-10-CM

## 2024-03-13 DIAGNOSIS — G89.29 CHRONIC BILATERAL LOW BACK PAIN WITHOUT SCIATICA: Primary | ICD-10-CM

## 2024-03-13 RX ORDER — CYCLOBENZAPRINE HCL 10 MG
10 TABLET ORAL 3 TIMES DAILY PRN
Qty: 30 TABLET | Refills: 0 | Status: SHIPPED | OUTPATIENT
Start: 2024-03-13 | End: 2024-03-15 | Stop reason: ALTCHOICE

## 2024-03-13 NOTE — TELEPHONE ENCOUNTER
Patient calling c/o neck pain going into her shoulder for 3 days, started 3.10.24, causing headaches. Can't turn her head.  States last time she had this, Dr. Musa called her in muscle relaxer.  Patient asking if he will call something into her pharmacy for her.  Patient scheduled ACUTE visit with Lizet on Friday, but states she needs relief from the pain.  Please call patient and advise.

## 2024-03-13 NOTE — TELEPHONE ENCOUNTER
Pt called and informed of script for her flexeril that was prescribed in the past for pt has been sent to her requested pharmacy.

## 2024-03-15 ENCOUNTER — OFFICE VISIT (OUTPATIENT)
Dept: FAMILY MEDICINE CLINIC | Age: 59
End: 2024-03-15
Payer: COMMERCIAL

## 2024-03-15 VITALS
SYSTOLIC BLOOD PRESSURE: 176 MMHG | OXYGEN SATURATION: 98 % | WEIGHT: 198 LBS | DIASTOLIC BLOOD PRESSURE: 96 MMHG | BODY MASS INDEX: 35.08 KG/M2 | HEIGHT: 63 IN | HEART RATE: 62 BPM

## 2024-03-15 DIAGNOSIS — M62.89 MUSCLE TIGHTNESS: ICD-10-CM

## 2024-03-15 DIAGNOSIS — M62.838 TRAPEZIUS MUSCLE SPASM: ICD-10-CM

## 2024-03-15 DIAGNOSIS — K59.01 SLOW TRANSIT CONSTIPATION: ICD-10-CM

## 2024-03-15 DIAGNOSIS — K58.2 IRRITABLE BOWEL SYNDROME WITH BOTH CONSTIPATION AND DIARRHEA: ICD-10-CM

## 2024-03-15 DIAGNOSIS — M50.30 DDD (DEGENERATIVE DISC DISEASE), CERVICAL: Primary | ICD-10-CM

## 2024-03-15 DIAGNOSIS — I10 PRIMARY HYPERTENSION: ICD-10-CM

## 2024-03-15 PROCEDURE — 3077F SYST BP >= 140 MM HG: CPT | Performed by: NURSE PRACTITIONER

## 2024-03-15 PROCEDURE — 3080F DIAST BP >= 90 MM HG: CPT | Performed by: NURSE PRACTITIONER

## 2024-03-15 PROCEDURE — 96372 THER/PROPH/DIAG INJ SC/IM: CPT | Performed by: NURSE PRACTITIONER

## 2024-03-15 PROCEDURE — 99214 OFFICE O/P EST MOD 30 MIN: CPT | Performed by: NURSE PRACTITIONER

## 2024-03-15 RX ORDER — KETOROLAC TROMETHAMINE 30 MG/ML
60 INJECTION, SOLUTION INTRAMUSCULAR; INTRAVENOUS ONCE
Status: COMPLETED | OUTPATIENT
Start: 2024-03-15 | End: 2024-03-15

## 2024-03-15 RX ORDER — HYDROCHLOROTHIAZIDE 25 MG/1
25 TABLET ORAL EVERY MORNING
Qty: 90 TABLET | Refills: 0 | Status: SHIPPED | OUTPATIENT
Start: 2024-03-15

## 2024-03-15 RX ORDER — METHYLPREDNISOLONE 4 MG/1
TABLET ORAL
Qty: 1 KIT | Refills: 0 | Status: SHIPPED | OUTPATIENT
Start: 2024-03-15

## 2024-03-15 RX ORDER — METHOCARBAMOL 750 MG/1
750 TABLET, FILM COATED ORAL 2 TIMES DAILY PRN
Qty: 20 TABLET | Refills: 0 | Status: SHIPPED | OUTPATIENT
Start: 2024-03-15 | End: 2024-03-25

## 2024-03-15 RX ADMIN — KETOROLAC TROMETHAMINE 60 MG: 30 INJECTION, SOLUTION INTRAMUSCULAR; INTRAVENOUS at 11:20

## 2024-03-15 ASSESSMENT — ENCOUNTER SYMPTOMS
RECTAL PAIN: 0
EYE PAIN: 0
ANAL BLEEDING: 0
WHEEZING: 0
ALLERGIC/IMMUNOLOGIC NEGATIVE: 1
EYE REDNESS: 0
DIARRHEA: 0
COUGH: 0
CHEST TIGHTNESS: 0
COLOR CHANGE: 0
EYE ITCHING: 0
RESPIRATORY NEGATIVE: 1
SINUS PRESSURE: 0
SORE THROAT: 0
TROUBLE SWALLOWING: 0
CONSTIPATION: 0
PHOTOPHOBIA: 0
EYE DISCHARGE: 0
STRIDOR: 0
NAUSEA: 0
CHOKING: 0
BLOOD IN STOOL: 0
ABDOMINAL DISTENTION: 0
GASTROINTESTINAL NEGATIVE: 1
VOMITING: 0
APNEA: 0
ABDOMINAL PAIN: 0
RHINORRHEA: 0
SHORTNESS OF BREATH: 0
BACK PAIN: 1

## 2024-03-15 ASSESSMENT — PATIENT HEALTH QUESTIONNAIRE - PHQ9
3. TROUBLE FALLING OR STAYING ASLEEP: 0
7. TROUBLE CONCENTRATING ON THINGS, SUCH AS READING THE NEWSPAPER OR WATCHING TELEVISION: 0
SUM OF ALL RESPONSES TO PHQ QUESTIONS 1-9: 0
4. FEELING TIRED OR HAVING LITTLE ENERGY: 0
8. MOVING OR SPEAKING SO SLOWLY THAT OTHER PEOPLE COULD HAVE NOTICED. OR THE OPPOSITE, BEING SO FIGETY OR RESTLESS THAT YOU HAVE BEEN MOVING AROUND A LOT MORE THAN USUAL: 0
SUM OF ALL RESPONSES TO PHQ QUESTIONS 1-9: 0
9. THOUGHTS THAT YOU WOULD BE BETTER OFF DEAD, OR OF HURTING YOURSELF: 0
SUM OF ALL RESPONSES TO PHQ9 QUESTIONS 1 & 2: 0
SUM OF ALL RESPONSES TO PHQ QUESTIONS 1-9: 0
6. FEELING BAD ABOUT YOURSELF - OR THAT YOU ARE A FAILURE OR HAVE LET YOURSELF OR YOUR FAMILY DOWN: 0
2. FEELING DOWN, DEPRESSED OR HOPELESS: 0
1. LITTLE INTEREST OR PLEASURE IN DOING THINGS: 0
10. IF YOU CHECKED OFF ANY PROBLEMS, HOW DIFFICULT HAVE THESE PROBLEMS MADE IT FOR YOU TO DO YOUR WORK, TAKE CARE OF THINGS AT HOME, OR GET ALONG WITH OTHER PEOPLE: 0
5. POOR APPETITE OR OVEREATING: 0
SUM OF ALL RESPONSES TO PHQ QUESTIONS 1-9: 0

## 2024-03-15 NOTE — PATIENT INSTRUCTIONS
[id number]    [facility] 7 am - 7:30 pm  [address] [address], [facility] [address]  [phone]    [facility] 8 am - 8 pm  [address] [place]  [address]  [phone]

## 2024-03-15 NOTE — PROGRESS NOTES
Negative for apnea, cough, choking, chest tightness, shortness of breath, wheezing and stridor.    Cardiovascular:  Negative for chest pain, palpitations and leg swelling.   Gastrointestinal: Negative.  Negative for abdominal distention, abdominal pain, anal bleeding, blood in stool, constipation, diarrhea, nausea, rectal pain and vomiting.   Genitourinary: Negative.  Negative for decreased urine volume, difficulty urinating, dysuria, enuresis, flank pain, frequency, genital sores, hematuria and urgency.   Musculoskeletal:  Positive for arthralgias, back pain, myalgias, neck pain and neck stiffness. Negative for gait problem and joint swelling.   Skin: Negative.  Negative for color change, pallor, rash and wound.   Allergic/Immunologic: Negative.    Neurological: Negative.  Negative for dizziness, facial asymmetry, weakness, light-headedness and headaches.   Psychiatric/Behavioral:  Negative for agitation, behavioral problems, confusion, decreased concentration, dysphoric mood, hallucinations, self-injury, sleep disturbance and suicidal ideas. The patient is not nervous/anxious and is not hyperactive.          Objective:     Vitals:    03/15/24 0822 03/15/24 0826   BP: (!) 176/98 (!) 176/96   Site: Left Upper Arm Left Upper Arm   Position: Sitting Sitting   Cuff Size: Large Adult Large Adult   Pulse: 62    SpO2: 98%    Weight: 89.8 kg (198 lb)    Height: 1.607 m (5' 3.25\")      Wt Readings from Last 3 Encounters:   03/15/24 89.8 kg (198 lb)   10/30/23 88.5 kg (195 lb)   08/21/23 86.2 kg (190 lb)     Temp Readings from Last 3 Encounters:   04/18/23 97.7 °F (36.5 °C) (Temporal)   03/22/21 97.9 °F (36.6 °C) (Temporal)   01/19/21 97.3 °F (36.3 °C) (Temporal)     BP Readings from Last 3 Encounters:   03/15/24 (!) 176/96   10/30/23 (!) 158/82   08/21/23 138/80     Pulse Readings from Last 3 Encounters:   03/15/24 62   10/30/23 75   08/21/23 82     Physical Exam  Vitals and nursing note reviewed.   Constitutional:

## 2024-03-20 ENCOUNTER — TELEPHONE (OUTPATIENT)
Dept: FAMILY MEDICINE CLINIC | Age: 59
End: 2024-03-20

## 2024-03-20 NOTE — TELEPHONE ENCOUNTER
As long as her heart rate is above 60 she may increase metoprolol XL to 100 mg p.o. twice daily.  Call readings Friday

## 2024-03-20 NOTE — TELEPHONE ENCOUNTER
Pt notified she would like to become Katies patient once doc retires Lizet are you able to accept her as a patient?

## 2024-03-20 NOTE — TELEPHONE ENCOUNTER
----- Message from DARÍO Perez - CNP sent at 3/15/2024  8:46 AM EDT -----  How is her BP running since starting back on Metoprolol

## 2024-03-25 ENCOUNTER — TELEPHONE (OUTPATIENT)
Dept: FAMILY MEDICINE CLINIC | Age: 59
End: 2024-03-25

## 2024-03-25 DIAGNOSIS — I10 PRIMARY HYPERTENSION: ICD-10-CM

## 2024-03-25 DIAGNOSIS — I10 ESSENTIAL HYPERTENSION, BENIGN: ICD-10-CM

## 2024-03-25 DIAGNOSIS — I10 UNCONTROLLED HYPERTENSION: ICD-10-CM

## 2024-03-25 RX ORDER — METOPROLOL SUCCINATE 100 MG/1
TABLET, EXTENDED RELEASE ORAL
Qty: 180 TABLET | Refills: 0 | Status: SHIPPED | OUTPATIENT
Start: 2024-03-25

## 2024-03-25 RX ORDER — HYDROCHLOROTHIAZIDE 50 MG/1
50 TABLET ORAL EVERY MORNING
Qty: 90 TABLET | Refills: 0 | Status: SHIPPED | OUTPATIENT
Start: 2024-03-25

## 2024-03-25 NOTE — TELEPHONE ENCOUNTER
Patient called and informed of the change in medication and also informed of the need for BMP and mag and blood pressure check on Thursday. Informed of need for BMP and mag and BP check on Thursday, unable to place on schedule but patient is aware she still needs to come in for these tests.

## 2024-03-25 NOTE — TELEPHONE ENCOUNTER
Pt called and stated that she was calling to give you her blood pressure readings. 3/20 213/90 pulse 63, 2/22 195/111 pulse 63, 2/22 evening 197/95 pulse 74 and today 185/93 pulse 78. She said that when she finished her prednisone her headache came back.

## 2024-03-25 NOTE — TELEPHONE ENCOUNTER
Increase metoprolol to  mg p.o. twice daily.  Increase hydrochlorothiazide to 50 mg daily.  Stop Lasix.  BMP magnesium and blood pressure check this Thursday

## 2024-03-28 ENCOUNTER — TELEPHONE (OUTPATIENT)
Dept: FAMILY MEDICINE CLINIC | Age: 59
End: 2024-03-28

## 2024-03-28 ENCOUNTER — NURSE ONLY (OUTPATIENT)
Dept: FAMILY MEDICINE CLINIC | Age: 59
End: 2024-03-28
Payer: COMMERCIAL

## 2024-03-28 DIAGNOSIS — M50.30 DDD (DEGENERATIVE DISC DISEASE), CERVICAL: Primary | ICD-10-CM

## 2024-03-28 DIAGNOSIS — I10 PRIMARY HYPERTENSION: Primary | ICD-10-CM

## 2024-03-28 DIAGNOSIS — I10 ESSENTIAL HYPERTENSION, BENIGN: ICD-10-CM

## 2024-03-28 DIAGNOSIS — I10 PRIMARY HYPERTENSION: ICD-10-CM

## 2024-03-28 LAB
ANION GAP SERPL CALCULATED.3IONS-SCNC: 12 MMOL/L (ref 3–16)
BUN SERPL-MCNC: 12 MG/DL (ref 7–20)
CALCIUM SERPL-MCNC: 9.7 MG/DL (ref 8.3–10.6)
CHLORIDE SERPL-SCNC: 107 MMOL/L (ref 99–110)
CO2 SERPL-SCNC: 20 MMOL/L (ref 21–32)
CREAT SERPL-MCNC: 1 MG/DL (ref 0.6–1.1)
GFR SERPLBLD CREATININE-BSD FMLA CKD-EPI: 65 ML/MIN/{1.73_M2}
GLUCOSE SERPL-MCNC: 143 MG/DL (ref 70–99)
MAGNESIUM SERPL-MCNC: 1.8 MG/DL (ref 1.8–2.4)
POTASSIUM SERPL-SCNC: 3.7 MMOL/L (ref 3.5–5.1)
SODIUM SERPL-SCNC: 139 MMOL/L (ref 136–145)

## 2024-03-28 PROCEDURE — 36415 COLL VENOUS BLD VENIPUNCTURE: CPT | Performed by: FAMILY MEDICINE

## 2024-03-28 RX ORDER — VALSARTAN 80 MG/1
80 TABLET ORAL DAILY
Qty: 30 TABLET | Refills: 1 | Status: CANCELLED | OUTPATIENT
Start: 2024-03-28

## 2024-03-28 RX ORDER — VALSARTAN 40 MG/1
40 TABLET ORAL DAILY
Qty: 30 TABLET | Refills: 0 | Status: SHIPPED | OUTPATIENT
Start: 2024-03-28

## 2024-03-28 NOTE — TELEPHONE ENCOUNTER
Pt is here for bp check and labs, pt bp today was 225/107, 180/98 hr 60, 171/93 hr 59. Pt has recently been told to increase metoprolol to 100 BID but told to not take second dose if hr is under 60 and the last 2 days it has been and pt has not taken 2nd dose, pt has also increased HCTZ to 50mg daily and readings at home are still high as well. Pt is concerned her recent neck pain is contributing to her bp being high. Pt has been having neck pain that is so severe it brings her to tears at night and is wanting to see if she can get an xray of the neck to look into this again. Pt has seen Dr Lemus in the past and her MRI was denied. Pt was not able to do PT due the severity of pain and discomfort. Pt scheduled to see Dr Musa for the neck pain on Tuesday 4/2/24

## 2024-03-28 NOTE — TELEPHONE ENCOUNTER
Pt states thst she is still having neck pain and she was wanting to see if she could get an x-ray ordered. States that her Mom had a brain tumor and she is worried about this.

## 2024-03-28 NOTE — TELEPHONE ENCOUNTER
Left detailed message xray order is placed and to get new medication from [facility] and take in addition to medication she already taking which is the metoprolol and HCTZ.

## 2024-03-29 ENCOUNTER — HOSPITAL ENCOUNTER (OUTPATIENT)
Age: 59
End: 2024-03-29
Payer: COMMERCIAL

## 2024-03-29 ENCOUNTER — HOSPITAL ENCOUNTER (OUTPATIENT)
Dept: GENERAL RADIOLOGY | Age: 59
Discharge: HOME | End: 2024-03-29
Payer: COMMERCIAL

## 2024-03-29 DIAGNOSIS — M50.30 DDD (DEGENERATIVE DISC DISEASE), CERVICAL: ICD-10-CM

## 2024-03-29 PROCEDURE — 72040 X-RAY EXAM NECK SPINE 2-3 VW: CPT

## 2024-04-02 ENCOUNTER — OFFICE VISIT (OUTPATIENT)
Dept: FAMILY MEDICINE CLINIC | Age: 59
End: 2024-04-02
Payer: COMMERCIAL

## 2024-04-02 VITALS
HEART RATE: 86 BPM | WEIGHT: 200 LBS | OXYGEN SATURATION: 97 % | SYSTOLIC BLOOD PRESSURE: 138 MMHG | DIASTOLIC BLOOD PRESSURE: 89 MMHG | BODY MASS INDEX: 35.15 KG/M2

## 2024-04-02 DIAGNOSIS — M79.7 FIBROMYALGIA: ICD-10-CM

## 2024-04-02 DIAGNOSIS — M54.2 CERVICAL PAIN: ICD-10-CM

## 2024-04-02 DIAGNOSIS — M62.838 TRAPEZIUS MUSCLE SPASM: ICD-10-CM

## 2024-04-02 DIAGNOSIS — M54.50 CHRONIC BILATERAL LOW BACK PAIN WITHOUT SCIATICA: ICD-10-CM

## 2024-04-02 DIAGNOSIS — M75.82 ROTATOR CUFF TENDONITIS, LEFT: Primary | ICD-10-CM

## 2024-04-02 DIAGNOSIS — G89.29 CHRONIC BILATERAL LOW BACK PAIN WITHOUT SCIATICA: ICD-10-CM

## 2024-04-02 PROCEDURE — 99214 OFFICE O/P EST MOD 30 MIN: CPT | Performed by: FAMILY MEDICINE

## 2024-04-02 PROCEDURE — 3075F SYST BP GE 130 - 139MM HG: CPT | Performed by: FAMILY MEDICINE

## 2024-04-02 PROCEDURE — 3079F DIAST BP 80-89 MM HG: CPT | Performed by: FAMILY MEDICINE

## 2024-04-02 RX ORDER — MELOXICAM 15 MG/1
15 TABLET ORAL DAILY
Qty: 30 TABLET | Refills: 2 | Status: SHIPPED | OUTPATIENT
Start: 2024-04-02

## 2024-04-02 RX ORDER — TIZANIDINE 4 MG/1
4 TABLET ORAL NIGHTLY PRN
Qty: 30 TABLET | Refills: 0 | Status: SHIPPED | OUTPATIENT
Start: 2024-04-02

## 2024-04-02 SDOH — ECONOMIC STABILITY: FOOD INSECURITY: WITHIN THE PAST 12 MONTHS, YOU WORRIED THAT YOUR FOOD WOULD RUN OUT BEFORE YOU GOT MONEY TO BUY MORE.: NEVER TRUE

## 2024-04-02 SDOH — ECONOMIC STABILITY: INCOME INSECURITY: HOW HARD IS IT FOR YOU TO PAY FOR THE VERY BASICS LIKE FOOD, HOUSING, MEDICAL CARE, AND HEATING?: NOT HARD AT ALL

## 2024-04-02 SDOH — ECONOMIC STABILITY: FOOD INSECURITY: WITHIN THE PAST 12 MONTHS, THE FOOD YOU BOUGHT JUST DIDN'T LAST AND YOU DIDN'T HAVE MONEY TO GET MORE.: NEVER TRUE

## 2024-04-02 NOTE — PROGRESS NOTES
Chief Complaint   Patient presents with    Hypertension        Internal Administration   First Dose      Second Dose           Last COVID Lab POC Glucose (mg/dl)   Date Value   2020 102 (H)             Wt Readings from Last 3 Encounters:   24 90.7 kg (200 lb)   03/15/24 89.8 kg (198 lb)   10/30/23 88.5 kg (195 lb)     BP Readings from Last 3 Encounters:   24 138/89   03/15/24 (!) 176/96   10/30/23 (!) 158/82      Lab Results   Component Value Date    LABA1C 5.4 2023    LABA1C 5.4 2020    LABA1C 5.9 2019       HPI:  Kamran Coronado is a 58 y.o. (: 1965) here today for    Patient is in the office today due to her continued back, neck and shoulder pain. She was in to see naif TORIBIO and she was placed on a steroid and it really helped her but once the steroid was through it immediately came back. She is showing issues with left rotator cuff impingement. She has seen ortho in the past with her neck issues and she states ortho tried to get an MRI but her insurance denied coverage.     [] Patient has completed an advance directive  [x] Patient has NOT completed an advanced directive  [] Patient has a documented healthcare surrogate  [x] Patient does NOT have a documented healthcare surrogate  [] Discussed the importance of establishing and updating an advanced directive.  Patient has questions at this time and those were answered.  [x] Discussed the importance of establishing and updating an advanced directive.  Patient does NOT have questions at this time.    Discussed with: [x] Patient            [] Family             [] Other caregiver    Patient's medications, allergies, past medical, surgical, social and family histories were reviewed and updated asappropriate on 2024 at 7:20 AM.    ROS:  Review of Systems    All other systems reviewed and are negative except as noted above on 2024 at 7:20 AM. Additional review of systems may be scanned into the media section

## 2024-04-10 ENCOUNTER — TELEPHONE (OUTPATIENT)
Dept: FAMILY MEDICINE CLINIC | Age: 59
End: 2024-04-10

## 2024-04-10 NOTE — TELEPHONE ENCOUNTER
Patient called confused about her medication and dosing. Reviewed all of patients medications with her and the dosing and frequency. Also discussed that she is not to take ibuprofen with being on meloxicam but she can take tylenol as needed. Patient medication list was updated and she wrote down all of her instructions for her medications and read back her instructions to verify she had the correct dosing.

## 2024-05-09 DIAGNOSIS — M54.50 CHRONIC BILATERAL LOW BACK PAIN WITHOUT SCIATICA: ICD-10-CM

## 2024-05-09 DIAGNOSIS — G89.29 CHRONIC BILATERAL LOW BACK PAIN WITHOUT SCIATICA: ICD-10-CM

## 2024-05-10 RX ORDER — CYCLOBENZAPRINE HCL 10 MG
TABLET ORAL
Qty: 30 TABLET | Refills: 0 | OUTPATIENT
Start: 2024-05-10

## 2024-05-10 NOTE — TELEPHONE ENCOUNTER
Discontinued by: Rita Jaimes APRN - CNP on 3/15/2024 08:44   Reason: Therapy completed   Future appt scheduled 06/12/2024                 Last appt 04/002/2024      Last Written 03/13/2024      cyclobenzaprine (FLEXERIL) 10 MG tablet   03/13/2024  #30  0 RF     Discontinued by: Rita Jaimes APRN - CNP on 3/15/2024 08:44   Reason: Therapy completed

## 2024-06-10 DIAGNOSIS — M54.50 CHRONIC BILATERAL LOW BACK PAIN WITHOUT SCIATICA: ICD-10-CM

## 2024-06-10 DIAGNOSIS — G89.29 CHRONIC BILATERAL LOW BACK PAIN WITHOUT SCIATICA: ICD-10-CM

## 2024-06-10 DIAGNOSIS — M54.2 CERVICAL PAIN: ICD-10-CM

## 2024-06-10 RX ORDER — TIZANIDINE 4 MG/1
4 TABLET ORAL NIGHTLY PRN
Qty: 30 TABLET | Refills: 0 | Status: SHIPPED | OUTPATIENT
Start: 2024-06-10 | End: 2024-06-12 | Stop reason: SDUPTHER

## 2024-06-10 RX ORDER — MELOXICAM 15 MG/1
15 TABLET ORAL DAILY
Qty: 30 TABLET | Refills: 0 | Status: SHIPPED | OUTPATIENT
Start: 2024-06-10 | End: 2024-06-12 | Stop reason: SINTOL

## 2024-06-10 NOTE — TELEPHONE ENCOUNTER
Refill Request     CONFIRM preferrred pharmacy with the patient.    If Mail Order Rx - Pend for 90 day refill.      Last Seen: Last Seen Department: 4/2/2024  Last Seen by PCP: 4/2/2024    Last Written: 4.2.24    If no future appointment scheduled, route STAFF MESSAGE with patient name to the  Pool for scheduling.      Next Appointment:   Future Appointments   Date Time Provider Department Center   6/12/2024  7:20 AM Dontrell Musa MD [facility] [place] - [facility]       Message sent to  to schedule appt with patient?  NO        tiZANidine (ZANAFLEX) 4 MG tablet [[id number]]     meloxicam (MOBIC) 15 MG tablet [[id number]]

## 2024-06-12 ENCOUNTER — OFFICE VISIT (OUTPATIENT)
Dept: FAMILY MEDICINE CLINIC | Age: 59
End: 2024-06-12
Payer: COMMERCIAL

## 2024-06-12 VITALS
SYSTOLIC BLOOD PRESSURE: 169 MMHG | HEART RATE: 77 BPM | BODY MASS INDEX: 35.15 KG/M2 | WEIGHT: 200 LBS | OXYGEN SATURATION: 98 % | DIASTOLIC BLOOD PRESSURE: 102 MMHG

## 2024-06-12 DIAGNOSIS — I10 PRIMARY HYPERTENSION: ICD-10-CM

## 2024-06-12 DIAGNOSIS — Z13.1 SCREENING FOR DIABETES MELLITUS (DM): ICD-10-CM

## 2024-06-12 DIAGNOSIS — I10 UNCONTROLLED HYPERTENSION: Primary | ICD-10-CM

## 2024-06-12 DIAGNOSIS — R07.89 CHEST WALL PAIN: ICD-10-CM

## 2024-06-12 DIAGNOSIS — Z13.29 SCREENING FOR THYROID DISORDER: ICD-10-CM

## 2024-06-12 DIAGNOSIS — M54.2 CERVICAL PAIN: ICD-10-CM

## 2024-06-12 DIAGNOSIS — G89.29 CHRONIC BILATERAL LOW BACK PAIN WITHOUT SCIATICA: ICD-10-CM

## 2024-06-12 DIAGNOSIS — M54.50 CHRONIC BILATERAL LOW BACK PAIN WITHOUT SCIATICA: ICD-10-CM

## 2024-06-12 DIAGNOSIS — Z13.0 SCREENING FOR DISORDER OF BLOOD AND BLOOD-FORMING ORGANS: ICD-10-CM

## 2024-06-12 DIAGNOSIS — F33.9 EPISODE OF RECURRENT MAJOR DEPRESSIVE DISORDER, UNSPECIFIED DEPRESSION EPISODE SEVERITY (HCC): ICD-10-CM

## 2024-06-12 DIAGNOSIS — R07.9 CHEST PAIN, UNSPECIFIED TYPE: ICD-10-CM

## 2024-06-12 DIAGNOSIS — Z13.220 SCREENING FOR CHOLESTEROL LEVEL: ICD-10-CM

## 2024-06-12 DIAGNOSIS — Z13.21 ENCOUNTER FOR VITAMIN DEFICIENCY SCREENING: ICD-10-CM

## 2024-06-12 PROBLEM — R56.9 NEW ONSET SEIZURE (HCC): Status: RESOLVED | Noted: 2017-01-12 | Resolved: 2024-06-12

## 2024-06-12 LAB
25(OH)D3 SERPL-MCNC: 29.5 NG/ML
ALBUMIN SERPL-MCNC: 4.3 G/DL (ref 3.4–5)
ALBUMIN/GLOB SERPL: 1.8 {RATIO} (ref 1.1–2.2)
ALP SERPL-CCNC: 62 U/L (ref 40–129)
ALT SERPL-CCNC: 13 U/L (ref 10–40)
ANION GAP SERPL CALCULATED.3IONS-SCNC: 12 MMOL/L (ref 3–16)
AST SERPL-CCNC: 17 U/L (ref 15–37)
BASOPHILS # BLD: 0.1 K/UL (ref 0–0.2)
BASOPHILS NFR BLD: 0.9 %
BILIRUB SERPL-MCNC: 0.3 MG/DL (ref 0–1)
BUN SERPL-MCNC: 14 MG/DL (ref 7–20)
CALCIUM SERPL-MCNC: 9.6 MG/DL (ref 8.3–10.6)
CHLORIDE SERPL-SCNC: 106 MMOL/L (ref 99–110)
CHOLEST SERPL-MCNC: 191 MG/DL (ref 0–199)
CO2 SERPL-SCNC: 21 MMOL/L (ref 21–32)
CREAT SERPL-MCNC: 0.9 MG/DL (ref 0.6–1.1)
DEPRECATED RDW RBC AUTO: 13.8 % (ref 12.4–15.4)
EOSINOPHIL # BLD: 0.3 K/UL (ref 0–0.6)
EOSINOPHIL NFR BLD: 3.8 %
FOLATE SERPL-MCNC: 8.08 NG/ML (ref 4.78–24.2)
GFR SERPLBLD CREATININE-BSD FMLA CKD-EPI: 74 ML/MIN/{1.73_M2}
GLUCOSE SERPL-MCNC: 100 MG/DL (ref 70–99)
HCT VFR BLD AUTO: 38.3 % (ref 36–48)
HDLC SERPL-MCNC: 65 MG/DL (ref 40–60)
HGB BLD-MCNC: 13 G/DL (ref 12–16)
LDLC SERPL CALC-MCNC: 112 MG/DL
LYMPHOCYTES # BLD: 1.9 K/UL (ref 1–5.1)
LYMPHOCYTES NFR BLD: 26.1 %
MAGNESIUM SERPL-MCNC: 2.2 MG/DL (ref 1.8–2.4)
MCH RBC QN AUTO: 29.9 PG (ref 26–34)
MCHC RBC AUTO-ENTMCNC: 34 G/DL (ref 31–36)
MCV RBC AUTO: 87.9 FL (ref 80–100)
MONOCYTES # BLD: 0.4 K/UL (ref 0–1.3)
MONOCYTES NFR BLD: 5.7 %
NEUTROPHILS # BLD: 4.7 K/UL (ref 1.7–7.7)
NEUTROPHILS NFR BLD: 63.5 %
PLATELET # BLD AUTO: 301 K/UL (ref 135–450)
PMV BLD AUTO: 7.9 FL (ref 5–10.5)
POTASSIUM SERPL-SCNC: 4.2 MMOL/L (ref 3.5–5.1)
PROT SERPL-MCNC: 6.7 G/DL (ref 6.4–8.2)
RBC # BLD AUTO: 4.36 M/UL (ref 4–5.2)
SODIUM SERPL-SCNC: 139 MMOL/L (ref 136–145)
TRIGL SERPL-MCNC: 70 MG/DL (ref 0–150)
VIT B12 SERPL-MCNC: 311 PG/ML (ref 211–911)
VLDLC SERPL CALC-MCNC: 14 MG/DL
WBC # BLD AUTO: 7.4 K/UL (ref 4–11)

## 2024-06-12 PROCEDURE — 3080F DIAST BP >= 90 MM HG: CPT | Performed by: FAMILY MEDICINE

## 2024-06-12 PROCEDURE — 93000 ELECTROCARDIOGRAM COMPLETE: CPT | Performed by: FAMILY MEDICINE

## 2024-06-12 PROCEDURE — 3077F SYST BP >= 140 MM HG: CPT | Performed by: FAMILY MEDICINE

## 2024-06-12 PROCEDURE — 99214 OFFICE O/P EST MOD 30 MIN: CPT | Performed by: FAMILY MEDICINE

## 2024-06-12 RX ORDER — METHYLPREDNISOLONE 4 MG/1
TABLET ORAL
Qty: 1 KIT | Refills: 0 | Status: SHIPPED | OUTPATIENT
Start: 2024-06-12

## 2024-06-12 RX ORDER — TIZANIDINE 4 MG/1
4 TABLET ORAL NIGHTLY PRN
Qty: 90 TABLET | Refills: 0 | Status: SHIPPED | OUTPATIENT
Start: 2024-06-12

## 2024-06-12 RX ORDER — VALSARTAN 40 MG/1
40 TABLET ORAL DAILY
Qty: 90 TABLET | Refills: 0 | Status: SHIPPED | OUTPATIENT
Start: 2024-06-12

## 2024-06-12 NOTE — PROGRESS NOTES
rash.      Comments: Turgor normal   Neurological:      Mental Status: She is oriented to person, place, and time.   Psychiatric:         Mood and Affect: Mood normal.         Behavior: Behavior normal.         Thought Content: Thought content normal.         Judgment: Judgment normal.              ASSESSMENT PLAN   Date of last Colonoscopy: 12/20/2018   No cologuard on file  No FIT/FOBT on file   No flexible sigmoidoscopy on file  Date of last Mammogram: 1/25/2023  No cervical cancer screening on file     Diagnosis Orders   1. Uncontrolled hypertension        2. Chest pain, unspecified type  EKG 12 Lead      3. Primary hypertension  valsartan (DIOVAN) 40 MG tablet      4. Chest wall pain  methylPREDNISolone (MEDROL DOSEPACK) 4 MG tablet      5. Chronic bilateral low back pain without sciatica  tiZANidine (ZANAFLEX) 4 MG tablet      6. Cervical pain  tiZANidine (ZANAFLEX) 4 MG tablet      7. Screening for diabetes mellitus (DM)  Hemoglobin A1C      8. Encounter for vitamin deficiency screening  Vitamin D 25 Hydroxy    Vitamin B12 & Folate      9. Screening for disorder of blood and blood-forming organs  CBC with Auto Differential    Comprehensive Metabolic Panel    Magnesium      10. Screening for thyroid disorder  TSH with Reflex      11. Screening for cholesterol level  Lipid Panel      12. Episode of recurrent major depressive disorder, unspecified depression episode severity (HCC)        Patient admits to not taking blood pressure medicines regularly.  Reinforced the need to do so including the hydrochlorothiazide 50 mg daily and metoprolol  mg twice daily.  We have increased her valsartan from 40 to 80 mg.  Her chest pain certainly has a musculoskeletal component, but she states that she also has a deeper discomfort.  Her EKG was unremarkable.  Get her blood pressure under control but may still need a stress test and echo.  Low back pain and neck pain continues.  She is having worsening headaches and

## 2024-06-13 LAB
EST. AVERAGE GLUCOSE BLD GHB EST-MCNC: 111.2 MG/DL
HBA1C MFR BLD: 5.5 %

## 2024-07-03 ENCOUNTER — OFFICE VISIT (OUTPATIENT)
Dept: FAMILY MEDICINE CLINIC | Age: 59
End: 2024-07-03

## 2024-07-03 DIAGNOSIS — R20.9 SKIN SENSATION DISTURBANCE: Primary | ICD-10-CM

## 2024-07-03 DIAGNOSIS — B07.8 OTHER VIRAL WARTS: ICD-10-CM

## 2024-07-03 DIAGNOSIS — L82.0 INFLAMED SEBORRHEIC KERATOSIS: ICD-10-CM

## 2024-07-03 NOTE — PROGRESS NOTES
Chief Complaint   Patient presents with    Procedure     SK removal        Internal Administration   First Dose      Second Dose           Last COVID Lab POC Glucose (mg/dl)   Date Value   2020 102 (H)            Wt Readings from Last 3 Encounters:   24 90.7 kg (200 lb)   24 90.7 kg (200 lb)   03/15/24 89.8 kg (198 lb)     BP Readings from Last 3 Encounters:   24 (!) 169/102   24 138/89   03/15/24 (!) 176/96     Lab Results   Component Value Date    LABA1C 5.5 2024    LABA1C 5.4 2023    LABA1C 5.4 2020         HPI:   Kamran Coronado is a 58 y.o. (: 1965) here today for removal    [] Patient has completed an advance directive  [] Patient has NOT completed an advanced directive  [] Patient has a documented healthcare surrogate  [] Patient does NOT have a documented healthcare surrogate  [] Discussed the importance of establishing and updating an advanced directive.  Patient has questions at this time and those were answered.  [] Discussed the importance of establishing and updating an advanced directive.  Patient does NOT have questions at this time.    Discussed with: [] Patient            [] Family             [] Other caregiver    Patient's medications, allergies, past medical, surgical, social and family histories were reviewed and updated as appropriateon 7/3/2024 at 3:18 PM.      Review of Systems  Additional review of systems may be scanned into the mediasection of this medical record.  Any responses requiring further intervention were pursued.  OBJECTIVE:  Estimated body mass index is 35.15 kg/m² as calculated from the following:    Height as of 3/15/24: 1.607 m (5' 3.25\").    Weight as of 24: 90.7 kg (200 lb).  There were no vitals filed for this visit.  Physical Exam  Vitals and nursing note reviewed.   Constitutional:       General: She is not in acute distress.     Appearance: She is well-developed. She is not diaphoretic.   HENT:      Head:

## 2024-07-11 DIAGNOSIS — M54.2 CERVICAL PAIN: ICD-10-CM

## 2024-07-11 DIAGNOSIS — M54.50 CHRONIC BILATERAL LOW BACK PAIN WITHOUT SCIATICA: ICD-10-CM

## 2024-07-11 DIAGNOSIS — G89.29 CHRONIC BILATERAL LOW BACK PAIN WITHOUT SCIATICA: ICD-10-CM

## 2024-07-11 RX ORDER — TIZANIDINE 4 MG/1
TABLET ORAL
Qty: 90 TABLET | Refills: 0 | Status: SHIPPED | OUTPATIENT
Start: 2024-07-11

## 2024-07-11 NOTE — TELEPHONE ENCOUNTER
Refill Request     CONFIRM preferrred pharmacy with the patient.    If Mail Order Rx - Pend for 90 day refill.      Last Seen: Last Seen Department: 7/3/2024  Last Seen by PCP: 7/3/2024    Last Written: 6/12/24    If no future appointment scheduled, route STAFF MESSAGE with patient name to the  Pool for scheduling.      Next Appointment:   No future appointments.    Message sent to  to schedule appt with patient?  N/A      Requested Prescriptions     Pending Prescriptions Disp Refills    tiZANidine (ZANAFLEX) 4 MG tablet [Pharmacy Med Name: TIZANIDINE HYDROCHLORIDE 4MG TABLET] 90 tablet 0     Sig: TAKE ONE (1) TABLET BY MOUTH NIGHTLY AS NEEDED (FOR MUSCLE PAIN)

## 2024-07-16 ENCOUNTER — TELEPHONE (OUTPATIENT)
Dept: FAMILY MEDICINE CLINIC | Age: 59
End: 2024-07-16

## 2024-07-16 DIAGNOSIS — R07.89 CHEST WALL PAIN: ICD-10-CM

## 2024-07-16 RX ORDER — METHYLPREDNISOLONE 4 MG/1
TABLET ORAL
Qty: 1 KIT | Refills: 0 | Status: SHIPPED | OUTPATIENT
Start: 2024-07-16

## 2024-07-16 NOTE — TELEPHONE ENCOUNTER
Pt states that she is having a lot of back and hip pain and trying to work. Was wanting to see if she could get something called in or what she could get OTC that would help. She uses [facility] pharmacy if calling in something.

## 2024-07-29 ENCOUNTER — TELEPHONE (OUTPATIENT)
Dept: FAMILY MEDICINE CLINIC | Age: 59
End: 2024-07-29

## 2024-07-29 RX ORDER — PROMETHAZINE HYDROCHLORIDE 25 MG/1
25 TABLET ORAL EVERY 4 HOURS PRN
Qty: 31 TABLET | Refills: 0 | Status: SHIPPED | OUTPATIENT
Start: 2024-07-29

## 2024-07-29 RX ORDER — TRAZODONE HYDROCHLORIDE 150 MG/1
150 TABLET ORAL NIGHTLY
Qty: 30 TABLET | Refills: 2 | Status: SHIPPED | OUTPATIENT
Start: 2024-07-29

## 2024-07-29 NOTE — TELEPHONE ENCOUNTER
Please follow up on request, may use Phenergan 25 mg quantity #31 p.o. every 4 hours as needed nausea, may cause drowsiness

## 2024-07-29 NOTE — TELEPHONE ENCOUNTER
Pt states that she has not been sleeping and she is has been really nauseated was wanting to see if she could get something called in to [facility] pharmacy. She was also wanting to see if she could get her Trazodone sent ion.

## 2024-08-19 DIAGNOSIS — F41.9 ANXIETY: ICD-10-CM

## 2024-08-19 RX ORDER — HYDROXYZINE PAMOATE 25 MG/1
CAPSULE ORAL
Qty: 90 CAPSULE | Refills: 5 | Status: SHIPPED | OUTPATIENT
Start: 2024-08-19

## 2024-08-26 ENCOUNTER — OFFICE VISIT (OUTPATIENT)
Dept: FAMILY MEDICINE CLINIC | Age: 59
End: 2024-08-26
Payer: COMMERCIAL

## 2024-08-26 ENCOUNTER — HOSPITAL ENCOUNTER (OUTPATIENT)
Dept: GENERAL RADIOLOGY | Age: 59
Discharge: HOME | End: 2024-08-26
Payer: COMMERCIAL

## 2024-08-26 ENCOUNTER — HOSPITAL ENCOUNTER (OUTPATIENT)
Age: 59
Discharge: HOME | End: 2024-08-26
Payer: COMMERCIAL

## 2024-08-26 VITALS
SYSTOLIC BLOOD PRESSURE: 131 MMHG | BODY MASS INDEX: 33.29 KG/M2 | OXYGEN SATURATION: 95 % | DIASTOLIC BLOOD PRESSURE: 83 MMHG | WEIGHT: 195 LBS | HEART RATE: 82 BPM | HEIGHT: 64 IN

## 2024-08-26 DIAGNOSIS — M25.551 CHRONIC RIGHT HIP PAIN: Primary | ICD-10-CM

## 2024-08-26 DIAGNOSIS — G89.29 CHRONIC RIGHT HIP PAIN: ICD-10-CM

## 2024-08-26 DIAGNOSIS — M25.551 CHRONIC RIGHT HIP PAIN: ICD-10-CM

## 2024-08-26 DIAGNOSIS — G89.29 CHRONIC RIGHT HIP PAIN: Primary | ICD-10-CM

## 2024-08-26 PROCEDURE — 99214 OFFICE O/P EST MOD 30 MIN: CPT | Performed by: NURSE PRACTITIONER

## 2024-08-26 PROCEDURE — 96372 THER/PROPH/DIAG INJ SC/IM: CPT | Performed by: NURSE PRACTITIONER

## 2024-08-26 PROCEDURE — 3075F SYST BP GE 130 - 139MM HG: CPT | Performed by: NURSE PRACTITIONER

## 2024-08-26 PROCEDURE — 73502 X-RAY EXAM HIP UNI 2-3 VIEWS: CPT

## 2024-08-26 PROCEDURE — 3079F DIAST BP 80-89 MM HG: CPT | Performed by: NURSE PRACTITIONER

## 2024-08-26 RX ORDER — KETOROLAC TROMETHAMINE 30 MG/ML
60 INJECTION, SOLUTION INTRAMUSCULAR; INTRAVENOUS ONCE
Status: COMPLETED | OUTPATIENT
Start: 2024-08-26 | End: 2024-08-26

## 2024-08-26 RX ORDER — BACLOFEN 10 MG/1
10 TABLET ORAL 3 TIMES DAILY PRN
Qty: 30 TABLET | Refills: 1 | Status: SHIPPED | OUTPATIENT
Start: 2024-08-26

## 2024-08-26 RX ADMIN — KETOROLAC TROMETHAMINE 60 MG: 30 INJECTION, SOLUTION INTRAMUSCULAR; INTRAVENOUS at 16:05

## 2024-08-26 ASSESSMENT — ENCOUNTER SYMPTOMS
APNEA: 0
COLOR CHANGE: 0
CHOKING: 0
CONSTIPATION: 0
DIARRHEA: 0
PHOTOPHOBIA: 0
EYE ITCHING: 0
EYE DISCHARGE: 0
RESPIRATORY NEGATIVE: 1
STRIDOR: 0
GASTROINTESTINAL NEGATIVE: 1
WHEEZING: 0
SHORTNESS OF BREATH: 0
SORE THROAT: 0
CHEST TIGHTNESS: 0
EYE REDNESS: 0
TROUBLE SWALLOWING: 0
COUGH: 0
NAUSEA: 0
SINUS PRESSURE: 0
BACK PAIN: 1
ALLERGIC/IMMUNOLOGIC NEGATIVE: 1
ABDOMINAL PAIN: 0
BLOOD IN STOOL: 0
EYE PAIN: 0
VOMITING: 0
RHINORRHEA: 0

## 2024-08-26 NOTE — PROGRESS NOTES
[facility]  [facility]  [address] [place] [address]  Dept: [phone]  Dept Fax: [phone]  Loc: [phone]    Kamran Coronado is a 59 y.o. female who presents today for her medical conditions/complaints as noted below.  Kamran Coronado is c/o of Other (Pt is here for pain in her right leg the right hip/groin area. Pt has had 3 rounds of steroids and is not doing any better. )        HPI:     Chief Complaint   Patient presents with    Other     Pt is here for pain in her right leg the right hip/groin area. Pt has had 3 rounds of steroids and is not doing any better.        Hip Pain   The incident occurred more than 1 week ago (Years, but admits to worse over last week). The incident occurred at home. There was no injury mechanism. The pain is present in the right hip (right hip). The quality of the pain is described as aching. The pain is at a severity of 10/10. The pain is severe. The pain has been Constant since onset. Associated symptoms include muscle weakness (Right hip) and numbness (in toes intermittently). Pertinent negatives include no inability to bear weight, loss of motion, loss of sensation or tingling. She reports no foreign bodies present. The symptoms are aggravated by movement. Treatments tried: Steroid, Zanaflex. The treatment provided no relief.       Past Medical History:   Diagnosis Date    Ankle fracture     Anxiety     Arthritis     Benzodiazepine abuse (HCC)     Depression     Fibromyalgia     Hyperlipidemia     Hypertension     Kidney cyst     x2 rt. ?    Migraine     Pneumonia 2008    bronchitis frequently since    TIA (transient ischemic attack)     Ulcer, Stomach Peptic     frequent N/V    Unspecified cerebral artery occlusion with cerebral infarction     mini stroke cused by severe migraine    Weight loss     frequent N/V, S/P cholecystectomy      Past Surgical History:   Procedure Laterality Date    ANKLE SURGERY        (Muscle spasms) Do not drive or operate machinery while taking     Dispense:  30 tablet     Refill:  1         No follow-ups on file.    Patient should call the office immediately with new or ongoing signs or symptoms or worsening, or proceedto the emergency room.  No changes in past medical history, past surgical history, social history, or family history were noted during the patient encounter unless specifically listed above.  All updates of past medicalhistory, past surgical history, social history, or family history were reviewed personally by me during the office visit.  All problems listed in the assessment are stable unless noted otherwise.  Medication profilereviewed personally by me during the office visit.  Medication side effects and possible impairments from medications were discussed as applicable.     Call if pattern of symptoms change or persists for an extended time.    This document was prepared by a combination of typing and transcription through a voice recognition software.    All medications have the potential for adverse effects. All medications effect each person differently. Please read and review provided information related to medication. If the medication that you have been prescribed has the potential to cause sedation, do not drive or operate car, truck, or heavy machinery until you know how the medication will effect you. If you experience any adverse effects from the medication, please call the office or report to the emergency department.    Information given to patient- What are the possible side effects of muscle relaxers?  Get emergency medical help if you have signs of an allergic reaction: hives; difficult breathing; swelling of your face, lips, tongue, or throat.  Call your doctor at once if you have:  weak or shallow breathing;  confusion, hallucinations; or  a seizure (convulsions).  Common side effects may include:  drowsiness, dizziness, weakness, tired

## 2024-08-26 NOTE — PATIENT INSTRUCTIONS
Not feeling your best?  Where to go for the right care at the right time.    Dear Kamran Coronado   I wanted to provide you with some information that might help you seek care for your condition when your primary care provider or specialist is unavailable. If you have a need outside of normal business hours, you should first contact your primary care office or specialist caring for your condition. They may have on-call providers that could assist with your care. During office hours, you may request a virtual or same day appointment.   But what if your primary care provider is not in the office that day and you can't wait until the  next day for care? In that situation, your next option is to visit an urgent care facility.          [facility] now has urgent care sites open to support our community.   [facility] is a great alternative when you need immediate medical care that is not a serious threat to your health or your doctor's office is closed or unable to get you in for an appointment. The urgent care centers offer fast access to [facility] doctors for minor illnesses and injuries for patients of all ages. There are other medical services available including lab testing, X-rays, EKGs, and IV fluids.  Locations are open daily from 8 a.m. - 8 p.m.     [place] [address], [place], [place] [address]  [phone]    [address], [place], [place] [address]  [phone]    Local Urgent Care     [facility] 8:30 am - 7:70 pm   [address] [place] [address]  [phone]    [facility]     8 am - 8 pm   [address], [place] [address]  [phone]    [facility] / [place] 7 am - 7:30 pm  [address] [place] [address]  [phone]     [facility] / [place] 7 am - 7:30 pm  [address] [place] [address]  [phone]    Josiah

## 2024-08-27 DIAGNOSIS — M25.551 CHRONIC RIGHT HIP PAIN: Primary | ICD-10-CM

## 2024-08-27 DIAGNOSIS — G89.29 CHRONIC RIGHT HIP PAIN: Primary | ICD-10-CM

## 2024-08-27 DIAGNOSIS — M54.50 CHRONIC BILATERAL LOW BACK PAIN WITHOUT SCIATICA: ICD-10-CM

## 2024-08-27 DIAGNOSIS — G89.29 CHRONIC BILATERAL LOW BACK PAIN WITHOUT SCIATICA: ICD-10-CM

## 2024-09-07 DIAGNOSIS — J18.9 COMMUNITY ACQUIRED PNEUMONIA, UNSPECIFIED LATERALITY: ICD-10-CM

## 2024-09-07 DIAGNOSIS — B96.89 ACUTE BACTERIAL SINUSITIS: ICD-10-CM

## 2024-09-07 DIAGNOSIS — J01.90 ACUTE BACTERIAL SINUSITIS: ICD-10-CM

## 2024-09-09 RX ORDER — IBUPROFEN 800 MG/1
TABLET, FILM COATED ORAL
Qty: 90 TABLET | Refills: 0 | Status: SHIPPED | OUTPATIENT
Start: 2024-09-09

## 2024-09-12 ENCOUNTER — TELEPHONE (OUTPATIENT)
Dept: FAMILY MEDICINE CLINIC | Age: 59
End: 2024-09-12

## 2024-10-22 ENCOUNTER — OFFICE VISIT (OUTPATIENT)
Dept: FAMILY MEDICINE CLINIC | Age: 59
End: 2024-10-22
Payer: COMMERCIAL

## 2024-10-22 VITALS
TEMPERATURE: 98.7 F | OXYGEN SATURATION: 98 % | WEIGHT: 195 LBS | HEART RATE: 77 BPM | RESPIRATION RATE: 17 BRPM | SYSTOLIC BLOOD PRESSURE: 160 MMHG | BODY MASS INDEX: 34 KG/M2 | DIASTOLIC BLOOD PRESSURE: 90 MMHG

## 2024-10-22 DIAGNOSIS — I10 UNCONTROLLED HYPERTENSION: ICD-10-CM

## 2024-10-22 DIAGNOSIS — J01.90 ACUTE BACTERIAL SINUSITIS: Primary | ICD-10-CM

## 2024-10-22 DIAGNOSIS — B96.89 ACUTE BACTERIAL SINUSITIS: Primary | ICD-10-CM

## 2024-10-22 DIAGNOSIS — R09.81 HEAD CONGESTION: ICD-10-CM

## 2024-10-22 LAB
INFLUENZA A ANTIBODY: NORMAL
INFLUENZA B ANTIBODY: NORMAL
Lab: 0
QC PASS/FAIL: NORMAL
SARS-COV-2 RDRP RESP QL NAA+PROBE: NEGATIVE

## 2024-10-22 PROCEDURE — 3077F SYST BP >= 140 MM HG: CPT

## 2024-10-22 PROCEDURE — 87635 SARS-COV-2 COVID-19 AMP PRB: CPT

## 2024-10-22 PROCEDURE — 87804 INFLUENZA ASSAY W/OPTIC: CPT

## 2024-10-22 PROCEDURE — 99213 OFFICE O/P EST LOW 20 MIN: CPT

## 2024-10-22 PROCEDURE — 3080F DIAST BP >= 90 MM HG: CPT

## 2024-10-22 NOTE — PATIENT INSTRUCTIONS
Please start taking the antibiotic called Augmentin twice a day for 7 days, for your sinus infection.  I encourage that you stay well-hydrated, take Tylenol or Motrin as needed for fever, pain.  If you develop worsening symptoms such as chest pain or shortness of breath, I would advise you to go to the ER especially if after hours.    Continue to monitor your blood pressures at home or at the pharmacy and follow-up with Dr. Musa as you may need further adjustment to better control your blood pressure.

## 2024-10-22 NOTE — PROGRESS NOTES
Patient admits to medication non compliance at times. Advised patient to follow-up in 2 weeks to further address her uncontrolled hypertension as she may need further adjustment in her antihypertensive management.              Return in about 2 weeks (around 11/5/2024), or if symptoms worsen or fail to improve, for HTN w/ Lencho.    Justine Cruz MD    This dictation was generated by voice recognition computer software.  Although all attempts are made to edit the dictation for accuracy, there may be errors in the transcription that are not intended.

## 2024-10-28 ENCOUNTER — TELEPHONE (OUTPATIENT)
Dept: FAMILY MEDICINE CLINIC | Age: 59
End: 2024-10-28

## 2024-10-28 DIAGNOSIS — B37.9 YEAST INFECTION: Primary | ICD-10-CM

## 2024-10-28 RX ORDER — FLUCONAZOLE 150 MG/1
150 TABLET ORAL ONCE
Qty: 1 TABLET | Refills: 0 | Status: SHIPPED | OUTPATIENT
Start: 2024-10-28 | End: 2024-10-31 | Stop reason: SDUPTHER

## 2024-10-28 NOTE — TELEPHONE ENCOUNTER
Pt was given antibiotics and now has a yeast infection. She is wanting to know if something can be called in.     Darci in [place]--number confirmed

## 2024-10-31 ENCOUNTER — TELEPHONE (OUTPATIENT)
Dept: FAMILY MEDICINE CLINIC | Age: 59
End: 2024-10-31

## 2024-10-31 DIAGNOSIS — B37.9 YEAST INFECTION: ICD-10-CM

## 2024-10-31 RX ORDER — FLUCONAZOLE 150 MG/1
150 TABLET ORAL ONCE
Qty: 1 TABLET | Refills: 0 | Status: SHIPPED | OUTPATIENT
Start: 2024-10-31 | End: 2024-10-31

## 2024-10-31 NOTE — TELEPHONE ENCOUNTER
Patient was given abx recently as well as 1 diflucan. States the 1 diflucan did help some but did not clear the yeast infection up completely. She would like to know if another diflucan can be sent to [facility] [place].

## 2024-10-31 NOTE — TELEPHONE ENCOUNTER
Patient does not use her mychart. She was seen last week by Dr. Cruz would you like me to see if she can do a virtual visit with you today? Or is this able to be sent in since she was just seen?

## 2024-11-05 ENCOUNTER — OFFICE VISIT (OUTPATIENT)
Dept: FAMILY MEDICINE CLINIC | Age: 59
End: 2024-11-05

## 2024-11-05 ENCOUNTER — HOSPITAL ENCOUNTER (OUTPATIENT)
Dept: CT IMAGING | Age: 59
Discharge: HOME | End: 2024-11-05
Payer: COMMERCIAL

## 2024-11-05 ENCOUNTER — HOSPITAL ENCOUNTER (OUTPATIENT)
Age: 59
Discharge: HOME | End: 2024-11-05
Payer: COMMERCIAL

## 2024-11-05 VITALS
BODY MASS INDEX: 33.12 KG/M2 | HEIGHT: 64 IN | DIASTOLIC BLOOD PRESSURE: 86 MMHG | HEART RATE: 72 BPM | SYSTOLIC BLOOD PRESSURE: 136 MMHG | WEIGHT: 194 LBS | OXYGEN SATURATION: 98 %

## 2024-11-05 DIAGNOSIS — N39.0 ACUTE UTI: Primary | ICD-10-CM

## 2024-11-05 DIAGNOSIS — R10.9 BILATERAL FLANK PAIN: ICD-10-CM

## 2024-11-05 DIAGNOSIS — N20.0 KIDNEY STONE: Primary | ICD-10-CM

## 2024-11-05 DIAGNOSIS — N39.0 ACUTE UTI: ICD-10-CM

## 2024-11-05 DIAGNOSIS — T36.95XA ANTIBIOTIC-INDUCED YEAST INFECTION: ICD-10-CM

## 2024-11-05 DIAGNOSIS — R11.0 NAUSEA: ICD-10-CM

## 2024-11-05 DIAGNOSIS — B37.9 ANTIBIOTIC-INDUCED YEAST INFECTION: ICD-10-CM

## 2024-11-05 LAB
ALBUMIN SERPL-MCNC: 4.2 G/DL (ref 3.4–5)
ALBUMIN/GLOB SERPL: 1.4 {RATIO} (ref 1.1–2.2)
ALP SERPL-CCNC: 63 U/L (ref 40–129)
ALT SERPL-CCNC: 9 U/L (ref 10–40)
ANION GAP SERPL CALCULATED.3IONS-SCNC: 10 MMOL/L (ref 3–16)
AST SERPL-CCNC: 13 U/L (ref 15–37)
BASOPHILS # BLD: 0.1 K/UL (ref 0–0.2)
BASOPHILS NFR BLD: 1.1 %
BILIRUB SERPL-MCNC: <0.2 MG/DL (ref 0–1)
BILIRUBIN, POC: ABNORMAL
BLOOD URINE, POC: ABNORMAL
BUN SERPL-MCNC: 12 MG/DL (ref 7–20)
CALCIUM SERPL-MCNC: 9.9 MG/DL (ref 8.3–10.6)
CHLORIDE SERPL-SCNC: 108 MMOL/L (ref 99–110)
CLARITY, POC: ABNORMAL
CO2 SERPL-SCNC: 21 MMOL/L (ref 21–32)
COLOR, POC: YELLOW
CREAT SERPL-MCNC: 0.8 MG/DL (ref 0.6–1.1)
DEPRECATED RDW RBC AUTO: 13.9 % (ref 12.4–15.4)
EOSINOPHIL # BLD: 0.3 K/UL (ref 0–0.6)
EOSINOPHIL NFR BLD: 2.8 %
GFR SERPLBLD CREATININE-BSD FMLA CKD-EPI: 85 ML/MIN/{1.73_M2}
GLUCOSE SERPL-MCNC: 105 MG/DL (ref 70–99)
GLUCOSE URINE, POC: ABNORMAL MG/DL
HCT VFR BLD AUTO: 39.4 % (ref 36–48)
HGB BLD-MCNC: 12.9 G/DL (ref 12–16)
KETONES, POC: ABNORMAL MG/DL
LEUKOCYTE EST, POC: ABNORMAL
LYMPHOCYTES # BLD: 2.9 K/UL (ref 1–5.1)
LYMPHOCYTES NFR BLD: 30 %
MCH RBC QN AUTO: 29.4 PG (ref 26–34)
MCHC RBC AUTO-ENTMCNC: 32.7 G/DL (ref 31–36)
MCV RBC AUTO: 89.8 FL (ref 80–100)
MONOCYTES # BLD: 0.4 K/UL (ref 0–1.3)
MONOCYTES NFR BLD: 4.5 %
NEUTROPHILS # BLD: 5.9 K/UL (ref 1.7–7.7)
NEUTROPHILS NFR BLD: 61.6 %
NITRITE, POC: ABNORMAL
PH, POC: 5.5
PLATELET # BLD AUTO: 284 K/UL (ref 135–450)
PMV BLD AUTO: 7.4 FL (ref 5–10.5)
POTASSIUM SERPL-SCNC: 3.8 MMOL/L (ref 3.5–5.1)
PROT SERPL-MCNC: 7.3 G/DL (ref 6.4–8.2)
PROTEIN, POC: ABNORMAL MG/DL
RBC # BLD AUTO: 4.39 M/UL (ref 4–5.2)
SODIUM SERPL-SCNC: 139 MMOL/L (ref 136–145)
SPECIFIC GRAVITY, POC: 1.02
UROBILINOGEN, POC: 0.2 MG/DL
WBC # BLD AUTO: 9.6 K/UL (ref 4–11)

## 2024-11-05 PROCEDURE — 80053 COMPREHEN METABOLIC PANEL: CPT

## 2024-11-05 PROCEDURE — 74176 CT ABD & PELVIS W/O CONTRAST: CPT

## 2024-11-05 PROCEDURE — 36415 COLL VENOUS BLD VENIPUNCTURE: CPT

## 2024-11-05 PROCEDURE — 85025 COMPLETE CBC W/AUTO DIFF WBC: CPT

## 2024-11-05 RX ORDER — PHENAZOPYRIDINE HYDROCHLORIDE 100 MG/1
100 TABLET, FILM COATED ORAL 3 TIMES DAILY PRN
Qty: 10 TABLET | Refills: 0 | Status: SHIPPED | OUTPATIENT
Start: 2024-11-05

## 2024-11-05 RX ORDER — PROMETHAZINE HYDROCHLORIDE 25 MG/1
25 TABLET ORAL EVERY 4 HOURS PRN
Qty: 30 TABLET | Refills: 0 | Status: SHIPPED | OUTPATIENT
Start: 2024-11-05

## 2024-11-05 RX ORDER — TAMSULOSIN HYDROCHLORIDE 0.4 MG/1
0.4 CAPSULE ORAL DAILY
Qty: 10 CAPSULE | Refills: 0 | Status: SHIPPED | OUTPATIENT
Start: 2024-11-05

## 2024-11-05 RX ORDER — FLUCONAZOLE 150 MG/1
TABLET ORAL
Qty: 2 TABLET | Refills: 0 | Status: SHIPPED | OUTPATIENT
Start: 2024-11-05

## 2024-11-05 RX ORDER — SULFAMETHOXAZOLE AND TRIMETHOPRIM 800; 160 MG/1; MG/1
1 TABLET ORAL 2 TIMES DAILY
Qty: 14 TABLET | Refills: 0 | Status: SHIPPED | OUTPATIENT
Start: 2024-11-05 | End: 2024-11-12

## 2024-11-05 ASSESSMENT — ENCOUNTER SYMPTOMS
VOMITING: 0
DIARRHEA: 0
RESPIRATORY NEGATIVE: 1
STRIDOR: 0
CONSTIPATION: 1
CHEST TIGHTNESS: 0
EYE DISCHARGE: 0
RECTAL PAIN: 0
TROUBLE SWALLOWING: 0
ANAL BLEEDING: 0
ABDOMINAL PAIN: 0
APNEA: 0
BACK PAIN: 1
CHOKING: 0
COLOR CHANGE: 0
ALLERGIC/IMMUNOLOGIC NEGATIVE: 1
NAUSEA: 1
PHOTOPHOBIA: 0
BLOOD IN STOOL: 0
COUGH: 0
EYE ITCHING: 0
ABDOMINAL DISTENTION: 0
SINUS PRESSURE: 0
EYE REDNESS: 0
WHEEZING: 0
RHINORRHEA: 0
SORE THROAT: 0
EYE PAIN: 0
SHORTNESS OF BREATH: 0

## 2024-11-05 NOTE — PROGRESS NOTES
Yes Rita Jaimes, APRN - CNP     Orders Placed This Encounter   Medications    promethazine (PHENERGAN) 25 MG tablet     Sig: Take 1 tablet by mouth every 4 hours as needed for Nausea May cause drowsiness     Dispense:  30 tablet     Refill:  0    sulfamethoxazole-trimethoprim (BACTRIM DS;SEPTRA DS) 800-160 MG per tablet     Sig: Take 1 tablet by mouth 2 times daily for 7 days     Dispense:  14 tablet     Refill:  0    fluconazole (DIFLUCAN) 150 MG tablet     Sig: Take 1 tablet by mouth now and then repeat in 5 days     Dispense:  2 tablet     Refill:  0    phenazopyridine (PYRIDIUM) 100 MG tablet     Sig: Take 1 tablet by mouth 3 times daily as needed for Pain     Dispense:  10 tablet     Refill:  0         Return if symptoms worsen or fail to improve.    Patient should call the office immediately with new or ongoing signs or symptoms or worsening, or proceedto the emergency room.  No changes in past medical history, past surgical history, social history, or family history were noted during the patient encounter unless specifically listed above.  All updates of past medicalhistory, past surgical history, social history, or family history were reviewed personally by me during the office visit.  All problems listed in the assessment are stable unless noted otherwise.  Medication profilereviewed personally by me during the office visit.  Medication side effects and possible impairments from medications were discussed as applicable.     Call if pattern of symptoms change or persists for an extended time.    This document was prepared by a combination of typing and transcription through a voice recognition software.    All medications have the potential for adverse effects. All medications effect each person differently. Please read and review provided information related to medication. If the medication that you have been prescribed has the potential to cause sedation, do not drive or operate car, truck, or

## 2024-11-05 NOTE — PATIENT INSTRUCTIONS
Not feeling your best?  Where to go for the right care at the right time.    Dear Kamran Coronado   I wanted to provide you with some information that might help you seek care for your condition when your primary care provider or specialist is unavailable. If you have a need outside of normal business hours, you should first contact your primary care office or specialist caring for your condition. They may have on-call providers that could assist with your care. During office hours, you may request a virtual or same day appointment.   But what if your primary care provider is not in the office that day and you can't wait until the  next day for care? In that situation, your next option is to visit an urgent care facility.          [facility] now has urgent care sites open to support our community.   [facility] is a great alternative when you need immediate medical care that is not a serious threat to your health or your doctor's office is closed or unable to get you in for an appointment. The urgent care centers offer fast access to [facility] doctors for minor illnesses and injuries for patients of all ages. There are other medical services available including lab testing, X-rays, EKGs, and IV fluids.  Locations are open daily from 8 a.m. - 8 p.m.     [place] [address], [place], [place] [address]  [phone]    [address], [place], [place] [address]  [phone]    Local Urgent Care     [facility] 8:30 am - 7:70 pm   [address] [place] [address]  [phone]    [facility]     8 am - 8 pm   [address], [place] [address]  [phone]    [facility] / [place] 7 am - 7:30 pm  [address] [place] [address]  [phone]     [facility] / [place] 7 am - 7:30 pm  [address] [place] [address]  [phone]    Josiah

## 2024-11-06 DIAGNOSIS — N13.30 HYDRONEPHROSIS, UNSPECIFIED HYDRONEPHROSIS TYPE: Primary | ICD-10-CM

## 2024-11-08 LAB
BACTERIA UR CULT: ABNORMAL
ORGANISM: ABNORMAL

## 2024-11-20 ENCOUNTER — OFFICE VISIT (OUTPATIENT)
Dept: FAMILY MEDICINE CLINIC | Age: 59
End: 2024-11-20

## 2024-11-20 VITALS
HEART RATE: 81 BPM | DIASTOLIC BLOOD PRESSURE: 83 MMHG | OXYGEN SATURATION: 96 % | RESPIRATION RATE: 16 BRPM | TEMPERATURE: 98.1 F | SYSTOLIC BLOOD PRESSURE: 159 MMHG

## 2024-11-20 DIAGNOSIS — R05.1 ACUTE COUGH: Primary | ICD-10-CM

## 2024-11-20 DIAGNOSIS — J20.9 ACUTE BRONCHITIS WITH WHEEZING: ICD-10-CM

## 2024-11-20 RX ORDER — PREDNISONE 20 MG/1
40 TABLET ORAL DAILY
Qty: 10 TABLET | Refills: 0 | Status: SHIPPED | OUTPATIENT
Start: 2024-11-20 | End: 2024-11-25

## 2024-11-20 RX ORDER — IPRATROPIUM BROMIDE AND ALBUTEROL SULFATE 2.5; .5 MG/3ML; MG/3ML
1 SOLUTION RESPIRATORY (INHALATION) ONCE
Status: SHIPPED | OUTPATIENT
Start: 2024-11-20

## 2024-11-20 RX ORDER — ALBUTEROL SULFATE 90 UG/1
2 INHALANT RESPIRATORY (INHALATION) 4 TIMES DAILY PRN
Qty: 18 G | Refills: 1 | Status: SHIPPED | OUTPATIENT
Start: 2024-11-20

## 2024-11-20 RX ORDER — LEVOFLOXACIN 500 MG/1
500 TABLET, FILM COATED ORAL DAILY
Qty: 7 TABLET | Refills: 0 | Status: SHIPPED | OUTPATIENT
Start: 2024-11-20 | End: 2024-11-27

## 2024-11-20 RX ORDER — BENZONATATE 100 MG/1
100 CAPSULE ORAL 3 TIMES DAILY PRN
Qty: 30 CAPSULE | Refills: 0 | Status: SHIPPED | OUTPATIENT
Start: 2024-11-20 | End: 2024-11-30

## 2024-11-20 NOTE — PROGRESS NOTES
[facility]  2024    Kamran Coronado (:  1965) is a 59 y.o. female, here for evaluation of the following medical concerns:    Chief Complaint   Patient presents with    Cough      productive at times. Started last Friday. no fevers, no body aches     Headache     From coughing, taking otc meds           HPI  History of TIA, hypertension, CVA, IBS with diarrhea and constipation, GERD, migraine, depression.  She was recently seen in office on  for acute UTI treated with Bactrim, and on 10/22 for acute bacterial sinusitis with Augmentin.    Symptom onset last 5 days ago. She never really got better from her last illness.   Symptoms include productive cough, headache, voice hoarseness, muscles in stomach and chest sore from coughing. Mucus was green, \"chunky.\" Associated Wheezing. Shortness of breath. Chills.  She smokes. No history of COPD. No sore throat.     Alleviating factors she restarted taking Augmentin 2 days ago twice a day, she did not complete her previous course.     Sick contacts: Daughter, who just got , has strep now.     History of walking pneumonia.     ROS  Review of Systems    HISTORIES  Current Outpatient Medications on File Prior to Visit   Medication Sig Dispense Refill    promethazine (PHENERGAN) 25 MG tablet Take 1 tablet by mouth every 4 hours as needed for Nausea May cause drowsiness 30 tablet 0    fluconazole (DIFLUCAN) 150 MG tablet Take 1 tablet by mouth now and then repeat in 5 days 2 tablet 0    phenazopyridine (PYRIDIUM) 100 MG tablet Take 1 tablet by mouth 3 times daily as needed for Pain 10 tablet 0    tamsulosin (FLOMAX) 0.4 MG capsule Take 1 capsule by mouth daily 10 capsule 0    ibuprofen (ADVIL;MOTRIN) 800 MG tablet TAKE ONE (1) TABLET THREE TIMES DAILY 90 tablet 0    baclofen (LIORESAL) 10 MG tablet Take 1 tablet by mouth 3 times daily as needed (Muscle spasms) Do not drive or operate machinery while taking 30 tablet 1    hydrOXYzine pamoate

## 2024-11-20 NOTE — PATIENT INSTRUCTIONS
Please stop taking Augmentin.  You have been prescribed a new antibiotic called levofloxacin to take daily for 7 days.  You have also been started on a steroid medication called prednisone to take daily for 5 days.  A prescription of albuterol inhaler has also been provided for you, which you may use every 4-6 hours as needed for shortness of breath or wheezing.    A breathing treatment was given in office to help with your breathing.  If you find that you are developing worsening shortness of breath, chest pain, I would advise you to go to the emergency department.  We may need to consider obtaining lung function testing in the future considering your history of smoking, and your exam likely consistent with a COPD exacerbation.

## 2024-11-22 ENCOUNTER — HOSPITAL ENCOUNTER (OUTPATIENT)
Dept: GENERAL RADIOLOGY | Age: 59
Discharge: HOME | End: 2024-11-22
Payer: COMMERCIAL

## 2024-11-22 ENCOUNTER — HOSPITAL ENCOUNTER (OUTPATIENT)
Age: 59
Discharge: HOME | End: 2024-11-22
Payer: COMMERCIAL

## 2024-11-22 DIAGNOSIS — J20.9 ACUTE BRONCHITIS WITH WHEEZING: ICD-10-CM

## 2024-11-22 DIAGNOSIS — R05.1 ACUTE COUGH: ICD-10-CM

## 2024-11-22 DIAGNOSIS — R05.1 ACUTE COUGH: Primary | ICD-10-CM

## 2024-11-22 PROCEDURE — 71046 X-RAY EXAM CHEST 2 VIEWS: CPT

## 2024-11-22 RX ORDER — GUAIFENESIN 600 MG/1
600 TABLET, EXTENDED RELEASE ORAL 2 TIMES DAILY
Qty: 14 TABLET | Refills: 0 | Status: SHIPPED | OUTPATIENT
Start: 2024-11-22 | End: 2024-11-29

## 2024-12-03 ENCOUNTER — OFFICE VISIT (OUTPATIENT)
Dept: FAMILY MEDICINE CLINIC | Age: 59
End: 2024-12-03

## 2024-12-03 VITALS
DIASTOLIC BLOOD PRESSURE: 82 MMHG | BODY MASS INDEX: 33.12 KG/M2 | OXYGEN SATURATION: 98 % | WEIGHT: 194 LBS | HEIGHT: 64 IN | HEART RATE: 89 BPM | SYSTOLIC BLOOD PRESSURE: 134 MMHG

## 2024-12-03 DIAGNOSIS — J06.9 ACUTE URI: Primary | ICD-10-CM

## 2024-12-03 DIAGNOSIS — R06.2 WHEEZING: ICD-10-CM

## 2024-12-03 RX ORDER — BUDESONIDE 0.5 MG/2ML
0.5 INHALANT ORAL ONCE
Status: COMPLETED | OUTPATIENT
Start: 2024-12-03 | End: 2024-12-03

## 2024-12-03 RX ORDER — DOXYCYCLINE HYCLATE 100 MG
100 TABLET ORAL 2 TIMES DAILY
Qty: 14 TABLET | Refills: 0 | Status: SHIPPED | OUTPATIENT
Start: 2024-12-03 | End: 2024-12-10

## 2024-12-03 RX ORDER — PREDNISONE 10 MG/1
TABLET ORAL
Qty: 30 TABLET | Refills: 0 | Status: SHIPPED | OUTPATIENT
Start: 2024-12-03

## 2024-12-03 RX ORDER — DEXTROMETHORPHAN HYDROBROMIDE AND PROMETHAZINE HYDROCHLORIDE 15; 6.25 MG/5ML; MG/5ML
5 SYRUP ORAL 4 TIMES DAILY PRN
Qty: 240 ML | Refills: 0 | Status: SHIPPED | OUTPATIENT
Start: 2024-12-03 | End: 2024-12-15

## 2024-12-03 RX ORDER — ALBUTEROL SULFATE 0.83 MG/ML
2.5 SOLUTION RESPIRATORY (INHALATION) ONCE
Status: COMPLETED | OUTPATIENT
Start: 2024-12-03 | End: 2024-12-03

## 2024-12-03 RX ADMIN — ALBUTEROL SULFATE 2.5 MG: 0.83 SOLUTION RESPIRATORY (INHALATION) at 15:59

## 2024-12-03 RX ADMIN — BUDESONIDE 500 MCG: 0.5 INHALANT ORAL at 15:59

## 2024-12-03 ASSESSMENT — ENCOUNTER SYMPTOMS
EYE ITCHING: 0
STRIDOR: 0
VOICE CHANGE: 1
CHEST TIGHTNESS: 0
RHINORRHEA: 0
PHOTOPHOBIA: 0
EYE REDNESS: 0
COUGH: 1
SHORTNESS OF BREATH: 0
SINUS PAIN: 1
TROUBLE SWALLOWING: 0
ABDOMINAL PAIN: 0
DIARRHEA: 0
APNEA: 0
ALLERGIC/IMMUNOLOGIC NEGATIVE: 1
VOMITING: 0
COLOR CHANGE: 0
EYE PAIN: 0
WHEEZING: 1
SINUS PRESSURE: 1
BLOOD IN STOOL: 0
GASTROINTESTINAL NEGATIVE: 1
EYE DISCHARGE: 0
NAUSEA: 0
SORE THROAT: 1
FACIAL SWELLING: 0
BACK PAIN: 0
CHOKING: 0
CONSTIPATION: 0

## 2024-12-03 NOTE — PROGRESS NOTES
(DIFLUCAN) 150 MG tablet Take 1 tablet by mouth now and then repeat in 5 days Yes Rita Jaimes APRN - CNP   phenazopyridine (PYRIDIUM) 100 MG tablet Take 1 tablet by mouth 3 times daily as needed for Pain Yes Rita Jaimes APRN - CNP   tamsulosin (FLOMAX) 0.4 MG capsule Take 1 capsule by mouth daily Yes Rita Jaimes APRN - CNP   ibuprofen (ADVIL;MOTRIN) 800 MG tablet TAKE ONE (1) TABLET THREE TIMES DAILY Yes Dontrell Musa MD   baclofen (LIORESAL) 10 MG tablet Take 1 tablet by mouth 3 times daily as needed (Muscle spasms) Do not drive or operate machinery while taking Yes Rita Jaimes APRN - CNP   hydrOXYzine pamoate (VISTARIL) 25 MG capsule TAKE 1 CAPSULE THREE TIMES       DAILY AS NEEDED FOR ANXIETY Yes Dontrell Musa MD   traZODone (DESYREL) 150 MG tablet Take 1 tablet by mouth nightly at bedtime. Yes Dontrell Musa MD   valsartan (DIOVAN) 40 MG tablet Take 1 tablet by mouth daily Yes Dontrell Musa MD   hydroCHLOROthiazide (HYDRODIURIL) 50 MG tablet Take 1 tablet by mouth every morning Yes Dontrell Musa MD   metoprolol succinate (TOPROL XL) 100 MG extended release tablet TAKE (1) TABLET DAILY IN THE AM AND 1 TAB IN THE EVENING Yes Dontrell Musa MD   pantoprazole (PROTONIX) 40 MG tablet TAKE ONE (1) TABLET BY MOUTH DAILY Yes Rita Jaimes APRN - CNP     Orders Placed This Encounter   Medications    albuterol (PROVENTIL) (2.5 MG/3ML) 0.083% nebulizer solution 2.5 mg     Order Specific Question:   Initiate RT Bronchodilator Protocol     Answer:   No    budesonide (PULMICORT) nebulizer suspension 500 mcg    doxycycline hyclate (VIBRA-TABS) 100 MG tablet     Sig: Take 1 tablet by mouth 2 times daily for 7 days     Dispense:  14 tablet     Refill:  0    promethazine-dextromethorphan (PROMETHAZINE-DM) 6.25-15 MG/5ML syrup     Sig: Take 5 mLs by mouth 4 times daily as needed for Cough     Dispense:

## 2024-12-03 NOTE — PATIENT INSTRUCTIONS
Not feeling your best?  Where to go for the right care at the right time.    Dear Kamran Coronado   I wanted to provide you with some information that might help you seek care for your condition when your primary care provider or specialist is unavailable. If you have a need outside of normal business hours, you should first contact your primary care office or specialist caring for your condition. They may have on-call providers that could assist with your care. During office hours, you may request a virtual or same day appointment.   But what if your primary care provider is not in the office that day and you can't wait until the  next day for care? In that situation, your next option is to visit an urgent care facility.          [facility] now has urgent care sites open to support our community.   [facility] is a great alternative when you need immediate medical care that is not a serious threat to your health or your doctor's office is closed or unable to get you in for an appointment. The urgent care centers offer fast access to [facility] doctors for minor illnesses and injuries for patients of all ages. There are other medical services available including lab testing, X-rays, EKGs, and IV fluids.  Locations are open daily from 8 a.m. - 8 p.m.     [place] [address], [place], [place] [address]  [phone]    [address], [place], [place] [address]  [phone]    Local Urgent Care     [facility] 8:30 am - 7:70 pm   [address] [place] [address]  [phone]    [facility]     8 am - 8 pm   [address], [place] [address]  [phone]    [facility] / [place] 7 am - 7:30 pm  [address] [place] [address]  [phone]     [facility] / [place] 7 am - 7:30 pm  [address] [place] [address]  [phone]    Josiah

## 2024-12-04 ENCOUNTER — TELEPHONE (OUTPATIENT)
Dept: FAMILY MEDICINE CLINIC | Age: 59
End: 2024-12-04

## 2024-12-04 NOTE — TELEPHONE ENCOUNTER
Patient called and she states she is about the same as yesterday. She states that she did get a couple more hours than she did yesterday.

## 2024-12-06 ENCOUNTER — TELEPHONE (OUTPATIENT)
Dept: FAMILY MEDICINE CLINIC | Age: 59
End: 2024-12-06

## 2024-12-06 NOTE — TELEPHONE ENCOUNTER
Pt called in today for an update on her sx   Pt said she is still wheezing   Cough is worse now at night   Pt went back to work today she feels like she is having trouble catching her breath   No energy   Pt thinks she is improving a little

## 2024-12-23 ENCOUNTER — APPOINTMENT (OUTPATIENT)
Dept: CT IMAGING | Age: 59
End: 2024-12-23
Payer: COMMERCIAL

## 2024-12-23 ENCOUNTER — APPOINTMENT (OUTPATIENT)
Dept: GENERAL RADIOLOGY | Age: 59
End: 2024-12-23
Payer: COMMERCIAL

## 2024-12-23 ENCOUNTER — HOSPITAL ENCOUNTER (EMERGENCY)
Age: 59
Discharge: HOME | End: 2024-12-23
Attending: EMERGENCY MEDICINE
Payer: COMMERCIAL

## 2024-12-23 VITALS
BODY MASS INDEX: 34.02 KG/M2 | OXYGEN SATURATION: 99 % | HEART RATE: 95 BPM | DIASTOLIC BLOOD PRESSURE: 89 MMHG | TEMPERATURE: 98 F | HEIGHT: 63 IN | WEIGHT: 192 LBS | RESPIRATION RATE: 18 BRPM | SYSTOLIC BLOOD PRESSURE: 178 MMHG

## 2024-12-23 DIAGNOSIS — S09.90XA INJURY OF HEAD, INITIAL ENCOUNTER: ICD-10-CM

## 2024-12-23 DIAGNOSIS — S01.81XA FACIAL LACERATION, INITIAL ENCOUNTER: ICD-10-CM

## 2024-12-23 DIAGNOSIS — S00.83XA CONTUSION OF FACE, INITIAL ENCOUNTER: Primary | ICD-10-CM

## 2024-12-23 PROCEDURE — 70450 CT HEAD/BRAIN W/O DYE: CPT

## 2024-12-23 PROCEDURE — 70486 CT MAXILLOFACIAL W/O DYE: CPT

## 2024-12-23 PROCEDURE — 6370000000 HC RX 637 (ALT 250 FOR IP): Performed by: EMERGENCY MEDICINE

## 2024-12-23 PROCEDURE — 12011 RPR F/E/E/N/L/M 2.5 CM/<: CPT

## 2024-12-23 PROCEDURE — 90715 TDAP VACCINE 7 YRS/> IM: CPT | Performed by: EMERGENCY MEDICINE

## 2024-12-23 PROCEDURE — 99284 EMERGENCY DEPT VISIT MOD MDM: CPT

## 2024-12-23 PROCEDURE — 73130 X-RAY EXAM OF HAND: CPT

## 2024-12-23 PROCEDURE — 6360000002 HC RX W HCPCS: Performed by: EMERGENCY MEDICINE

## 2024-12-23 PROCEDURE — 90471 IMMUNIZATION ADMIN: CPT | Performed by: EMERGENCY MEDICINE

## 2024-12-23 RX ORDER — HYDROCODONE BITARTRATE AND ACETAMINOPHEN 5; 325 MG/1; MG/1
1 TABLET ORAL EVERY 4 HOURS PRN
Qty: 8 TABLET | Refills: 0 | Status: SHIPPED | OUTPATIENT
Start: 2024-12-23 | End: 2024-12-26

## 2024-12-23 RX ORDER — OXYCODONE AND ACETAMINOPHEN 5; 325 MG/1; MG/1
1 TABLET ORAL ONCE
Status: COMPLETED | OUTPATIENT
Start: 2024-12-23 | End: 2024-12-23

## 2024-12-23 RX ADMIN — OXYCODONE HYDROCHLORIDE AND ACETAMINOPHEN 1 TABLET: 5; 325 TABLET ORAL at 09:10

## 2024-12-23 RX ADMIN — TETANUS TOXOID, REDUCED DIPHTHERIA TOXOID AND ACELLULAR PERTUSSIS VACCINE, ADSORBED 0.5 ML: 5; 2.5; 8; 8; 2.5 SUSPENSION INTRAMUSCULAR at 10:03

## 2024-12-23 ASSESSMENT — LIFESTYLE VARIABLES
HOW OFTEN DO YOU HAVE A DRINK CONTAINING ALCOHOL: NEVER
HOW MANY STANDARD DRINKS CONTAINING ALCOHOL DO YOU HAVE ON A TYPICAL DAY: PATIENT DOES NOT DRINK

## 2024-12-23 ASSESSMENT — PAIN - FUNCTIONAL ASSESSMENT: PAIN_FUNCTIONAL_ASSESSMENT: 0-10

## 2024-12-23 ASSESSMENT — PAIN SCALES - GENERAL: PAINLEVEL_OUTOF10: 9

## 2024-12-23 NOTE — ED PROVIDER NOTES
as CT, Ultrasound and MRI are read by the radiologist. Plain radiographic images are visualized and preliminarily interpreted by the ED Provider with the below findings:        Interpretation per the Radiologist below, if available at the time of this note:    XR HAND LEFT (MIN 3 VIEWS)   Final Result   1. No acute abnormality.         CT FACIAL BONES WO CONTRAST   Final Result   1. No acute intracranial abnormality.   2. No acute facial bone fracture.         CT HEAD WO CONTRAST   Final Result   1. No acute intracranial abnormality.   2. No acute facial bone fracture.           No results found.    No results found.    PROCEDURES   Unless otherwise noted below, none     Procedures    CRITICAL CARE TIME       EMERGENCY DEPARTMENT COURSE and DIFFERENTIAL DIAGNOSIS/MDM:   Vitals:    Vitals:    12/23/24 0845   BP: (!) 178/89   Pulse: 95   Resp: 18   Temp: 98 °F (36.7 °C)   TempSrc: Oral   SpO2: 99%   Weight: 87.1 kg (192 lb)   Height: 1.6 m (5' 3\")       Patient was given the following medications:  Medications   Tetanus-Diphth-Acell Pertussis (BOOSTRIX) injection 0.5 mL (has no administration in time range)   oxyCODONE-acetaminophen (PERCOCET) 5-325 MG per tablet 1 tablet (1 tablet Oral Given 12/23/24 0910)            Is this patient to be included in the SEP-1 Core Measure due to severe sepsis or septic shock?       PAST MEDICAL HISTORY      has a past medical history of Ankle fracture, Anxiety, Arthritis, Benzodiazepine abuse (HCC), Depression, Fibromyalgia, Hyperlipidemia, Hypertension, Kidney cyst, Migraine, Pneumonia (2008), TIA (transient ischemic attack) (2007), Ulcer, Stomach Peptic, Unspecified cerebral artery occlusion with cerebral infarction (2007), and Weight loss.     CC/HPI Summary, DDx, ED Course, and Reassessment: Kamran Coronado is a 59 y.o. female who presents to the emergency department with fall. She was walking outside of a client's house this morning when she tripped and fell. She hit the right

## 2025-01-16 DIAGNOSIS — F41.9 ANXIETY: ICD-10-CM

## 2025-01-16 DIAGNOSIS — B96.89 ACUTE BACTERIAL SINUSITIS: ICD-10-CM

## 2025-01-16 DIAGNOSIS — J18.9 COMMUNITY ACQUIRED PNEUMONIA, UNSPECIFIED LATERALITY: ICD-10-CM

## 2025-01-16 DIAGNOSIS — J01.90 ACUTE BACTERIAL SINUSITIS: ICD-10-CM

## 2025-01-16 RX ORDER — IBUPROFEN 800 MG/1
TABLET, FILM COATED ORAL
Qty: 90 TABLET | Refills: 0 | Status: SHIPPED | OUTPATIENT
Start: 2025-01-16

## 2025-01-16 RX ORDER — HYDROXYZINE PAMOATE 25 MG/1
25 CAPSULE ORAL 3 TIMES DAILY PRN
Qty: 90 CAPSULE | Refills: 0 | Status: SHIPPED | OUTPATIENT
Start: 2025-01-16

## 2025-01-16 NOTE — TELEPHONE ENCOUNTER
Refill Request     CONFIRM preferrred pharmacy with the patient.    If Mail Order Rx - Pend for 90 day refill.      Last Seen: Last Seen Department: 12/3/2024  Last Seen by PCP: 11/20/2024    Last Written: hydroxyzine 8/19/24  Ibuprofen 9/9/24    If no future appointment scheduled, route STAFF MESSAGE with patient name to the  Pool for scheduling.      Next Appointment:   Future Appointments   Date Time Provider Department Center   2/26/2025  1:40 PM [facility]       Message sent to  to schedule appt with patient?  N/A      Requested Prescriptions     Pending Prescriptions Disp Refills    hydrOXYzine pamoate (VISTARIL) 25 MG capsule 90 capsule 0     Sig: Take 1 capsule by mouth 3 times daily as needed for Itching    ibuprofen (ADVIL;MOTRIN) 800 MG tablet 90 tablet 0     Sig: TAKE ONE (1) TABLET THREE TIMES DAILY

## 2025-03-06 ENCOUNTER — HOSPITAL ENCOUNTER (OUTPATIENT)
Dept: CT IMAGING | Age: 60
Discharge: HOME | End: 2025-03-06
Payer: COMMERCIAL

## 2025-03-06 ENCOUNTER — OFFICE VISIT (OUTPATIENT)
Dept: FAMILY MEDICINE CLINIC | Age: 60
End: 2025-03-06
Payer: COMMERCIAL

## 2025-03-06 VITALS
SYSTOLIC BLOOD PRESSURE: 177 MMHG | HEIGHT: 63 IN | DIASTOLIC BLOOD PRESSURE: 120 MMHG | OXYGEN SATURATION: 96 % | HEART RATE: 91 BPM | BODY MASS INDEX: 34.38 KG/M2 | WEIGHT: 194 LBS

## 2025-03-06 DIAGNOSIS — I10 PRIMARY HYPERTENSION: Primary | ICD-10-CM

## 2025-03-06 DIAGNOSIS — L98.9 SKIN LESION: ICD-10-CM

## 2025-03-06 DIAGNOSIS — I10 ESSENTIAL HYPERTENSION, BENIGN: ICD-10-CM

## 2025-03-06 DIAGNOSIS — F51.04 PSYCHOPHYSIOLOGICAL INSOMNIA: ICD-10-CM

## 2025-03-06 DIAGNOSIS — R42 DIZZINESS: ICD-10-CM

## 2025-03-06 DIAGNOSIS — I10 UNCONTROLLED HYPERTENSION: ICD-10-CM

## 2025-03-06 DIAGNOSIS — F32.9 REACTIVE DEPRESSION: ICD-10-CM

## 2025-03-06 DIAGNOSIS — M79.7 FIBROMYALGIA: ICD-10-CM

## 2025-03-06 PROCEDURE — 99214 OFFICE O/P EST MOD 30 MIN: CPT | Performed by: NURSE PRACTITIONER

## 2025-03-06 PROCEDURE — 70450 CT HEAD/BRAIN W/O DYE: CPT

## 2025-03-06 PROCEDURE — 3077F SYST BP >= 140 MM HG: CPT | Performed by: NURSE PRACTITIONER

## 2025-03-06 PROCEDURE — 96372 THER/PROPH/DIAG INJ SC/IM: CPT | Performed by: NURSE PRACTITIONER

## 2025-03-06 PROCEDURE — 3080F DIAST BP >= 90 MM HG: CPT | Performed by: NURSE PRACTITIONER

## 2025-03-06 RX ORDER — HYDROCHLOROTHIAZIDE 50 MG/1
50 TABLET ORAL EVERY MORNING
Qty: 90 TABLET | Refills: 0 | Status: SHIPPED | OUTPATIENT
Start: 2025-03-06

## 2025-03-06 RX ORDER — METOPROLOL SUCCINATE 100 MG/1
TABLET, EXTENDED RELEASE ORAL
Qty: 180 TABLET | Refills: 0 | Status: SHIPPED | OUTPATIENT
Start: 2025-03-06

## 2025-03-06 RX ORDER — TRAZODONE HYDROCHLORIDE 100 MG/1
TABLET ORAL
Qty: 30 TABLET | Refills: 2 | Status: SHIPPED | OUTPATIENT
Start: 2025-03-06

## 2025-03-06 RX ORDER — KETOROLAC TROMETHAMINE 30 MG/ML
60 INJECTION, SOLUTION INTRAMUSCULAR; INTRAVENOUS ONCE
Status: COMPLETED | OUTPATIENT
Start: 2025-03-06 | End: 2025-03-06

## 2025-03-06 RX ORDER — VALSARTAN 40 MG/1
40 TABLET ORAL DAILY
Qty: 90 TABLET | Refills: 0 | Status: SHIPPED | OUTPATIENT
Start: 2025-03-06

## 2025-03-06 RX ADMIN — KETOROLAC TROMETHAMINE 60 MG: 30 INJECTION, SOLUTION INTRAMUSCULAR; INTRAVENOUS at 13:30

## 2025-03-06 ASSESSMENT — ENCOUNTER SYMPTOMS
RESPIRATORY NEGATIVE: 1
CHEST TIGHTNESS: 0
DIARRHEA: 0
RHINORRHEA: 0
COUGH: 0
TROUBLE SWALLOWING: 0
BLOOD IN STOOL: 0
CHOKING: 0
EYE PAIN: 0
PHOTOPHOBIA: 0
VOMITING: 0
BACK PAIN: 0
NAUSEA: 0
COLOR CHANGE: 0
EYE ITCHING: 0
ALLERGIC/IMMUNOLOGIC NEGATIVE: 1
SHORTNESS OF BREATH: 0
ABDOMINAL PAIN: 0
SORE THROAT: 0
EYE REDNESS: 0
GASTROINTESTINAL NEGATIVE: 1
CONSTIPATION: 0
STRIDOR: 0
EYE DISCHARGE: 0
SINUS PRESSURE: 0
WHEEZING: 0
APNEA: 0

## 2025-03-06 ASSESSMENT — PATIENT HEALTH QUESTIONNAIRE - PHQ9
9. THOUGHTS THAT YOU WOULD BE BETTER OFF DEAD, OR OF HURTING YOURSELF: NOT AT ALL
3. TROUBLE FALLING OR STAYING ASLEEP: NEARLY EVERY DAY
1. LITTLE INTEREST OR PLEASURE IN DOING THINGS: SEVERAL DAYS
10. IF YOU CHECKED OFF ANY PROBLEMS, HOW DIFFICULT HAVE THESE PROBLEMS MADE IT FOR YOU TO DO YOUR WORK, TAKE CARE OF THINGS AT HOME, OR GET ALONG WITH OTHER PEOPLE: SOMEWHAT DIFFICULT
SUM OF ALL RESPONSES TO PHQ QUESTIONS 1-9: 14
SUM OF ALL RESPONSES TO PHQ QUESTIONS 1-9: 14
2. FEELING DOWN, DEPRESSED OR HOPELESS: NEARLY EVERY DAY
SUM OF ALL RESPONSES TO PHQ QUESTIONS 1-9: 14
4. FEELING TIRED OR HAVING LITTLE ENERGY: NEARLY EVERY DAY
6. FEELING BAD ABOUT YOURSELF - OR THAT YOU ARE A FAILURE OR HAVE LET YOURSELF OR YOUR FAMILY DOWN: SEVERAL DAYS
5. POOR APPETITE OR OVEREATING: SEVERAL DAYS
8. MOVING OR SPEAKING SO SLOWLY THAT OTHER PEOPLE COULD HAVE NOTICED. OR THE OPPOSITE, BEING SO FIGETY OR RESTLESS THAT YOU HAVE BEEN MOVING AROUND A LOT MORE THAN USUAL: NOT AT ALL
SUM OF ALL RESPONSES TO PHQ QUESTIONS 1-9: 14
7. TROUBLE CONCENTRATING ON THINGS, SUCH AS READING THE NEWSPAPER OR WATCHING TELEVISION: MORE THAN HALF THE DAYS

## 2025-03-06 NOTE — PATIENT INSTRUCTIONS
[id number]  [phone]    [facility] / [place] 7 am - 7:30 pm  [address] [place] [address]  [phone]     [facility] / [place] 7 am - 7:30 pm  [address] [place] [address]  [phone]    [facility] / [place] 7 am - 7:30 pm  [address] [address], [facility] [address]  [phone]    [facility] 8 am - 8 pm  [address] [place]  [address]  [phone]

## 2025-03-06 NOTE — PROGRESS NOTES
not diaphoretic.   HENT:      Head: Normocephalic and atraumatic.      Right Ear: Tympanic membrane, ear canal and external ear normal. There is no impacted cerumen.      Left Ear: Tympanic membrane, ear canal and external ear normal. There is no impacted cerumen.      Nose: Nose normal. No congestion or rhinorrhea.      Mouth/Throat:      Mouth: Mucous membranes are moist.      Pharynx: Oropharynx is clear. No oropharyngeal exudate or posterior oropharyngeal erythema.   Eyes:      General: No scleral icterus.        Right eye: No discharge.         Left eye: No discharge.      Extraocular Movements: Extraocular movements intact.      Conjunctiva/sclera: Conjunctivae normal.      Pupils: Pupils are equal, round, and reactive to light.   Neck:      Vascular: No carotid bruit.      Trachea: No tracheal deviation.   Cardiovascular:      Rate and Rhythm: Normal rate and regular rhythm.      Pulses: Normal pulses.      Heart sounds: Normal heart sounds. No murmur heard.     No friction rub. No gallop.   Pulmonary:      Effort: Pulmonary effort is normal. No respiratory distress.      Breath sounds: Normal breath sounds. No stridor. No wheezing, rhonchi or rales.   Chest:      Chest wall: No tenderness.   Abdominal:      General: Bowel sounds are normal. There is no distension.      Palpations: Abdomen is soft. There is no mass.      Tenderness: There is no abdominal tenderness. There is no right CVA tenderness, left CVA tenderness, guarding or rebound.      Hernia: No hernia is present.   Musculoskeletal:         General: No swelling, tenderness, deformity or signs of injury. Normal range of motion.      Cervical back: Normal range of motion and neck supple. No rigidity. No muscular tenderness.      Right lower leg: No edema.      Left lower leg: No edema.   Lymphadenopathy:      Cervical: No cervical adenopathy.   Skin:     General: Skin is warm and dry.      Capillary Refill: Capillary refill takes less than 2 seconds.

## 2025-03-11 ENCOUNTER — TELEPHONE (OUTPATIENT)
Dept: FAMILY MEDICINE CLINIC | Age: 60
End: 2025-03-11

## 2025-03-11 DIAGNOSIS — M79.7 FIBROMYALGIA: Primary | ICD-10-CM

## 2025-03-11 RX ORDER — DULOXETIN HYDROCHLORIDE 20 MG/1
20 CAPSULE, DELAYED RELEASE ORAL DAILY
Qty: 30 CAPSULE | Refills: 0 | Status: SHIPPED | OUTPATIENT
Start: 2025-03-11

## 2025-03-11 NOTE — TELEPHONE ENCOUNTER
Patient states eloise had told her she would discuss giving her something for her pain when she came in for an appt on Thursday. However, since that has been changed to a lab visit she would like to know what she can do for her pain? Also would like to know if she can get another torodol shot on Thursday as well. She is scheduled to see Dr. Cruz in June which was her first available.

## 2025-03-11 NOTE — TELEPHONE ENCOUNTER
[phone] (Mobile) Called pt back to go over providers message, and she asked to have rx sent to [facility]

## 2025-03-11 NOTE — TELEPHONE ENCOUNTER
Pt was wanting to let you know that the Cymbalta does not work for her. She said that she was on it for years and it never helped. Is there something else that can be called in to [facility] pharmacy.

## 2025-03-24 ENCOUNTER — TELEPHONE (OUTPATIENT)
Dept: FAMILY MEDICINE CLINIC | Age: 60
End: 2025-03-24

## 2025-03-24 ENCOUNTER — LAB (OUTPATIENT)
Dept: FAMILY MEDICINE CLINIC | Age: 60
End: 2025-03-24

## 2025-03-24 VITALS — DIASTOLIC BLOOD PRESSURE: 92 MMHG | OXYGEN SATURATION: 98 % | SYSTOLIC BLOOD PRESSURE: 148 MMHG | HEART RATE: 64 BPM

## 2025-03-24 DIAGNOSIS — I10 PRIMARY HYPERTENSION: ICD-10-CM

## 2025-03-24 RX ORDER — VALSARTAN 40 MG/1
40 TABLET ORAL 2 TIMES DAILY
Qty: 60 TABLET | Refills: 0 | Status: SHIPPED | OUTPATIENT
Start: 2025-03-24

## 2025-03-24 NOTE — TELEPHONE ENCOUNTER
Spoke with patient.  Advised on message.  Patient voices understanding.  She admits to taking her BP medications consistently.  She is scheduled in June to see Dr. Cruz

## 2025-03-24 NOTE — TELEPHONE ENCOUNTER
I have increased patient's valsartan dose from 40 mg daily to 40 mg twice daily.  New prescription has been sent to her pharmacy.  Please have her scheduled for a nurse visit blood pressure check in 1 week.  Can you please confirm that patient is taking her other hypertension medications including hydrochlorothiazide, metoprolol, as she has previously reported not taking her blood pressure medications consistently?  Please have her scheduled for an office visit to discuss her hypertension/establish care.

## 2025-04-01 ENCOUNTER — OFFICE VISIT (OUTPATIENT)
Dept: FAMILY MEDICINE CLINIC | Age: 60
End: 2025-04-01

## 2025-04-01 ENCOUNTER — TELEPHONE (OUTPATIENT)
Dept: FAMILY MEDICINE CLINIC | Age: 60
End: 2025-04-01

## 2025-04-01 VITALS
OXYGEN SATURATION: 98 % | WEIGHT: 195 LBS | DIASTOLIC BLOOD PRESSURE: 89 MMHG | BODY MASS INDEX: 34.54 KG/M2 | SYSTOLIC BLOOD PRESSURE: 156 MMHG | HEART RATE: 63 BPM | RESPIRATION RATE: 17 BRPM

## 2025-04-01 DIAGNOSIS — Z12.31 ENCOUNTER FOR SCREENING MAMMOGRAM FOR BREAST CANCER: ICD-10-CM

## 2025-04-01 DIAGNOSIS — N64.4 BREAST PAIN, LEFT: Primary | ICD-10-CM

## 2025-04-01 SDOH — ECONOMIC STABILITY: FOOD INSECURITY: WITHIN THE PAST 12 MONTHS, YOU WORRIED THAT YOUR FOOD WOULD RUN OUT BEFORE YOU GOT MONEY TO BUY MORE.: NEVER TRUE

## 2025-04-01 SDOH — ECONOMIC STABILITY: FOOD INSECURITY: WITHIN THE PAST 12 MONTHS, THE FOOD YOU BOUGHT JUST DIDN'T LAST AND YOU DIDN'T HAVE MONEY TO GET MORE.: NEVER TRUE

## 2025-04-01 NOTE — TELEPHONE ENCOUNTER
Central scheduling calling requesting that we place an order for a diagnostic US of the Left breast       Please call the patient when done       Please call once this is done   Denise: [phone]

## 2025-04-01 NOTE — PROGRESS NOTES
Left hip pain M25.552    Insomnia due to mental condition F51.05    Acute encephalopathy G93.40    HTN (hypertension) I10    Benzodiazepine overdose T42.4X1A    Prolonged Q-T interval on ECG R94.31    Disorder of electrolytes E87.8    Accidental amitriptyline overdose T43.011A    Somnolence R40.0    Intractable chronic migraine without aura and without status migrainosus G43.719    Encephalopathy G93.40    Altered mental status R41.82    Episode of recurrent major depressive disorder F33.9    Polysubstance overdose T50.901A    Reactive depression F32.9    Persistent migraine aura without cerebral infarction and without status migrainosus, not intractable G43.509    Accidental drug overdose T50.901A    Chronic bilateral low back pain without sciatica M54.50, G89.29    H/O: CVA (cerebrovascular accident) Z86.73    Other chest pain R07.89    Epigastric pain R10.13    Dyspepsia R10.13    Duodenal ulcer K26.9    Gastroesophageal reflux disease with esophagitis K21.00    Hidradenitis axillaris L73.2    Skin tag L91.8    Abnormal pigmentation of skin L81.9    Seborrheic keratosis L82.1    Abdominal distension R14.0    Slow transit constipation K59.01    Abdominal pain R10.9    Right hip pain M25.551    Inflamed seborrheic keratosis L82.0    Chronic tension headache G44.229    Pulmonary nodule R91.1    Syncope and collapse R55    Chronic daily headache R51.9    Nausea R11.0    Headache, hemiplegic migraine G43.409    Bell's palsy G51.0    Chest pain R07.9    Leg cramps R25.2    Grief reaction F43.20    Essential hypertension, benign I10    Thyroid nodule E04.1    Prominent dropped metatarsal head of left foot M21.6X2    Irritable bowel syndrome with both constipation and diarrhea K58.2    Metatarsalgia of left foot M77.42    Skin cyst L72.9    Sebaceous cyst L72.3        Review of Systems - negative except as listed above in the HPI        The patient (or guardian, if applicable) and other individuals in attendance with the

## 2025-04-14 ENCOUNTER — HOSPITAL ENCOUNTER (OUTPATIENT)
Dept: ULTRASOUND IMAGING | Age: 60
Discharge: HOME | End: 2025-04-14
Payer: COMMERCIAL

## 2025-04-14 ENCOUNTER — HOSPITAL ENCOUNTER (OUTPATIENT)
Dept: MAMMOGRAPHY | Age: 60
Discharge: HOME | End: 2025-04-14
Payer: COMMERCIAL

## 2025-04-14 DIAGNOSIS — N64.4 BREAST PAIN, LEFT: ICD-10-CM

## 2025-04-14 PROCEDURE — 76642 ULTRASOUND BREAST LIMITED: CPT

## 2025-04-14 PROCEDURE — G0279 TOMOSYNTHESIS, MAMMO: HCPCS

## 2025-04-15 ENCOUNTER — TELEPHONE (OUTPATIENT)
Dept: FAMILY MEDICINE CLINIC | Age: 60
End: 2025-04-15

## 2025-04-15 ENCOUNTER — RESULTS FOLLOW-UP (OUTPATIENT)
Dept: FAMILY MEDICINE CLINIC | Age: 60
End: 2025-04-15

## 2025-04-15 DIAGNOSIS — N64.4 BREAST PAIN, LEFT: Primary | ICD-10-CM

## 2025-04-15 NOTE — TELEPHONE ENCOUNTER
Pt is concern about her b/p.     She states her evening meds are working as they should.     Pt she states its running 173/92.     Writer suggestions 3 days am and pm readings 1 hour after medication.     Pt agreeable and will send in the readings in

## 2025-04-21 DIAGNOSIS — I10 PRIMARY HYPERTENSION: ICD-10-CM

## 2025-04-21 RX ORDER — VALSARTAN 80 MG/1
80 TABLET ORAL 2 TIMES DAILY
Qty: 60 TABLET | Refills: 1 | Status: SHIPPED | OUTPATIENT
Start: 2025-04-21

## 2025-04-22 DIAGNOSIS — B96.89 ACUTE BACTERIAL SINUSITIS: ICD-10-CM

## 2025-04-22 DIAGNOSIS — J18.9 COMMUNITY ACQUIRED PNEUMONIA, UNSPECIFIED LATERALITY: ICD-10-CM

## 2025-04-22 DIAGNOSIS — J01.90 ACUTE BACTERIAL SINUSITIS: ICD-10-CM

## 2025-04-23 RX ORDER — IBUPROFEN 800 MG/1
TABLET, FILM COATED ORAL
Qty: 90 TABLET | Refills: 0 | Status: SHIPPED | OUTPATIENT
Start: 2025-04-23

## 2025-04-24 ENCOUNTER — TELEPHONE (OUTPATIENT)
Dept: FAMILY MEDICINE CLINIC | Age: 60
End: 2025-04-24

## 2025-04-24 NOTE — TELEPHONE ENCOUNTER
Patient calling to request appointment with Inna Pritchard, states spouse, Marcial Rizo, currently sees as PCP.  Offered with Swapna, but patient requested a message be sent back to Inna. Please advise.

## 2025-05-01 ENCOUNTER — OFFICE VISIT (OUTPATIENT)
Dept: FAMILY MEDICINE CLINIC | Age: 60
End: 2025-05-01
Payer: COMMERCIAL

## 2025-05-01 VITALS
HEIGHT: 63 IN | HEART RATE: 71 BPM | WEIGHT: 195.8 LBS | OXYGEN SATURATION: 98 % | BODY MASS INDEX: 34.69 KG/M2 | SYSTOLIC BLOOD PRESSURE: 142 MMHG | DIASTOLIC BLOOD PRESSURE: 82 MMHG

## 2025-05-01 DIAGNOSIS — F51.04 PSYCHOPHYSIOLOGICAL INSOMNIA: ICD-10-CM

## 2025-05-01 DIAGNOSIS — Z87.19 HISTORY OF DUODENAL ULCER: ICD-10-CM

## 2025-05-01 DIAGNOSIS — E55.9 VITAMIN D DEFICIENCY: ICD-10-CM

## 2025-05-01 DIAGNOSIS — G43.509 PERSISTENT MIGRAINE AURA WITHOUT CEREBRAL INFARCTION AND WITHOUT STATUS MIGRAINOSUS, NOT INTRACTABLE: ICD-10-CM

## 2025-05-01 DIAGNOSIS — Z23 NEED FOR VACCINATION FOR STREP PNEUMONIAE: ICD-10-CM

## 2025-05-01 DIAGNOSIS — K21.9 GASTROESOPHAGEAL REFLUX DISEASE WITHOUT ESOPHAGITIS: ICD-10-CM

## 2025-05-01 DIAGNOSIS — E04.1 THYROID NODULE: ICD-10-CM

## 2025-05-01 DIAGNOSIS — R73.01 ELEVATED FASTING GLUCOSE: ICD-10-CM

## 2025-05-01 DIAGNOSIS — K58.1 IRRITABLE BOWEL SYNDROME WITH CONSTIPATION: ICD-10-CM

## 2025-05-01 DIAGNOSIS — I10 PRIMARY HYPERTENSION: Primary | ICD-10-CM

## 2025-05-01 DIAGNOSIS — M79.7 FIBROMYALGIA: ICD-10-CM

## 2025-05-01 DIAGNOSIS — K21.00 GASTROESOPHAGEAL REFLUX DISEASE WITH ESOPHAGITIS WITHOUT HEMORRHAGE: ICD-10-CM

## 2025-05-01 DIAGNOSIS — Z91.89 HX OF DRUG OVERDOSE: ICD-10-CM

## 2025-05-01 DIAGNOSIS — R91.1 PULMONARY NODULE: ICD-10-CM

## 2025-05-01 DIAGNOSIS — R11.0 NAUSEA: ICD-10-CM

## 2025-05-01 DIAGNOSIS — F32.9 REACTIVE DEPRESSION: ICD-10-CM

## 2025-05-01 PROBLEM — R07.9 CHEST PAIN: Status: RESOLVED | Noted: 2021-03-22 | Resolved: 2025-05-01

## 2025-05-01 PROBLEM — L81.9 ABNORMAL PIGMENTATION OF SKIN: Status: RESOLVED | Noted: 2019-10-11 | Resolved: 2025-05-01

## 2025-05-01 PROBLEM — F43.20 GRIEF REACTION: Status: RESOLVED | Noted: 2021-07-23 | Resolved: 2025-05-01

## 2025-05-01 PROBLEM — L82.0 INFLAMED SEBORRHEIC KERATOSIS: Status: RESOLVED | Noted: 2020-03-18 | Resolved: 2025-05-01

## 2025-05-01 PROBLEM — R25.2 LEG CRAMPS: Status: RESOLVED | Noted: 2021-03-22 | Resolved: 2025-05-01

## 2025-05-01 PROBLEM — L91.8 SKIN TAG: Status: RESOLVED | Noted: 2019-03-05 | Resolved: 2025-05-01

## 2025-05-01 PROBLEM — L82.1 SEBORRHEIC KERATOSIS: Status: RESOLVED | Noted: 2019-10-11 | Resolved: 2025-05-01

## 2025-05-01 PROBLEM — R10.9 ABDOMINAL PAIN: Status: RESOLVED | Noted: 2019-10-29 | Resolved: 2025-05-01

## 2025-05-01 PROBLEM — R51.9 CHRONIC DAILY HEADACHE: Status: RESOLVED | Noted: 2020-12-01 | Resolved: 2025-05-01

## 2025-05-01 PROBLEM — G43.409 HEADACHE, HEMIPLEGIC MIGRAINE: Status: RESOLVED | Noted: 2021-01-19 | Resolved: 2025-05-01

## 2025-05-01 PROBLEM — R14.0 ABDOMINAL DISTENSION: Status: RESOLVED | Noted: 2019-10-29 | Resolved: 2025-05-01

## 2025-05-01 PROBLEM — R55 SYNCOPE AND COLLAPSE: Status: RESOLVED | Noted: 2020-11-30 | Resolved: 2025-05-01

## 2025-05-01 PROBLEM — L72.3 SEBACEOUS CYST: Status: RESOLVED | Noted: 2023-04-18 | Resolved: 2025-05-01

## 2025-05-01 PROBLEM — L72.9 SKIN CYST: Status: RESOLVED | Noted: 2023-04-05 | Resolved: 2025-05-01

## 2025-05-01 PROBLEM — R07.89 OTHER CHEST PAIN: Status: RESOLVED | Noted: 2018-11-29 | Resolved: 2025-05-01

## 2025-05-01 PROBLEM — K59.01 SLOW TRANSIT CONSTIPATION: Status: RESOLVED | Noted: 2019-10-29 | Resolved: 2025-05-01

## 2025-05-01 PROBLEM — G93.40 ENCEPHALOPATHY: Status: RESOLVED | Noted: 2017-01-16 | Resolved: 2025-05-01

## 2025-05-01 PROBLEM — G51.0 BELL'S PALSY: Status: RESOLVED | Noted: 2021-01-19 | Resolved: 2025-05-01

## 2025-05-01 PROBLEM — M25.551 RIGHT HIP PAIN: Status: RESOLVED | Noted: 2020-03-18 | Resolved: 2025-05-01

## 2025-05-01 PROCEDURE — 99214 OFFICE O/P EST MOD 30 MIN: CPT | Performed by: NURSE PRACTITIONER

## 2025-05-01 PROCEDURE — 3079F DIAST BP 80-89 MM HG: CPT | Performed by: NURSE PRACTITIONER

## 2025-05-01 PROCEDURE — 90677 PCV20 VACCINE IM: CPT | Performed by: NURSE PRACTITIONER

## 2025-05-01 PROCEDURE — 3077F SYST BP >= 140 MM HG: CPT | Performed by: NURSE PRACTITIONER

## 2025-05-01 PROCEDURE — 90471 IMMUNIZATION ADMIN: CPT | Performed by: NURSE PRACTITIONER

## 2025-05-01 RX ORDER — VALSARTAN 160 MG/1
160 TABLET ORAL 2 TIMES DAILY
Qty: 60 TABLET | Refills: 5 | Status: SHIPPED | OUTPATIENT
Start: 2025-05-01

## 2025-05-01 RX ORDER — DULOXETIN HYDROCHLORIDE 20 MG/1
20 CAPSULE, DELAYED RELEASE ORAL 2 TIMES DAILY
Qty: 60 CAPSULE | Refills: 5 | Status: SHIPPED | OUTPATIENT
Start: 2025-05-01

## 2025-05-01 RX ORDER — PANTOPRAZOLE SODIUM 40 MG/1
40 TABLET, DELAYED RELEASE ORAL DAILY
Qty: 90 TABLET | Refills: 3 | Status: SHIPPED | OUTPATIENT
Start: 2025-05-01

## 2025-05-01 RX ORDER — PROMETHAZINE HYDROCHLORIDE 25 MG/1
25 TABLET ORAL EVERY 4 HOURS PRN
Qty: 30 TABLET | Refills: 0 | Status: SHIPPED | OUTPATIENT
Start: 2025-05-01

## 2025-05-01 RX ORDER — POLYETHYLENE GLYCOL 3350 17 G/17G
17 POWDER, FOR SOLUTION ORAL DAILY
Qty: 1530 G | Refills: 5 | Status: SHIPPED | OUTPATIENT
Start: 2025-05-01 | End: 2026-10-23

## 2025-05-01 NOTE — PROGRESS NOTES
Assessment & Plan    Assessment & Plan  1. Hypertension:  - Blood pressure readings have shown improvement compared to previous records.  - Dosage of valsartan will be increased to 160 mg twice daily.  - A renal ultrasound will be ordered to assess the adequacy of blood flow to the kidneys.  - Blood work will be conducted today.  - Advised to monitor blood pressure once or twice daily or as needed based on symptoms.    2. Anxiety:  - Previously on the lowest dose of duloxetine, which was not adjusted.  - Prescription for duloxetine 20 mg twice daily will be provided.  - Advised to start with a once-daily dose for the initial 1 to 2 weeks to prevent potential side effects such as nausea, and then increase to twice daily.    3. Gastroesophageal reflux disease (GERD):  - Advised to avoid ibuprofen due to history of duodenal ulcer.  - Prescription for Protonix will be provided.  - Advised to take it daily for a month until symptoms are controlled, and then as needed. If symptoms recur, resume daily intake.  - Referral to Dr. Barrett, a gastroenterologist, will be made.    4. Irritable bowel syndrome with constipation (IBS-C):  - Prescription for MiraLAX will be provided.    5. Health maintenance:  - Up to date on colonoscopy and breast cancer screening.  - A pneumonia vaccine will be administered today.    Follow-up:  - Follow up in 3 months.  Primary hypertension  -     CBC with Auto Differential  -     Comprehensive Metabolic Panel  -     valsartan (DIOVAN) 160 MG tablet; Take 1 tablet by mouth 2 times daily, Disp-60 tablet, R-5Normal  -     Vascular duplex renal arterial complete; Future  Fibromyalgia  -     DULoxetine (CYMBALTA) 20 MG extended release capsule; Take 1 capsule by mouth 2 times daily, Disp-60 capsule, R-5Normal  Reactive depression  Psychophysiological insomnia  Persistent migraine aura without cerebral infarction and without status migrainosus, not intractable  Gastroesophageal reflux disease with

## 2025-05-02 LAB
ALBUMIN SERPL-MCNC: 4.3 G/DL (ref 3.4–5)
ALBUMIN/GLOB SERPL: 1.7 {RATIO} (ref 1.1–2.2)
ALP SERPL-CCNC: 65 U/L (ref 40–129)
ALT SERPL-CCNC: 12 U/L (ref 10–40)
ANION GAP SERPL CALCULATED.3IONS-SCNC: 10 MMOL/L (ref 3–16)
AST SERPL-CCNC: 15 U/L (ref 15–37)
BASOPHILS # BLD: 0.1 K/UL (ref 0–0.2)
BASOPHILS NFR BLD: 1.2 %
BILIRUB SERPL-MCNC: 0.5 MG/DL (ref 0–1)
BUN SERPL-MCNC: 14 MG/DL (ref 7–20)
CALCIUM SERPL-MCNC: 9.3 MG/DL (ref 8.3–10.6)
CHLORIDE SERPL-SCNC: 109 MMOL/L (ref 99–110)
CO2 SERPL-SCNC: 20 MMOL/L (ref 21–32)
CREAT SERPL-MCNC: 0.9 MG/DL (ref 0.6–1.1)
DEPRECATED RDW RBC AUTO: 13.5 % (ref 12.4–15.4)
EOSINOPHIL # BLD: 0.2 K/UL (ref 0–0.6)
EOSINOPHIL NFR BLD: 2.4 %
EST. AVERAGE GLUCOSE BLD GHB EST-MCNC: 114 MG/DL
GFR SERPLBLD CREATININE-BSD FMLA CKD-EPI: 74 ML/MIN/{1.73_M2}
GLUCOSE SERPL-MCNC: 92 MG/DL (ref 70–99)
HBA1C MFR BLD: 5.6 %
HCT VFR BLD AUTO: 35.8 % (ref 36–48)
HGB BLD-MCNC: 12.4 G/DL (ref 12–16)
LYMPHOCYTES # BLD: 2.2 K/UL (ref 1–5.1)
LYMPHOCYTES NFR BLD: 29 %
MCH RBC QN AUTO: 29.8 PG (ref 26–34)
MCHC RBC AUTO-ENTMCNC: 34.6 G/DL (ref 31–36)
MCV RBC AUTO: 86 FL (ref 80–100)
MONOCYTES # BLD: 0.4 K/UL (ref 0–1.3)
MONOCYTES NFR BLD: 5.1 %
NEUTROPHILS # BLD: 4.6 K/UL (ref 1.7–7.7)
NEUTROPHILS NFR BLD: 62.3 %
PLATELET # BLD AUTO: 285 K/UL (ref 135–450)
PMV BLD AUTO: 7.9 FL (ref 5–10.5)
POTASSIUM SERPL-SCNC: 3.7 MMOL/L (ref 3.5–5.1)
PROT SERPL-MCNC: 6.8 G/DL (ref 6.4–8.2)
RBC # BLD AUTO: 4.17 M/UL (ref 4–5.2)
SODIUM SERPL-SCNC: 139 MMOL/L (ref 136–145)
TSH SERPL DL<=0.005 MIU/L-ACNC: 1.27 UIU/ML (ref 0.27–4.2)
WBC # BLD AUTO: 7.4 K/UL (ref 4–11)

## 2025-05-05 ENCOUNTER — RESULTS FOLLOW-UP (OUTPATIENT)
Dept: FAMILY MEDICINE CLINIC | Age: 60
End: 2025-05-05

## 2025-05-27 DIAGNOSIS — F32.9 REACTIVE DEPRESSION: ICD-10-CM

## 2025-05-27 DIAGNOSIS — F51.04 PSYCHOPHYSIOLOGICAL INSOMNIA: ICD-10-CM

## 2025-05-27 DIAGNOSIS — F41.9 ANXIETY: ICD-10-CM

## 2025-05-27 RX ORDER — TRAZODONE HYDROCHLORIDE 100 MG/1
TABLET ORAL
Qty: 30 TABLET | Refills: 2 | Status: SHIPPED | OUTPATIENT
Start: 2025-05-27

## 2025-05-27 RX ORDER — HYDROXYZINE PAMOATE 25 MG/1
25 CAPSULE ORAL 3 TIMES DAILY PRN
Qty: 90 CAPSULE | Refills: 0 | Status: SHIPPED | OUTPATIENT
Start: 2025-05-27

## 2025-05-27 NOTE — TELEPHONE ENCOUNTER
Patient states she does take the Trazodone but this was taken off her list when she was here the other day. She needs this refilled.   Future appt scheduled not scheduled  Last appt 5/1

## 2025-06-12 ENCOUNTER — HOSPITAL ENCOUNTER (OUTPATIENT)
Age: 60
Discharge: HOME | End: 2025-06-14
Payer: COMMERCIAL

## 2025-06-12 DIAGNOSIS — I10 PRIMARY HYPERTENSION: ICD-10-CM

## 2025-06-12 LAB
VAS AORTA AT RENAL ARTERIES PSV: 71.6 CM/S
VAS AORTA MID AP: 1.76 CM
VAS AORTA MID PSV: 71.6 CM/S
VAS AORTA MID TRANS: 1.71 CM
VAS AORTA PROX AP: 2.23 CM
VAS AORTA PROX PSV: 59.8 CM/S
VAS AORTA PROX TR: 2.23 CM
VAS L RENAL ORIG RI: 0.77
VAS LEFT KIDNEY LENGTH: 10.98 CM
VAS LEFT RENAL DIST EDV: 16.1 CM/S
VAS LEFT RENAL DIST PSV: 55.5 CM/S
VAS LEFT RENAL DIST RAR: 0.78
VAS LEFT RENAL DIST RI: 0.71
VAS LEFT RENAL MID EDV: 21.1 CM/S
VAS LEFT RENAL MID PSV: 73.7 CM/S
VAS LEFT RENAL MID RAR: 1.03
VAS LEFT RENAL MID RI: 0.71
VAS LEFT RENAL ORIGIN EDV: 24.1 CM/S
VAS LEFT RENAL ORIGIN PSV: 103 CM/S
VAS LEFT RENAL ORIGIN RAR: 1.44
VAS LEFT RENAL PROX EDV: 29.2 CM/S
VAS LEFT RENAL PROX PSV: 102 CM/S
VAS LEFT RENAL PROX RAR: 1.42
VAS LEFT RENAL PROX RI: 0.71
VAS LEFT RENAL RAR: 1.44
VAS RIGHT KIDNEY LENGTH: 11.72 CM
VAS RIGHT RENAL DIST EDV: 25.6 CM/S
VAS RIGHT RENAL DIST PSV: 76 CM/S
VAS RIGHT RENAL DIST RAR: 1.06
VAS RIGHT RENAL DIST RI: 0.66
VAS RIGHT RENAL MID EDV: 47.3 CM/S
VAS RIGHT RENAL MID PSV: 157 CM/S
VAS RIGHT RENAL MID RAR: 2.19
VAS RIGHT RENAL MID RI: 0.7
VAS RIGHT RENAL ORIGIN EDV: 20.6 CM/S
VAS RIGHT RENAL ORIGIN PSV: 89.5 CM/S
VAS RIGHT RENAL ORIGIN RAR: 1.25
VAS RIGHT RENAL ORIGIN RI: 0.77
VAS RIGHT RENAL PROX EDV: 20.8 CM/S
VAS RIGHT RENAL PROX PSV: 103 CM/S
VAS RIGHT RENAL PROX RAR: 1.44
VAS RIGHT RENAL PROX RI: 0.8
VAS RIGHT RENAL RAR: 2.19

## 2025-06-12 PROCEDURE — 93975 VASCULAR STUDY: CPT

## 2025-06-30 ENCOUNTER — ANESTHESIA EVENT (OUTPATIENT)
Dept: ENDOSCOPY | Age: 60
End: 2025-06-30
Payer: COMMERCIAL

## 2025-07-01 ENCOUNTER — HOSPITAL ENCOUNTER (OUTPATIENT)
Age: 60
Setting detail: OUTPATIENT SURGERY
Discharge: HOME | End: 2025-07-01
Attending: INTERNAL MEDICINE | Admitting: INTERNAL MEDICINE
Payer: COMMERCIAL

## 2025-07-01 ENCOUNTER — ANESTHESIA (OUTPATIENT)
Dept: ENDOSCOPY | Age: 60
End: 2025-07-01
Payer: COMMERCIAL

## 2025-07-01 VITALS
OXYGEN SATURATION: 100 % | HEIGHT: 63 IN | SYSTOLIC BLOOD PRESSURE: 149 MMHG | WEIGHT: 192 LBS | TEMPERATURE: 97.7 F | RESPIRATION RATE: 16 BRPM | HEART RATE: 62 BPM | DIASTOLIC BLOOD PRESSURE: 85 MMHG | BODY MASS INDEX: 34.02 KG/M2

## 2025-07-01 DIAGNOSIS — Z12.11 SPECIAL SCREENING FOR MALIGNANT NEOPLASMS, COLON: ICD-10-CM

## 2025-07-01 DIAGNOSIS — K21.9 CHRONIC GERD: ICD-10-CM

## 2025-07-01 PROCEDURE — 6360000002 HC RX W HCPCS: Performed by: NURSE ANESTHETIST, CERTIFIED REGISTERED

## 2025-07-01 PROCEDURE — 6360000002 HC RX W HCPCS: Performed by: INTERNAL MEDICINE

## 2025-07-01 PROCEDURE — 2580000003 HC RX 258: Performed by: ANESTHESIOLOGY

## 2025-07-01 PROCEDURE — 88305 TISSUE EXAM BY PATHOLOGIST: CPT

## 2025-07-01 PROCEDURE — 3609010600 HC COLONOSCOPY POLYPECTOMY SNARE/COLD BIOPSY: Performed by: INTERNAL MEDICINE

## 2025-07-01 PROCEDURE — 7100000010 HC PHASE II RECOVERY - FIRST 15 MIN: Performed by: INTERNAL MEDICINE

## 2025-07-01 PROCEDURE — 3609017100 HC EGD: Performed by: INTERNAL MEDICINE

## 2025-07-01 PROCEDURE — 2709999900 HC NON-CHARGEABLE SUPPLY: Performed by: INTERNAL MEDICINE

## 2025-07-01 PROCEDURE — 3700000001 HC ADD 15 MINUTES (ANESTHESIA): Performed by: INTERNAL MEDICINE

## 2025-07-01 PROCEDURE — 3700000000 HC ANESTHESIA ATTENDED CARE: Performed by: INTERNAL MEDICINE

## 2025-07-01 PROCEDURE — 7100000011 HC PHASE II RECOVERY - ADDTL 15 MIN: Performed by: INTERNAL MEDICINE

## 2025-07-01 RX ORDER — ONDANSETRON 2 MG/ML
4 INJECTION INTRAMUSCULAR; INTRAVENOUS EVERY 6 HOURS PRN
Status: DISCONTINUED | OUTPATIENT
Start: 2025-07-01 | End: 2025-07-01

## 2025-07-01 RX ORDER — SODIUM CHLORIDE 0.9 % (FLUSH) 0.9 %
5-40 SYRINGE (ML) INJECTION EVERY 12 HOURS SCHEDULED
Status: DISCONTINUED | OUTPATIENT
Start: 2025-07-01 | End: 2025-07-01 | Stop reason: HOSPADM

## 2025-07-01 RX ORDER — ONDANSETRON 2 MG/ML
4 INJECTION INTRAMUSCULAR; INTRAVENOUS ONCE
Status: COMPLETED | OUTPATIENT
Start: 2025-07-01 | End: 2025-07-01

## 2025-07-01 RX ORDER — NALOXONE HYDROCHLORIDE 0.4 MG/ML
INJECTION, SOLUTION INTRAMUSCULAR; INTRAVENOUS; SUBCUTANEOUS PRN
Status: CANCELLED | OUTPATIENT
Start: 2025-07-01

## 2025-07-01 RX ORDER — OXYCODONE HYDROCHLORIDE 5 MG/1
10 TABLET ORAL PRN
Refills: 0 | Status: CANCELLED | OUTPATIENT
Start: 2025-07-01 | End: 2025-07-01

## 2025-07-01 RX ORDER — SODIUM CHLORIDE 0.9 % (FLUSH) 0.9 %
5-40 SYRINGE (ML) INJECTION PRN
Status: DISCONTINUED | OUTPATIENT
Start: 2025-07-01 | End: 2025-07-01 | Stop reason: HOSPADM

## 2025-07-01 RX ORDER — LIDOCAINE HYDROCHLORIDE 10 MG/ML
0.3 INJECTION, SOLUTION EPIDURAL; INFILTRATION; INTRACAUDAL; PERINEURAL
Status: DISCONTINUED | OUTPATIENT
Start: 2025-07-01 | End: 2025-07-01 | Stop reason: HOSPADM

## 2025-07-01 RX ORDER — LIDOCAINE HYDROCHLORIDE 20 MG/ML
INJECTION, SOLUTION INFILTRATION; PERINEURAL
Status: DISCONTINUED | OUTPATIENT
Start: 2025-07-01 | End: 2025-07-01 | Stop reason: SDUPTHER

## 2025-07-01 RX ORDER — SODIUM CHLORIDE 0.9 % (FLUSH) 0.9 %
5-40 SYRINGE (ML) INJECTION PRN
Status: CANCELLED | OUTPATIENT
Start: 2025-07-01

## 2025-07-01 RX ORDER — SODIUM CHLORIDE 9 MG/ML
INJECTION, SOLUTION INTRAVENOUS PRN
Status: DISCONTINUED | OUTPATIENT
Start: 2025-07-01 | End: 2025-07-01 | Stop reason: HOSPADM

## 2025-07-01 RX ORDER — ONDANSETRON 2 MG/ML
4 INJECTION INTRAMUSCULAR; INTRAVENOUS
Status: CANCELLED | OUTPATIENT
Start: 2025-07-01

## 2025-07-01 RX ORDER — LABETALOL HYDROCHLORIDE 5 MG/ML
10 INJECTION, SOLUTION INTRAVENOUS
Status: CANCELLED | OUTPATIENT
Start: 2025-07-01

## 2025-07-01 RX ORDER — SODIUM CHLORIDE 9 MG/ML
INJECTION, SOLUTION INTRAVENOUS PRN
Status: CANCELLED | OUTPATIENT
Start: 2025-07-01

## 2025-07-01 RX ORDER — OXYCODONE HYDROCHLORIDE 5 MG/1
5 TABLET ORAL PRN
Refills: 0 | Status: CANCELLED | OUTPATIENT
Start: 2025-07-01 | End: 2025-07-01

## 2025-07-01 RX ORDER — PROPOFOL 10 MG/ML
INJECTION, EMULSION INTRAVENOUS
Status: DISCONTINUED | OUTPATIENT
Start: 2025-07-01 | End: 2025-07-01 | Stop reason: SDUPTHER

## 2025-07-01 RX ORDER — MEPERIDINE HYDROCHLORIDE 25 MG/ML
12.5 INJECTION INTRAMUSCULAR; INTRAVENOUS; SUBCUTANEOUS EVERY 5 MIN PRN
Refills: 0 | Status: CANCELLED | OUTPATIENT
Start: 2025-07-01

## 2025-07-01 RX ORDER — SODIUM CHLORIDE, SODIUM LACTATE, POTASSIUM CHLORIDE, CALCIUM CHLORIDE 600; 310; 30; 20 MG/100ML; MG/100ML; MG/100ML; MG/100ML
INJECTION, SOLUTION INTRAVENOUS CONTINUOUS
Status: DISCONTINUED | OUTPATIENT
Start: 2025-07-01 | End: 2025-07-01 | Stop reason: HOSPADM

## 2025-07-01 RX ORDER — SODIUM CHLORIDE 0.9 % (FLUSH) 0.9 %
5-40 SYRINGE (ML) INJECTION EVERY 12 HOURS SCHEDULED
Status: CANCELLED | OUTPATIENT
Start: 2025-07-01

## 2025-07-01 RX ORDER — GLYCOPYRROLATE 0.2 MG/ML
INJECTION INTRAMUSCULAR; INTRAVENOUS
Status: DISCONTINUED | OUTPATIENT
Start: 2025-07-01 | End: 2025-07-01 | Stop reason: SDUPTHER

## 2025-07-01 RX ADMIN — ONDANSETRON 4 MG: 2 INJECTION, SOLUTION INTRAMUSCULAR; INTRAVENOUS at 09:49

## 2025-07-01 RX ADMIN — SODIUM CHLORIDE, POTASSIUM CHLORIDE, SODIUM LACTATE AND CALCIUM CHLORIDE: 600; 310; 30; 20 INJECTION, SOLUTION INTRAVENOUS at 11:16

## 2025-07-01 RX ADMIN — PROPOFOL 450 MG: 10 INJECTION, EMULSION INTRAVENOUS at 11:17

## 2025-07-01 RX ADMIN — LIDOCAINE HYDROCHLORIDE 60 MG: 20 INJECTION, SOLUTION INFILTRATION; PERINEURAL at 11:17

## 2025-07-01 RX ADMIN — GLYCOPYRROLATE 0.15 MG: 0.2 INJECTION, SOLUTION INTRAMUSCULAR; INTRAVENOUS at 11:17

## 2025-07-01 ASSESSMENT — PAIN - FUNCTIONAL ASSESSMENT: PAIN_FUNCTIONAL_ASSESSMENT: NONE - DENIES PAIN

## 2025-07-01 NOTE — ANESTHESIA PRE PROCEDURE
Department of Anesthesiology  Preprocedure Note       Name:  Kamran Coronado   Age:  59 y.o.  :  1965                                          MRN:  [id number]         Date:  2025      Surgeon: Surgeon(s):  Moris Tena DO    Procedure: Procedure(s):  EGD W/ANES.  COLON W/ANES.    Medications prior to admission:   Prior to Admission medications    Medication Sig Start Date End Date Taking? Authorizing Provider   metoprolol succinate (TOPROL XL) 100 MG extended release tablet TAKE (1) TABLET DAILY IN THE AM AND 1 TAB IN THE EVENING 3/6/25  Yes Rita Jaimes APRN - CNP   hydrOXYzine pamoate (VISTARIL) 25 MG capsule Take 1 capsule by mouth 3 times daily as needed for Itching 25   Ashley Pritchard APRN - CNP   traZODone (DESYREL) 100 MG tablet Take 1/2 tablet to 1 tablet by mouth nightly for insomnia 25   Ashley Pritchard APRN - CNP   promethazine (PHENERGAN) 25 MG tablet Take 1 tablet by mouth every 4 hours as needed for Nausea May cause drowsiness  Patient not taking: Reported on 2025   Ashley Pritchard APRN - CNP   valsartan (DIOVAN) 160 MG tablet Take 1 tablet by mouth 2 times daily 25   Ashley Pritchard APRN - CNP   DULoxetine (CYMBALTA) 20 MG extended release capsule Take 1 capsule by mouth 2 times daily 25   Ashley Pritchard APRN - CNP   pantoprazole (PROTONIX) 40 MG tablet Take 1 tablet by mouth daily 25   Ashley Pritchard APRN - CNP   polyethylene glycol (GLYCOLAX) 17 GM/SCOOP powder Take 17 g by mouth daily 5/1/25 10/23/26  Ashley Pritchard APRN - CNP   ibuprofen (ADVIL;MOTRIN) 800 MG tablet TAKE ONE (1) TABLET THREE TIMES DAILY 25   Justine Cruz MD   diclofenac sodium (VOLTAREN) 1 % GEL Apply 2 g topically 4 times daily as needed for Pain (to left breast painful area)  Patient not taking: Reported on 2025   Justine Cruz MD   hydroCHLOROthiazide (HYDRODIURIL) 50 MG tablet Take 1 tablet by mouth

## 2025-07-01 NOTE — H&P
[facility] ENDOSCOPY  Outpatient Procedure H&P    Patient: Kamran Coronado MRN: [id number]     YOB: 1965  Age: 59 y.o.  Sex: female    Unit: [facility] ENDOSCOPY Room/Bed: Room/bed info not found Location: [facility]     Procedure: Procedure(s):  EGD W/ANES.  COLON W/ANES.    Indication:GERD, CRCS  Referring  Physician:        Brief history: Patient states that she has a history of ulcers in the esophagus. She was told that those needed to continually be monitored endoscopically. She had 2 EGDs both of which she woke up in the middle of and was very uncomfortable and refused to have another one performed. She does have heartburn as well as sour brash and chest pain there is no dysphagia. Her Protonix had been increased to 40 mg twice a day even that does not seem to be giving her good relief she drinks 4-5 pops per day she does use tobacco her weight is stable there is no blood in the stool or black or tarry stool although she has some issues with alternating diarrhea and constipation she has a history of IBS-C hypertension and anxiety. She has a family history of heart disease in her mother leukemia and diabetes in her father.  She tells me she just received a letter from her previous gastroenterologist stating that she is due for a colonoscopy.    Nurses past medical history notes reviewed and agreed.   Medications reviewed.    Allergies: Benadryl [diphenhydramine], Topamax [topiramate], Vitamin c [ascorbic acid], Lyrica [pregabalin], and Penicillins     Allergies noted: Yes     Past Medical History:   Past Medical History:   Diagnosis Date    Acute encephalopathy 10/14/2016    Ankle fracture     Anxiety     Arthritis     Awareness under anesthesia     Bell's palsy 01/19/2021    Benzodiazepine abuse (HCC)     Cerebral artery occlusion with cerebral infarction (HCC)     mini stroke cused by severe migraine      Depression     Drug induced headache 09/17/2013    Fibromyalgia     Finger pain,

## 2025-07-01 NOTE — ANESTHESIA POSTPROCEDURE EVALUATION
Department of Anesthesiology  Postprocedure Note    Patient: Kamran Coronado  MRN: [id number]  YOB: 1965  Date of evaluation: 7/1/2025    Procedure Summary       Date: 07/01/25 Room / Location: [address]    Anesthesia Start: 1109 Anesthesia Stop: 1204    Procedures:       EGD W/ANES.      COLONOSCOPY POLYPECTOMY SNARE/BIOPSY Diagnosis:       Special screening for malignant neoplasms, colon      Chronic GERD      (Special screening for malignant neoplasms, colon [Z12.11])      (Chronic GERD [K21.9])    Surgeons: Moris Tena DO Responsible Provider: Azael Avalos MD    Anesthesia Type: TIVA ASA Status: 3            Anesthesia Type: TIVA    Alexia Phase I: Alexia Score: 10    Alexia Phase II: Alexia Score: 10    Anesthesia Post Evaluation    Patient location during evaluation: PACU  Patient participation: complete - patient participated  Level of consciousness: awake and alert  Airway patency: patent  Nausea & Vomiting: no nausea and no vomiting  Cardiovascular status: blood pressure returned to baseline  Respiratory status: acceptable  Hydration status: euvolemic  Comments: VSS on transfer to phase 2 recovery.  No anesthetic complications.  Pain management: adequate    No notable events documented.

## 2025-07-01 NOTE — DISCHARGE INSTRUCTIONS
PATIENT INSTRUCTIONS  POST-SEDATION    Kamran Coronado          IMMEDIATELY FOLLOWING PROCEDURE:    Do not drive or operate machinery for the first twenty four hours after surgery.     Do not make any important decisions for twenty four hours after surgery or while taking narcotic pain medications or sedatives.     You should NOT BE LEFT UNATTENDED OR ALONE. A responsible adult should be with you for the rest of the day of your procedure and also during the night for your protection and safety.    You may experience some light headedness. Rest at home with activity as tolerated. You may not need to go to bed, but it is important to rest for the next 24 hours. You should not engage in athletic sports such as basketball, volleyball, jogging, skating, or activities requiring refined motor skills for 24 hours.   If you develop intractable nausea and vomiting or a severe headache please notify your doctor immediately.   You are not expected to have any fever, but if you feel warm, take your temperature. If you have a fever 101 degrees or higher, call your doctor.     If you have had an Endoscopy:   *Eat lightly for your first meal and gradually resume your normal / prescribed diet. DO NOT eat or drink until your gag reflex returns.   *If you have a sore throat you may use lozenges, or salt water gargles.   *If you have had a colonoscopy, do not expect a normal bowel movement for approximately three days due to the cleansing of the large intestine prior to colonoscopy.    ONCE YOU ARE HOME, IF YOU SHOULD HAVE:  Difficulty in breathing, persistent nausea or vomiting, bleeding you feel is excessive, or pain that is unusual, increased abdominal bloating, or any swelling, fever / chills, call your physician. If you cannot contact your physician, but feel that your signs and symptoms need a physician's attention, go to the Emergency Department.      FOLLOW-UP:    Please follow up with your primary care provider as scheduled or

## 2025-07-08 DIAGNOSIS — F41.9 ANXIETY: ICD-10-CM

## 2025-07-08 RX ORDER — HYDROXYZINE PAMOATE 25 MG/1
CAPSULE ORAL
Qty: 90 CAPSULE | Refills: 0 | Status: SHIPPED | OUTPATIENT
Start: 2025-07-08

## 2025-07-09 DIAGNOSIS — J18.9 COMMUNITY ACQUIRED PNEUMONIA, UNSPECIFIED LATERALITY: ICD-10-CM

## 2025-07-09 DIAGNOSIS — J01.90 ACUTE BACTERIAL SINUSITIS: ICD-10-CM

## 2025-07-09 DIAGNOSIS — B96.89 ACUTE BACTERIAL SINUSITIS: ICD-10-CM

## 2025-07-09 RX ORDER — IBUPROFEN 800 MG/1
TABLET, FILM COATED ORAL
Qty: 90 TABLET | Refills: 0 | Status: SHIPPED | OUTPATIENT
Start: 2025-07-09

## 2025-07-23 ENCOUNTER — OFFICE VISIT (OUTPATIENT)
Dept: FAMILY MEDICINE CLINIC | Age: 60
End: 2025-07-23
Payer: COMMERCIAL

## 2025-07-23 VITALS — HEART RATE: 95 BPM | DIASTOLIC BLOOD PRESSURE: 82 MMHG | OXYGEN SATURATION: 96 % | SYSTOLIC BLOOD PRESSURE: 168 MMHG

## 2025-07-23 DIAGNOSIS — D48.9 NEOPLASM OF UNCERTAIN BEHAVIOR: ICD-10-CM

## 2025-07-23 DIAGNOSIS — M54.41 CHRONIC RIGHT-SIDED LOW BACK PAIN WITH RIGHT-SIDED SCIATICA: Primary | ICD-10-CM

## 2025-07-23 DIAGNOSIS — R30.0 DYSURIA: ICD-10-CM

## 2025-07-23 DIAGNOSIS — F41.9 ANXIETY: ICD-10-CM

## 2025-07-23 DIAGNOSIS — M79.7 FIBROMYALGIA: ICD-10-CM

## 2025-07-23 DIAGNOSIS — G89.29 CHRONIC RIGHT-SIDED LOW BACK PAIN WITH RIGHT-SIDED SCIATICA: Primary | ICD-10-CM

## 2025-07-23 DIAGNOSIS — R39.9 UTI SYMPTOMS: ICD-10-CM

## 2025-07-23 LAB
BILIRUBIN, POC: NORMAL
BLOOD URINE, POC: NORMAL
CLARITY, POC: CLEAR
COLOR, POC: YELLOW
GLUCOSE URINE, POC: NORMAL MG/DL
KETONES, POC: NORMAL MG/DL
LEUKOCYTE EST, POC: NORMAL
NITRITE, POC: NORMAL
PH, POC: 7
PROTEIN, POC: NORMAL MG/DL
SPECIFIC GRAVITY, POC: 1.01
UROBILINOGEN, POC: 0.2 MG/DL

## 2025-07-23 PROCEDURE — 99214 OFFICE O/P EST MOD 30 MIN: CPT | Performed by: NURSE PRACTITIONER

## 2025-07-23 PROCEDURE — 3079F DIAST BP 80-89 MM HG: CPT | Performed by: NURSE PRACTITIONER

## 2025-07-23 PROCEDURE — 3077F SYST BP >= 140 MM HG: CPT | Performed by: NURSE PRACTITIONER

## 2025-07-23 PROCEDURE — 81002 URINALYSIS NONAUTO W/O SCOPE: CPT | Performed by: NURSE PRACTITIONER

## 2025-07-23 RX ORDER — DULOXETIN HYDROCHLORIDE 30 MG/1
30 CAPSULE, DELAYED RELEASE ORAL 2 TIMES DAILY
Qty: 60 CAPSULE | Refills: 2 | Status: SHIPPED | OUTPATIENT
Start: 2025-07-23

## 2025-07-23 RX ORDER — PREDNISONE 20 MG/1
20 TABLET ORAL DAILY
Qty: 10 TABLET | Refills: 0 | Status: SHIPPED | OUTPATIENT
Start: 2025-07-23 | End: 2025-08-02

## 2025-07-23 RX ORDER — NITROFURANTOIN 25; 75 MG/1; MG/1
100 CAPSULE ORAL 2 TIMES DAILY
Qty: 14 CAPSULE | Refills: 0 | Status: SHIPPED | OUTPATIENT
Start: 2025-07-23 | End: 2025-07-30

## 2025-07-23 RX ORDER — BACLOFEN 10 MG/1
10 TABLET ORAL 3 TIMES DAILY PRN
Qty: 30 TABLET | Refills: 1 | Status: SHIPPED | OUTPATIENT
Start: 2025-07-23

## 2025-07-23 RX ORDER — PHENAZOPYRIDINE HYDROCHLORIDE 100 MG/1
100 TABLET, FILM COATED ORAL 3 TIMES DAILY PRN
Qty: 6 TABLET | Refills: 0 | Status: SHIPPED | OUTPATIENT
Start: 2025-07-23 | End: 2025-07-25

## 2025-07-23 NOTE — PROGRESS NOTES
Assessment & Plan    Assessment & Plan  1. Sciatica  - Symptoms suggest sciatica, with pain radiating from the lower back to the buttock and down the leg  - X-ray of the lumbar spine ordered  - Prednisone and muscle relaxers prescribed  - Advised to apply ice for 20 minutes at a time    2. Urinary Tract Infection (UTI)  - Urine test showed presence of leukocytes and a small amount of blood  - Urine culture will be sent for analysis  - Antibiotics prescribed    3. Irregular skin lesion on abdomen  - Irregular skin lesion noted in the left lower abd area  - Referral to dermatology for further evaluation    4. Medication management  - Dosage of duloxetine increased  - Advised to follow up in about a month to assess effectiveness of the increased dose    Follow-up  - Follow up in about a month for duloxetine effectiveness  Chronic right-sided low back pain with right-sided sciatica  -     baclofen (LIORESAL) 10 MG tablet; Take 1 tablet by mouth 3 times daily as needed (Muscle spasms) Do not drive or operate machinery while taking, Disp-30 tablet, R-1Normal  -     XR LUMBAR SPINE (2-3 VIEWS); Future  -     predniSONE (DELTASONE) 20 MG tablet; Take 1 tablet by mouth daily for 10 days, Disp-10 tablet, R-0Normal  Dysuria  -     nitrofurantoin, macrocrystal-monohydrate, (MACROBID) 100 MG capsule; Take 1 capsule by mouth 2 times daily for 7 days, Disp-14 capsule, R-0Normal  -     phenazopyridine (PYRIDIUM) 100 MG tablet; Take 1 tablet by mouth 3 times daily as needed for Pain, Disp-6 tablet, R-0Normal  Neoplasm of uncertain behavior  -     [facility] - Maricarmen Alejo PA-C, [facility]  Fibromyalgia  -     DULoxetine (CYMBALTA) 30 MG extended release capsule; Take 1 capsule by mouth 2 times daily, Disp-60 capsule, R-2Normal         Subjective   History of Present Illness  The patient is a 59-year-old white female who presents today with complaints of swelling of bilateral lower extremities and low back pain

## 2025-07-25 ENCOUNTER — TELEPHONE (OUTPATIENT)
Dept: FAMILY MEDICINE CLINIC | Age: 60
End: 2025-07-25

## 2025-07-25 NOTE — TELEPHONE ENCOUNTER
Patient was seen Wednesday by Inna for sciatic pain and kidney infection. She states she hasn't been able to go back to work and is hoping she can by Monday. Is it okay to give work excuse for 07/23-07/25?

## 2025-07-28 ENCOUNTER — TELEPHONE (OUTPATIENT)
Dept: FAMILY MEDICINE CLINIC | Age: 60
End: 2025-07-28

## 2025-07-28 ENCOUNTER — HOSPITAL ENCOUNTER (OUTPATIENT)
Dept: GENERAL RADIOLOGY | Age: 60
Discharge: HOME | End: 2025-07-28
Payer: COMMERCIAL

## 2025-07-28 ENCOUNTER — HOSPITAL ENCOUNTER (OUTPATIENT)
Age: 60
Discharge: HOME | End: 2025-07-28
Payer: COMMERCIAL

## 2025-07-28 DIAGNOSIS — G89.29 CHRONIC RIGHT-SIDED LOW BACK PAIN WITH RIGHT-SIDED SCIATICA: ICD-10-CM

## 2025-07-28 DIAGNOSIS — M54.41 CHRONIC RIGHT-SIDED LOW BACK PAIN WITH RIGHT-SIDED SCIATICA: ICD-10-CM

## 2025-07-28 PROCEDURE — 72100 X-RAY EXAM L-S SPINE 2/3 VWS: CPT

## 2025-07-28 NOTE — TELEPHONE ENCOUNTER
Pt was in the office.   She states that her sciatica is no better.   If she doesn't take the muscle relaxer, it really hurts.  Can't take them during the day due to drowsiness.   Toes are tingling, feet are numb, pain moving to the left side of her back.   Not sure if she has a kidney stone or what.   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   Not sure how she's going to do that.    Please advise.

## 2025-07-29 NOTE — TELEPHONE ENCOUNTER
Waiting on x-ray results. Recommend she remain off work for treatment. Pending x-ray results, she will likely need MRI given her symptoms.

## 2025-07-31 DIAGNOSIS — M51.362 DEGENERATION OF INTERVERTEBRAL DISC OF LUMBAR REGION WITH DISCOGENIC BACK PAIN AND LOWER EXTREMITY PAIN: ICD-10-CM

## 2025-07-31 DIAGNOSIS — M54.41 CHRONIC RIGHT-SIDED LOW BACK PAIN WITH RIGHT-SIDED SCIATICA: Primary | ICD-10-CM

## 2025-07-31 DIAGNOSIS — G89.29 CHRONIC RIGHT-SIDED LOW BACK PAIN WITH RIGHT-SIDED SCIATICA: Primary | ICD-10-CM

## 2025-08-01 DIAGNOSIS — M54.41 CHRONIC RIGHT-SIDED LOW BACK PAIN WITH RIGHT-SIDED SCIATICA: Primary | ICD-10-CM

## 2025-08-01 DIAGNOSIS — G89.29 CHRONIC RIGHT-SIDED LOW BACK PAIN WITH RIGHT-SIDED SCIATICA: ICD-10-CM

## 2025-08-01 DIAGNOSIS — M54.41 CHRONIC RIGHT-SIDED LOW BACK PAIN WITH RIGHT-SIDED SCIATICA: ICD-10-CM

## 2025-08-01 DIAGNOSIS — G89.29 CHRONIC RIGHT-SIDED LOW BACK PAIN WITH RIGHT-SIDED SCIATICA: Primary | ICD-10-CM

## 2025-08-01 RX ORDER — BACLOFEN 10 MG/1
TABLET ORAL
Qty: 30 TABLET | Refills: 1 | OUTPATIENT
Start: 2025-08-01

## 2025-08-04 ENCOUNTER — TELEPHONE (OUTPATIENT)
Dept: FAMILY MEDICINE CLINIC | Age: 60
End: 2025-08-04

## 2025-08-19 DIAGNOSIS — F51.04 PSYCHOPHYSIOLOGICAL INSOMNIA: ICD-10-CM

## 2025-08-19 DIAGNOSIS — G89.29 CHRONIC RIGHT-SIDED LOW BACK PAIN WITH RIGHT-SIDED SCIATICA: ICD-10-CM

## 2025-08-19 DIAGNOSIS — F32.9 REACTIVE DEPRESSION: ICD-10-CM

## 2025-08-19 DIAGNOSIS — M54.41 CHRONIC RIGHT-SIDED LOW BACK PAIN WITH RIGHT-SIDED SCIATICA: ICD-10-CM

## 2025-08-19 RX ORDER — BACLOFEN 10 MG/1
TABLET ORAL
Qty: 30 TABLET | Refills: 1 | Status: SHIPPED | OUTPATIENT
Start: 2025-08-19

## 2025-08-19 RX ORDER — TRAZODONE HYDROCHLORIDE 100 MG/1
TABLET ORAL
Qty: 30 TABLET | Refills: 2 | Status: SHIPPED | OUTPATIENT
Start: 2025-08-19

## 2025-09-03 ENCOUNTER — HOSPITAL ENCOUNTER (OUTPATIENT)
Dept: PHYSICAL THERAPY | Age: 60
Setting detail: THERAPIES SERIES
Discharge: HOME | End: 2025-09-03
Payer: COMMERCIAL

## 2025-09-03 PROCEDURE — 97110 THERAPEUTIC EXERCISES: CPT

## 2025-09-03 PROCEDURE — 97140 MANUAL THERAPY 1/> REGIONS: CPT

## 2025-09-03 PROCEDURE — 97161 PT EVAL LOW COMPLEX 20 MIN: CPT

## (undated) DEVICE — GOWN SIRUS NONREIN XL W/TWL: Brand: MEDLINE INDUSTRIES, INC.

## (undated) DEVICE — FORCEPS ENDOSCP BX L230CM DIA28MM ALGTR CUP SPEC RETRV GI

## (undated) DEVICE — ENDO CARRY-ON PROCEDURE KIT INCLUDES SUCTION TUBING, LUBRICANT, GAUZE, BIOHAZARD STICKER, TRANSPORT PAD AND INTERCEPT BEDSIDE KIT.: Brand: ENDO CARRY-ON PROCEDURE KIT

## (undated) DEVICE — ENDOSCOPIC KIT 2 12 FT OP4 DE2 GWN SYR

## (undated) DEVICE — ELECTRODE PT RET AD L9FT HI MOIST COND ADH HYDRGEL CORDED

## (undated) DEVICE — NEEDLE HYPO 25GA L1.5IN BLU POLYPR HUB S STL REG BVL STR

## (undated) DEVICE — PACK,UNIVERSAL,SPLIT,II,AURORA: Brand: MEDLINE

## (undated) DEVICE — ELECTRODE ECG MONITR FOAM TEAR DROP ADLT RED

## (undated) DEVICE — TRAP POLYP ETRAP

## (undated) DEVICE — CONMED SCOPE SAVER BITE BLOCK, 20X27 MM: Brand: SCOPE SAVER

## (undated) DEVICE — APPLICATOR MEDICATED 26 CC SOLUTION HI LT ORNG CHLORAPREP

## (undated) DEVICE — MAJOR SET UP PK

## (undated) DEVICE — CANNULA NSL 13FT TUBE AD ETCO2 DIV SAMP M

## (undated) DEVICE — YANKAUER,BULB TIP,W/O VENT,RIGID,STERILE: Brand: MEDLINE

## (undated) DEVICE — TIP SUCT DIA12FR W STYL CTRL VENT DISPOSABLE FRAZ

## (undated) DEVICE — CANNULA,OXY,ADULT,SUPERSOFT,W/7'TUB,SC: Brand: MEDLINE INDUSTRIES, INC.

## (undated) DEVICE — SOLUTION IV IRRIG 500ML 0.9% SODIUM CHL 2F7123

## (undated) DEVICE — Device: Brand: SINGLE USE ELECTROSURGICAL SNARE SD-400

## (undated) DEVICE — GLOVE ORANGE PI 8 1/2   MSG9085

## (undated) DEVICE — SYRINGE MED 10ML LUERLOCK TIP W/O SFTY DISP

## (undated) DEVICE — GOWN,AURORA,NONREINF,RAGLAN,XXL,STERILE: Brand: MEDLINE

## (undated) DEVICE — SUTURE VCRL SZ 3-0 L18IN ABSRB UD L26MM SH 1/2 CIR J864D

## (undated) DEVICE — Z INACTIVE USE 2855096 SPONGE GZ W4XL4IN 8 PLY 100% COT

## (undated) DEVICE — ELECTRODE,RADIOTRANSLUCENT,FOAM,3PK: Brand: MEDLINE